# Patient Record
Sex: FEMALE | Race: WHITE | NOT HISPANIC OR LATINO | Employment: OTHER | URBAN - METROPOLITAN AREA
[De-identification: names, ages, dates, MRNs, and addresses within clinical notes are randomized per-mention and may not be internally consistent; named-entity substitution may affect disease eponyms.]

---

## 2017-01-10 ENCOUNTER — TELEPHONE (OUTPATIENT)
Dept: FAMILY MEDICINE | Facility: CLINIC | Age: 72
End: 2017-01-10

## 2017-01-10 RX ORDER — PIOGLITAZONEHYDROCHLORIDE 30 MG/1
30 TABLET ORAL DAILY
Qty: 90 TABLET | Refills: 1 | Status: SHIPPED | OUTPATIENT
Start: 2017-01-10 | End: 2017-06-29 | Stop reason: SDUPTHER

## 2017-01-10 NOTE — TELEPHONE ENCOUNTER
----- Message from Cesar Chan sent at 1/9/2017  8:53 AM CST -----  Contact: Self  Needs to discuss a change of medicine through insurance company. Insurance wants to replace Rx Januvia 100mg tablets to Rx Pioglipazone. First must be verified through [employer]. Please call [phone]. Thank You!

## 2017-01-16 ENCOUNTER — TELEPHONE (OUTPATIENT)
Dept: FAMILY MEDICINE | Facility: CLINIC | Age: 72
End: 2017-01-16

## 2017-01-16 NOTE — TELEPHONE ENCOUNTER
----- Message from Miranda Hall sent at 1/16/2017  3:25 PM CST -----  Contact: patient  Patient called stated that she has flu symptoms requesting a Rx. Please call back to advise at [phone]. Thanks,

## 2017-01-17 ENCOUNTER — OFFICE VISIT (OUTPATIENT)
Dept: FAMILY MEDICINE | Facility: CLINIC | Age: 72
End: 2017-01-17
Payer: MEDICARE

## 2017-01-17 VITALS
WEIGHT: 162.25 LBS | DIASTOLIC BLOOD PRESSURE: 60 MMHG | OXYGEN SATURATION: 98 % | BODY MASS INDEX: 29.86 KG/M2 | SYSTOLIC BLOOD PRESSURE: 114 MMHG | HEART RATE: 87 BPM | TEMPERATURE: 98 F | HEIGHT: 62 IN

## 2017-01-17 DIAGNOSIS — K52.9 GASTROENTERITIS: ICD-10-CM

## 2017-01-17 DIAGNOSIS — K52.9 GASTROENTERITIS: Primary | ICD-10-CM

## 2017-01-17 DIAGNOSIS — J06.9 UPPER RESPIRATORY TRACT INFECTION, UNSPECIFIED TYPE: ICD-10-CM

## 2017-01-17 PROCEDURE — 99999 PR PBB SHADOW E&M-EST. PATIENT-LVL III: CPT | Mod: PBBFAC,,, | Performed by: INTERNAL MEDICINE

## 2017-01-17 PROCEDURE — 3074F SYST BP LT 130 MM HG: CPT | Mod: S$GLB,,, | Performed by: INTERNAL MEDICINE

## 2017-01-17 PROCEDURE — 1159F MED LIST DOCD IN RCRD: CPT | Mod: S$GLB,,, | Performed by: INTERNAL MEDICINE

## 2017-01-17 PROCEDURE — 1157F ADVNC CARE PLAN IN RCRD: CPT | Mod: S$GLB,,, | Performed by: INTERNAL MEDICINE

## 2017-01-17 PROCEDURE — 99214 OFFICE O/P EST MOD 30 MIN: CPT | Mod: S$GLB,,, | Performed by: INTERNAL MEDICINE

## 2017-01-17 PROCEDURE — 3078F DIAST BP <80 MM HG: CPT | Mod: S$GLB,,, | Performed by: INTERNAL MEDICINE

## 2017-01-17 PROCEDURE — 1126F AMNT PAIN NOTED NONE PRSNT: CPT | Mod: S$GLB,,, | Performed by: INTERNAL MEDICINE

## 2017-01-17 PROCEDURE — 1160F RVW MEDS BY RX/DR IN RCRD: CPT | Mod: S$GLB,,, | Performed by: INTERNAL MEDICINE

## 2017-01-17 RX ORDER — ONDANSETRON 4 MG/1
4 TABLET, ORALLY DISINTEGRATING ORAL EVERY 4 HOURS PRN
Qty: 15 TABLET | Refills: 0 | Status: SHIPPED | OUTPATIENT
Start: 2017-01-17 | End: 2017-06-29

## 2017-01-17 RX ORDER — CEFUROXIME AXETIL 500 MG/1
500 TABLET ORAL 2 TIMES DAILY
Qty: 20 TABLET | Refills: 0 | Status: SHIPPED | OUTPATIENT
Start: 2017-01-17 | End: 2017-01-27

## 2017-01-17 RX ORDER — PANTOPRAZOLE SODIUM 40 MG/1
40 TABLET, DELAYED RELEASE ORAL DAILY
Qty: 30 TABLET | Refills: 0 | Status: SHIPPED | OUTPATIENT
Start: 2017-01-17 | End: 2017-06-29

## 2017-01-17 NOTE — MR AVS SNAPSHOT
[facility]  [address]  [facility] [address]  Phone: [phone]  Fax: [phone]                  Shirlene Ortiz   2017 2:10 PM   Office Visit    Description:  Female : 1945   Provider:  Hiram Mendiola MD   Department:  [facility]           Reason for Visit     Cough     Nasal Congestion           Diagnoses this Visit        Comments    Gastroenteritis    -  Primary     Upper respiratory tract infection, unspecified type                To Do List           Future Appointments        Provider Department Dept Phone    3/15/2017 9:40 AM LAB, [place] [facility] [phone]    3/22/2017 7:50 AM Hiram Mendiola MD [facility] [phone]      Goals (5 Years of Data)     None       These Medications        Disp Refills Start End    cefUROXime (CEFTIN) 500 MG tablet 20 tablet 0 2017    Take 1 tablet (500 mg total) by mouth 2 (two) times daily. - Oral    Pharmacy: [id number] - [facility] [address] Ph #: [phone]       ondansetron (ZOFRAN-ODT) 4 MG TbDL 15 tablet 0 2017     Take 1 tablet (4 mg total) by mouth every 4 (four) hours as needed (nausea, vomiting). - Oral    Pharmacy: [id number] - [place], [place] - [address] Ph #: [phone]       pantoprazole (PROTONIX) 40 MG tablet 30 tablet 0 2017     Take 1 tablet (40 mg total) by mouth once daily. - Oral    Pharmacy: [id number] - [place], [place] - [address] Ph #: [phone]       ranitidine (ZANTAC) 300 MG tablet 14 tablet 0 2017    Take 1 tablet (300 mg total) by mouth every evening. - Oral    Pharmacy: [id number] - [place], [place] - [address] Ph #: [phone]         [facility]     [facility] On Call Nurse Care Line -  Assistance  Registered nurses in the [facility] provide clinical advisement, health education,  appointment booking, and other advisory services.  Call for this free service at [phone].             Medications           Message regarding Medications     Verify the changes and/or additions to your medication regime listed below are the same as discussed with your clinician today.  If any of these changes or additions are incorrect, please notify your healthcare provider.        START taking these NEW medications        Refills    cefUROXime (CEFTIN) 500 MG tablet 0    Sig: Take 1 tablet (500 mg total) by mouth 2 (two) times daily.    Class: Normal    Route: Oral    ondansetron (ZOFRAN-ODT) 4 MG TbDL 0    Sig: Take 1 tablet (4 mg total) by mouth every 4 (four) hours as needed (nausea, vomiting).    Class: Normal    Route: Oral    pantoprazole (PROTONIX) 40 MG tablet 0    Sig: Take 1 tablet (40 mg total) by mouth once daily.    Class: Normal    Route: Oral    ranitidine (ZANTAC) 300 MG tablet 0    Sig: Take 1 tablet (300 mg total) by mouth every evening.    Class: Normal    Route: Oral           Verify that the below list of medications is an accurate representation of the medications you are currently taking.  If none reported, the list may be blank. If incorrect, please contact your healthcare provider. Carry this list with you in case of emergency.           Current Medications     amlodipine (NORVASC) 10 MG tablet Take 1 tablet (10 mg total) by mouth once daily. If pressure drops too much, then take 1/2 tablet (5 mg) daily    aspirin (ECOTRIN) 325 MG EC tablet Take 325 mg by mouth once daily.      atorvastatin (LIPITOR) 40 MG tablet Take 1 tablet (40 mg total) by mouth once daily.    blood sugar diagnostic Strp 1 strip by Misc.(Non-Drug; Combo Route) route once daily. One touch ultra Blue Test Strips    clopidogrel (PLAVIX) 75 mg tablet Take 1 tablet (75 mg total) by mouth once.    fenofibrate 160 MG Tab Take 1 tablet (160 mg total) by mouth once daily.    glipiZIDE (GLUCOTROL) 10 MG tablet Take 1  "tablet (10 mg total) by mouth 2 (two) times daily.    hydrochlorothiazide (MICROZIDE) 12.5 mg capsule Take 1 capsule (12.5 mg total) by mouth once daily.    lisinopril (PRINIVIL,ZESTRIL) 20 MG tablet Take 1 tablet (20 mg total) by mouth once daily.    metformin (GLUCOPHAGE) 1000 MG tablet Take 1 tablet (1,000 mg total) by mouth 2 (two) times daily with meals.    pioglitazone (ACTOS) 30 MG tablet Take 1 tablet (30 mg total) by mouth once daily.    tizanidine (ZANAFLEX) 2 MG tablet 1 po q4h prn back pain    cefUROXime (CEFTIN) 500 MG tablet Take 1 tablet (500 mg total) by mouth 2 (two) times daily.    ondansetron (ZOFRAN-ODT) 4 MG TbDL Take 1 tablet (4 mg total) by mouth every 4 (four) hours as needed (nausea, vomiting).    pantoprazole (PROTONIX) 40 MG tablet Take 1 tablet (40 mg total) by mouth once daily.    ranitidine (ZANTAC) 300 MG tablet Take 1 tablet (300 mg total) by mouth every evening.           Clinical Reference Information           Vital Signs - Last Recorded  Most recent update: 1/17/2017  2:21 PM by Ashly Vogel LPN    BP Pulse Temp Ht Wt SpO2    114/60 87 97.6 °F (36.4 °C) 5' 2" (1.575 m) 73.6 kg (162 lb 4.1 oz) 98%    BMI                29.68 kg/m2          Blood Pressure          Most Recent Value    BP  114/60      Allergies as of 1/17/2017     No Known Allergies      Immunizations Administered on Date of Encounter - 1/17/2017     None      Smoking Cessation     If you would like to quit smoking:   You may be eligible for free services if you are a [place] resident and started smoking cigarettes before September 1, 1988.  Call the Smoking Cessation Trust (SCT) toll free at (274) 643-4011 or (793) 032-4312.   Call 6-800-QUIT-NOW if you do not meet the above criteria.            "

## 2017-01-24 DIAGNOSIS — Z79.4 TYPE 2 DIABETES MELLITUS WITHOUT COMPLICATION, WITH LONG-TERM CURRENT USE OF INSULIN: ICD-10-CM

## 2017-01-24 DIAGNOSIS — E11.9 TYPE 2 DIABETES MELLITUS WITHOUT COMPLICATION, WITH LONG-TERM CURRENT USE OF INSULIN: ICD-10-CM

## 2017-01-24 NOTE — TELEPHONE ENCOUNTER
----- Message from Trae Echevarria sent at 1/23/2017  3:21 PM CST -----  Contact: same  Patient called in and stated that [employer] called her and stated they have not been able to get in touch with Dr. Mendiola about filling her Lighttouch test strips.      [facility] - [place], [place] - [address]  [address] [facility] [address]  Phone: [phone] Fax: [phone]    Patient call back number is [phone]

## 2017-01-24 NOTE — TELEPHONE ENCOUNTER
I called the patient and I am waiting for her to give me the number to [facility], for test strips. Awaiting call

## 2017-02-14 ENCOUNTER — CLINICAL SUPPORT (OUTPATIENT)
Dept: CARDIOLOGY | Facility: CLINIC | Age: 72
End: 2017-02-14
Payer: MEDICARE

## 2017-02-14 DIAGNOSIS — I49.5 SINUS NODE DYSFUNCTION: ICD-10-CM

## 2017-02-14 DIAGNOSIS — Z95.0 PACEMAKER: Primary | ICD-10-CM

## 2017-02-14 DIAGNOSIS — Z95.0 PACEMAKER: ICD-10-CM

## 2017-02-14 PROCEDURE — 93280 PM DEVICE PROGR EVAL DUAL: CPT | Mod: S$GLB,,, | Performed by: INTERNAL MEDICINE

## 2017-03-08 ENCOUNTER — PATIENT OUTREACH (OUTPATIENT)
Dept: ADMINISTRATIVE | Facility: HOSPITAL | Age: 72
End: 2017-03-08

## 2017-03-16 ENCOUNTER — PATIENT OUTREACH (OUTPATIENT)
Dept: ADMINISTRATIVE | Facility: HOSPITAL | Age: 72
End: 2017-03-16

## 2017-03-16 NOTE — LETTER
March 16, 2017    Shirlene Ortiz  [address] [facility] [address]             [facility]  [address]  [facility] [address]  Phone: [phone] Dear Mrs. Ortiz:    We have tried to reach you by mikehart unsuccessfully.    [facility] is committed to your overall health.  To help you get the most out of each of your visits, we will review your information to make sure you are up to date on all of your recommended tests and/or procedures.       Dr. Mendiola         has found that you may be due for:     Tetanus immunization   Pneumonia immunization   Annual Dilated Eye Exam   Osteoporosis screening (DEXA SCAN)     Please bring your home blood pressure machine with you so that we may test for accuracy.     If you have had any of the above done at another facility, please bring the records or information with you so that your record at [facility] will be complete.      If you are currently taking medication, please bring it with you to your appointment for review.     Sincerely,  Radha Rodriguez  Clinical Care Coordinator  [facility] [address] [place] [address]  Phone: [phone]   Fax: [phone]

## 2017-03-22 ENCOUNTER — HOSPITAL ENCOUNTER (OUTPATIENT)
Dept: RADIOLOGY | Facility: HOSPITAL | Age: 72
Discharge: HOME | End: 2017-03-22
Attending: INTERNAL MEDICINE
Payer: MEDICARE

## 2017-03-22 ENCOUNTER — OFFICE VISIT (OUTPATIENT)
Dept: FAMILY MEDICINE | Facility: CLINIC | Age: 72
End: 2017-03-22
Payer: MEDICARE

## 2017-03-22 VITALS
OXYGEN SATURATION: 95 % | SYSTOLIC BLOOD PRESSURE: 124 MMHG | BODY MASS INDEX: 30.31 KG/M2 | HEART RATE: 86 BPM | DIASTOLIC BLOOD PRESSURE: 64 MMHG | HEIGHT: 62 IN | WEIGHT: 164.69 LBS | RESPIRATION RATE: 18 BRPM

## 2017-03-22 DIAGNOSIS — E78.5 DYSLIPIDEMIA: ICD-10-CM

## 2017-03-22 DIAGNOSIS — Z00.00 ROUTINE PHYSICAL EXAMINATION: Primary | ICD-10-CM

## 2017-03-22 DIAGNOSIS — I73.9 PVD (PERIPHERAL VASCULAR DISEASE): ICD-10-CM

## 2017-03-22 DIAGNOSIS — Z78.0 POST-MENOPAUSAL: ICD-10-CM

## 2017-03-22 DIAGNOSIS — I10 ESSENTIAL HYPERTENSION: ICD-10-CM

## 2017-03-22 PROCEDURE — 99999 PR PBB SHADOW E&M-EST. PATIENT-LVL III: CPT | Mod: PBBFAC,,, | Performed by: INTERNAL MEDICINE

## 2017-03-22 PROCEDURE — 3078F DIAST BP <80 MM HG: CPT | Mod: S$GLB,,, | Performed by: INTERNAL MEDICINE

## 2017-03-22 PROCEDURE — 99499 UNLISTED E&M SERVICE: CPT | Mod: S$GLB,,, | Performed by: INTERNAL MEDICINE

## 2017-03-22 PROCEDURE — 3074F SYST BP LT 130 MM HG: CPT | Mod: S$GLB,,, | Performed by: INTERNAL MEDICINE

## 2017-03-22 PROCEDURE — 99397 PER PM REEVAL EST PAT 65+ YR: CPT | Mod: S$GLB,,, | Performed by: INTERNAL MEDICINE

## 2017-03-22 PROCEDURE — 77080 DXA BONE DENSITY AXIAL: CPT | Mod: 26,,, | Performed by: RADIOLOGY

## 2017-03-22 PROCEDURE — 77080 DXA BONE DENSITY AXIAL: CPT | Mod: TC,PO

## 2017-03-22 NOTE — PROGRESS NOTES
Subjective:       Patient ID: Shirlene Ortiz is a 71 y.o. female.    Chief Complaint: Annual Exam    HPI Comments: Here for routine health maintenance.  No new complaints.  PVD - 3 new stents in left leg 12/2016; will likely get stent in right leg  Poorly compliant with office f/u in past    DM - uncontrolled   HTN - controlled; takes 1/2 10 mg amlodipine daily; if BP low, will take 1/4 of a 10 mg per Dr Sandoval's instructions.   HLD - borderline controlled ldl goal < 70; good triglycerides; low HDL  Smoke abuse - advised to quit; not ready  Complains of low back pain - advised to avoid nsaids, but takes ibuprofen  CKD III - stable    Review of Systems   Constitutional: Negative for appetite change and fever.   HENT: Negative for nosebleeds and trouble swallowing.    Eyes: Negative for discharge and visual disturbance.   Respiratory: Negative for choking and shortness of breath.    Cardiovascular: Negative for chest pain and palpitations.   Gastrointestinal: Negative for abdominal pain, nausea and vomiting.   Musculoskeletal: Negative for arthralgias and joint swelling.   Skin: Negative for rash and wound.   Neurological: Negative for dizziness and syncope.   Psychiatric/Behavioral: Negative for confusion and dysphoric mood.       Objective:      Vitals:    03/22/17 0801   BP: 124/64   Pulse: 86   Resp: 18     Physical Exam   Constitutional: She appears well-nourished.   Eyes: Conjunctivae and EOM are normal.   Neck: Trachea normal and normal range of motion. No thyromegaly present.   Cardiovascular: Normal heart sounds.    Edema negative   Pulmonary/Chest: Effort normal and breath sounds normal.   Abdominal: Soft. There is no hepatomegaly.   Neurological: No cranial nerve deficit.   DTR decreased bilateral   Skin: Skin is warm, dry and intact.   Psychiatric: She has a normal mood and affect.   Alert and Oriented    Vitals reviewed.        Assessment:       1. Routine physical examination    2. Uncontrolled type  "2 diabetes mellitus without complication, without long-term current use of insulin    3. PVD (peripheral vascular disease)    4. Dyslipidemia    5. Essential hypertension    6. Post-menopausal        Plan:       Routine physical examination    Uncontrolled type 2 diabetes mellitus without complication, without long-term current use of insulin  -     Hemoglobin A1c; Future; Expected date: 3/22/17  -     Hemoglobin A1c; Future; Expected date: 6/20/17    PVD (peripheral vascular disease)  -     Comprehensive metabolic panel; Future; Expected date: 3/22/17  -     Comprehensive metabolic panel; Future; Expected date: 6/20/17    Dyslipidemia  -     Lipid panel; Future; Expected date: 3/22/17  -     Lipid panel; Future; Expected date: 6/20/17    Essential hypertension  -     Comprehensive metabolic panel; Future; Expected date: 3/22/17  -     Comprehensive metabolic panel; Future; Expected date: 6/20/17    Post-menopausal  -     DXA Bone Density Spine And Hip_Axial Skeleton; Future; Expected date: 3/22/17    wellness reviewed        Counseled on regular exercise, maintenance of a healthy weight, balanced diet rich in fruits/vegetables and lean protein, and avoidance of unhealthy habits like smoking and excessive alcohol intake.   Also, counseled on importance of being compliant with medication, health appointments, diet and exercise.     Return in about 3 months (around 6/22/2017). c    "This note will not be shared with the patient."  "

## 2017-03-22 NOTE — MR AVS SNAPSHOT
[facility]  [address]  [place] [address]  Phone: [phone]  Fax: [phone]                  Shirlene Ortiz   3/22/2017 7:50 AM   Office Visit    Description:  Female : 1945   Provider:  Hiram Mendiola MD   Department:  [facility]           Reason for Visit     Annual Exam           Diagnoses this Visit        Comments    Routine physical examination    -  Primary     Uncontrolled type 2 diabetes mellitus without complication, without long-term current use of insulin         PVD (peripheral vascular disease)         Dyslipidemia         Essential hypertension         Post-menopausal                To Do List           Future Appointments        Provider Department Dept Phone    3/22/2017 8:45 AM LAB, [place] [facility] [phone]    2017 8:00 AM LAB, [place] [facility] [phone]    2017 7:30 AM Hiram Mendiola MD [facility] [phone]      Goals (5 Years of Data)     None      Follow-Up and Disposition     Return in about 3 months (around 2017).    Follow-up and Disposition History      [facility] Call     [facility] On Call Nurse Care Line -  Assistance  Registered nurses in the [facility] provide clinical advisement, health education, appointment booking, and other advisory services.  Call for this free service at [phone].             Medications           Message regarding Medications     Verify the changes and/or additions to your medication regime listed below are the same as discussed with your clinician today.  If any of these changes or additions are incorrect, please notify your healthcare provider.        STOP taking these medications     tizanidine (ZANAFLEX) 2 MG tablet 1 po q4h prn back pain           Verify that the below list of medications is an accurate representation of the medications you are currently taking.  If none reported, the  "list may be blank. If incorrect, please contact your healthcare provider. Carry this list with you in case of emergency.           Current Medications     amlodipine (NORVASC) 10 MG tablet Take 1 tablet (10 mg total) by mouth once daily. If pressure drops too much, then take 1/2 tablet (5 mg) daily    aspirin (ECOTRIN) 325 MG EC tablet Take 325 mg by mouth once daily.      atorvastatin (LIPITOR) 40 MG tablet Take 1 tablet (40 mg total) by mouth once daily.    blood sugar diagnostic Strp 1 strip by Misc.(Non-Drug; Combo Route) route once daily. One touch ultra Blue Test Strips    clopidogrel (PLAVIX) 75 mg tablet Take 1 tablet (75 mg total) by mouth once.    fenofibrate 160 MG Tab Take 1 tablet (160 mg total) by mouth once daily.    glipiZIDE (GLUCOTROL) 10 MG tablet Take 1 tablet (10 mg total) by mouth 2 (two) times daily.    hydrochlorothiazide (MICROZIDE) 12.5 mg capsule Take 1 capsule (12.5 mg total) by mouth once daily.    lisinopril (PRINIVIL,ZESTRIL) 20 MG tablet Take 1 tablet (20 mg total) by mouth once daily.    metformin (GLUCOPHAGE) 1000 MG tablet Take 1 tablet (1,000 mg total) by mouth 2 (two) times daily with meals.    ondansetron (ZOFRAN-ODT) 4 MG TbDL Take 1 tablet (4 mg total) by mouth every 4 (four) hours as needed (nausea, vomiting).    pantoprazole (PROTONIX) 40 MG tablet Take 1 tablet (40 mg total) by mouth once daily.    pioglitazone (ACTOS) 30 MG tablet Take 1 tablet (30 mg total) by mouth once daily.    ranitidine (ZANTAC) 300 MG tablet TAKE ONE TABLET BY MOUTH DAILY IN THE EVENING           Clinical Reference Information           Your Vitals Were     BP Pulse Resp Height Weight SpO2    124/64 (BP Location: Left arm, Patient Position: Sitting, BP Method: Manual) 86 18 5' 2" (1.575 m) 74.7 kg (164 lb 10.9 oz) 95%    BMI                30.12 kg/m2          Blood Pressure          Most Recent Value    BP  124/64      Allergies as of 3/22/2017     No Known Allergies      Immunizations Administered " on Date of Encounter - 3/22/2017     None      Orders Placed During Today's Visit     Future Labs/Procedures Expected by Expires    Comprehensive metabolic panel  3/22/2017 5/21/2018    DXA Bone Density Spine And Hip_Axial Skeleton  3/22/2017 3/22/2018    Hemoglobin A1c  3/22/2017 5/21/2018    Lipid panel  3/22/2017 5/21/2018    Comprehensive metabolic panel  6/20/2017 3/22/2018    Hemoglobin A1c  6/20/2017 3/22/2018    Lipid panel  6/20/2017 3/22/2018      Smoking Cessation     If you would like to quit smoking:   You may be eligible for free services if you are a [place] resident and started smoking cigarettes before September 1, 1988.  Call the Smoking Cessation Trust (SCT) toll free at (244) 124-3064 or (368) 773-7508.   Call 1-503-QUIT-NOW if you do not meet the above criteria.            Language Assistance Services     ATTENTION: Language assistance services are available, free of charge. Please call [phone].      ATENCIÓN: Si habla kendraañol, tiene a keith disposición servicios gratuitos de asistencia lingüística. Llame al [phone].     CHÚ Ý: N?u b?n nói Ti?ng Vi?t, có các d?ch v? h? tr? ngôn ng? mi?n phí dành cho b?n. G?i s? [phone].         [facility] complies with applicable Federal civil rights laws and does not discriminate on the basis of race, color, national origin, age, disability, or sex.

## 2017-03-31 ENCOUNTER — TELEPHONE (OUTPATIENT)
Dept: FAMILY MEDICINE | Facility: CLINIC | Age: 72
End: 2017-03-31

## 2017-06-12 DIAGNOSIS — I49.5 SINUS NODE DYSFUNCTION: ICD-10-CM

## 2017-06-12 DIAGNOSIS — Z95.0 PACEMAKER: Primary | ICD-10-CM

## 2017-06-14 ENCOUNTER — CLINICAL SUPPORT (OUTPATIENT)
Dept: CARDIOLOGY | Facility: CLINIC | Age: 72
End: 2017-06-14
Payer: MEDICARE

## 2017-06-14 DIAGNOSIS — Z95.0 PACEMAKER: ICD-10-CM

## 2017-06-14 DIAGNOSIS — I49.5 SINUS NODE DYSFUNCTION: ICD-10-CM

## 2017-06-14 PROCEDURE — 93280 PM DEVICE PROGR EVAL DUAL: CPT | Mod: S$GLB,,, | Performed by: INTERNAL MEDICINE

## 2017-06-15 ENCOUNTER — PATIENT OUTREACH (OUTPATIENT)
Dept: ADMINISTRATIVE | Facility: HOSPITAL | Age: 72
End: 2017-06-15

## 2017-06-23 ENCOUNTER — PATIENT OUTREACH (OUTPATIENT)
Dept: ADMINISTRATIVE | Facility: HOSPITAL | Age: 72
End: 2017-06-23

## 2017-06-23 NOTE — LETTER
June 23, 2017    Shirlene MARRERO   [address] [address] [facility] [address]             [facility]  [address]  [facility] [address]  Phone: [phone] Dear Ms. Ortiz:    We have tried to reach you by mychart unsuccessfully.    [facility] is committed to your overall health.  To help you get the most out of each of your visits, we will review your information to make sure you are up to date on all of your recommended tests and/or procedures.       Dr. Mendiola        has found that you may be due for:     Annual Dilated Eye Exam     If you have had any of the above done at another facility, please bring the records or information with you so that your record at [facility] will be complete.      If you are currently taking medication , please bring it with you to your appointment for review.     We appreciate the opportunity to provide you with excellent medical care.       If you have any questions or concerns, please don't hesitate to call.    Sincerely,  Radha Rodriguez  Clinical Care Coordinator  [facility] [address] [place] [address]  Phone: [phone]   Fax: [phone]

## 2017-06-29 ENCOUNTER — OFFICE VISIT (OUTPATIENT)
Dept: FAMILY MEDICINE | Facility: CLINIC | Age: 72
End: 2017-06-29
Payer: MEDICARE

## 2017-06-29 ENCOUNTER — LAB VISIT (OUTPATIENT)
Dept: LAB | Facility: HOSPITAL | Age: 72
End: 2017-06-29
Attending: INTERNAL MEDICINE
Payer: MEDICARE

## 2017-06-29 VITALS
HEART RATE: 76 BPM | OXYGEN SATURATION: 99 % | SYSTOLIC BLOOD PRESSURE: 118 MMHG | BODY MASS INDEX: 30.44 KG/M2 | DIASTOLIC BLOOD PRESSURE: 64 MMHG | WEIGHT: 165.38 LBS | RESPIRATION RATE: 18 BRPM | HEIGHT: 62 IN

## 2017-06-29 DIAGNOSIS — I10 ESSENTIAL HYPERTENSION: ICD-10-CM

## 2017-06-29 DIAGNOSIS — M54.50 ACUTE MIDLINE LOW BACK PAIN WITHOUT SCIATICA: ICD-10-CM

## 2017-06-29 DIAGNOSIS — E78.5 DYSLIPIDEMIA: ICD-10-CM

## 2017-06-29 LAB
ALBUMIN SERPL BCP-MCNC: 3.8 G/DL
ALP SERPL-CCNC: 68 U/L
ALT SERPL W/O P-5'-P-CCNC: 11 U/L
ANION GAP SERPL CALC-SCNC: 11 MMOL/L
AST SERPL-CCNC: 12 U/L
BILIRUB SERPL-MCNC: 0.3 MG/DL
BUN SERPL-MCNC: 30 MG/DL
CALCIUM SERPL-MCNC: 10.4 MG/DL
CHLORIDE SERPL-SCNC: 106 MMOL/L
CHOLEST/HDLC SERPL: 4.7 {RATIO}
CO2 SERPL-SCNC: 25 MMOL/L
CREAT SERPL-MCNC: 1.5 MG/DL
EST. GFR  (AFRICAN AMERICAN): 39.8 ML/MIN/1.73 M^2
EST. GFR  (NON AFRICAN AMERICAN): 34.6 ML/MIN/1.73 M^2
ESTIMATED AVG GLUCOSE: 140 MG/DL
GLUCOSE SERPL-MCNC: 170 MG/DL
HBA1C MFR BLD HPLC: 6.5 %
HDL/CHOLESTEROL RATIO: 21.3 %
HDLC SERPL-MCNC: 136 MG/DL
HDLC SERPL-MCNC: 29 MG/DL
LDLC SERPL CALC-MCNC: 63.4 MG/DL
NONHDLC SERPL-MCNC: 107 MG/DL
POTASSIUM SERPL-SCNC: 4.8 MMOL/L
PROT SERPL-MCNC: 7.5 G/DL
SODIUM SERPL-SCNC: 142 MMOL/L
TRIGL SERPL-MCNC: 218 MG/DL

## 2017-06-29 PROCEDURE — 1159F MED LIST DOCD IN RCRD: CPT | Mod: S$GLB,,, | Performed by: INTERNAL MEDICINE

## 2017-06-29 PROCEDURE — 1126F AMNT PAIN NOTED NONE PRSNT: CPT | Mod: S$GLB,,, | Performed by: INTERNAL MEDICINE

## 2017-06-29 PROCEDURE — 36415 COLL VENOUS BLD VENIPUNCTURE: CPT | Mod: PO

## 2017-06-29 PROCEDURE — 80061 LIPID PANEL: CPT

## 2017-06-29 PROCEDURE — 99214 OFFICE O/P EST MOD 30 MIN: CPT | Mod: S$GLB,,, | Performed by: INTERNAL MEDICINE

## 2017-06-29 PROCEDURE — 3044F HG A1C LEVEL LT 7.0%: CPT | Mod: S$GLB,,, | Performed by: INTERNAL MEDICINE

## 2017-06-29 PROCEDURE — 83036 HEMOGLOBIN GLYCOSYLATED A1C: CPT

## 2017-06-29 PROCEDURE — 80053 COMPREHEN METABOLIC PANEL: CPT

## 2017-06-29 PROCEDURE — 4010F ACE/ARB THERAPY RXD/TAKEN: CPT | Mod: S$GLB,,, | Performed by: INTERNAL MEDICINE

## 2017-06-29 PROCEDURE — 99499 UNLISTED E&M SERVICE: CPT | Mod: S$GLB,,, | Performed by: INTERNAL MEDICINE

## 2017-06-29 PROCEDURE — 99999 PR PBB SHADOW E&M-EST. PATIENT-LVL III: CPT | Mod: PBBFAC,,, | Performed by: INTERNAL MEDICINE

## 2017-06-29 RX ORDER — TIZANIDINE 2 MG/1
1 TABLET ORAL 2 TIMES DAILY PRN
COMMUNITY
Start: 2017-06-12 | End: 2017-06-29

## 2017-06-29 RX ORDER — PIOGLITAZONEHYDROCHLORIDE 30 MG/1
30 TABLET ORAL DAILY
Qty: 90 TABLET | Refills: 1 | Status: SHIPPED | OUTPATIENT
Start: 2017-06-29 | End: 2017-08-15

## 2017-06-29 RX ORDER — METHOCARBAMOL 500 MG/1
500 TABLET, FILM COATED ORAL 4 TIMES DAILY
Qty: 40 TABLET | Refills: 1 | Status: SHIPPED | OUTPATIENT
Start: 2017-06-29 | End: 2017-07-09

## 2017-06-29 NOTE — PROGRESS NOTES
Subjective:       Patient ID: Shirlene Ortiz is a 72 y.o. female.    Chief Complaint: Back Pain; Diabetes (f/u); and Medication Refill    PVD - 3 new stents in left leg 12/2016; will likely get stent in right leg  Poorly compliant with office f/u in past    DM - controlled   HTN - controlled; takes 1/2 10 mg amlodipine daily; if BP low, will take 1/4 of a 10 mg per Dr Sandoval's instructions.   HLD - borderline controlled ldl goal < 70; good triglycerides; low HDL  Smoke abuse - advised to quit; not ready  Complains of low back pain - advised to avoid nsaids; tizanidine nw;   CKD III - stable      Review of Systems   Constitutional: Negative for appetite change and fever.   HENT: Negative for nosebleeds and trouble swallowing.    Eyes: Negative for discharge and visual disturbance.   Respiratory: Negative for choking and shortness of breath.    Cardiovascular: Negative for chest pain and palpitations.   Gastrointestinal: Negative for abdominal pain, nausea and vomiting.   Musculoskeletal: Positive for back pain. Negative for arthralgias and joint swelling.   Skin: Negative for rash and wound.   Neurological: Negative for dizziness and syncope.   Psychiatric/Behavioral: Negative for confusion and dysphoric mood.       Objective:      Vitals:    06/29/17 0729   BP: 118/64   Pulse: 76   Resp: 18     Physical Exam   Constitutional: She appears well-nourished.   Eyes: Conjunctivae and EOM are normal.   Neck: Normal range of motion.   Cardiovascular: Normal rate and regular rhythm.    Pulmonary/Chest: Effort normal and breath sounds normal.   Musculoskeletal:   Normal ROM bilateral   Back non ttp   Neurological: No cranial nerve deficit (grossly intact).   Skin: Skin is warm and dry.   Psychiatric: She has a normal mood and affect.   Alert and orientated   Vitals reviewed.        Assessment:       1. Uncontrolled type 2 diabetes mellitus without complication, without long-term current use of insulin    2. Essential  hypertension    3. Dyslipidemia    4. Acute midline low back pain without sciatica        Plan:       Uncontrolled type 2 diabetes mellitus without complication, without long-term current use of insulin  -     pioglitazone (ACTOS) 30 MG tablet; Take 1 tablet (30 mg total) by mouth once daily.  Dispense: 90 tablet; Refill: 1  -     Hemoglobin A1c; Future; Expected date: 06/29/2017  -     Hemoglobin A1c; Future; Expected date: 09/27/2017  -     Microalbumin/creatinine urine ratio; Future; Expected date: 09/27/2017  -     Ambulatory referral to Optometry    Essential hypertension  -     Comprehensive metabolic panel; Future; Expected date: 06/29/2017  -     Comprehensive metabolic panel; Future; Expected date: 09/27/2017    Dyslipidemia  -     Lipid panel; Future; Expected date: 06/29/2017  -     Lipid panel; Future; Expected date: 09/27/2017    Acute midline low back pain without sciatica  -     Ambulatory consult to Physical Therapy  -     methocarbamol (ROBAXIN) 500 MG Tab; Take 1 tablet (500 mg total) by mouth 4 (four) times daily.  Dispense: 40 tablet; Refill: 1    Continue current plan of care    Medication List with Changes/Refills   New Medications    METHOCARBAMOL (ROBAXIN) 500 MG TAB    Take 1 tablet (500 mg total) by mouth 4 (four) times daily.   Current Medications    AMLODIPINE (NORVASC) 10 MG TABLET    Take 1 tablet (10 mg total) by mouth once daily. If pressure drops too much, then take 1/2 tablet (5 mg) daily    ASPIRIN (ECOTRIN) 325 MG EC TABLET    Take 325 mg by mouth once daily.      ATORVASTATIN (LIPITOR) 40 MG TABLET    Take 1 tablet (40 mg total) by mouth once daily.    BLOOD SUGAR DIAGNOSTIC STRP    3 strips by Misc.(Non-Drug; Combo Route) route once daily. One touch ultra Blue Test Strips    CLOPIDOGREL (PLAVIX) 75 MG TABLET    Take 1 tablet (75 mg total) by mouth once.    FENOFIBRATE 160 MG TAB    Take 1 tablet (160 mg total) by mouth once daily.    GLIPIZIDE (GLUCOTROL) 10 MG TABLET    Take 1  "tablet (10 mg total) by mouth 2 (two) times daily.    HYDROCHLOROTHIAZIDE (MICROZIDE) 12.5 MG CAPSULE    Take 1 capsule (12.5 mg total) by mouth once daily.    LISINOPRIL (PRINIVIL,ZESTRIL) 20 MG TABLET    Take 1 tablet (20 mg total) by mouth once daily.    METFORMIN (GLUCOPHAGE) 1000 MG TABLET    Take 1 tablet (1,000 mg total) by mouth 2 (two) times daily with meals.   Changed and/or Refilled Medications    Modified Medication Previous Medication    PIOGLITAZONE (ACTOS) 30 MG TABLET pioglitazone (ACTOS) 30 MG tablet       Take 1 tablet (30 mg total) by mouth once daily.    Take 1 tablet (30 mg total) by mouth once daily.   Discontinued Medications    ONDANSETRON (ZOFRAN-ODT) 4 MG TBDL    Take 1 tablet (4 mg total) by mouth every 4 (four) hours as needed (nausea, vomiting).    PANTOPRAZOLE (PROTONIX) 40 MG TABLET    Take 1 tablet (40 mg total) by mouth once daily.    RANITIDINE (ZANTAC) 300 MG TABLET    TAKE ONE TABLET BY MOUTH DAILY IN THE EVENING    TIZANIDINE (ZANAFLEX) 2 MG TABLET    Take 1 tablet by mouth 2 (two) times daily as needed.             Counseled on regular exercise, maintenance of a healthy weight, balanced diet rich in fruits/vegetables and lean protein, and avoidance of unhealthy habits like smoking and excessive alcohol intake.   Also, counseled on importance of being compliant with medication, health appointments, diet and exercise.     Return in about 3 months (around 9/29/2017). c    "This note will not be shared with the patient."  "

## 2017-07-05 ENCOUNTER — TELEPHONE (OUTPATIENT)
Dept: FAMILY MEDICINE | Facility: CLINIC | Age: 72
End: 2017-07-05

## 2017-07-05 NOTE — TELEPHONE ENCOUNTER
Phoned pt in regards to messages below. Lab results per Dr Mendiola' instructions. Pt is inquiring if she should continue her 325 mg aspirin daily or change to a low dose aspirin. Pt states we can leave message on her vm to avoid playing phone tag. Please review and advise on the aspirin. Thank you, CLC

## 2017-07-05 NOTE — TELEPHONE ENCOUNTER
----- Message from Miranda Hall sent at 7/5/2017  9:01 AM CDT -----  Contact: Patient  Patient returning call regarding test results. Please call back at [phone] to advise. Thanks,

## 2017-07-05 NOTE — TELEPHONE ENCOUNTER
----- Message from Latasha Snyder sent at 7/3/2017  4:06 PM CDT -----  Contact: self  Patient want to speak with a nurse regarding test results please call back at [phone] (home)

## 2017-08-15 ENCOUNTER — TELEPHONE (OUTPATIENT)
Dept: CARDIOLOGY | Facility: CLINIC | Age: 72
End: 2017-08-15

## 2017-08-15 PROBLEM — Z72.0 TOBACCO ABUSE: Status: ACTIVE | Noted: 2017-08-15

## 2017-08-15 PROBLEM — R79.89 ELEVATED TROPONIN: Status: ACTIVE | Noted: 2017-08-15

## 2017-08-15 NOTE — TELEPHONE ENCOUNTER
----- Message from Maya Esquivel sent at 8/15/2017  8:53 AM CDT -----  Contact: self  Call placed to pod.  Tried to get Latia Blair through IM. Was told by manager to send high priority message.  Patient is not sure if she had a heart attack or not. Patient had tightness in chest all day yesterday and last night, took nitroglycerin pill and BP meds last night BP was 189/99, went to normal.  Woke up this morning BP is 100/51. [phone] (home)

## 2017-08-16 ENCOUNTER — TELEPHONE (OUTPATIENT)
Dept: CARDIOLOGY | Facility: CLINIC | Age: 72
End: 2017-08-16

## 2017-08-16 PROBLEM — R07.9 CHEST PAIN: Status: ACTIVE | Noted: 2017-08-16

## 2017-08-16 PROBLEM — R07.9 CHEST PAIN: Status: ACTIVE | Noted: 2017-08-15

## 2017-08-16 NOTE — TELEPHONE ENCOUNTER
----- Message from Miranda Hall sent at 8/16/2017  2:57 PM CDT -----  Contact: Mitali Jasmine patient's daughter called to advise that patient is in the [facility]. Please call back at [phone]. Thanks,

## 2017-08-17 DIAGNOSIS — I25.10 CORONARY ATHEROSCLEROSIS OF UNSPECIFIED TYPE OF VESSEL, NATIVE OR GRAFT: Primary | ICD-10-CM

## 2017-08-17 PROBLEM — I21.4 NON-ST ELEVATED MYOCARDIAL INFARCTION: Status: ACTIVE | Noted: 2017-08-17

## 2017-08-22 PROBLEM — I25.10 CORONARY ATHEROSCLEROSIS OF UNSPECIFIED TYPE OF VESSEL, NATIVE OR GRAFT: Status: ACTIVE | Noted: 2017-08-15

## 2017-08-22 PROBLEM — I25.10 CORONARY ATHEROSCLEROSIS OF UNSPECIFIED TYPE OF VESSEL, NATIVE OR GRAFT: Status: ACTIVE | Noted: 2017-08-22

## 2017-08-25 PROBLEM — J20.9 COPD WITH ACUTE BRONCHITIS: Status: ACTIVE | Noted: 2017-08-25

## 2017-08-25 PROBLEM — J44.0 COPD WITH ACUTE BRONCHITIS: Status: ACTIVE | Noted: 2017-08-25

## 2017-08-27 PROBLEM — D64.9 ANEMIA: Status: ACTIVE | Noted: 2017-08-27

## 2017-08-28 DIAGNOSIS — I25.10 CORONARY ARTERY DISEASE, ANGINA PRESENCE UNSPECIFIED, UNSPECIFIED VESSEL OR LESION TYPE, UNSPECIFIED WHETHER NATIVE OR TRANSPLANTED HEART: Primary | ICD-10-CM

## 2017-08-31 DIAGNOSIS — E11.8 TYPE 2 DIABETES MELLITUS WITH COMPLICATION, WITHOUT LONG-TERM CURRENT USE OF INSULIN: Primary | ICD-10-CM

## 2017-08-31 NOTE — TELEPHONE ENCOUNTER
----- Message from Kassandra Jimenez sent at 8/31/2017  9:43 AM CDT -----  Contact: self  Patient [phone] is requesting Lantus and test strips for [employer] Diabetic testing Machine called to [facility]/patient test 6 times daily/patient is almost out and asking to send request as soon as possible and please advise patient when sent

## 2017-09-01 RX ORDER — LANCETS
1 EACH MISCELLANEOUS
Qty: 540 EACH | Refills: 3 | Status: SHIPPED | OUTPATIENT
Start: 2017-09-01 | End: 2017-09-28 | Stop reason: SDUPTHER

## 2017-09-14 ENCOUNTER — OFFICE VISIT (OUTPATIENT)
Dept: VASCULAR SURGERY | Facility: CLINIC | Age: 72
End: 2017-09-14
Payer: MEDICARE

## 2017-09-14 VITALS
HEART RATE: 88 BPM | BODY MASS INDEX: 27.85 KG/M2 | HEIGHT: 63 IN | WEIGHT: 157.19 LBS | DIASTOLIC BLOOD PRESSURE: 65 MMHG | SYSTOLIC BLOOD PRESSURE: 116 MMHG

## 2017-09-14 DIAGNOSIS — Z95.1 S/P CABG (CORONARY ARTERY BYPASS GRAFT): ICD-10-CM

## 2017-09-14 PROCEDURE — 99999 PR PBB SHADOW E&M-EST. PATIENT-LVL III: CPT | Mod: PBBFAC,,, | Performed by: THORACIC SURGERY (CARDIOTHORACIC VASCULAR SURGERY)

## 2017-09-14 PROCEDURE — 99024 POSTOP FOLLOW-UP VISIT: CPT | Mod: S$GLB,,, | Performed by: THORACIC SURGERY (CARDIOTHORACIC VASCULAR SURGERY)

## 2017-09-15 NOTE — PROGRESS NOTES
OFFICE NOTE    This is a 72-year-old lady who had coronary artery bypass grafting on   08/22/2017.  She returns to the office today in followup.  She has done well.    She has no major complaints.  She has been able to wean her home oxygen to where   she only needs it occasionally at night.  Medicines are noted.    PHYSICAL EXAMINATION:  VITAL SIGNS:  Stable.  SKIN:  On exam, surgical wounds are clean and dry.  There is no evidence of   infection.  Her chest tube stitches were removed.    PLAN:  At this point, she is doing well, status post coronary artery bypass   grafting and she is making good progress.  I will defer medical management to   Cardiology Service.  She can see me on an as-needed basis, but I would   anticipate a good long-term outlook.      ALICIA  dd: 09/14/2017 08:46:34 (CDT)  td: 09/15/2017 00:32:09 (CDT)  Doc ID   #[id number]  Job ID #[id number]    CC:

## 2017-09-19 ENCOUNTER — PATIENT OUTREACH (OUTPATIENT)
Dept: ADMINISTRATIVE | Facility: HOSPITAL | Age: 72
End: 2017-09-19

## 2017-09-19 ENCOUNTER — TELEPHONE (OUTPATIENT)
Dept: FAMILY MEDICINE | Facility: CLINIC | Age: 72
End: 2017-09-19

## 2017-09-19 ENCOUNTER — NURSE TRIAGE (OUTPATIENT)
Dept: ADMINISTRATIVE | Facility: CLINIC | Age: 72
End: 2017-09-19

## 2017-09-19 NOTE — TELEPHONE ENCOUNTER
----- Message from Alley Simms sent at 9/19/2017  4:10 PM CDT -----  Contact: Patient  Patient is returning the call to report her blood sugar level and her bp.  Her blood sugar level is 245 and he bp is 93/46 and 89 is the pulse.  Can you please call the patient back at [phone].  Thank you

## 2017-09-19 NOTE — TELEPHONE ENCOUNTER
I have asked patient to lie down legs up and drink more water.  Also I have asked her to check her BS and to call back with her results.  Patient states she wakes up with a very dry mouth, and sometimes during the day her lips are very dry.  Patient states sometimes sh feels an irregular Heart beat but states it doesn't cause chest pains and goes away.  Advised patient that this advice is based on her stating she is having no heart rate problems now.  Patient states she has night sweats and wakes up soaked. She is concerned if she may not be becoming dehydrated.

## 2017-09-26 ENCOUNTER — LAB VISIT (OUTPATIENT)
Dept: LAB | Facility: HOSPITAL | Age: 72
End: 2017-09-26
Attending: INTERNAL MEDICINE
Payer: MEDICARE

## 2017-09-26 DIAGNOSIS — I10 ESSENTIAL HYPERTENSION: ICD-10-CM

## 2017-09-26 DIAGNOSIS — E11.8 TYPE 2 DIABETES MELLITUS WITH COMPLICATION, WITHOUT LONG-TERM CURRENT USE OF INSULIN: ICD-10-CM

## 2017-09-26 DIAGNOSIS — E78.5 DYSLIPIDEMIA: ICD-10-CM

## 2017-09-26 LAB
ALBUMIN SERPL BCP-MCNC: 3.7 G/DL
ALP SERPL-CCNC: 68 U/L
ALT SERPL W/O P-5'-P-CCNC: 12 U/L
ANION GAP SERPL CALC-SCNC: 8 MMOL/L
AST SERPL-CCNC: 18 U/L
BILIRUB SERPL-MCNC: 0.6 MG/DL
BUN SERPL-MCNC: 25 MG/DL
CALCIUM SERPL-MCNC: 9.5 MG/DL
CHLORIDE SERPL-SCNC: 107 MMOL/L
CHOLEST SERPL-MCNC: 144 MG/DL
CHOLEST/HDLC SERPL: 5 {RATIO}
CO2 SERPL-SCNC: 26 MMOL/L
CREAT SERPL-MCNC: 1.4 MG/DL
EST. GFR  (AFRICAN AMERICAN): 43.3 ML/MIN/1.73 M^2
EST. GFR  (NON AFRICAN AMERICAN): 37.6 ML/MIN/1.73 M^2
ESTIMATED AVG GLUCOSE: 117 MG/DL
GLUCOSE SERPL-MCNC: 106 MG/DL
HBA1C MFR BLD HPLC: 5.7 %
HDLC SERPL-MCNC: 29 MG/DL
HDLC SERPL: 20.1 %
LDLC SERPL CALC-MCNC: 75.6 MG/DL
NONHDLC SERPL-MCNC: 115 MG/DL
POTASSIUM SERPL-SCNC: 4.6 MMOL/L
PROT SERPL-MCNC: 7.3 G/DL
SODIUM SERPL-SCNC: 141 MMOL/L
TRIGL SERPL-MCNC: 197 MG/DL

## 2017-09-26 PROCEDURE — 80053 COMPREHEN METABOLIC PANEL: CPT

## 2017-09-26 PROCEDURE — 80061 LIPID PANEL: CPT

## 2017-09-26 PROCEDURE — 36415 COLL VENOUS BLD VENIPUNCTURE: CPT | Mod: PO

## 2017-09-26 PROCEDURE — 83036 HEMOGLOBIN GLYCOSYLATED A1C: CPT

## 2017-09-26 RX ORDER — LISINOPRIL 20 MG/1
TABLET ORAL
Qty: 90 TABLET | Refills: 3 | Status: CANCELLED | OUTPATIENT
Start: 2017-09-26

## 2017-09-26 RX ORDER — HYDROCHLOROTHIAZIDE 12.5 MG/1
CAPSULE ORAL
Qty: 90 CAPSULE | Refills: 3 | Status: CANCELLED | OUTPATIENT
Start: 2017-09-26

## 2017-09-26 RX ORDER — GLIPIZIDE 10 MG/1
TABLET ORAL
Qty: 180 TABLET | Refills: 3 | Status: CANCELLED | OUTPATIENT
Start: 2017-09-26

## 2017-09-26 RX ORDER — ATORVASTATIN CALCIUM 40 MG/1
TABLET, FILM COATED ORAL
Qty: 90 TABLET | Refills: 3 | Status: CANCELLED | OUTPATIENT
Start: 2017-09-26

## 2017-09-26 RX ORDER — METFORMIN HYDROCHLORIDE 1000 MG/1
TABLET ORAL
Qty: 180 TABLET | Refills: 3 | Status: CANCELLED | OUTPATIENT
Start: 2017-09-26

## 2017-09-26 NOTE — TELEPHONE ENCOUNTER
----- Message from Marilee Solomon sent at 9/26/2017 11:30 AM CDT -----  Pharmacy has been sending refill requests in but has not gotten a response. Patient would like office to call pharmacy to get her medications refilled. She did not know which of her medications needed to be refilled. Please remit to:      [facility] - [place], [place] - [address]  [address] [address]  Phone: [phone] Fax: [phone]    Please call patient when completed at [phone].

## 2017-09-27 ENCOUNTER — PATIENT OUTREACH (OUTPATIENT)
Dept: ADMINISTRATIVE | Facility: HOSPITAL | Age: 72
End: 2017-09-27

## 2017-09-27 RX ORDER — METHOCARBAMOL 500 MG/1
TABLET, FILM COATED ORAL
Qty: 80 TABLET | Refills: 1 | Status: SHIPPED | OUTPATIENT
Start: 2017-09-27 | End: 2017-10-03

## 2017-09-27 RX ORDER — TIZANIDINE 2 MG/1
TABLET ORAL
Qty: 60 TABLET | Refills: 1 | OUTPATIENT
Start: 2017-09-27

## 2017-09-27 NOTE — TELEPHONE ENCOUNTER
Please advise:  Pt. Reports /62 on 9-  94/49 on 9-27-17,  130/70 today    LHC done on 8-16-17  want to know do meds need adjustment.

## 2017-09-27 NOTE — PROGRESS NOTES
Portal outreach un-read by patient.  Outreach mailed today    [facility] is committed to your overall health.  To help you get the most out of each of your visits, we will review your information to make sure you are up to date on all of your recommended tests and/or procedures.       Dr. Mendiola       has found that you may be due for:     Influenza vaccine   Diabetic Foot Exam   Mammogram       REMINDER: lab appointment 9/26/17     If you have had any of the above done at another facility, please bring the records or information with you so that your record at [facility] will be complete.      If you are currently taking medication , please bring it with you to your appointment for review.     We appreciate the opportunity to provide you with excellent medical care.     Please call the number listed below if you have any questions.

## 2017-09-27 NOTE — LETTER
September 27, 2017    Shirlene MARRERO   [address] [address] [address]             [facility]  [address]  [place] [facility] [place] [address]  Phone: [phone] Dear Ms. Ortiz:    We have tried to reach you by mikehart unsuccessfully.      [facility] is committed to your overall health.  To help you get the most out of each of your visits, we will review your information to make sure you are up to date on all of your recommended tests and/or procedures.       Dr. Mendiola       has found that you may be due for:     Influenza vaccine   Diabetic Foot Exam   Mammogram     If you have had any of the above done at another facility, please bring the records or information with you so that your record at [facility] will be complete.      If you are currently taking medication , please bring it with you to your appointment for review.     We appreciate the opportunity to provide you with excellent medical care.     Please call the number listed below if you have any questions.     Sincerely,    Radha Rodriguez  Clinical Care Coordinator  [facility] [address] [place] [address]  Phone: [phone]   Fax: [phone]

## 2017-09-28 RX ORDER — FENOFIBRATE 160 MG/1
TABLET ORAL
Qty: 90 TABLET | Refills: 3 | Status: SHIPPED | OUTPATIENT
Start: 2017-09-28 | End: 2018-09-07 | Stop reason: SDUPTHER

## 2017-09-29 ENCOUNTER — TELEPHONE (OUTPATIENT)
Dept: CARDIOLOGY | Facility: CLINIC | Age: 72
End: 2017-09-29

## 2017-09-29 NOTE — TELEPHONE ENCOUNTER
----- Message from Gia Avalos sent at 9/29/2017  3:15 PM CDT -----  Contact: patient   Patient returning a missed call. Please advise. Call to pod. No answer.  Call back   Thanks!

## 2017-09-29 NOTE — TELEPHONE ENCOUNTER
Spoke to patient, informed her of prescription was sent to Dr. Loo by Latia GOFF. Verbalized understanding.

## 2017-09-29 NOTE — TELEPHONE ENCOUNTER
----- Message from RT Jeramy sent at 9/29/2017 11:49 AM CDT -----  Contact: pt    pt , requesting to check the status of her medication refills via [facility] pharmacy, very soon, please, thanks.

## 2017-10-03 ENCOUNTER — TELEPHONE (OUTPATIENT)
Dept: CARDIOLOGY | Facility: CLINIC | Age: 72
End: 2017-10-03

## 2017-10-03 ENCOUNTER — OFFICE VISIT (OUTPATIENT)
Dept: FAMILY MEDICINE | Facility: CLINIC | Age: 72
End: 2017-10-03
Payer: MEDICARE

## 2017-10-03 VITALS
SYSTOLIC BLOOD PRESSURE: 118 MMHG | HEIGHT: 63 IN | DIASTOLIC BLOOD PRESSURE: 72 MMHG | OXYGEN SATURATION: 99 % | RESPIRATION RATE: 20 BRPM | BODY MASS INDEX: 27.57 KG/M2 | WEIGHT: 155.63 LBS | HEART RATE: 77 BPM

## 2017-10-03 DIAGNOSIS — D64.9 NORMOCYTIC ANEMIA: ICD-10-CM

## 2017-10-03 DIAGNOSIS — E11.9 CONTROLLED TYPE 2 DIABETES MELLITUS WITHOUT COMPLICATION, WITHOUT LONG-TERM CURRENT USE OF INSULIN: Primary | ICD-10-CM

## 2017-10-03 DIAGNOSIS — I25.10 CORONARY ARTERY DISEASE, ANGINA PRESENCE UNSPECIFIED, UNSPECIFIED VESSEL OR LESION TYPE, UNSPECIFIED WHETHER NATIVE OR TRANSPLANTED HEART: ICD-10-CM

## 2017-10-03 DIAGNOSIS — I10 ESSENTIAL HYPERTENSION: ICD-10-CM

## 2017-10-03 DIAGNOSIS — E78.5 DYSLIPIDEMIA: ICD-10-CM

## 2017-10-03 DIAGNOSIS — Z12.31 ENCOUNTER FOR SCREENING MAMMOGRAM FOR BREAST CANCER: ICD-10-CM

## 2017-10-03 PROCEDURE — 90662 IIV NO PRSV INCREASED AG IM: CPT | Mod: S$GLB,,, | Performed by: INTERNAL MEDICINE

## 2017-10-03 PROCEDURE — G0008 ADMIN INFLUENZA VIRUS VAC: HCPCS | Mod: S$GLB,,, | Performed by: INTERNAL MEDICINE

## 2017-10-03 PROCEDURE — 99999 PR PBB SHADOW E&M-EST. PATIENT-LVL III: CPT | Mod: PBBFAC,,, | Performed by: INTERNAL MEDICINE

## 2017-10-03 PROCEDURE — 99499 UNLISTED E&M SERVICE: CPT | Mod: S$GLB,,, | Performed by: INTERNAL MEDICINE

## 2017-10-03 PROCEDURE — 99214 OFFICE O/P EST MOD 30 MIN: CPT | Mod: 25,S$GLB,, | Performed by: INTERNAL MEDICINE

## 2017-10-03 RX ORDER — LANCETS
1 EACH MISCELLANEOUS
Qty: 540 EACH | Refills: 11 | Status: SHIPPED | OUTPATIENT
Start: 2017-10-03 | End: 2019-01-14 | Stop reason: ALTCHOICE

## 2017-10-03 RX ORDER — ATORVASTATIN CALCIUM 40 MG/1
40 TABLET, FILM COATED ORAL DAILY
Qty: 90 TABLET | Refills: 3 | Status: SHIPPED | OUTPATIENT
Start: 2017-10-03 | End: 2018-09-07 | Stop reason: SDUPTHER

## 2017-10-03 RX ORDER — AMLODIPINE BESYLATE 5 MG/1
5 TABLET ORAL NIGHTLY
Qty: 30 TABLET | Refills: 6 | Status: SHIPPED | OUTPATIENT
Start: 2017-10-03 | End: 2017-10-18

## 2017-10-03 RX ORDER — METFORMIN HYDROCHLORIDE 1000 MG/1
1000 TABLET ORAL 2 TIMES DAILY WITH MEALS
Qty: 180 TABLET | Refills: 3 | Status: SHIPPED | OUTPATIENT
Start: 2017-10-03 | End: 2018-09-07 | Stop reason: SDUPTHER

## 2017-10-03 RX ORDER — HYDROCHLOROTHIAZIDE 12.5 MG/1
12.5 CAPSULE ORAL DAILY
Qty: 90 CAPSULE | Refills: 0 | Status: SHIPPED | OUTPATIENT
Start: 2017-10-03 | End: 2017-10-18

## 2017-10-03 RX ORDER — GLIPIZIDE 10 MG/1
10 TABLET ORAL 2 TIMES DAILY
Qty: 180 TABLET | Refills: 3 | Status: SHIPPED | OUTPATIENT
Start: 2017-10-03 | End: 2018-09-07 | Stop reason: SDUPTHER

## 2017-10-03 RX ORDER — LISINOPRIL 20 MG/1
20 TABLET ORAL DAILY
Qty: 90 TABLET | Refills: 3 | Status: SHIPPED | OUTPATIENT
Start: 2017-10-03 | End: 2017-10-18 | Stop reason: SDUPTHER

## 2017-10-03 RX ORDER — CARVEDILOL 6.25 MG/1
6.25 TABLET ORAL 2 TIMES DAILY
Qty: 90 TABLET | Refills: 6 | Status: SHIPPED | OUTPATIENT
Start: 2017-10-03 | End: 2017-10-18 | Stop reason: SDUPTHER

## 2017-10-03 NOTE — PROGRESS NOTES
Subjective:       Patient ID: Shirlene Ortiz is a 72 y.o. female.    Chief Complaint: No chief complaint on file.    CAD s/p CABG 8/17 after an NSTEMI  PVD - 3 new stents in left leg 12/2016;   13 stents in total  Poorly compliant with office f/u in past    DM - controlled   HTN - controlled; takes 1/2 10 mg amlodipine daily; if BP low, will take 1/4 of a 10 mg per Dr Sandoval's instructions.   HLD - uncontrolled ldl goal < 70; good triglycerides; low HDL  Smoke abuse - has quit since CABG - relapsed 1 day last week.  CKD III - slightly worse  COPD - stable      Review of Systems   Constitutional: Negative for appetite change and fever.   HENT: Negative for nosebleeds and trouble swallowing.    Eyes: Negative for discharge and visual disturbance.   Respiratory: Negative for choking and shortness of breath.    Cardiovascular: Negative for chest pain and palpitations.   Gastrointestinal: Negative for abdominal pain, nausea and vomiting.   Musculoskeletal: Negative for arthralgias and joint swelling.   Skin: Negative for rash and wound.   Neurological: Negative for dizziness and syncope.   Psychiatric/Behavioral: Negative for confusion and dysphoric mood.       Objective:      Vitals:    10/03/17 0827   BP: 118/72   Pulse: 77   Resp: 20     Physical Exam   Constitutional: She appears well-nourished.   Eyes: Conjunctivae and EOM are normal.   Neck: Normal range of motion.   Cardiovascular: Normal rate and regular rhythm.    Pulses:       Dorsalis pedis pulses are 2+ on the right side, and 2+ on the left side.   Pulmonary/Chest: Effort normal and breath sounds normal.   Musculoskeletal:   Normal ROM bilateral    Feet:   Right Foot:   Protective Sensation: 4 sites tested. 4 sites sensed.   Left Foot:   Protective Sensation: 4 sites tested. 4 sites sensed.   Neurological: No cranial nerve deficit (grossly intact).   Skin: Skin is warm and dry.   Psychiatric: She has a normal mood and affect.   Alert and orientated  "  Vitals reviewed.        Assessment:       1. Controlled type 2 diabetes mellitus without complication, without long-term current use of insulin    2. Dyslipidemia    3. Essential hypertension    4. Coronary artery disease, angina presence unspecified, unspecified vessel or lesion type, unspecified whether native or transplanted heart    5. Normocytic anemia        Plan:       Controlled type 2 diabetes mellitus without complication, without long-term current use of insulin  -     Microalbumin/creatinine urine ratio; Future; Expected date: 04/01/2018  -     Hemoglobin A1c; Future; Expected date: 04/01/2018    Dyslipidemia  -     Lipid panel; Future; Expected date: 04/01/2018    Essential hypertension  -     Comprehensive metabolic panel; Future; Expected date: 04/01/2018    Coronary artery disease, angina presence unspecified, unspecified vessel or lesion type, unspecified whether native or transplanted heart  -     Lipid panel; Future; Expected date: 04/01/2018    Normocytic anemia  -     CBC auto differential; Future; Expected date: 04/01/2018            Counseled on regular exercise, maintenance of a healthy weight, balanced diet rich in fruits/vegetables and lean protein, and avoidance of unhealthy habits like smoking and excessive alcohol intake.   Also, counseled on importance of being compliant with medication, health appointments, diet and exercise.     Return in about 6 months (around 4/3/2018).    "This note will not be shared with the patient."  "

## 2017-10-03 NOTE — TELEPHONE ENCOUNTER
----- Message from Yajaira Diaz sent at 10/3/2017 12:59 PM CDT -----  Contact: pt [phone]  Patient called and states she has been calling for a few weeks for a refill to be  sent to [facility] as soon as possible  she states it is  For all of her medications.

## 2017-10-03 NOTE — TELEPHONE ENCOUNTER
Spoke to patient, informed her of prescriptions sent to [facility] on 10-, patient verbalized understanding.

## 2017-10-12 ENCOUNTER — TELEPHONE (OUTPATIENT)
Dept: FAMILY MEDICINE | Facility: CLINIC | Age: 72
End: 2017-10-12

## 2017-10-12 NOTE — TELEPHONE ENCOUNTER
----- Message from Ena Lamar sent at 10/12/2017 10:08 AM CDT -----  Contact: [facility] called regarding diabetic supplies needed for the pt, meter and test strips. Received only lancets. Please contact [phone] reference # [id number]    [facility] - [place], [place] - [address]  [address] [address]  Phone: [phone] Fax: [phone]

## 2017-10-13 ENCOUNTER — TELEPHONE (OUTPATIENT)
Dept: CARDIOLOGY | Facility: CLINIC | Age: 72
End: 2017-10-13

## 2017-10-13 NOTE — TELEPHONE ENCOUNTER
Spoke to patient, patient stated she has been feeling dizzy on and off when she moves and light headed, she stated she didn't have BP readings, she stated she stop taking her norvas med about 3-4 days ago. I advised her to take her Blood Pressure and record the readings before she takes her meds and if its lower than 110/60 skip that dose, and advise her to drink more water she may be dehydrated.      Patient also has a appointment to see Dr. Loo on Wednesday, informed her if she feels dizzy and weak with light headiness to go to the ER over the weekend, patient verbalized understanding.

## 2017-10-18 ENCOUNTER — OFFICE VISIT (OUTPATIENT)
Dept: CARDIOLOGY | Facility: CLINIC | Age: 72
End: 2017-10-18
Payer: MEDICARE

## 2017-10-18 VITALS
HEIGHT: 63 IN | BODY MASS INDEX: 27.7 KG/M2 | HEART RATE: 80 BPM | WEIGHT: 156.31 LBS | DIASTOLIC BLOOD PRESSURE: 53 MMHG | SYSTOLIC BLOOD PRESSURE: 79 MMHG

## 2017-10-18 DIAGNOSIS — I10 ESSENTIAL HYPERTENSION: ICD-10-CM

## 2017-10-18 DIAGNOSIS — I25.83 CORONARY ARTERY DISEASE DUE TO LIPID RICH PLAQUE: ICD-10-CM

## 2017-10-18 DIAGNOSIS — I73.9 PVD (PERIPHERAL VASCULAR DISEASE): ICD-10-CM

## 2017-10-18 DIAGNOSIS — I25.10 CORONARY ARTERY DISEASE DUE TO LIPID RICH PLAQUE: ICD-10-CM

## 2017-10-18 DIAGNOSIS — R77.0 ABNORMALITY OF ALBUMIN: ICD-10-CM

## 2017-10-18 DIAGNOSIS — Z95.0 PACEMAKER: ICD-10-CM

## 2017-10-18 DIAGNOSIS — E78.2 MIXED HYPERLIPIDEMIA: ICD-10-CM

## 2017-10-18 DIAGNOSIS — Z95.1 S/P CABG (CORONARY ARTERY BYPASS GRAFT): Primary | ICD-10-CM

## 2017-10-18 DIAGNOSIS — E11.00 UNCONTROLLED TYPE 2 DIABETES MELLITUS WITH HYPEROSMOLARITY WITHOUT COMA, WITHOUT LONG-TERM CURRENT USE OF INSULIN: ICD-10-CM

## 2017-10-18 DIAGNOSIS — I21.4 NSTEMI (NON-ST ELEVATED MYOCARDIAL INFARCTION): ICD-10-CM

## 2017-10-18 PROCEDURE — 99214 OFFICE O/P EST MOD 30 MIN: CPT | Mod: S$GLB,,, | Performed by: INTERNAL MEDICINE

## 2017-10-18 PROCEDURE — 99999 PR PBB SHADOW E&M-EST. PATIENT-LVL III: CPT | Mod: PBBFAC,,, | Performed by: INTERNAL MEDICINE

## 2017-10-18 PROCEDURE — 99499 UNLISTED E&M SERVICE: CPT | Mod: S$GLB,,, | Performed by: INTERNAL MEDICINE

## 2017-10-18 RX ORDER — BLOOD GLUCOSE CONTROL HIGH,LOW
EACH MISCELLANEOUS
COMMUNITY
Start: 2017-10-13

## 2017-10-18 RX ORDER — CARVEDILOL 3.12 MG/1
3.12 TABLET ORAL 2 TIMES DAILY
Qty: 180 TABLET | Refills: 6 | Status: SHIPPED | OUTPATIENT
Start: 2017-10-18 | End: 2018-11-14 | Stop reason: SDUPTHER

## 2017-10-18 RX ORDER — ISOPROPYL ALCOHOL 0.75 G/1
SWAB TOPICAL
COMMUNITY
Start: 2017-10-17

## 2017-10-18 RX ORDER — AMLODIPINE BESYLATE 2.5 MG/1
2.5 TABLET ORAL NIGHTLY
Qty: 90 TABLET | Refills: 5 | Status: SHIPPED | OUTPATIENT
Start: 2017-10-18 | End: 2018-10-29 | Stop reason: SDUPTHER

## 2017-10-18 RX ORDER — LISINOPRIL 10 MG/1
10 TABLET ORAL DAILY
Qty: 90 TABLET | Refills: 5 | Status: SHIPPED | OUTPATIENT
Start: 2017-10-18 | End: 2018-11-14 | Stop reason: SDUPTHER

## 2017-10-18 NOTE — PROGRESS NOTES
Subjective:    Patient ID:  Shirlene Ortiz is a 72 y.o. female who presents for follow-up of Coronary Artery Disease (corn.bypass-echo-ekg-b/p low for the pass two to three wks-dizzines when moving around)      HPI  Here for follow up of CABG (8/17)/PVD. Feeling very weak especially during hypotension. No angina.       Review of Systems   Constitution: Negative for malaise/fatigue.   Eyes: Negative for blurred vision.   Cardiovascular: Negative for chest pain, claudication, cyanosis, dyspnea on exertion, irregular heartbeat, leg swelling, near-syncope, orthopnea, palpitations, paroxysmal nocturnal dyspnea and syncope.   Respiratory: Negative for cough and shortness of breath.    Hematologic/Lymphatic: Does not bruise/bleed easily.   Musculoskeletal: Negative for back pain, falls, joint pain, muscle cramps, muscle weakness and myalgias.   Gastrointestinal: Negative for abdominal pain, change in bowel habit, nausea and vomiting.   Genitourinary: Negative for urgency.   Neurological: Negative for dizziness, focal weakness and light-headedness.        Objective:    Physical Exam   Constitutional: She is oriented to person, place, and time. She appears well-developed and well-nourished.   HENT:   Head: Normocephalic.   Eyes: Conjunctivae are normal.   Neck: Normal range of motion. Neck supple. No JVD present.   Cardiovascular: Normal rate, regular rhythm, normal heart sounds and intact distal pulses.    Pulses:       Carotid pulses are 2+ on the right side, and 2+ on the left side.       Radial pulses are 2+ on the right side, and 2+ on the left side.        Dorsalis pedis pulses are 2+ on the right side, and 2+ on the left side.        Posterior tibial pulses are 2+ on the right side, and 2+ on the left side.   Well healed midline sternal incision.     Pulmonary/Chest: Effort normal and breath sounds normal.   Abdominal: Soft. Bowel sounds are normal.   Musculoskeletal: She exhibits no edema or tenderness.    Neurological: She is alert and oriented to person, place, and time. Gait normal.   Skin: Skin is warm, dry and intact. No cyanosis. Nails show no clubbing.   Psychiatric: She has a normal mood and affect. Her speech is normal and behavior is normal. Thought content normal.   Nursing note and vitals reviewed.            ..    Chemistry        Component Value Date/Time     09/26/2017 0918    K 4.6 09/26/2017 0918     09/26/2017 0918    CO2 26 09/26/2017 0918    BUN 25 (H) 09/26/2017 0918    CREATININE 1.4 09/26/2017 0918     09/26/2017 0918        Component Value Date/Time    CALCIUM 9.5 09/26/2017 0918    ALKPHOS 68 09/26/2017 0918    AST 18 09/26/2017 0918    ALT 12 09/26/2017 0918    BILITOT 0.6 09/26/2017 0918    ESTGFRAFRICA 43.3 (A) 09/26/2017 0918    EGFRNONAA 37.6 (A) 09/26/2017 0918            ..  Lab Results   Component Value Date    CHOL 144 09/26/2017    CHOL 163 08/22/2017    CHOL 136 06/29/2017     Lab Results   Component Value Date    HDL 29 (L) 09/26/2017    HDL 20 (L) 08/22/2017    HDL 29 (L) 06/29/2017     Lab Results   Component Value Date    LDLCALC 75.6 09/26/2017    LDLCALC Invalid, Trig>400.0 08/22/2017    LDLCALC 63.4 06/29/2017     Lab Results   Component Value Date    TRIG 197 (H) 09/26/2017    TRIG 491 (H) 08/22/2017    TRIG 218 (H) 06/29/2017     Lab Results   Component Value Date    CHOLHDL 20.1 09/26/2017    CHOLHDL 12.3 (L) 08/22/2017    CHOLHDL 21.3 06/29/2017     ..  Lab Results   Component Value Date    WBC 4.89 09/12/2017    HGB 9.3 (L) 09/12/2017    HCT 30.5 (L) 09/12/2017    MCV 91 09/12/2017     09/12/2017       Test(s) Reviewed  I have reviewed the following in detail:  [] Stress test   [x] Angiography   [x] Echocardiogram   [x] Labs   [x] Other:       Assessment:         ICD-10-CM ICD-9-CM   1. S/P CABG (coronary artery bypass graft) Z95.1 V45.81   2. PVD (peripheral vascular disease) I73.9 443.9   3. Coronary artery disease due to lipid rich plaque  I25.10 414.00    I25.83 414.3   4. Pacemaker Z95.0 V45.01   5. Mixed hyperlipidemia E78.2 272.2   6. Essential hypertension I10 401.9   7. NSTEMI (non-ST elevated myocardial infarction) I21.4 410.70   8. Uncontrolled type 2 diabetes mellitus with hyperosmolarity without coma, without long-term current use of insulin E11.00 250.22     Problem List Items Addressed This Visit     Coronary artery disease due to lipid rich plaque    Essential hypertension    Mixed hyperlipidemia    NSTEMI (non-ST elevated myocardial infarction)    Pacemaker    PVD (peripheral vascular disease)    Overview     Significantly reduced MAURICIO on the left leg.  70-89% stenosis seen in the left popliteal artery.   SIGNED BY: Caesar Loo MD On: 08/12/2016 17:05         S/P CABG (coronary artery bypass graft) - Primary    Overview     8/17  CABG x5 LIMA-LAD, VG-PDA, VG-D, YVG-OM-PLB   mammary artery to left anterior descending coronary artery and separate segments   of saphenous vein from the aorta to the posterior descending coronary artery   and from the aorta to the diagonal coronary artery and from the aorta to the   obtuse marginal coronary artery and to the posterolateral branch of the   circumflex in a sequential fashion using a combination of antegrade and   retrograde cardioplegia with mild systemic hypothermia and endoscopic vein   harvest.         Uncontrolled type 2 diabetes mellitus with hyperosmolarity without coma, without long-term current use of insulin    Overview     Documented by Maury on 10/26/15.           Other Visit Diagnoses    None.          Plan:           Return to clinic 6 months   Low level/low impact aerobic exercise 5x's/wk. Heart healthy diet and risk factor modification.    See labs and med orders.  Cardiac rehab

## 2017-10-24 ENCOUNTER — TELEPHONE (OUTPATIENT)
Dept: CARDIOLOGY | Facility: CLINIC | Age: 72
End: 2017-10-24

## 2017-10-24 NOTE — TELEPHONE ENCOUNTER
Spoke to Cornelia Mendiola, clarified patient meds, Lisinopril 20 mg to 10 mg, Amlodipine 5 mg to 2.5 mg, Carvedilol 6.25 mg to 3.125 mg.

## 2017-11-06 ENCOUNTER — TELEPHONE (OUTPATIENT)
Dept: CARDIOLOGY | Facility: CLINIC | Age: 72
End: 2017-11-06

## 2017-11-06 NOTE — TELEPHONE ENCOUNTER
----- Message from Zuleyma Cheatham sent at 11/6/2017 10:27 AM CST -----  Contact: self  Patient needs to speak to a nurse.. Please call back [phone] (home)

## 2017-11-06 NOTE — TELEPHONE ENCOUNTER
Spoke to patient, stated she has not been taking her brio medication and has some breathing problems. Advise her to consult with her PCP in upcoming appointment, patient verbalized understanding.

## 2017-11-07 PROBLEM — R07.9 CHEST PAIN: Status: ACTIVE | Noted: 2017-11-06

## 2017-11-07 PROBLEM — J44.9 COPD (CHRONIC OBSTRUCTIVE PULMONARY DISEASE): Status: ACTIVE | Noted: 2017-08-25

## 2017-11-10 ENCOUNTER — TELEPHONE (OUTPATIENT)
Dept: CARDIOLOGY | Facility: CLINIC | Age: 72
End: 2017-11-10

## 2017-11-10 ENCOUNTER — TELEPHONE (OUTPATIENT)
Dept: FAMILY MEDICINE | Facility: CLINIC | Age: 72
End: 2017-11-10

## 2017-11-10 PROBLEM — R07.9 CHEST PAIN: Status: RESOLVED | Noted: 2017-11-06 | Resolved: 2017-11-10

## 2017-11-10 NOTE — TELEPHONE ENCOUNTER
----- Message from Cristhian Villanueva sent at 11/10/2017  9:22 AM CST -----  Contact: [facility]   Patient need to speak with a nurse regarding scheduling a hospital follow up appointment in 1 month. Please call back at [phone] (home) or [phone]

## 2017-11-10 NOTE — TELEPHONE ENCOUNTER
----- Message from Cristhian Villanueva sent at 11/10/2017  9:19 AM CST -----  Contact: [facility]   Patient need to speak with a nurse regarding scheduling a hospital follow up appointment in 3 weeks. Please call back at [phone] (home) or [phone]

## 2017-11-16 ENCOUNTER — TELEPHONE (OUTPATIENT)
Dept: CARDIOLOGY | Facility: CLINIC | Age: 72
End: 2017-11-16

## 2017-11-16 ENCOUNTER — TELEPHONE (OUTPATIENT)
Dept: FAMILY MEDICINE | Facility: CLINIC | Age: 72
End: 2017-11-16

## 2017-11-16 NOTE — TELEPHONE ENCOUNTER
----- Message from Cristhian Villanueva sent at 11/16/2017 11:37 AM CST -----  Contact: [facility]   Pat With [facility] want to speak with a nurse regarding a rx for nicotine packages. Please call back at [phone]

## 2017-11-16 NOTE — TELEPHONE ENCOUNTER
----- Message from Trae MARRERO Wale sent at 11/16/2017  1:16 PM CST -----  Contact: [facility]/[place]  Patton called in regarding the attached patient who is trying to quite smoking.  Patient has a Voucher Card from [employer] [place] that will pay for patients Rx for patches.  Rk wanted to see if Dr. Mendiola (patients PCP) would send a Rx over for this.  Generic nicotine patch at 21 mgs is fine.      [id number] - [address], [address], [address] [address]  Phone: [phone] Fax: [phone]

## 2017-11-16 NOTE — TELEPHONE ENCOUNTER
----- Message from Miranda Hall sent at 11/16/2017 10:23 AM CST -----  Contact: patient  Patient called wanted to know when can she start driving her car? Please call back to advise at [phone]. Thanks,

## 2017-11-17 RX ORDER — IBUPROFEN 200 MG
1 TABLET ORAL DAILY
Qty: 28 PATCH | Refills: 1 | Status: SHIPPED | OUTPATIENT
Start: 2017-11-17 | End: 2018-01-10

## 2017-11-17 NOTE — TELEPHONE ENCOUNTER
----- Message from Mariam Urrutia sent at 11/17/2017 10:49 AM CST -----  Contact: pt  Pt states need the office to send over nicotine (NICODERM CQ) 21 mg/24 hr, Place 1 patch onto the skin once daily. - Transdermal to pharmacy..[phone] (please notify when sent)          .  [id number] - [place], [place] - [address], [place] [address]  [address], [address] [address]  Phone: [phone] Fax: [phone]

## 2017-12-05 ENCOUNTER — TELEPHONE (OUTPATIENT)
Dept: CARDIOLOGY | Facility: CLINIC | Age: 72
End: 2017-12-05

## 2017-12-05 NOTE — TELEPHONE ENCOUNTER
----- Message from Trang Dover sent at 12/5/2017  9:27 AM CST -----  Contact: Self  Pt calling to speak to a nurse regarding more questions for vascular procedure. [phone].

## 2017-12-05 NOTE — TELEPHONE ENCOUNTER
Spoke to patient, informed her no preparations  is neccessary for her CFD. Patient verbalized understanding.

## 2017-12-19 ENCOUNTER — CLINICAL SUPPORT (OUTPATIENT)
Dept: CARDIOLOGY | Facility: CLINIC | Age: 72
End: 2017-12-19
Attending: INTERNAL MEDICINE
Payer: MEDICARE

## 2017-12-19 ENCOUNTER — LAB VISIT (OUTPATIENT)
Dept: LAB | Facility: HOSPITAL | Age: 72
End: 2017-12-19
Attending: INTERNAL MEDICINE
Payer: MEDICARE

## 2017-12-19 DIAGNOSIS — R77.0 ABNORMALITY OF ALBUMIN: ICD-10-CM

## 2017-12-19 DIAGNOSIS — I35.0 NONRHEUMATIC AORTIC VALVE STENOSIS: ICD-10-CM

## 2017-12-19 DIAGNOSIS — Z95.1 S/P CABG (CORONARY ARTERY BYPASS GRAFT): ICD-10-CM

## 2017-12-19 LAB
BASOPHILS # BLD AUTO: 0.02 K/UL
BASOPHILS NFR BLD: 0.4 %
DIFFERENTIAL METHOD: ABNORMAL
EOSINOPHIL # BLD AUTO: 0.2 K/UL
EOSINOPHIL NFR BLD: 3.9 %
ERYTHROCYTE [DISTWIDTH] IN BLOOD BY AUTOMATED COUNT: 14.5 %
HCT VFR BLD AUTO: 33.5 %
HGB BLD-MCNC: 10.4 G/DL
IMM GRANULOCYTES # BLD AUTO: 0.02 K/UL
IMM GRANULOCYTES NFR BLD AUTO: 0.4 %
LYMPHOCYTES # BLD AUTO: 1.2 K/UL
LYMPHOCYTES NFR BLD: 22.9 %
MCH RBC QN AUTO: 26.7 PG
MCHC RBC AUTO-ENTMCNC: 31 G/DL
MCV RBC AUTO: 86 FL
MONOCYTES # BLD AUTO: 0.5 K/UL
MONOCYTES NFR BLD: 8.6 %
NEUTROPHILS # BLD AUTO: 3.4 K/UL
NEUTROPHILS NFR BLD: 63.8 %
NRBC BLD-RTO: 0 /100 WBC
PLATELET # BLD AUTO: 269 K/UL
PMV BLD AUTO: 10.1 FL
RBC # BLD AUTO: 3.89 M/UL
WBC # BLD AUTO: 5.37 K/UL

## 2017-12-19 PROCEDURE — 85025 COMPLETE CBC W/AUTO DIFF WBC: CPT

## 2017-12-19 PROCEDURE — 36415 COLL VENOUS BLD VENIPUNCTURE: CPT | Mod: PO

## 2017-12-19 PROCEDURE — 93306 TTE W/DOPPLER COMPLETE: CPT | Mod: S$GLB,,, | Performed by: INTERNAL MEDICINE

## 2017-12-21 LAB
DIASTOLIC DYSFUNCTION: YES
ESTIMATED PA SYSTOLIC PRESSURE: 30.04
RETIRED EF AND QEF - SEE NOTES: 55 (ref 55–65)
TRICUSPID VALVE REGURGITATION: ABNORMAL

## 2018-01-10 ENCOUNTER — OFFICE VISIT (OUTPATIENT)
Dept: CARDIOLOGY | Facility: CLINIC | Age: 73
End: 2018-01-10
Payer: MEDICARE

## 2018-01-10 ENCOUNTER — CLINICAL SUPPORT (OUTPATIENT)
Dept: CARDIOLOGY | Facility: CLINIC | Age: 73
End: 2018-01-10
Attending: INTERNAL MEDICINE
Payer: MEDICARE

## 2018-01-10 VITALS
HEIGHT: 63 IN | HEART RATE: 88 BPM | DIASTOLIC BLOOD PRESSURE: 71 MMHG | WEIGHT: 158.75 LBS | SYSTOLIC BLOOD PRESSURE: 123 MMHG | BODY MASS INDEX: 28.13 KG/M2

## 2018-01-10 DIAGNOSIS — Z95.5 S/P CORONARY ARTERY STENT PLACEMENT: ICD-10-CM

## 2018-01-10 DIAGNOSIS — Z95.0 PACEMAKER: ICD-10-CM

## 2018-01-10 DIAGNOSIS — I49.5 SINUS NODE DYSFUNCTION: ICD-10-CM

## 2018-01-10 DIAGNOSIS — Z95.1 S/P CABG (CORONARY ARTERY BYPASS GRAFT): Primary | ICD-10-CM

## 2018-01-10 DIAGNOSIS — I25.10 CORONARY ARTERY DISEASE INVOLVING NATIVE CORONARY ARTERY OF NATIVE HEART WITHOUT ANGINA PECTORIS: ICD-10-CM

## 2018-01-10 DIAGNOSIS — I73.9 PVD (PERIPHERAL VASCULAR DISEASE): ICD-10-CM

## 2018-01-10 DIAGNOSIS — Z95.0 PACEMAKER: Primary | ICD-10-CM

## 2018-01-10 DIAGNOSIS — E78.2 MIXED HYPERLIPIDEMIA: ICD-10-CM

## 2018-01-10 DIAGNOSIS — I10 ESSENTIAL HYPERTENSION: ICD-10-CM

## 2018-01-10 PROCEDURE — 99214 OFFICE O/P EST MOD 30 MIN: CPT | Mod: S$GLB,,, | Performed by: INTERNAL MEDICINE

## 2018-01-10 PROCEDURE — 93280 PM DEVICE PROGR EVAL DUAL: CPT | Mod: S$GLB,,, | Performed by: INTERNAL MEDICINE

## 2018-01-10 PROCEDURE — 99499 UNLISTED E&M SERVICE: CPT | Mod: S$GLB,,, | Performed by: INTERNAL MEDICINE

## 2018-01-10 PROCEDURE — 99999 PR PBB SHADOW E&M-EST. PATIENT-LVL III: CPT | Mod: PBBFAC,,, | Performed by: INTERNAL MEDICINE

## 2018-01-10 RX ORDER — NEOMYCIN SULFATE, POLYMYXIN B SULFATE AND HYDROCORTISONE 10; 3.5; 1 MG/ML; MG/ML; [USP'U]/ML
3 SUSPENSION/ DROPS AURICULAR (OTIC) 4 TIMES DAILY
Qty: 10 ML | Refills: 2 | Status: SHIPPED | OUTPATIENT
Start: 2018-01-10 | End: 2019-04-30

## 2018-01-10 NOTE — PROGRESS NOTES
Subjective:    Patient ID:  Shirlene Ortiz is a 72 y.o. female who presents for follow-up of Hyperlipidemia (4 month f/u w/ echo and labs ) and Peripheral Vascular Disease      HPI  Here for follow up of CABG-PCI (BELLE 11/17)/PPM/PAD. No further angina since PCI. Remains active.     Review of Systems   Constitution: Negative for malaise/fatigue.   Eyes: Negative for blurred vision.   Cardiovascular: Negative for chest pain, claudication, cyanosis, dyspnea on exertion, irregular heartbeat, leg swelling, near-syncope, orthopnea, palpitations, paroxysmal nocturnal dyspnea and syncope.   Respiratory: Negative for cough and shortness of breath.    Hematologic/Lymphatic: Does not bruise/bleed easily.   Musculoskeletal: Negative for back pain, falls, joint pain, muscle cramps, muscle weakness and myalgias.   Gastrointestinal: Negative for abdominal pain, change in bowel habit, nausea and vomiting.   Genitourinary: Negative for urgency.   Neurological: Negative for dizziness, focal weakness and light-headedness.        Objective:    Physical Exam   Constitutional: She is oriented to person, place, and time. She appears well-developed and well-nourished.   HENT:   Head: Normocephalic.   Eyes: Conjunctivae are normal.   Neck: Normal range of motion. Neck supple. No JVD present.   Cardiovascular: Normal rate, regular rhythm, normal heart sounds and intact distal pulses.    Pulses:       Carotid pulses are 2+ on the right side, and 2+ on the left side.       Radial pulses are 2+ on the right side, and 2+ on the left side.        Dorsalis pedis pulses are 2+ on the right side, and 2+ on the left side.        Posterior tibial pulses are 2+ on the right side, and 2+ on the left side.   Well healed midline sternal incision.  Pacer site clean and dry, well healed.       Pulmonary/Chest: Effort normal and breath sounds normal.   Abdominal: Soft. Bowel sounds are normal.   Musculoskeletal: She exhibits no edema or tenderness.    Neurological: She is alert and oriented to person, place, and time. Gait normal.   Skin: Skin is warm, dry and intact. No cyanosis. Nails show no clubbing.   Psychiatric: She has a normal mood and affect. Her speech is normal and behavior is normal. Thought content normal.   Nursing note and vitals reviewed.            ..    Chemistry        Component Value Date/Time     11/10/2017 0540    K 4.7 11/10/2017 0540     11/10/2017 0540    CO2 26 11/10/2017 0540    BUN 18 11/10/2017 0540    CREATININE 1.10 11/10/2017 0540     (H) 11/10/2017 0540        Component Value Date/Time    CALCIUM 9.8 11/10/2017 0540    ALKPHOS 62 11/06/2017 2344    AST 17 11/06/2017 2344    ALT 23 11/06/2017 2344    BILITOT 0.3 11/06/2017 2344    ESTGFRAFRICA 58 (A) 11/10/2017 0540    EGFRNONAA 50 (A) 11/10/2017 0540            ..  Lab Results   Component Value Date    CHOL 144 09/26/2017    CHOL 163 08/22/2017    CHOL 136 06/29/2017     Lab Results   Component Value Date    HDL 29 (L) 09/26/2017    HDL 20 (L) 08/22/2017    HDL 29 (L) 06/29/2017     Lab Results   Component Value Date    LDLCALC 75.6 09/26/2017    LDLCALC Invalid, Trig>400.0 08/22/2017    LDLCALC 63.4 06/29/2017     Lab Results   Component Value Date    TRIG 197 (H) 09/26/2017    TRIG 491 (H) 08/22/2017    TRIG 218 (H) 06/29/2017     Lab Results   Component Value Date    CHOLHDL 20.1 09/26/2017    CHOLHDL 12.3 (L) 08/22/2017    CHOLHDL 21.3 06/29/2017     ..  Lab Results   Component Value Date    WBC 5.37 12/19/2017    HGB 10.4 (L) 12/19/2017    HCT 33.5 (L) 12/19/2017    MCV 86 12/19/2017     12/19/2017       Test(s) Reviewed  I have reviewed the following in detail:  [] Stress test   [] Angiography   [x] Echocardiogram   [x] Labs   [] Other:       Assessment:         ICD-10-CM ICD-9-CM   1. S/P CABG (coronary artery bypass graft) Z95.1 V45.81   2. S/P coronary artery stent placement Z95.5 V45.82   3. Essential hypertension I10 401.9   4. Mixed  hyperlipidemia E78.2 272.2   5. PVD (peripheral vascular disease) I73.9 443.9   6. Coronary artery disease involving native coronary artery of native heart without angina pectoris I25.10 414.01   7. Pacemaker Z95.0 V45.01     Problem List Items Addressed This Visit     CAD (coronary artery disease)    Overview     11/17  Circumflex: Native circumflex has a 70% stenosis proximal to the stent in  the mid portion and has a short discrete 95% in-stent restenosis just prior to  the obtuse marginal and another 80% right at the obtuse marginal and a 70% in  the distal portion of the stent. There was a stent in the obtuse marginal into  the stent of the circumflex with a 70% in-stent restenosis.  --  Graft to the mid LAD: The graft was a LIMA from the aorta.  --  Graft to the 1st obtuse marginal: Vein graft to the circumflex is occluded.           Essential hypertension    Mixed hyperlipidemia    Pacemaker    Overview     left chest PACEMAKER  MEDTRONIC SEDR01 Sensia   S/N:[id number]  Deion Link  8/3/2010 - current     right atrium LEAD  MEDTRONIC 5076 CapSure Fix Novus  S/N:[id number]  Deion Link  8/3/2010 - current     right ventricle LEAD  MEDTRONIC 5076 CapSure Fix Novus  S/N:[id number]  Deion Link  8/3/2010 - current          PVD (peripheral vascular disease)    Overview     Significantly reduced MARUICIO on the left leg.  70-89% stenosis seen in the left popliteal artery.   SIGNED BY: Caesar Loo MD On: 08/12/2016 17:05         S/P CABG (coronary artery bypass graft) - Primary    Overview     8/17  CABG x5 LIMA-LAD, VG-PDA, VG-D, YVG-OM-PLB            S/P coronary artery stent placement    Overview     11/17  proximal circumflex using a 2.5 x 38, post-dilated to 3.0 with 0% residual  stenosis.                Plan:           Return to clinic 9 months   Low level/low impact aerobic exercise 5x's/wk. Heart healthy diet and risk factor modification.    See labs and med orders.

## 2018-01-25 ENCOUNTER — PES CALL (OUTPATIENT)
Dept: ADMINISTRATIVE | Facility: CLINIC | Age: 73
End: 2018-01-25

## 2018-02-19 ENCOUNTER — PATIENT OUTREACH (OUTPATIENT)
Dept: ADMINISTRATIVE | Facility: HOSPITAL | Age: 73
End: 2018-02-19

## 2018-03-06 ENCOUNTER — PATIENT OUTREACH (OUTPATIENT)
Dept: ADMINISTRATIVE | Facility: HOSPITAL | Age: 73
End: 2018-03-06

## 2018-03-06 NOTE — PROGRESS NOTES
Health Maintenance Due   Topic Date Due    Mammogram  09/19/2017    Eye Exam  12/21/2017     Portal outreach un-read by patient.  Outreach mailed today

## 2018-03-06 NOTE — LETTER
March 6, 2018    Shirlene Ortiz  [address] [address]             [facility]  [address]  [place] [address]  Phone: [phone] Dear MsKalpana Ortiz:    We have tried to reach you by [facility] email unsuccessfully.      [facility] is committed to your overall health and would like to ensure that you are up to date on your recommended health testing.   Dr. Mendiola has found that you may be due for the following:     Mammogram   Diabetic Retinopathy EYE EXAM screening (we now are able to perform this test the same day as your office visit with a health care team member without paying a second copay)     If you have had any of the above done at another facility and have a copy of these records, please fax them to the fax number below.  If not, please call [phone] so that we can get the necessary information to obtain copies from that facility.     Otherwise, please schedule these appointments at your earliest convenience by calling [phone] or going to [email or web address].      Sincerely,  Radha Rodriguez  Clinical Care Coordinator  [facility] [address] [facility] [address]  [place] [address]  Phone: [phone]   Fax: [phone]

## 2018-03-16 DIAGNOSIS — Z13.5 DIABETIC RETINOPATHY SCREENING: ICD-10-CM

## 2018-03-23 ENCOUNTER — OFFICE VISIT (OUTPATIENT)
Dept: FAMILY MEDICINE | Facility: CLINIC | Age: 73
End: 2018-03-23
Payer: MEDICARE

## 2018-03-23 VITALS
DIASTOLIC BLOOD PRESSURE: 68 MMHG | WEIGHT: 157.44 LBS | HEART RATE: 80 BPM | OXYGEN SATURATION: 99 % | BODY MASS INDEX: 27.89 KG/M2 | HEIGHT: 63 IN | SYSTOLIC BLOOD PRESSURE: 126 MMHG

## 2018-03-23 DIAGNOSIS — I25.10 CORONARY ARTERY DISEASE INVOLVING NATIVE CORONARY ARTERY OF NATIVE HEART WITHOUT ANGINA PECTORIS: ICD-10-CM

## 2018-03-23 DIAGNOSIS — Z95.0 PACEMAKER: ICD-10-CM

## 2018-03-23 DIAGNOSIS — I49.5 SINUS NODE DYSFUNCTION: ICD-10-CM

## 2018-03-23 DIAGNOSIS — Z00.00 ENCOUNTER FOR PREVENTIVE HEALTH EXAMINATION: Primary | ICD-10-CM

## 2018-03-23 DIAGNOSIS — E11.00 UNCONTROLLED TYPE 2 DIABETES MELLITUS WITH HYPEROSMOLARITY WITHOUT COMA, WITHOUT LONG-TERM CURRENT USE OF INSULIN: ICD-10-CM

## 2018-03-23 DIAGNOSIS — I77.1 TORTUOUS AORTA: ICD-10-CM

## 2018-03-23 DIAGNOSIS — Z95.5 S/P CORONARY ARTERY STENT PLACEMENT: ICD-10-CM

## 2018-03-23 DIAGNOSIS — N18.30 TYPE 2 DIABETES MELLITUS WITH STAGE 3 CHRONIC KIDNEY DISEASE, WITHOUT LONG-TERM CURRENT USE OF INSULIN: ICD-10-CM

## 2018-03-23 DIAGNOSIS — I73.9 PVD (PERIPHERAL VASCULAR DISEASE): ICD-10-CM

## 2018-03-23 DIAGNOSIS — E78.2 MIXED HYPERLIPIDEMIA: ICD-10-CM

## 2018-03-23 DIAGNOSIS — I70.0 ATHEROSCLEROSIS OF AORTA: ICD-10-CM

## 2018-03-23 DIAGNOSIS — I21.4 NSTEMI (NON-ST ELEVATED MYOCARDIAL INFARCTION): ICD-10-CM

## 2018-03-23 DIAGNOSIS — E11.22 TYPE 2 DIABETES MELLITUS WITH STAGE 3 CHRONIC KIDNEY DISEASE, WITHOUT LONG-TERM CURRENT USE OF INSULIN: ICD-10-CM

## 2018-03-23 DIAGNOSIS — R47.9 SPEECH DEFECT: ICD-10-CM

## 2018-03-23 DIAGNOSIS — R26.9 FUNCTIONAL GAIT DISORDER: ICD-10-CM

## 2018-03-23 DIAGNOSIS — J44.9 CHRONIC OBSTRUCTIVE PULMONARY DISEASE, UNSPECIFIED COPD TYPE: ICD-10-CM

## 2018-03-23 DIAGNOSIS — Z72.0 TOBACCO ABUSE: ICD-10-CM

## 2018-03-23 DIAGNOSIS — I10 ESSENTIAL HYPERTENSION: ICD-10-CM

## 2018-03-23 DIAGNOSIS — N18.30 CHRONIC KIDNEY DISEASE, STAGE III (MODERATE): ICD-10-CM

## 2018-03-23 DIAGNOSIS — Z95.1 S/P CABG (CORONARY ARTERY BYPASS GRAFT): ICD-10-CM

## 2018-03-23 DIAGNOSIS — D64.9 ANEMIA, UNSPECIFIED TYPE: ICD-10-CM

## 2018-03-23 PROCEDURE — G0439 PPPS, SUBSEQ VISIT: HCPCS | Mod: S$GLB,,, | Performed by: NURSE PRACTITIONER

## 2018-03-23 PROCEDURE — 99499 UNLISTED E&M SERVICE: CPT | Mod: S$GLB,,, | Performed by: NURSE PRACTITIONER

## 2018-03-23 PROCEDURE — 99999 PR PBB SHADOW E&M-EST. PATIENT-LVL V: CPT | Mod: PBBFAC,,, | Performed by: NURSE PRACTITIONER

## 2018-03-23 NOTE — PROGRESS NOTES
"Shirlene Ortiz presented for a  Medicare AWV and comprehensive Health Risk Assessment today. The following components were reviewed and updated:    · Medical history  · Family History  · Social history  · Allergies and Current Medications  · Health Risk Assessment  · Health Maintenance  · Care Team     Review of Systems   Constitutional: Negative for chills, fever and weight loss.   Respiratory: Negative for cough, shortness of breath and wheezing.    Cardiovascular: Negative for chest pain and leg swelling.   Gastrointestinal: Negative for abdominal pain, blood in stool, constipation, diarrhea, nausea and vomiting.   Skin: Negative for rash.   Neurological: Negative for dizziness, weakness and headaches.     ** See Completed Assessments for Annual Wellness Visit within the encounter summary.**     The following assessments were completed:  · Living Situation  · CAGE  · Depression Screening  · Timed Get Up and Go  · Whisper Test  · Cognitive Function Screening      · Nutrition Screening  · ADL Screening  · PAQ Screening    Vitals:    03/23/18 0828   BP: 126/68   BP Location: Left arm   Patient Position: Sitting   BP Method: Medium (Manual)   Pulse: 80   SpO2: 99%   Weight: 71.4 kg (157 lb 6.5 oz)   Height: 5' 3" (1.6 m)     Body mass index is 27.88 kg/m².  Physical Exam   Constitutional: She is oriented to person, place, and time. She appears well-nourished.   Cardiovascular: Normal rate, regular rhythm, normal heart sounds and intact distal pulses.    Pulmonary/Chest: Effort normal and breath sounds normal. She has no wheezes. She has no rales.   Neurological: She is alert and oriented to person, place, and time.   Skin: Skin is warm and dry. No rash noted.   Vitals reviewed.        Diagnoses and health risks identified today and associated recommendations/orders:    1. Encounter for preventive health examination  Reviewed and discussed health maintenance.    Mammogram scheduled  - Ambulatory referral to " Optometry    2. Chronic kidney disease, stage III (moderate)  Stable- continue current treatment and follow up routinely with PCP   Labs reviewed. Avoid NSAIDs and increase fluids    3. Type 2 diabetes mellitus with stage 3 chronic kidney disease, without long-term current use of insulin  Stable- continue current treatment and follow up routinely with PCP   - Ambulatory referral to Optometry    4. Uncontrolled type 2 diabetes mellitus with hyperosmolarity without coma, without long-term current use of insulin  Stable- continue current treatment and follow up routinely with PCP  - Ambulatory referral to Optometry    5. Chronic obstructive pulmonary disease, unspecified COPD type  Stable- continue current treatment and follow up routinely with PCP    6. Atherosclerosis of aorta  Stable- continue current treatment and follow up routinely with PCP and cardiology (Dr. Loo)    7. Tortuous aorta  Stable- continue current treatment and follow up routinely with PCP and cardiology (Dr. Loo)    8. PVD (peripheral vascular disease)  Stable- continue current treatment and follow up routinely with PCP and cardiology (Dr. Loo)    9. Coronary artery disease involving native coronary artery of native heart without angina pectoris  Stable- continue current treatment and follow up routinely with PCP and cardiology (Dr. Loo)    10. Pacemaker  Stable- continue current treatment and follow up routinely with PCP and cardiology (Dr. Loo)    11. Sinus node dysfunction  Stable- continue current treatment and follow up routinely with PCP and cardiology (Dr. Loo)    12. S/P coronary artery stent placement  Stable- continue current treatment and follow up routinely with PCP and cardiology (Dr. Loo)    13. S/P CABG (coronary artery bypass graft)  Stable- continue current treatment and follow up routinely with PCP and cardiology (Dr. Loo)    14. NSTEMI (non-ST elevated myocardial infarction)  Stable- continue current treatment and follow up  routinely with PCP and cardiology (Dr. Loo)    15. Mixed hyperlipidemia  Stable- continue current treatment and follow up routinely with PCP and cardiology (Dr. Loo)    16. Essential hypertension  Stable- continue current treatment and follow up routinely with PCP and cardiology (Dr. Loo)    17. Anemia, unspecified type  Stable- continue current treatment and follow up routinely with PCP   Labs reviewed    18. Functional gait disorder  Safety issues discussed and reviewed    19. Tobacco abuse  Current smoker. Not willing to quite    20. Speech defect  No change. Stable. Follow up with PCP routinely and prn    I offered to discuss end of life issues, including information on how to make advance directives that the patient could use to name someone who would make medical decisions on their behalf if they became too ill to make themselves.    ___Patient declined  _X_Patient is interested, I provided paper work and offered to discuss.    Provided Shirlene with a 5-10 year written screening schedule and personal prevention plan. Recommendations were developed using the USPSTF age appropriate recommendations. Education, counseling, and referrals were provided as needed. After Visit Summary printed and given to patient which includes a list of additional screenings\tests needed.    Naomie Davis NP

## 2018-03-23 NOTE — PATIENT INSTRUCTIONS
Counseling and Referral of Other Preventative  (Italic type indicates deductible and co-insurance are waived)    Patient Name: Shirlene Ortiz  Today's Date: 3/23/2018    Health Maintenance       Date Due Completion Date    Mammogram 09/19/2017 9/19/2016    Eye Exam 12/21/2017 12/21/2016    Hemoglobin A1c 05/07/2018 11/7/2017    Lipid Panel 09/26/2018 9/26/2017    Foot Exam 10/03/2018 10/3/2017 (Done)    Override on 10/3/2017: Done    Override on 9/14/2016: Done    Override on 10/26/2015: Done    High Dose Statin 03/23/2019 3/23/2018    DEXA SCAN 03/22/2020 3/22/2017    Override on 1/1/2014: Done    TETANUS VACCINE 01/01/2022 1/1/2012 (Done)    Override on 1/1/2012: Done    Colonoscopy 04/27/2025 4/27/2015        No orders of the defined types were placed in this encounter.    The following information is provided to all patients.  This information is to help you find resources for any of the problems found today that may be affecting your health:                Living healthy guide: www.[facility].[place].gov      Understanding Diabetes: www.diabetes.org      Eating healthy: www.cdc.gov/healthyweight      CDC home safety checklist: www.cdc.gov/steadi/patient.html      Agency on Aging: www.goea.[place].[facility]      Alcoholics anonymous (AA): www.aa.org      Physical Activity: www.meng.nih.gov/ly9liim      Tobacco use: www.quitwithus[place].org

## 2018-09-10 RX ORDER — GLIPIZIDE 10 MG/1
TABLET ORAL
Qty: 180 TABLET | Refills: 3 | Status: SHIPPED | OUTPATIENT
Start: 2018-09-10 | End: 2019-07-17 | Stop reason: SDUPTHER

## 2018-09-10 RX ORDER — METFORMIN HYDROCHLORIDE 1000 MG/1
TABLET ORAL
Qty: 180 TABLET | Refills: 3 | Status: SHIPPED | OUTPATIENT
Start: 2018-09-10 | End: 2019-07-17 | Stop reason: SDUPTHER

## 2018-09-10 RX ORDER — ATORVASTATIN CALCIUM 40 MG/1
TABLET, FILM COATED ORAL
Qty: 90 TABLET | Refills: 3 | Status: SHIPPED | OUTPATIENT
Start: 2018-09-10 | End: 2019-07-17 | Stop reason: SDUPTHER

## 2018-09-12 RX ORDER — FENOFIBRATE 160 MG/1
TABLET ORAL
Qty: 90 TABLET | Refills: 4 | Status: SHIPPED | OUTPATIENT
Start: 2018-09-12 | End: 2019-10-01 | Stop reason: SDUPTHER

## 2018-10-16 DIAGNOSIS — E11.8 TYPE 2 DIABETES MELLITUS WITH COMPLICATION, WITHOUT LONG-TERM CURRENT USE OF INSULIN: ICD-10-CM

## 2018-10-18 RX ORDER — BLOOD SUGAR DIAGNOSTIC
STRIP MISCELLANEOUS
Qty: 350 STRIP | Refills: 3 | Status: SHIPPED | OUTPATIENT
Start: 2018-10-18 | End: 2019-12-06 | Stop reason: SDUPTHER

## 2018-10-18 NOTE — TELEPHONE ENCOUNTER
----- Message from Sarah Andino sent at 10/18/2018 11:33 AM CDT -----  Contact: [facility]   Pharmacy called regarding ACCU-CHEK [place] PLUS TEST STRP Strp for the above pt.     [email or web address] [address] [facility]    Pharmacy can be reached at [phone].

## 2018-10-22 ENCOUNTER — LAB VISIT (OUTPATIENT)
Dept: LAB | Facility: HOSPITAL | Age: 73
End: 2018-10-22
Attending: INTERNAL MEDICINE
Payer: MEDICARE

## 2018-10-22 DIAGNOSIS — I10 ESSENTIAL HYPERTENSION: ICD-10-CM

## 2018-10-22 DIAGNOSIS — Z95.5 S/P CORONARY ARTERY STENT PLACEMENT: ICD-10-CM

## 2018-10-22 DIAGNOSIS — Z95.1 S/P CABG (CORONARY ARTERY BYPASS GRAFT): ICD-10-CM

## 2018-10-22 DIAGNOSIS — E78.2 MIXED HYPERLIPIDEMIA: ICD-10-CM

## 2018-10-22 LAB
ALBUMIN SERPL BCP-MCNC: 3.7 G/DL
ALP SERPL-CCNC: 59 U/L
ALT SERPL W/O P-5'-P-CCNC: 11 U/L
ANION GAP SERPL CALC-SCNC: 7 MMOL/L
AST SERPL-CCNC: 10 U/L
BASOPHILS # BLD AUTO: 0.04 K/UL
BASOPHILS NFR BLD: 0.6 %
BILIRUB SERPL-MCNC: 0.3 MG/DL
BUN SERPL-MCNC: 38 MG/DL
CALCIUM SERPL-MCNC: 9.9 MG/DL
CHLORIDE SERPL-SCNC: 108 MMOL/L
CHOLEST SERPL-MCNC: 130 MG/DL
CHOLEST/HDLC SERPL: 4.3 {RATIO}
CO2 SERPL-SCNC: 24 MMOL/L
CREAT SERPL-MCNC: 1.3 MG/DL
DIFFERENTIAL METHOD: ABNORMAL
EOSINOPHIL # BLD AUTO: 0.2 K/UL
EOSINOPHIL NFR BLD: 2.9 %
ERYTHROCYTE [DISTWIDTH] IN BLOOD BY AUTOMATED COUNT: 14.5 %
EST. GFR  (AFRICAN AMERICAN): 47 ML/MIN/1.73 M^2
EST. GFR  (NON AFRICAN AMERICAN): 40.8 ML/MIN/1.73 M^2
GLUCOSE SERPL-MCNC: 142 MG/DL
HCT VFR BLD AUTO: 35.2 %
HDLC SERPL-MCNC: 30 MG/DL
HDLC SERPL: 23.1 %
HGB BLD-MCNC: 10.9 G/DL
IMM GRANULOCYTES # BLD AUTO: 0.02 K/UL
IMM GRANULOCYTES NFR BLD AUTO: 0.3 %
LDLC SERPL CALC-MCNC: 71 MG/DL
LYMPHOCYTES # BLD AUTO: 1.3 K/UL
LYMPHOCYTES NFR BLD: 21.2 %
MCH RBC QN AUTO: 28.5 PG
MCHC RBC AUTO-ENTMCNC: 31 G/DL
MCV RBC AUTO: 92 FL
MONOCYTES # BLD AUTO: 0.6 K/UL
MONOCYTES NFR BLD: 9.5 %
NEUTROPHILS # BLD AUTO: 4 K/UL
NEUTROPHILS NFR BLD: 65.5 %
NONHDLC SERPL-MCNC: 100 MG/DL
NRBC BLD-RTO: 0 /100 WBC
PLATELET # BLD AUTO: 230 K/UL
PMV BLD AUTO: 10.6 FL
POTASSIUM SERPL-SCNC: 4.6 MMOL/L
PROT SERPL-MCNC: 7.1 G/DL
RBC # BLD AUTO: 3.82 M/UL
SODIUM SERPL-SCNC: 139 MMOL/L
T4 FREE SERPL-MCNC: 1.01 NG/DL
TRIGL SERPL-MCNC: 145 MG/DL
TSH SERPL DL<=0.005 MIU/L-ACNC: 1.79 UIU/ML
WBC # BLD AUTO: 6.18 K/UL

## 2018-10-22 PROCEDURE — 80061 LIPID PANEL: CPT

## 2018-10-22 PROCEDURE — 80053 COMPREHEN METABOLIC PANEL: CPT

## 2018-10-22 PROCEDURE — 84439 ASSAY OF FREE THYROXINE: CPT

## 2018-10-22 PROCEDURE — 84443 ASSAY THYROID STIM HORMONE: CPT

## 2018-10-22 PROCEDURE — 36415 COLL VENOUS BLD VENIPUNCTURE: CPT | Mod: PO

## 2018-10-22 PROCEDURE — 85025 COMPLETE CBC W/AUTO DIFF WBC: CPT

## 2018-10-29 ENCOUNTER — OFFICE VISIT (OUTPATIENT)
Dept: CARDIOLOGY | Facility: CLINIC | Age: 73
End: 2018-10-29
Payer: MEDICARE

## 2018-10-29 VITALS
BODY MASS INDEX: 28.21 KG/M2 | SYSTOLIC BLOOD PRESSURE: 145 MMHG | DIASTOLIC BLOOD PRESSURE: 80 MMHG | WEIGHT: 159.19 LBS | HEIGHT: 63 IN | HEART RATE: 97 BPM

## 2018-10-29 DIAGNOSIS — I10 ESSENTIAL HYPERTENSION: ICD-10-CM

## 2018-10-29 DIAGNOSIS — I25.10 CORONARY ARTERY DISEASE INVOLVING NATIVE CORONARY ARTERY OF NATIVE HEART WITHOUT ANGINA PECTORIS: ICD-10-CM

## 2018-10-29 DIAGNOSIS — Z95.5 S/P CORONARY ARTERY STENT PLACEMENT: Primary | ICD-10-CM

## 2018-10-29 DIAGNOSIS — Z95.0 PACEMAKER: ICD-10-CM

## 2018-10-29 DIAGNOSIS — E78.2 MIXED HYPERLIPIDEMIA: ICD-10-CM

## 2018-10-29 DIAGNOSIS — Z95.1 S/P CABG (CORONARY ARTERY BYPASS GRAFT): ICD-10-CM

## 2018-10-29 DIAGNOSIS — I73.9 PVD (PERIPHERAL VASCULAR DISEASE): ICD-10-CM

## 2018-10-29 PROCEDURE — 1101F PT FALLS ASSESS-DOCD LE1/YR: CPT | Mod: CPTII,,, | Performed by: INTERNAL MEDICINE

## 2018-10-29 PROCEDURE — 3077F SYST BP >= 140 MM HG: CPT | Mod: CPTII,,, | Performed by: INTERNAL MEDICINE

## 2018-10-29 PROCEDURE — 99999 PR PBB SHADOW E&M-EST. PATIENT-LVL IV: CPT | Mod: PBBFAC,,, | Performed by: INTERNAL MEDICINE

## 2018-10-29 PROCEDURE — 99214 OFFICE O/P EST MOD 30 MIN: CPT | Mod: PBBFAC,PO | Performed by: INTERNAL MEDICINE

## 2018-10-29 PROCEDURE — 99214 OFFICE O/P EST MOD 30 MIN: CPT | Mod: S$PBB,,, | Performed by: INTERNAL MEDICINE

## 2018-10-29 PROCEDURE — 3079F DIAST BP 80-89 MM HG: CPT | Mod: CPTII,,, | Performed by: INTERNAL MEDICINE

## 2018-10-29 RX ORDER — AMLODIPINE BESYLATE 5 MG/1
5 TABLET ORAL NIGHTLY
Qty: 90 TABLET | Refills: 5 | Status: SHIPPED | OUTPATIENT
Start: 2018-10-29 | End: 2020-01-23

## 2018-10-29 NOTE — PROGRESS NOTES
Subjective:    Patient ID:  Shirlene Ortiz is a 73 y.o. female who presents for follow-up of No chief complaint on file.      HPI    Here for follow up of CABG-PCI (BELLE 11/17)/PPM/PAD. No angina. Having Bp fluctuations. C/o bilateral calf claudications FC 2.        Review of Systems   Constitution: Negative for malaise/fatigue.   Eyes: Negative for blurred vision.   Cardiovascular: Negative for chest pain, claudication, cyanosis, dyspnea on exertion, irregular heartbeat, leg swelling, near-syncope, orthopnea, palpitations, paroxysmal nocturnal dyspnea and syncope.   Respiratory: Negative for cough and shortness of breath.    Hematologic/Lymphatic: Does not bruise/bleed easily.   Musculoskeletal: Negative for back pain, falls, joint pain, muscle cramps, muscle weakness and myalgias.   Gastrointestinal: Negative for abdominal pain, change in bowel habit, nausea and vomiting.   Genitourinary: Negative for urgency.   Neurological: Negative for dizziness, focal weakness and light-headedness.        Objective:    Physical Exam   Constitutional: She is oriented to person, place, and time. She appears well-developed and well-nourished.   HENT:   Head: Normocephalic.   Eyes: Conjunctivae are normal.   Neck: Normal range of motion. Neck supple. No JVD present.   Cardiovascular: Normal rate, regular rhythm, normal heart sounds and intact distal pulses.   Pulses:       Carotid pulses are 2+ on the right side, and 2+ on the left side.       Radial pulses are 2+ on the right side, and 2+ on the left side.        Dorsalis pedis pulses are 2+ on the right side, and 2+ on the left side.        Posterior tibial pulses are 2+ on the right side, and 2+ on the left side.   Well healed midline sternal incision.  Pacer site clean and dry, well healed.       Pulmonary/Chest: Effort normal and breath sounds normal.   Abdominal: Soft. Bowel sounds are normal.   Musculoskeletal: She exhibits no edema or tenderness.   Neurological: She is  alert and oriented to person, place, and time. Gait normal.   Skin: Skin is warm, dry and intact. No cyanosis. Nails show no clubbing.   Psychiatric: She has a normal mood and affect. Her speech is normal and behavior is normal. Thought content normal.   Nursing note and vitals reviewed.              ..    Chemistry        Component Value Date/Time     10/22/2018 0710    K 4.6 10/22/2018 0710     10/22/2018 0710    CO2 24 10/22/2018 0710    BUN 38 (H) 10/22/2018 0710    CREATININE 1.3 10/22/2018 0710     (H) 10/22/2018 0710        Component Value Date/Time    CALCIUM 9.9 10/22/2018 0710    ALKPHOS 59 10/22/2018 0710    AST 10 10/22/2018 0710    ALT 11 10/22/2018 0710    BILITOT 0.3 10/22/2018 0710    ESTGFRAFRICA 47.0 (A) 10/22/2018 0710    EGFRNONAA 40.8 (A) 10/22/2018 0710            ..  Lab Results   Component Value Date    CHOL 130 10/22/2018    CHOL 144 09/26/2017    CHOL 163 08/22/2017     Lab Results   Component Value Date    HDL 30 (L) 10/22/2018    HDL 29 (L) 09/26/2017    HDL 20 (L) 08/22/2017     Lab Results   Component Value Date    LDLCALC 71.0 10/22/2018    LDLCALC 75.6 09/26/2017    LDLCALC Invalid, Trig>400.0 08/22/2017     Lab Results   Component Value Date    TRIG 145 10/22/2018    TRIG 197 (H) 09/26/2017    TRIG 491 (H) 08/22/2017     Lab Results   Component Value Date    CHOLHDL 23.1 10/22/2018    CHOLHDL 20.1 09/26/2017    CHOLHDL 12.3 (L) 08/22/2017     ..  Lab Results   Component Value Date    WBC 6.18 10/22/2018    HGB 10.9 (L) 10/22/2018    HCT 35.2 (L) 10/22/2018    MCV 92 10/22/2018     10/22/2018       Test(s) Reviewed  I have reviewed the following in detail:  [] Stress test   [] Angiography   [x] Echocardiogram   [x] Labs   [x] Other:       Assessment:         ICD-10-CM ICD-9-CM   1. S/P coronary artery stent placement Z95.5 V45.82   2. S/P CABG (coronary artery bypass graft) Z95.1 V45.81   3. Pacemaker Z95.0 V45.01   4. PVD (peripheral vascular disease) I73.9  443.9   5. Mixed hyperlipidemia E78.2 272.2   6. Essential hypertension I10 401.9   7. Coronary artery disease involving native coronary artery of native heart without angina pectoris I25.10 414.01     Problem List Items Addressed This Visit     CAD (coronary artery disease)    Overview     11/17  Circumflex: Native circumflex has a 70% stenosis proximal to the stent in  the mid portion and has a short discrete 95% in-stent restenosis just prior to  the obtuse marginal and another 80% right at the obtuse marginal and a 70% in  the distal portion of the stent. There was a stent in the obtuse marginal into  the stent of the circumflex with a 70% in-stent restenosis.  --  Graft to the mid LAD: The graft was a LIMA from the aorta.  --  Graft to the 1st obtuse marginal: Vein graft to the circumflex is occluded.           Essential hypertension    Mixed hyperlipidemia    Pacemaker    Overview     left chest PACEMAKER  MEDTRONIC SEDR01 Sensia   S/N:[id number]  Deion Link  8/3/2010 - current     right atrium LEAD  [employer]TR[employer] 5076 CapSure Fix Novus  S/N:[id number]  Deion Link  8/3/2010 - current     right ventricle LEAD  MEDTRONIC 5076 CapSure Fix Novus  S/N:[id number]  Deion Link  8/3/2010 - current          PVD (peripheral vascular disease)    Overview     Significantly reduced MAURICIO on the left leg.  70-89% stenosis seen in the left popliteal artery.   SIGNED BY: Caesar Loo MD On: 08/12/2016 17:05         S/P CABG (coronary artery bypass graft)    Overview     8/17  CABG x5 LIMA-LAD, VG-PDA, VG-D, YVG-OM-PLB            S/P coronary artery stent placement - Primary    Overview     11/17  proximal circumflex using a 2.5 x 38, post-dilated to 3.0 with 0% residual  stenosis.                Plan:           Return to clinic 3 months   Low level/low impact aerobic exercise 5x's/wk. Heart healthy diet and risk factor modification.    See labs and med orders.  Increase norvasc.    carotid US, MIC- US

## 2018-11-08 RX ORDER — CLOPIDOGREL BISULFATE 75 MG/1
75 TABLET ORAL DAILY
Qty: 90 TABLET | Refills: 1 | Status: SHIPPED | OUTPATIENT
Start: 2018-11-08 | End: 2019-04-08 | Stop reason: SDUPTHER

## 2018-11-14 RX ORDER — LISINOPRIL 10 MG/1
10 TABLET ORAL DAILY
Qty: 90 TABLET | Refills: 4 | Status: SHIPPED | OUTPATIENT
Start: 2018-11-14 | End: 2019-11-11 | Stop reason: SDUPTHER

## 2018-11-14 RX ORDER — CARVEDILOL 3.12 MG/1
3.12 TABLET ORAL 2 TIMES DAILY
Qty: 180 TABLET | Refills: 6 | Status: SHIPPED | OUTPATIENT
Start: 2018-11-14 | End: 2020-01-22

## 2018-11-14 NOTE — TELEPHONE ENCOUNTER
----- Message from Hanna Medina sent at 11/14/2018 10:33 AM CST -----  Contact: Margarita with [facility]  Margarita with [facility]  pharmacy [phone] called regarding carvedilol (COREG) 3.125 MG tablet for above patient. They are requesting one of the following: refill request. Thanks!

## 2018-11-27 ENCOUNTER — CLINICAL SUPPORT (OUTPATIENT)
Dept: CARDIOLOGY | Facility: CLINIC | Age: 73
End: 2018-11-27
Attending: INTERNAL MEDICINE
Payer: MEDICARE

## 2018-11-27 DIAGNOSIS — E78.2 MIXED HYPERLIPIDEMIA: ICD-10-CM

## 2018-11-27 DIAGNOSIS — I73.9 PVD (PERIPHERAL VASCULAR DISEASE): ICD-10-CM

## 2018-11-27 DIAGNOSIS — I25.10 CORONARY ARTERY DISEASE INVOLVING NATIVE CORONARY ARTERY OF NATIVE HEART WITHOUT ANGINA PECTORIS: ICD-10-CM

## 2018-11-27 DIAGNOSIS — Z95.1 S/P CABG (CORONARY ARTERY BYPASS GRAFT): ICD-10-CM

## 2018-11-27 DIAGNOSIS — Z95.5 S/P CORONARY ARTERY STENT PLACEMENT: ICD-10-CM

## 2018-11-27 PROCEDURE — 93925 LOWER EXTREMITY STUDY: CPT | Mod: HCWC,S$GLB,, | Performed by: INTERNAL MEDICINE

## 2018-11-27 PROCEDURE — 93880 EXTRACRANIAL BILAT STUDY: CPT | Mod: HCWC,S$GLB,, | Performed by: INTERNAL MEDICINE

## 2018-11-30 LAB
LEFT ANT TIBIAL SYS PSV: 90 CM/S
LEFT ARM DIASTOLIC BLOOD PRESSURE: 60 MMHG
LEFT ARM SYSTOLIC BLOOD PRESSURE: 100 MMHG
LEFT CBA DIAS: 14 CM/S
LEFT CBA SYS: 71 CM/S
LEFT CCA DIST DIAS: 15 CM/S
LEFT CCA DIST SYS: 66 CM/S
LEFT CCA MID DIAS: 16 CM/S
LEFT CCA MID SYS: 77 CM/S
LEFT CCA PROX DIAS: 14 CM/S
LEFT CCA PROX SYS: 91 CM/S
LEFT CFA PSV: 114 CM/S
LEFT ECA DIAS: 7 CM/S
LEFT ECA SYS: 89 CM/S
LEFT ICA DIST DIAS: 18 CM/S
LEFT ICA DIST SYS: 64 CM/S
LEFT ICA MID DIAS: 29 CM/S
LEFT ICA MID SYS: 162 CM/S
LEFT ICA PROX DIAS: 41 CM/S
LEFT ICA PROX SYS: 143 CM/S
LEFT PERONEAL SYS PSV: 70 CM/S
LEFT POPLITEAL PSV: 90 CM/S
LEFT POST TIBIAL SYS PSV: 19 CM/S
LEFT PROFUNDA SYS PSV: 116 CM/S
LEFT SUPER FEMORAL OSTIAL SYS PSV: 86 CM/S
LEFT SUPER FEMORAL PROX SYS PSV: 108 CM/S
LEFT TIB/PER TRUNK SYS PSV: 67 CM/S
LEFT VERTEBRAL DIAS: 17 CM/S
LEFT VERTEBRAL SYS: 55 CM/S
OHS CV CAROTID ULTRASOUND LEFT ICA/CCA RATIO: 1.78
OHS CV CAROTID ULTRASOUND RIGHT ICA/CCA RATIO: 2.25
OHS CV LEFT LOWER EXTREMITY ABI (NO CALC): 1
OHS CV LOWER EXTREMITY STENT MEASUREMENTS - LEFT - DSFA S1 - MID: 139
OHS CV LOWER EXTREMITY STENT MEASUREMENTS - LEFT - MSFA S1 - PROX: 128
OHS CV LOWER EXTREMITY STENT MEASUREMENTS - LEFT - POP S1 - DIST: 89
OHS CV LOWER EXTREMITY STENT MEASUREMENTS - LEFT - PSFA S1 - OST: 101
OHS CV RIGHT ABI LOWER EXTREMITY (NO CALC): 0.92
RIGHT ANT TIBIAL SYS PSV: 55 CM/S
RIGHT ARM DIASTOLIC BLOOD PRESSURE: 60 MMHG
RIGHT ARM SYSTOLIC BLOOD PRESSURE: 120 MMHG
RIGHT CBA DIAS: 12 CM/S
RIGHT CBA SYS: 64 CM/S
RIGHT CCA DIST DIAS: 12 CM/S
RIGHT CCA DIST SYS: 52 CM/S
RIGHT CCA MID DIAS: 14 CM/S
RIGHT CCA MID SYS: 69 CM/S
RIGHT CCA PROX DIAS: 10 CM/S
RIGHT CCA PROX SYS: 67 CM/S
RIGHT CFA PSV: 82 CM/S
RIGHT ECA DIAS: 11 CM/S
RIGHT ECA SYS: 84 CM/S
RIGHT ICA DIST DIAS: 14 CM/S
RIGHT ICA DIST SYS: 40 CM/S
RIGHT ICA MID DIAS: 17 CM/S
RIGHT ICA MID SYS: 129 CM/S
RIGHT ICA PROX DIAS: 35 CM/S
RIGHT ICA PROX SYS: 155 CM/S
RIGHT PERONEAL SYS PSV: 70 CM/S
RIGHT POPLITEAL PSV: 46 CM/S
RIGHT POST TIBIAL SYS PSV: 45 CM/S
RIGHT PROFUNDA SYS PSV: 76 CM/S
RIGHT SUPER FEMORAL DIST SYS PSV: 74 CM/S
RIGHT SUPER FEMORAL MID SYS PSV: 136 CM/S
RIGHT SUPER FEMORAL OSTIAL SYS PSV: 82 CM/S
RIGHT SUPER FEMORAL PROX SYS PSV: 94 CM/S
RIGHT TIB/PER TRUNK SYS PSV: 305 CM/S
RIGHT VERTEBRAL DIAS: 12 CM/S
RIGHT VERTEBRAL SYS: 69 CM/S

## 2019-01-08 ENCOUNTER — TELEPHONE (OUTPATIENT)
Dept: CARDIOLOGY | Facility: CLINIC | Age: 74
End: 2019-01-08

## 2019-01-14 RX ORDER — INSULIN PUMP SYRINGE, 3 ML
EACH MISCELLANEOUS
Qty: 1 EACH | Refills: 0 | Status: SHIPPED | OUTPATIENT
Start: 2019-01-14 | End: 2020-01-14

## 2019-01-14 RX ORDER — LANCETS
EACH MISCELLANEOUS
Qty: 200 EACH | Refills: 11 | Status: SHIPPED | OUTPATIENT
Start: 2019-01-14

## 2019-01-14 NOTE — TELEPHONE ENCOUNTER
----- Message from Lucy Kramer sent at 1/14/2019 11:56 AM CST -----  Type:  RX Refill Request    Who Called: self   Refill or New Rx:  New glucose meter-Accu check meter  RX Name and Strength:   How is the patient currently taking it? (ex. 1XDay):  Is this a 30 day or 90 day RX:  Preferred Pharmacy with phone number:  [facility] mail/[facility] -fax [phone]  Local or Mail Order: mail order Ordering Provider:  Dr Maury Jose Call Back Number:  Additional Information:  Patient states she need a new rx for glucose meter.

## 2019-01-23 ENCOUNTER — TELEPHONE (OUTPATIENT)
Dept: FAMILY MEDICINE | Facility: CLINIC | Age: 74
End: 2019-01-23

## 2019-01-23 NOTE — TELEPHONE ENCOUNTER
----- Message from Debby Mosqueda sent at 1/23/2019 11:24 AM CST -----  Contact: patient  Type: Needs Medical Advice    Who Called:  Patient  Symptoms (please be specific):  na  How long has patient had these symptoms:  herman  Pharmacy name and phone #:  [facility]  Best Call Back Number: [phone] (home)    Additional Information: Patient is calling to check the status of the order for a new accu -check blood glucose monitor, please call to advise. Thank you!

## 2019-01-24 ENCOUNTER — OFFICE VISIT (OUTPATIENT)
Dept: PRIMARY CARE CLINIC | Facility: CLINIC | Age: 74
End: 2019-01-24
Payer: MEDICARE

## 2019-01-24 VITALS
BODY MASS INDEX: 28.08 KG/M2 | OXYGEN SATURATION: 98 % | RESPIRATION RATE: 17 BRPM | HEIGHT: 63 IN | TEMPERATURE: 98 F | DIASTOLIC BLOOD PRESSURE: 80 MMHG | HEART RATE: 80 BPM | WEIGHT: 158.5 LBS | SYSTOLIC BLOOD PRESSURE: 140 MMHG

## 2019-01-24 DIAGNOSIS — R05.9 COUGH: Primary | ICD-10-CM

## 2019-01-24 DIAGNOSIS — R06.2 WHEEZING: ICD-10-CM

## 2019-01-24 PROCEDURE — 3079F DIAST BP 80-89 MM HG: CPT | Mod: HCNC,CPTII,S$GLB, | Performed by: NURSE PRACTITIONER

## 2019-01-24 PROCEDURE — 3077F PR MOST RECENT SYSTOLIC BLOOD PRESSURE >= 140 MM HG: ICD-10-PCS | Mod: HCNC,CPTII,S$GLB, | Performed by: NURSE PRACTITIONER

## 2019-01-24 PROCEDURE — 1101F PR PT FALLS ASSESS DOC 0-1 FALLS W/OUT INJ PAST YR: ICD-10-PCS | Mod: HCNC,CPTII,S$GLB, | Performed by: NURSE PRACTITIONER

## 2019-01-24 PROCEDURE — 99999 PR PBB SHADOW E&M-EST. PATIENT-LVL V: ICD-10-PCS | Mod: PBBFAC,,, | Performed by: NURSE PRACTITIONER

## 2019-01-24 PROCEDURE — 94640 AIRWAY INHALATION TREATMENT: CPT | Mod: 59,HCNC,S$GLB, | Performed by: NURSE PRACTITIONER

## 2019-01-24 PROCEDURE — 3077F SYST BP >= 140 MM HG: CPT | Mod: HCNC,CPTII,S$GLB, | Performed by: NURSE PRACTITIONER

## 2019-01-24 PROCEDURE — 1101F PT FALLS ASSESS-DOCD LE1/YR: CPT | Mod: HCNC,CPTII,S$GLB, | Performed by: NURSE PRACTITIONER

## 2019-01-24 PROCEDURE — 99999 PR PBB SHADOW E&M-EST. PATIENT-LVL V: CPT | Mod: PBBFAC,,, | Performed by: NURSE PRACTITIONER

## 2019-01-24 PROCEDURE — 99214 OFFICE O/P EST MOD 30 MIN: CPT | Mod: 25,HCNC,S$GLB, | Performed by: NURSE PRACTITIONER

## 2019-01-24 PROCEDURE — 94640 PR INHAL RX, AIRWAY OBST/DX SPUTUM INDUCT: ICD-10-PCS | Mod: 59,HCNC,S$GLB, | Performed by: NURSE PRACTITIONER

## 2019-01-24 PROCEDURE — 3079F PR MOST RECENT DIASTOLIC BLOOD PRESSURE 80-89 MM HG: ICD-10-PCS | Mod: HCNC,CPTII,S$GLB, | Performed by: NURSE PRACTITIONER

## 2019-01-24 PROCEDURE — 99214 PR OFFICE/OUTPT VISIT, EST, LEVL IV, 30-39 MIN: ICD-10-PCS | Mod: 25,HCNC,S$GLB, | Performed by: NURSE PRACTITIONER

## 2019-01-24 RX ORDER — ALBUTEROL SULFATE 90 UG/1
2 AEROSOL, METERED RESPIRATORY (INHALATION) EVERY 6 HOURS PRN
Qty: 18 G | Refills: 0 | Status: SHIPPED | OUTPATIENT
Start: 2019-01-24 | End: 2019-12-27

## 2019-01-24 RX ORDER — ALBUTEROL SULFATE 0.83 MG/ML
2.5 SOLUTION RESPIRATORY (INHALATION)
Status: COMPLETED | OUTPATIENT
Start: 2019-01-24 | End: 2019-01-24

## 2019-01-24 RX ORDER — ALBUTEROL SULFATE 90 UG/1
2 AEROSOL, METERED RESPIRATORY (INHALATION) EVERY 6 HOURS PRN
Qty: 18 G | Refills: 0 | Status: SHIPPED | OUTPATIENT
Start: 2019-01-24 | End: 2019-01-24 | Stop reason: SDUPTHER

## 2019-01-24 RX ORDER — ALBUTEROL SULFATE 90 UG/1
2 AEROSOL, METERED RESPIRATORY (INHALATION) EVERY 6 HOURS PRN
Qty: 18 G | Refills: 0 | Status: SHIPPED | OUTPATIENT
Start: 2019-01-24 | End: 2019-01-24

## 2019-01-24 RX ADMIN — ALBUTEROL SULFATE 2.5 MG: 0.83 SOLUTION RESPIRATORY (INHALATION) at 02:01

## 2019-01-24 NOTE — PROGRESS NOTES
"Shirlene Ortiz is a 73 y.o. female patient of Hiram Mendiola MD who presents to the clinic today for   Chief Complaint   Patient presents with    Cough   .    HPI    Pt, who is not known to me, reports a new problem to me: [place] pretty [place].  2 mornings ago she had a temp of 99.2 and started with a dry cough--"700 or 800 times", she guesses.  Breathing through her mouth or taking caused the coughing.  Cough originates in the throat, like there's an irritated area on the right throat.  She's very hoarse.  Rare runny nose for a day.  Saliva in her mouth is causing her to be nauseated  Last night she did get some sleep, the cough calmed some.   Rare mucus production is clear    These symptoms began 4 days ago and status is improved..     Symptoms are + acute.    Pt denies the following symptoms:  Vomiting, ST    Aggravating factors include nothing .    Relieving factors include nothing .    OTC Medications tried are Mucinex Multipsymptom didn't help.    Prescription medications taken for symptoms are Amox.    Pertinent medical history:  Had a lot of breathing problems after CABG in 11/2017 but that improved.  Did try to quit again in 2017/early 2018.    Smoking status:  Current everyday smoker    ROS    Constitutional:   +  fever, no fatigue, no change in appetite.    Head:   + headache from coughing yesterday  Ears:   No pain.  Eyes:  No sxs  Nose:   No sinus pain, + congestion, + runny nose, no post nasal drip.  Throat:  No ST pain.    Heart:  No palpitations, chest pain.    Lungs:  No difficulty breathing, + coughing, rare clear sputum production, not wheezing.              Symptoms are community acquired.  No known sick contacts.  Was at the [employer].    GI/:  Nausea but no vomiting.  Has spastic colon.  Bowel pattern is to have BM about every 5 days--not hard to pass, very long stools, sometimes they are very soft.    MS:  No new bone, joint or muscle problems.  Abd sore from " coughing    Skin:  No rashes, itching.      PAST MEDICAL HISTORY:  Past Medical History:   Diagnosis Date    Acute coronary artery obstruction without MI     DM (diabetes mellitus)     HTN (hypertension)     Hyperlipidemia     PVD (peripheral vascular disease)     S/P CABG (coronary artery bypass graft) 9/14/2017 8/17 CABG x5 LIMA-LAD, VG-PDA, VG-D, YVG-OM-PLB  mammary artery to left anterior descending coronary artery and separate segments  of saphenous vein from the aorta to the posterior descending coronary artery  and from the aorta to the diagonal coronary artery and from the aorta to the  obtuse marginal coronary artery and to the posterolateral branch of the  circumflex in a sequential fashion using a combination of antegrade and  retrograde cardioplegia with mild systemic hypothermia and endoscopic vein  harvest.    Sinus node dysfunction     Stroke 7/4/14       PAST SURGICAL HISTORY:  Past Surgical History:   Procedure Laterality Date    AORTOCORONARY BYPASS-CABG x5 with endoscopic vein harvest N/A 8/22/2017    Performed by [facility] at [facility] OR    AORTOGRAM WITH RUNOFF N/A 12/5/2016    Performed by Caesar Loo MD at [facility] CATH    APPENDECTOMY      CARDIAC PACEMAKER PLACEMENT      CHOLECYSTECTOMY      COLONOSCOPY N/A 4/27/2015    [facility]O    coranary stent      CORONARY ANGIOPLASTY      HEART CATH-LEFT Right 8/16/2017    Performed [facility]oo [facility] at [facility] CATH    ILIAC ARTERY STENT      Left heart cath Right 11/7/2017    Performed by Zhao aL MD at [facility] CATH    POLYPECTOMY      Stent BELLE coronary-RM # 211 B N/A 11/9/2017    Performed by [facility] Loo [facility] at [facility] CATH    TOTAL ABDOMINAL HYSTERECTOMY         SOCIAL HISTORY:  Social History     Socioeconomic History    Marital status: Single     Spouse name: Not on file    Number of children: Not on file    Years of education: Not on file    Highest education level:  Not on file   Social Needs    Financial resource strain: Not on file    Food insecurity - worry: Not on file    Food insecurity - inability: Not on file    Transportation needs - medical: Not on file    Transportation needs - non-medical: Not on file   Occupational History    Not on file   Tobacco Use    Smoking status: Current Every Day Smoker     Packs/day: 1.50     Years: 60.00     Pack years: 90.00     Types: Cigarettes     Last attempt to quit: 8/15/2017     Years since quittin.4    Smokeless tobacco: Never Used    Tobacco comment: Tobacco treatment specialist consulted.   Substance and Sexual Activity    Alcohol use: No    Drug use: No    Sexual activity: Not on file   Other Topics Concern    Not on file   Social History Narrative    Not on file       FAMILY HISTORY:  Family History   Problem Relation Age of Onset    Kidney disease Mother     Hypertension Mother     Diabetes Mother     Melanoma Sister     Lung disease Sister     Hypertension Sister        ALLERGIES AND MEDICATIONS: updated and reviewed.  Review of patient's allergies indicates:   Allergen Reactions    Nicotine      puritis     Current Outpatient Medications   Medication Sig Dispense Refill    ACCU-CHEK ISRAEL CONTROL SOLN Soln       ACCU-CHEK [place] PLUS TEST STRP Strp TEST  2  TO  4  TIMES  EVERY   strip 3    amLODIPine (NORVASC) 5 MG tablet Take 1 tablet (5 mg total) by mouth every evening. 90 tablet 5    aspirin (ECOTRIN) 81 MG EC tablet Take 1 tablet (81 mg total) by mouth once daily. (Patient taking differently: Take 325 mg by mouth once daily. )  0    atorvastatin (LIPITOR) 40 MG tablet TAKE 1 TABLET EVERY DAY 90 tablet 3    BD ALCOHOL SWABS PadM       blood sugar diagnostic Strp To check BG 6 times daily, to use with insurance preferred meter 200 each 11    blood-glucose meter kit To check BG 6  times daily, to use with insurance preferred meter 1 each 0    carvedilol (COREG) 3.125 MG tablet Take 1  tablet (3.125 mg total) by mouth 2 (two) times daily. 180 tablet 6    clopidogrel (PLAVIX) 75 mg tablet Take 1 tablet (75 mg total) by mouth once daily. 90 tablet 1    fenofibrate 160 MG Tab TAKE 1 TABLET EVERY DAY 90 tablet 4    glipiZIDE (GLUCOTROL) 10 MG tablet TAKE 1 TABLET TWICE DAILY 180 tablet 3    lancets Misc To check BG 6 times daily, to use with insurance preferred meter 200 each 11    lisinopril 10 MG tablet TAKE 1 TABLET (10 MG TOTAL) BY MOUTH ONCE DAILY. 90 tablet 4    metFORMIN (GLUCOPHAGE) 1000 MG tablet TAKE 1 TABLET TWICE DAILY WITH MEALS 180 tablet 3    neomycin-polymyxin-hydrocortisone (CORTISPORIN) 3.5-10,000-1 mg/mL-unit/mL-% otic suspension Place 3 drops into both ears 4 (four) times daily. 10 mL 2     No current facility-administered medications for this visit.              PHYSICAL EXAM    Alert, coop 73 y.o. female patient in no acute distress.    Vitals:    01/24/19 1335   BP: (!) 140/80   Pulse: 80   Resp: 17   Temp: 97.8 °F (36.6 °C)     VS reviewed.  VS stable.  CC, nursing note, medications & PMH all reviewed today.    Head:  Normocephalic, atraumatic.    EENT:  EACs patent (narrow canals with small amount of cerumen).  TMs no erythema, normal LR, no effusions, no TM perforation.                              Eye lids normal, no discharge present.       Sinus tenderness to palp--none.               Nares--no edema, no d/c present.    Pharynx not injected.  Edentulous               Tonsils not erythematous , not enlarged, no exudate present.    No anterior, no posterior cervical lymph nodes palpable.    No submental, submandibular or supraclavicular lymph nodes palp.             Resp:  Respirations even, unlabored   Lungs CTA bilat.  No wheezing.  No crackles.  Moves air to bases bilat.    Heart:  RRR, no MRG.                MS:  Ambulates normally.             NEURO:  Alert and oriented x 4.  Responds appropriately during interaction.    Skin:  Warm, dry, color good.    Cough  -      albuterol nebulizer solution 2.5 mg    Wheezing  -     albuterol nebulizer solution 2.5 mg      Pt today presents with coughing continuing after an illness earlier in the week, where she had a fever, runny nose and a lot of coughing, sometimes in back to back spasms..  The fever is gone and the coughing improved as of last night.  But she now has a hoarse voice.  Cough rarely productive and the sputum is clear.  On exam she coughs occ, has coarse breath sounds t/o but good air movement.    This is a new problem to me.  No work up is planned.        Pt advised to perform comfort measures recommended on patient instruction sheet .    If not better in 7 days, the patient is advised to call us.  If worse or concerns, the patient is advised to call us.  Explained exam findings, diagnosis and treatment plan to patient.  Questions answered and patient states understanding.

## 2019-01-24 NOTE — PATIENT INSTRUCTIONS
Your symptoms today are consistent with viral respiratory illness that has not completely resolved.  The infection has left some inflammation in the tubes of the lungs worsening the cough.    Comfort measures:  Increase fluids  Get plenty of rest  Vaporizer or frequent showers may be helpful.  Take tylenol of ibuprofen as needed for pain or fever    The following is also prescribed for the cough albuterol inhaler.    If you are using an spacer (small piece of cardboard tube) with the inhaler:  Shake the inhaler.  Connect it to the spacer.  Put the spacer to your lips.  Spray the medication into the spacer.  Slowly inhale the medication from the spacer.  Hold to the count of 10, if you can.  Breathe out.  Rinse your mouth out with water and spit it in the sink.  Wait 1-2 minutes.  Repeat.    Salt water gargles.  Saline nasal spray  Wash cloth with warm water placed over the sinus area can lessen discomfort and pressure.  Sleep with head of bed elevated to decrease drainage, cough and congestion.    I recommend frequent handwashing to limit spread of infection to others     If you are not better in 7 days, if worse or you have concerns or questions, please do not hesitate to call.  You can reach us at [phone] Monday through Thursday (except holidays) 10 a.m. to 5 p.m.  Or call Hiram Mendiola MD's nurse.   NOTE:  I do not work on Fridays.  If you have concerns on Fridays, call your primary care office.    Evenings and weekends [facility] is also available if symptoms worsen or fail to improve.  When you call the number, a registered nurse answers and takes your information.  The nurse can look at your medical record and make recommendations or call the on call physician, if warranted.      Urgent Cares associated with [facility] are open M-F evenings and on the weekends, as well.    [facility] Address: [address], [place] [address] Phone: [phone]    [facility] Address:   Address: [address] [place] [address], [place] [address] Phone: [phone]    Thank you for using the [facility]!    MELVIN Lujan, CNP, FNP-BC  [facility]

## 2019-02-26 DIAGNOSIS — Z95.0 PACEMAKER: Primary | ICD-10-CM

## 2019-02-26 DIAGNOSIS — I49.5 SINUS NODE DYSFUNCTION: ICD-10-CM

## 2019-03-01 ENCOUNTER — TELEPHONE (OUTPATIENT)
Dept: FAMILY MEDICINE | Facility: CLINIC | Age: 74
End: 2019-03-01

## 2019-03-28 ENCOUNTER — CLINICAL SUPPORT (OUTPATIENT)
Dept: CARDIOLOGY | Facility: CLINIC | Age: 74
End: 2019-03-28
Attending: INTERNAL MEDICINE
Payer: MEDICARE

## 2019-03-28 ENCOUNTER — OFFICE VISIT (OUTPATIENT)
Dept: CARDIOLOGY | Facility: CLINIC | Age: 74
End: 2019-03-28
Payer: MEDICARE

## 2019-03-28 VITALS
DIASTOLIC BLOOD PRESSURE: 76 MMHG | SYSTOLIC BLOOD PRESSURE: 125 MMHG | HEIGHT: 63 IN | HEART RATE: 86 BPM | WEIGHT: 155.44 LBS | BODY MASS INDEX: 27.54 KG/M2

## 2019-03-28 DIAGNOSIS — I49.5 SINUS NODE DYSFUNCTION: ICD-10-CM

## 2019-03-28 DIAGNOSIS — Z72.0 TOBACCO ABUSE: ICD-10-CM

## 2019-03-28 DIAGNOSIS — I73.9 PVD (PERIPHERAL VASCULAR DISEASE): ICD-10-CM

## 2019-03-28 DIAGNOSIS — I25.10 CORONARY ARTERY DISEASE INVOLVING NATIVE CORONARY ARTERY OF NATIVE HEART WITHOUT ANGINA PECTORIS: ICD-10-CM

## 2019-03-28 DIAGNOSIS — E78.2 MIXED HYPERLIPIDEMIA: ICD-10-CM

## 2019-03-28 DIAGNOSIS — I10 ESSENTIAL HYPERTENSION: ICD-10-CM

## 2019-03-28 DIAGNOSIS — Z95.1 S/P CABG (CORONARY ARTERY BYPASS GRAFT): ICD-10-CM

## 2019-03-28 DIAGNOSIS — Z95.0 PACEMAKER: Primary | ICD-10-CM

## 2019-03-28 DIAGNOSIS — Z95.5 S/P CORONARY ARTERY STENT PLACEMENT: ICD-10-CM

## 2019-03-28 DIAGNOSIS — Z95.0 PACEMAKER: ICD-10-CM

## 2019-03-28 PROCEDURE — 3074F PR MOST RECENT SYSTOLIC BLOOD PRESSURE < 130 MM HG: ICD-10-PCS | Mod: HCNC,CPTII,S$GLB, | Performed by: INTERNAL MEDICINE

## 2019-03-28 PROCEDURE — 1101F PR PT FALLS ASSESS DOC 0-1 FALLS W/OUT INJ PAST YR: ICD-10-PCS | Mod: HCNC,CPTII,S$GLB, | Performed by: INTERNAL MEDICINE

## 2019-03-28 PROCEDURE — 1101F PT FALLS ASSESS-DOCD LE1/YR: CPT | Mod: HCNC,CPTII,S$GLB, | Performed by: INTERNAL MEDICINE

## 2019-03-28 PROCEDURE — 99999 PR PBB SHADOW E&M-EST. PATIENT-LVL III: CPT | Mod: PBBFAC,HCNC,, | Performed by: INTERNAL MEDICINE

## 2019-03-28 PROCEDURE — 99214 PR OFFICE/OUTPT VISIT, EST, LEVL IV, 30-39 MIN: ICD-10-PCS | Mod: HCNC,S$GLB,, | Performed by: INTERNAL MEDICINE

## 2019-03-28 PROCEDURE — 3078F PR MOST RECENT DIASTOLIC BLOOD PRESSURE < 80 MM HG: ICD-10-PCS | Mod: HCNC,CPTII,S$GLB, | Performed by: INTERNAL MEDICINE

## 2019-03-28 PROCEDURE — 3078F DIAST BP <80 MM HG: CPT | Mod: HCNC,CPTII,S$GLB, | Performed by: INTERNAL MEDICINE

## 2019-03-28 PROCEDURE — 3074F SYST BP LT 130 MM HG: CPT | Mod: HCNC,CPTII,S$GLB, | Performed by: INTERNAL MEDICINE

## 2019-03-28 PROCEDURE — 99999 PR PBB SHADOW E&M-EST. PATIENT-LVL III: ICD-10-PCS | Mod: PBBFAC,HCNC,, | Performed by: INTERNAL MEDICINE

## 2019-03-28 PROCEDURE — 99214 OFFICE O/P EST MOD 30 MIN: CPT | Mod: HCNC,S$GLB,, | Performed by: INTERNAL MEDICINE

## 2019-03-28 NOTE — PROGRESS NOTES
Subjective:    Patient ID:  Shirlene Ortiz is a 73 y.o. female who presents for follow-up of CAD F/U      HPI  Here for follow up of CABG-PCI (BELLE 11/17)/PPM/PAD. No angina.  C/o bilateral calf claudications FC 2a. Patient denies palpitations, syncope, presyncope, lightheadedness or dizziness.      Review of Systems   Constitution: Negative for malaise/fatigue.   Eyes: Negative for blurred vision.   Cardiovascular: Negative for chest pain, claudication, cyanosis, dyspnea on exertion, irregular heartbeat, leg swelling, near-syncope, orthopnea, palpitations, paroxysmal nocturnal dyspnea and syncope.   Respiratory: Negative for cough and shortness of breath.    Hematologic/Lymphatic: Does not bruise/bleed easily.   Musculoskeletal: Negative for back pain, falls, joint pain, muscle cramps, muscle weakness and myalgias.   Gastrointestinal: Negative for abdominal pain, change in bowel habit, nausea and vomiting.   Genitourinary: Negative for urgency.   Neurological: Negative for dizziness, focal weakness and light-headedness.        Objective:    Physical Exam   Constitutional: She is oriented to person, place, and time. She appears well-developed and well-nourished.   HENT:   Head: Normocephalic.   Eyes: Conjunctivae are normal.   Neck: Normal range of motion. Neck supple. No JVD present.   Cardiovascular: Normal rate, regular rhythm, normal heart sounds and intact distal pulses.   Pulses:       Carotid pulses are 2+ on the right side, and 2+ on the left side.       Radial pulses are 2+ on the right side, and 2+ on the left side.        Dorsalis pedis pulses are 2+ on the right side, and 2+ on the left side.        Posterior tibial pulses are 2+ on the right side, and 2+ on the left side.   Well healed midline sternal incision.  Pacer site clean and dry, well healed.       Pulmonary/Chest: Effort normal and breath sounds normal.   Abdominal: Soft. Bowel sounds are normal.   Musculoskeletal: She exhibits no edema or  tenderness.   Neurological: She is alert and oriented to person, place, and time. Gait normal.   Skin: Skin is warm, dry and intact. No cyanosis. Nails show no clubbing.   Psychiatric: She has a normal mood and affect. Her speech is normal and behavior is normal. Thought content normal.   Nursing note and vitals reviewed.              ..    Chemistry        Component Value Date/Time     10/22/2018 0710    K 4.6 10/22/2018 0710     10/22/2018 0710    CO2 24 10/22/2018 0710    BUN 38 (H) 10/22/2018 0710    CREATININE 1.3 10/22/2018 0710     (H) 10/22/2018 0710        Component Value Date/Time    CALCIUM 9.9 10/22/2018 0710    ALKPHOS 59 10/22/2018 0710    AST 10 10/22/2018 0710    ALT 11 10/22/2018 0710    BILITOT 0.3 10/22/2018 0710    ESTGFRAFRICA 47.0 (A) 10/22/2018 0710    EGFRNONAA 40.8 (A) 10/22/2018 0710            ..  Lab Results   Component Value Date    CHOL 130 10/22/2018    CHOL 144 09/26/2017    CHOL 163 08/22/2017     Lab Results   Component Value Date    HDL 30 (L) 10/22/2018    HDL 29 (L) 09/26/2017    HDL 20 (L) 08/22/2017     Lab Results   Component Value Date    LDLCALC 71.0 10/22/2018    LDLCALC 75.6 09/26/2017    LDLCALC Invalid, Trig>400.0 08/22/2017     Lab Results   Component Value Date    TRIG 145 10/22/2018    TRIG 197 (H) 09/26/2017    TRIG 491 (H) 08/22/2017     Lab Results   Component Value Date    CHOLHDL 23.1 10/22/2018    CHOLHDL 20.1 09/26/2017    CHOLHDL 12.3 (L) 08/22/2017     ..  Lab Results   Component Value Date    WBC 6.18 10/22/2018    HGB 10.9 (L) 10/22/2018    HCT 35.2 (L) 10/22/2018    MCV 92 10/22/2018     10/22/2018       Test(s) Reviewed  I have reviewed the following in detail:  [] Stress test   [] Angiography   [x] Echocardiogram   [x] Labs   [x] Other:       Assessment:         ICD-10-CM ICD-9-CM   1. Pacemaker Z95.0 V45.01   2. S/P coronary artery stent placement Z95.5 V45.82   3. S/P CABG (coronary artery bypass graft) Z95.1 V45.81   4. PVD  (peripheral vascular disease) I73.9 443.9   5. Coronary artery disease involving native coronary artery of native heart without angina pectoris I25.10 414.01   6. Essential hypertension I10 401.9   7. Mixed hyperlipidemia E78.2 272.2     Problem List Items Addressed This Visit     S/P coronary artery stent placement    Overview     11/17  proximal circumflex using a 2.5 x 38, post-dilated to 3.0 with 0% residual  stenosis.         S/P CABG (coronary artery bypass graft)    Overview     8/17  CABG x5 LIMA-LAD, VG-PDA, VG-D, YVG-OM-PLB            PVD (peripheral vascular disease)    Overview     Significantly reduced MAURICIO on the left leg.  70-89% stenosis seen in the left popliteal artery.   SIGNED BY: Caesar Loo MD On: 08/12/2016 17:05         Pacemaker - Primary    Overview     left chest PACEMAKER  MEDTRONIC SEDR01 Sensia   S/N:[id number]  [employer] Link  8/3/2010 - current     right atrium LEAD  MEDTRONIC 5076 CapSure Fix Novus  S/N:[id number]  Deion Link  8/3/2010 - current     right ventricle LEAD  MEDTRONIC 5076 CapSure Fix Novus  S/N:[id number]  Deion Link  8/3/2010 - current          Mixed hyperlipidemia    Essential hypertension    CAD (coronary artery disease)    Overview     11/17  Circumflex: Native circumflex has a 70% stenosis proximal to the stent in  the mid portion and has a short discrete 95% in-stent restenosis just prior to  the obtuse marginal and another 80% right at the obtuse marginal and a 70% in  the distal portion of the stent. There was a stent in the obtuse marginal into  the stent of the circumflex with a 70% in-stent restenosis.  --  Graft to the mid LAD: The graft was a LIMA from the aorta.  --  Graft to the 1st obtuse marginal: Vein graft to the circumflex is occluded.                  Plan:           Return to clinic 9 months   Low level/low impact aerobic exercise 5x's/wk. Heart healthy diet and risk factor modification.    See labs and med orders.      Portions of this note may have  been created with voice recognition software.  Grammatical, syntax and spelling errors may be inevitable.

## 2019-04-08 RX ORDER — CLOPIDOGREL BISULFATE 75 MG/1
TABLET ORAL
Qty: 90 TABLET | Refills: 1 | Status: SHIPPED | OUTPATIENT
Start: 2019-04-08 | End: 2019-08-26 | Stop reason: SDUPTHER

## 2019-04-17 ENCOUNTER — TELEPHONE (OUTPATIENT)
Dept: CARDIOLOGY | Facility: CLINIC | Age: 74
End: 2019-04-17

## 2019-04-17 ENCOUNTER — TELEPHONE (OUTPATIENT)
Dept: VASCULAR SURGERY | Facility: CLINIC | Age: 74
End: 2019-04-17

## 2019-04-17 NOTE — TELEPHONE ENCOUNTER
----- Message from Kelley Mauricio sent at 4/17/2019  9:31 AM CDT -----  Type: Needs Medical Advice    Who Called:  self  Symptoms (please be specific):  Having some pain / problems with the leg where vein was removed  How long has patient had these symptoms:  Had bypass surgery in august 2018    Pharmacy name and phone #:     Best Call Back Number: [phone]  Additional Information: asking to discuss

## 2019-04-17 NOTE — TELEPHONE ENCOUNTER
Spoke to pt. Stated she is trying to talk to Dr. Goodwin about leg he did a bypass on. informed pt. Of Dr. Goodwin number. Pt. Verbalized understanding.

## 2019-04-17 NOTE — TELEPHONE ENCOUNTER
----- Message from Ирина Vogel sent at 4/17/2019  8:49 AM CDT -----  Contact: patient  Type: Needs Medical Advice    Who Called:  patient  Best Call Back Number: [phone]  Additional Information: would like to speak to the nurse about some concerns that she has.

## 2019-04-30 ENCOUNTER — OFFICE VISIT (OUTPATIENT)
Dept: FAMILY MEDICINE | Facility: CLINIC | Age: 74
End: 2019-04-30
Payer: MEDICARE

## 2019-04-30 ENCOUNTER — LAB VISIT (OUTPATIENT)
Dept: LAB | Facility: HOSPITAL | Age: 74
End: 2019-04-30
Attending: NURSE PRACTITIONER
Payer: MEDICARE

## 2019-04-30 VITALS
BODY MASS INDEX: 27.62 KG/M2 | OXYGEN SATURATION: 98 % | HEIGHT: 63 IN | HEART RATE: 98 BPM | SYSTOLIC BLOOD PRESSURE: 138 MMHG | DIASTOLIC BLOOD PRESSURE: 84 MMHG | WEIGHT: 155.88 LBS

## 2019-04-30 DIAGNOSIS — I10 ESSENTIAL HYPERTENSION: ICD-10-CM

## 2019-04-30 DIAGNOSIS — Z72.0 TOBACCO ABUSE: ICD-10-CM

## 2019-04-30 DIAGNOSIS — Z86.010 HX OF COLONIC POLYPS: ICD-10-CM

## 2019-04-30 DIAGNOSIS — D22.9 ATYPICAL NEVI: ICD-10-CM

## 2019-04-30 DIAGNOSIS — Z87.891 PERSONAL HISTORY OF NICOTINE DEPENDENCE: ICD-10-CM

## 2019-04-30 DIAGNOSIS — Z95.1 S/P CABG (CORONARY ARTERY BYPASS GRAFT): ICD-10-CM

## 2019-04-30 DIAGNOSIS — H60.542 ECZEMA OF LEFT EXTERNAL EAR: ICD-10-CM

## 2019-04-30 DIAGNOSIS — I25.10 CORONARY ARTERY DISEASE INVOLVING NATIVE CORONARY ARTERY OF NATIVE HEART WITHOUT ANGINA PECTORIS: ICD-10-CM

## 2019-04-30 DIAGNOSIS — J44.9 CHRONIC OBSTRUCTIVE PULMONARY DISEASE, UNSPECIFIED COPD TYPE: ICD-10-CM

## 2019-04-30 DIAGNOSIS — Z00.00 ENCOUNTER FOR PREVENTIVE HEALTH EXAMINATION: Primary | ICD-10-CM

## 2019-04-30 DIAGNOSIS — D64.9 ANEMIA, UNSPECIFIED TYPE: ICD-10-CM

## 2019-04-30 DIAGNOSIS — I70.0 ATHEROSCLEROSIS OF AORTA: ICD-10-CM

## 2019-04-30 DIAGNOSIS — I77.1 TORTUOUS AORTA: ICD-10-CM

## 2019-04-30 DIAGNOSIS — E11.00 UNCONTROLLED TYPE 2 DIABETES MELLITUS WITH HYPEROSMOLARITY WITHOUT COMA, WITHOUT LONG-TERM CURRENT USE OF INSULIN: ICD-10-CM

## 2019-04-30 DIAGNOSIS — I49.5 SINUS NODE DYSFUNCTION: ICD-10-CM

## 2019-04-30 DIAGNOSIS — Z12.31 ENCOUNTER FOR SCREENING MAMMOGRAM FOR MALIGNANT NEOPLASM OF BREAST: ICD-10-CM

## 2019-04-30 DIAGNOSIS — R26.9 FUNCTIONAL GAIT DISORDER: ICD-10-CM

## 2019-04-30 DIAGNOSIS — E11.22 TYPE 2 DIABETES MELLITUS WITH STAGE 3 CHRONIC KIDNEY DISEASE, WITHOUT LONG-TERM CURRENT USE OF INSULIN: ICD-10-CM

## 2019-04-30 DIAGNOSIS — Z12.9 SCREENING FOR CANCER: ICD-10-CM

## 2019-04-30 DIAGNOSIS — E78.2 MIXED HYPERLIPIDEMIA: ICD-10-CM

## 2019-04-30 DIAGNOSIS — N18.30 TYPE 2 DIABETES MELLITUS WITH STAGE 3 CHRONIC KIDNEY DISEASE, WITHOUT LONG-TERM CURRENT USE OF INSULIN: ICD-10-CM

## 2019-04-30 DIAGNOSIS — I73.9 PVD (PERIPHERAL VASCULAR DISEASE): ICD-10-CM

## 2019-04-30 DIAGNOSIS — I65.23 BILATERAL CAROTID ARTERY STENOSIS: ICD-10-CM

## 2019-04-30 DIAGNOSIS — R47.9 SPEECH DEFECT: ICD-10-CM

## 2019-04-30 DIAGNOSIS — Z95.0 PACEMAKER: ICD-10-CM

## 2019-04-30 DIAGNOSIS — E11.649 UNCONTROLLED TYPE 2 DIABETES MELLITUS WITH HYPOGLYCEMIA WITHOUT COMA: ICD-10-CM

## 2019-04-30 DIAGNOSIS — Z95.5 S/P CORONARY ARTERY STENT PLACEMENT: ICD-10-CM

## 2019-04-30 LAB
ESTIMATED AVG GLUCOSE: 143 MG/DL (ref 68–131)
HBA1C MFR BLD HPLC: 6.6 % (ref 4–5.6)

## 2019-04-30 PROCEDURE — 99499 UNLISTED E&M SERVICE: CPT | Mod: S$GLB,,, | Performed by: NURSE PRACTITIONER

## 2019-04-30 PROCEDURE — G0439 PR MEDICARE ANNUAL WELLNESS SUBSEQUENT VISIT: ICD-10-PCS | Mod: HCNC,S$GLB,, | Performed by: NURSE PRACTITIONER

## 2019-04-30 PROCEDURE — 3075F PR MOST RECENT SYSTOLIC BLOOD PRESS GE 130-139MM HG: ICD-10-PCS | Mod: HCNC,CPTII,S$GLB, | Performed by: NURSE PRACTITIONER

## 2019-04-30 PROCEDURE — 99499 RISK ADDL DX/OHS AUDIT: ICD-10-PCS | Mod: S$GLB,,, | Performed by: NURSE PRACTITIONER

## 2019-04-30 PROCEDURE — 83036 HEMOGLOBIN GLYCOSYLATED A1C: CPT | Mod: HCNC

## 2019-04-30 PROCEDURE — 36415 COLL VENOUS BLD VENIPUNCTURE: CPT | Mod: HCNC,PO

## 2019-04-30 PROCEDURE — 3044F PR MOST RECENT HEMOGLOBIN A1C LEVEL <7.0%: ICD-10-PCS | Mod: HCNC,CPTII,S$GLB, | Performed by: NURSE PRACTITIONER

## 2019-04-30 PROCEDURE — 99999 PR PBB SHADOW E&M-EST. PATIENT-LVL V: ICD-10-PCS | Mod: PBBFAC,HCNC,, | Performed by: NURSE PRACTITIONER

## 2019-04-30 PROCEDURE — 3044F HG A1C LEVEL LT 7.0%: CPT | Mod: HCNC,CPTII,S$GLB, | Performed by: NURSE PRACTITIONER

## 2019-04-30 PROCEDURE — 3079F DIAST BP 80-89 MM HG: CPT | Mod: HCNC,CPTII,S$GLB, | Performed by: NURSE PRACTITIONER

## 2019-04-30 PROCEDURE — 3075F SYST BP GE 130 - 139MM HG: CPT | Mod: HCNC,CPTII,S$GLB, | Performed by: NURSE PRACTITIONER

## 2019-04-30 PROCEDURE — 3079F PR MOST RECENT DIASTOLIC BLOOD PRESSURE 80-89 MM HG: ICD-10-PCS | Mod: HCNC,CPTII,S$GLB, | Performed by: NURSE PRACTITIONER

## 2019-04-30 PROCEDURE — G0439 PPPS, SUBSEQ VISIT: HCPCS | Mod: HCNC,S$GLB,, | Performed by: NURSE PRACTITIONER

## 2019-04-30 PROCEDURE — 99999 PR PBB SHADOW E&M-EST. PATIENT-LVL V: CPT | Mod: PBBFAC,HCNC,, | Performed by: NURSE PRACTITIONER

## 2019-04-30 RX ORDER — TRIAMCINOLONE ACETONIDE 1 MG/G
OINTMENT TOPICAL 2 TIMES DAILY
Qty: 30 G | Refills: 1 | Status: SHIPPED | OUTPATIENT
Start: 2019-04-30 | End: 2019-08-05 | Stop reason: SDUPTHER

## 2019-04-30 NOTE — PATIENT INSTRUCTIONS
Counseling and Referral of Other Preventative  (Italic type indicates deductible and co-insurance are waived)    Patient Name: Shirlene Ortiz  Today's Date: 4/30/2019    Health Maintenance       Date Due Completion Date    LDCT Lung Screen 05/26/2000 ---    Mammogram 09/19/2017 9/19/2016    Eye Exam 12/21/2017 12/21/2016    Hemoglobin A1c 05/07/2018 11/7/2017    Foot Exam 10/03/2018 10/3/2017 (Done)    Override on 10/3/2017: Done    Override on 9/14/2016: Done    Override on 10/26/2015: Done    Lipid Panel 10/22/2019 10/22/2018    DEXA SCAN 03/22/2020 3/22/2017    Override on 1/1/2014: Done    High Dose Statin 04/30/2020 4/30/2019    Aspirin/Antiplatelet Therapy 04/30/2020 4/30/2019    TETANUS VACCINE 01/01/2022 1/1/2012 (Done)    Override on 1/1/2012: Done    Colonoscopy 04/27/2025 4/27/2015        Orders Placed This Encounter   Procedures    Hemoglobin A1c    Ambulatory referral to Dermatology     The following information is provided to all patients.  This information is to help you find resources for any of the problems found today that may be affecting your health:                Living healthy guide: www.[facility].[place].gov      Understanding Diabetes: www.diabetes.org      Eating healthy: www.cdc.gov/healthyweight      [facility] home safety checklist: www.cdc.gov/steadi/patient.html      Agency on Aging: www.goea.[place].[facility]      Alcoholics anonymous (AA): www.aa.org      Physical Activity: www.meng.nih.gov/yk7yavz      Tobacco use: www.quitwithusla.org

## 2019-05-01 ENCOUNTER — TELEPHONE (OUTPATIENT)
Dept: FAMILY MEDICINE | Facility: CLINIC | Age: 74
End: 2019-05-01

## 2019-05-01 NOTE — TELEPHONE ENCOUNTER
Spoke with pt.  HgA1c was 6.6.  A slight elevation from last HgA1c.  I want to encourage healthy food choices and daily exercise.  Continue current medication regimen.  Dr Mendiola will continue to follow blood sugars.

## 2019-05-01 NOTE — TELEPHONE ENCOUNTER
----- Message from Davon Cali sent at 5/1/2019  1:45 PM CDT -----  Type:  Test Results    Who Called:  pt  Best Call Back Number:  [phone]  Additional Information: please call pt to review results.

## 2019-05-06 DIAGNOSIS — N18.9 CHRONIC KIDNEY DISEASE, UNSPECIFIED CKD STAGE: ICD-10-CM

## 2019-05-06 PROBLEM — I65.23 BILATERAL CAROTID ARTERY STENOSIS: Status: ACTIVE | Noted: 2019-05-06

## 2019-05-06 PROBLEM — I21.4 NSTEMI (NON-ST ELEVATED MYOCARDIAL INFARCTION): Status: RESOLVED | Noted: 2017-08-17 | Resolved: 2019-05-06

## 2019-05-06 NOTE — PROGRESS NOTES
"Shirlene Ortiz presented for a  Medicare AWV and comprehensive Health Risk Assessment today. The following components were reviewed and updated:    · Medical history  · Family History  · Social history  · Allergies and Current Medications  · Health Risk Assessment  · Health Maintenance  · Care Team     ** See Completed Assessments for Annual Wellness Visit within the encounter summary.**       The following assessments were completed:  · Living Situation  · CAGE  · Depression Screening  · Timed Get Up and Go  · Whisper Test  · Cognitive Function Screening          · Nutrition Screening  · ADL Screening  · PAQ Screening    Vitals:    04/30/19 0850   BP: 138/84   BP Location: Left arm   Patient Position: Sitting   BP Method: Medium (Manual)   Pulse: 98   SpO2: 98%   Weight: 70.7 kg (155 lb 13.8 oz)   Height: 5' 3" (1.6 m)     Body mass index is 27.61 kg/m².  Physical Exam   Constitutional: She is oriented to person, place, and time. She appears well-nourished. No distress.   Eyes: Conjunctivae are normal.   Cardiovascular: Normal rate, regular rhythm and normal heart sounds.   No murmur heard.  Pulses:       Dorsalis pedis pulses are 2+ on the right side, and 2+ on the left side.        Posterior tibial pulses are 2+ on the right side, and 2+ on the left side.   Pulmonary/Chest: Effort normal and breath sounds normal. No stridor. She has no wheezes. She has no rales.   Musculoskeletal:        Right foot: There is normal range of motion.        Left foot: There is normal range of motion and no deformity.   Feet:   Right Foot:   Protective Sensation: 6 sites tested. 6 sites sensed.   Skin Integrity: Negative for ulcer, blister, skin breakdown or erythema.   Left Foot:   Protective Sensation: 6 sites tested. 6 sites sensed.   Skin Integrity: Negative for ulcer, blister, skin breakdown or erythema.   Neurological: She is alert and oriented to person, place, and time.   Skin: Skin is warm and dry. No rash noted. "   Psychiatric: She has a normal mood and affect.   Vitals reviewed.        Diagnoses and health risks identified today and associated recommendations/orders:    1. Encounter for preventive health examination  Reviewed health maintenance and provided recommendations    Highly encourage schedule eye exam, educated on importance     2. Type 2 diabetes mellitus with stage 3 chronic kidney disease, without long-term current use of insulin  Continue to monitor  Followed by Hiram Mendiola MD .   Last a1c 5.9   - Hemoglobin A1c; Future  - Ambulatory referral to Optometry    3. Uncontrolled type 2 diabetes mellitus with hypoglycemia without coma  Continue to monitor  Followed by Hiram Mendiola MD .      4. Eczema of left external ear  Continue to monitor  Followed by Hiram Mendiola MD .      5. Atypical nevi  Schedule skin check     - Ambulatory referral to Dermatology    6. Screening for cancer  Encourage smoking cessation  - CT Chest Lung Screening Low Dose; Future    7. Encounter for screening mammogram for malignant neoplasm of breast     - Mammo Digital Screening Bilat; Future    8. Personal history of nicotine dependence   Counseling provided  - CT Chest Lung Screening Low Dose; Future    9. Uncontrolled type 2 diabetes mellitus with hyperosmolarity without coma, without long-term current use of insulin  Continue to monitor  Followed by Hiram Mendiola MD .      10. Hx of colonic polyps  Continue to monitor  Followed by Hiram Mendiola MD .      11. Functional gait disorder  Continue to monitor  Followed by Hiram Mendiola MD .      12. Tobacco abuse  Continue to monitor  Followed by Hiram Mendiola MD .      13. Anemia, unspecified type  Continue to monitor  Followed by Hiram Mendiola MD .      14. Tortuous aorta  Continue to monitor  Followed by .Cinda .      15. Atherosclerosis of aorta  Continue to monitor  Followed by cinda.      16. S/P coronary artery stent placement  Continue to monitor  Followed by Cinda.      17. S/P CABG  (coronary artery bypass graft)  Continue to monitor  Followed by fernando.      19. Essential hypertension  Continue to monitor  Followed by Hiram Mendiola MD .      20. Mixed hyperlipidemia  Continue to monitor  Followed by Hiram Mendiola MD .      21. PVD (peripheral vascular disease)  Continue to monitor  Followed by Hiram Mendiola MD .      22. Coronary artery disease involving native coronary artery of native heart without angina pectoris  Continue to monitor  Followed by fernando  [facility].      23. Pacemaker  Continue to monitor  Followed by Fernando.      24. Sinus node dysfunction  Continue to monitor  Followed by Fernando.      25. Chronic obstructive pulmonary disease, unspecified COPD type  Continue to monitor  Followed by Hiram Mendiola MD .      26. Speech defect  Continue to monitor  Followed by Hiram Mendiola MD .        Provided Shirlene with a 5-10 year written screening schedule and personal prevention plan. Recommendations were developed using the USPSTF age appropriate recommendations. Education, counseling, and referrals were provided as needed. After Visit Summary printed and given to patient which includes a list of additional screenings\tests needed.    Follow up in about 1 year (around 4/30/2020).    Ivonne Campbell NP

## 2019-05-14 ENCOUNTER — HOSPITAL ENCOUNTER (OUTPATIENT)
Dept: RADIOLOGY | Facility: HOSPITAL | Age: 74
Discharge: HOME | End: 2019-05-14
Attending: NURSE PRACTITIONER
Payer: MEDICARE

## 2019-05-14 DIAGNOSIS — Z87.891 PERSONAL HISTORY OF NICOTINE DEPENDENCE: ICD-10-CM

## 2019-05-14 DIAGNOSIS — Z12.31 ENCOUNTER FOR SCREENING MAMMOGRAM FOR MALIGNANT NEOPLASM OF BREAST: ICD-10-CM

## 2019-05-14 DIAGNOSIS — Z00.00 ENCOUNTER FOR PREVENTIVE HEALTH EXAMINATION: ICD-10-CM

## 2019-05-14 DIAGNOSIS — Z12.9 SCREENING FOR CANCER: ICD-10-CM

## 2019-05-14 PROCEDURE — G0297 CT CHEST LUNG SCREENING LOW DOSE: ICD-10-PCS | Mod: 26,HCNC,, | Performed by: RADIOLOGY

## 2019-05-14 PROCEDURE — 77067 MAMMO DIGITAL SCREENING BILAT WITH TOMOSYNTHESIS_CAD: ICD-10-PCS | Mod: 26,HCNC,, | Performed by: RADIOLOGY

## 2019-05-14 PROCEDURE — 77067 SCR MAMMO BI INCL CAD: CPT | Mod: TC,HCNC,PO

## 2019-05-14 PROCEDURE — 77063 MAMMO DIGITAL SCREENING BILAT WITH TOMOSYNTHESIS_CAD: ICD-10-PCS | Mod: 26,HCNC,, | Performed by: RADIOLOGY

## 2019-05-14 PROCEDURE — G0297 LDCT FOR LUNG CA SCREEN: HCPCS | Mod: TC,HCNC,PO

## 2019-05-14 PROCEDURE — 77063 BREAST TOMOSYNTHESIS BI: CPT | Mod: 26,HCNC,, | Performed by: RADIOLOGY

## 2019-05-14 PROCEDURE — G0297 LDCT FOR LUNG CA SCREEN: HCPCS | Mod: 26,HCNC,, | Performed by: RADIOLOGY

## 2019-05-14 PROCEDURE — 77067 SCR MAMMO BI INCL CAD: CPT | Mod: 26,HCNC,, | Performed by: RADIOLOGY

## 2019-05-15 ENCOUNTER — TELEPHONE (OUTPATIENT)
Dept: FAMILY MEDICINE | Facility: CLINIC | Age: 74
End: 2019-05-15

## 2019-05-15 DIAGNOSIS — Z87.891 PERSONAL HISTORY OF NICOTINE DEPENDENCE: ICD-10-CM

## 2019-05-15 DIAGNOSIS — Z12.9 SCREENING FOR CANCER: ICD-10-CM

## 2019-05-15 NOTE — TELEPHONE ENCOUNTER
I returned Ms. Ortiz call and pt states someone called her yesterday around 4:30 concerning her test results.   I did not see a message from NP for result review.    Message forward to Funmi GERARD for review.

## 2019-05-15 NOTE — TELEPHONE ENCOUNTER
----- Message from Miranda Hall sent at 5/15/2019  9:37 AM CDT -----  Contact: patient  Type: Needs Medical Advice    Who Called:  patient  Symptoms (please be specific):    How long has patient had these symptoms:    Pharmacy name and phone #:    Best Call Back Number: [phone]  Additional Information: requesting a call back to discuss test results.

## 2019-05-15 NOTE — TELEPHONE ENCOUNTER
Spoke with Mrs. Ortiz, discussed the findings of CT (pulmonary nodules).  Pt agrees to f/u with LDCT in 6 months.  Order has been placed.  Also notified pt that mammogram was normal

## 2019-05-30 ENCOUNTER — OFFICE VISIT (OUTPATIENT)
Dept: OPTOMETRY | Facility: CLINIC | Age: 74
End: 2019-05-30
Payer: MEDICARE

## 2019-05-30 DIAGNOSIS — H25.13 NUCLEAR SCLEROSIS OF BOTH EYES: ICD-10-CM

## 2019-05-30 DIAGNOSIS — H52.203 HYPEROPIA OF BOTH EYES WITH ASTIGMATISM AND PRESBYOPIA: ICD-10-CM

## 2019-05-30 DIAGNOSIS — E11.9 TYPE 2 DIABETES MELLITUS WITHOUT RETINOPATHY: Primary | ICD-10-CM

## 2019-05-30 DIAGNOSIS — H52.4 HYPEROPIA OF BOTH EYES WITH ASTIGMATISM AND PRESBYOPIA: ICD-10-CM

## 2019-05-30 DIAGNOSIS — H52.03 HYPEROPIA OF BOTH EYES WITH ASTIGMATISM AND PRESBYOPIA: ICD-10-CM

## 2019-05-30 PROCEDURE — 92015 DETERMINE REFRACTIVE STATE: CPT | Mod: HCNC,S$GLB,, | Performed by: OPTOMETRIST

## 2019-05-30 PROCEDURE — 92015 PR REFRACTION: ICD-10-PCS | Mod: HCNC,S$GLB,, | Performed by: OPTOMETRIST

## 2019-05-30 PROCEDURE — 99999 PR PBB SHADOW E&M-EST. PATIENT-LVL II: ICD-10-PCS | Mod: PBBFAC,HCNC,, | Performed by: OPTOMETRIST

## 2019-05-30 PROCEDURE — 92004 COMPRE OPH EXAM NEW PT 1/>: CPT | Mod: HCNC,S$GLB,, | Performed by: OPTOMETRIST

## 2019-05-30 PROCEDURE — 99999 PR PBB SHADOW E&M-EST. PATIENT-LVL II: CPT | Mod: PBBFAC,HCNC,, | Performed by: OPTOMETRIST

## 2019-05-30 PROCEDURE — 92004 PR EYE EXAM, NEW PATIENT,COMPREHESV: ICD-10-PCS | Mod: HCNC,S$GLB,, | Performed by: OPTOMETRIST

## 2019-05-30 NOTE — LETTER
May 30, 2019      Ivonne Campbell, NP  [address] [facility] [address] [place] [address]           [facility]  [address] [facility] [address] [place] [address]  Phone: [phone]  Fax: [phone]          Patient: Shirlene Ortiz   MR Number: [id number]   YOB: 1945   Date of Visit: 5/30/2019       Dear Ivonne Campbell:    Thank you for referring Shirlene Ortiz to me for evaluation. Attached you will find relevant portions of my assessment and plan of care.    If you have questions, please do not hesitate to call me. I look forward to following Shirlene Ortiz along with you.    Sincerely,    Brian Parisi, OD    Enclosure  CC:  No Recipients    If you would like to receive this communication electronically, please contact [email or web address] or [phone] to request more information on [facility] Link access.    For providers and/or their staff who would like to refer a patient to [facility], please contact us through our one-stop-shop provider referral line, [facility], at [phone].    If you feel you have received this communication in error or would no longer like to receive these types of communications, please e-mail [email or web address]

## 2019-05-30 NOTE — PROGRESS NOTES
HPI     Routine diabetic eye exam--dle--2015    Pt complains of blurred distance and near glasses. Wearing glasses she   found-unsure if rx or reading glasses. Denies any eye pain. No flashes or   floaters. BSL fluctuates.     Hemoglobin A1C       Date                     Value               Ref Range             Status                04/30/2019               6.6 (H)             4.0 - 5.6 %           Final              Comment:    ADA Screening Guidelines:  5.7-6.4%  Consistent with   prediabetes  >or=6.5%  Consistent with diabetes  High levels of fetal   hemoglobin interfere with the HbA1C  assay. Heterozygous hemoglobin   variants (HbS, HgC, etc)do  not significantly interfere with this assay.     However, presence of multiple variants may affect accuracy.         11/06/2017               5.9 (H)             0.0 - 5.6 %           Final              Comment:    Reference Interval:  5.0 - 5.6 Normal   5.7 - 6.4 High Risk   > 6.5   Diabetic    Hgb A1c results are standardized based on the (NGSP) National     Glycohemoglobin Standardization Program.    Hemoglobin A1C levels are   related to mean serum/plasma glucose   during the preceding 2-3 months.              09/26/2017               5.7 (H)             4.0 - 5.6 %           Final              Comment:    According to ADA guidelines, hemoglobin A1c <7.0% represents  optimal   control in non-pregnant diabetic patients. Different  metrics may apply to   specific patient populations.   Standards of Medical Care in   Diabetes-2016.  For the purpose of screening for the presence of   diabetes:  <5.7%     Consistent with the absence of diabetes  5.7-6.4%    Consistent with increasing risk for diabetes   (prediabetes)  >or=6.5%    Consistent with diabetes  Currently, no consensus exists for use of   hemoglobin A1c  for diagnosis of diabetes for children.  This Hemoglobin   A1c assay has significant interference with fetal   hemoglobin   (HbF).   The results are invalid  for patients with abnormal amounts of   HbF,     including those with known Hereditary Persistence   of Fetal Hemoglobin.   Heterozygous hemoglobin variants (HbAS, HbAC,   HbAD, HbAE, HbA2) do not   significantly interfere with this assay;   however, presence of multiple   variants in a sample may impact the %   interference.    ----------      Last edited by Edilia John on 5/30/2019  9:21 AM. (History)            Assessment /Plan     For exam results, see Encounter Report.    Type 2 diabetes mellitus without retinopathy    Nuclear sclerosis of both eyes    Hyperopia of both eyes with astigmatism and presbyopia      1. No diabetic retinopathy, no csme. Return in 1 year for dilated eye exam.  2. Educated pt on presence of cataracts and effects on vision. No surgery at this time. Recheck in one year.  3. New Spec Rx given. Different lens options discussed with patient. RTC 1 year full exam.

## 2019-07-18 RX ORDER — ATORVASTATIN CALCIUM 40 MG/1
TABLET, FILM COATED ORAL
Qty: 90 TABLET | Refills: 0 | Status: SHIPPED | OUTPATIENT
Start: 2019-07-18 | End: 2019-09-18 | Stop reason: SDUPTHER

## 2019-07-18 RX ORDER — METFORMIN HYDROCHLORIDE 1000 MG/1
TABLET ORAL
Qty: 180 TABLET | Refills: 0 | Status: SHIPPED | OUTPATIENT
Start: 2019-07-18 | End: 2019-09-18 | Stop reason: SDUPTHER

## 2019-07-18 RX ORDER — GLIPIZIDE 10 MG/1
TABLET ORAL
Qty: 180 TABLET | Refills: 0 | Status: SHIPPED | OUTPATIENT
Start: 2019-07-18 | End: 2019-09-18 | Stop reason: SDUPTHER

## 2019-07-18 NOTE — TELEPHONE ENCOUNTER
Needs an appointment. Needs appointment with me in my next available regular appointment time slot (do not use any hold spots).   Refill to apt  Pt not seen by me in almost 2 yr

## 2019-08-06 RX ORDER — TRIAMCINOLONE ACETONIDE 1 MG/G
OINTMENT TOPICAL 2 TIMES DAILY
Qty: 30 G | Refills: 1 | Status: SHIPPED | OUTPATIENT
Start: 2019-08-06 | End: 2019-11-11 | Stop reason: SDUPTHER

## 2019-08-07 DIAGNOSIS — N18.9 CHRONIC KIDNEY DISEASE, UNSPECIFIED CKD STAGE: ICD-10-CM

## 2019-08-13 ENCOUNTER — TELEPHONE (OUTPATIENT)
Dept: FAMILY MEDICINE | Facility: CLINIC | Age: 74
End: 2019-08-13

## 2019-08-13 NOTE — TELEPHONE ENCOUNTER
Called and spoke with patient, she wanted to know what some of her medications that she is taking were for. I left her know that amlodipine and lisinopril is for blood pressure. Annual appointment with Dr. Mendiola has been scheduled in October.

## 2019-08-13 NOTE — TELEPHONE ENCOUNTER
----- Message from Johana Krause sent at 8/13/2019 10:56 AM CDT -----  Type: Needs Medical Advice    Who Called:  Patient   Best Call Back Number: [phone]  Additional Information: patient is requesting a return call concerning her blood pressure medication, she states she gets her medication form mail order and the bottles do not tell her what each medication is for, she is not sure which one is for blood pressure    Thank you

## 2019-08-27 RX ORDER — CLOPIDOGREL BISULFATE 75 MG/1
TABLET ORAL
Qty: 90 TABLET | Refills: 1 | Status: SHIPPED | OUTPATIENT
Start: 2019-08-27 | End: 2020-01-09

## 2019-09-19 RX ORDER — ATORVASTATIN CALCIUM 40 MG/1
TABLET, FILM COATED ORAL
Qty: 90 TABLET | Refills: 0 | Status: SHIPPED | OUTPATIENT
Start: 2019-09-19 | End: 2019-11-25 | Stop reason: SDUPTHER

## 2019-09-19 RX ORDER — METFORMIN HYDROCHLORIDE 1000 MG/1
TABLET ORAL
Qty: 180 TABLET | Refills: 0 | Status: SHIPPED | OUTPATIENT
Start: 2019-09-19 | End: 2019-11-25 | Stop reason: SDUPTHER

## 2019-09-19 RX ORDER — GLIPIZIDE 10 MG/1
TABLET ORAL
Qty: 180 TABLET | Refills: 0 | Status: SHIPPED | OUTPATIENT
Start: 2019-09-19 | End: 2019-11-25 | Stop reason: SDUPTHER

## 2019-10-02 ENCOUNTER — OFFICE VISIT (OUTPATIENT)
Dept: FAMILY MEDICINE | Facility: CLINIC | Age: 74
End: 2019-10-02
Payer: MEDICARE

## 2019-10-02 ENCOUNTER — HOSPITAL ENCOUNTER (OUTPATIENT)
Dept: RADIOLOGY | Facility: HOSPITAL | Age: 74
Discharge: HOME | End: 2019-10-02
Attending: INTERNAL MEDICINE
Payer: MEDICARE

## 2019-10-02 VITALS
SYSTOLIC BLOOD PRESSURE: 132 MMHG | OXYGEN SATURATION: 95 % | HEIGHT: 63 IN | WEIGHT: 153.13 LBS | BODY MASS INDEX: 27.13 KG/M2 | DIASTOLIC BLOOD PRESSURE: 60 MMHG | HEART RATE: 100 BPM

## 2019-10-02 DIAGNOSIS — J44.9 CHRONIC OBSTRUCTIVE PULMONARY DISEASE, UNSPECIFIED COPD TYPE: ICD-10-CM

## 2019-10-02 DIAGNOSIS — R91.8 LUNG NODULES: ICD-10-CM

## 2019-10-02 DIAGNOSIS — I10 ESSENTIAL HYPERTENSION: ICD-10-CM

## 2019-10-02 DIAGNOSIS — E78.5 DYSLIPIDEMIA: ICD-10-CM

## 2019-10-02 DIAGNOSIS — I25.10 CORONARY ARTERY DISEASE, ANGINA PRESENCE UNSPECIFIED, UNSPECIFIED VESSEL OR LESION TYPE, UNSPECIFIED WHETHER NATIVE OR TRANSPLANTED HEART: ICD-10-CM

## 2019-10-02 DIAGNOSIS — E11.9 CONTROLLED TYPE 2 DIABETES MELLITUS WITHOUT COMPLICATION, WITHOUT LONG-TERM CURRENT USE OF INSULIN: ICD-10-CM

## 2019-10-02 DIAGNOSIS — K22.89 ESOPHAGEAL THICKENING: ICD-10-CM

## 2019-10-02 DIAGNOSIS — Z00.00 ROUTINE PHYSICAL EXAMINATION: Primary | ICD-10-CM

## 2019-10-02 DIAGNOSIS — R13.10 DYSPHAGIA, UNSPECIFIED TYPE: ICD-10-CM

## 2019-10-02 PROCEDURE — 90662 FLU VACCINE - HIGH DOSE (65+) PRESERVATIVE FREE IM: ICD-10-PCS | Mod: HCNC,S$GLB,, | Performed by: INTERNAL MEDICINE

## 2019-10-02 PROCEDURE — 99397 PER PM REEVAL EST PAT 65+ YR: CPT | Mod: 25,HCNC,S$GLB, | Performed by: INTERNAL MEDICINE

## 2019-10-02 PROCEDURE — 71250 CT THORAX DX C-: CPT | Mod: 26,HCNC,, | Performed by: RADIOLOGY

## 2019-10-02 PROCEDURE — 90662 IIV NO PRSV INCREASED AG IM: CPT | Mod: HCNC,S$GLB,, | Performed by: INTERNAL MEDICINE

## 2019-10-02 PROCEDURE — 71250 CT CHEST WITHOUT CONTRAST: ICD-10-PCS | Mod: 26,HCNC,, | Performed by: RADIOLOGY

## 2019-10-02 PROCEDURE — 3078F PR MOST RECENT DIASTOLIC BLOOD PRESSURE < 80 MM HG: ICD-10-PCS | Mod: HCNC,CPTII,S$GLB, | Performed by: INTERNAL MEDICINE

## 2019-10-02 PROCEDURE — 99999 PR PBB SHADOW E&M-EST. PATIENT-LVL III: ICD-10-PCS | Mod: PBBFAC,HCNC,, | Performed by: INTERNAL MEDICINE

## 2019-10-02 PROCEDURE — 3044F PR MOST RECENT HEMOGLOBIN A1C LEVEL <7.0%: ICD-10-PCS | Mod: HCNC,CPTII,S$GLB, | Performed by: INTERNAL MEDICINE

## 2019-10-02 PROCEDURE — 3075F PR MOST RECENT SYSTOLIC BLOOD PRESS GE 130-139MM HG: ICD-10-PCS | Mod: HCNC,CPTII,S$GLB, | Performed by: INTERNAL MEDICINE

## 2019-10-02 PROCEDURE — 71250 CT THORAX DX C-: CPT | Mod: TC,HCNC,PO

## 2019-10-02 PROCEDURE — 99999 PR PBB SHADOW E&M-EST. PATIENT-LVL III: CPT | Mod: PBBFAC,HCNC,, | Performed by: INTERNAL MEDICINE

## 2019-10-02 PROCEDURE — G0008 ADMIN INFLUENZA VIRUS VAC: HCPCS | Mod: HCNC,S$GLB,, | Performed by: INTERNAL MEDICINE

## 2019-10-02 PROCEDURE — 99397 PR PREVENTIVE VISIT,EST,65 & OVER: ICD-10-PCS | Mod: 25,HCNC,S$GLB, | Performed by: INTERNAL MEDICINE

## 2019-10-02 PROCEDURE — 3044F HG A1C LEVEL LT 7.0%: CPT | Mod: HCNC,CPTII,S$GLB, | Performed by: INTERNAL MEDICINE

## 2019-10-02 PROCEDURE — G0008 FLU VACCINE - HIGH DOSE (65+) PRESERVATIVE FREE IM: ICD-10-PCS | Mod: HCNC,S$GLB,, | Performed by: INTERNAL MEDICINE

## 2019-10-02 PROCEDURE — 3078F DIAST BP <80 MM HG: CPT | Mod: HCNC,CPTII,S$GLB, | Performed by: INTERNAL MEDICINE

## 2019-10-02 PROCEDURE — 3075F SYST BP GE 130 - 139MM HG: CPT | Mod: HCNC,CPTII,S$GLB, | Performed by: INTERNAL MEDICINE

## 2019-10-02 NOTE — PROGRESS NOTES
Subjective:       Patient ID: Shirlene Ortiz is a 74 y.o. female.    Chief Complaint: Annual Exam    Patient not seen by me in 2 years.    Here for routine health maintenance.      CAD s/p CABG 8/17 after an NSTEMI  PVD - 3 new stents in left leg 12/2016;   13 stents in total  Poorly compliant with office f/u in past     CT in April dx multiple pulmonary nodules.  Most were granulomas.  Showed esophageal thickening - concentric.  Admits to food getting stuck in esophagus past couple months - bread with peanut butter.     DM - controlled   HTN - controlled; takes 1/2 10 mg amlodipine daily; if BP low, will take 1/4 of a 10 mg per Dr Sandoval's instructions.   HLD - uncontrolled ldl goal < 70; good triglycerides; low HDL  Smoke abuse - not ready to quit  CKD III - slightly worse last time checked.   COPD - stable    Review of Systems   Constitutional: Negative for appetite change and fever.   HENT: Negative for nosebleeds and trouble swallowing.    Eyes: Negative for discharge and visual disturbance.   Respiratory: Negative for choking and shortness of breath.    Cardiovascular: Negative for chest pain and palpitations.   Gastrointestinal: Negative for abdominal pain, nausea and vomiting.   Musculoskeletal: Negative for arthralgias and joint swelling.   Skin: Negative for rash and wound.   Neurological: Negative for dizziness and syncope.   Psychiatric/Behavioral: Negative for confusion and dysphoric mood.       Objective:      Vitals:    10/02/19 1434   BP: 132/60   Pulse: 100     Physical Exam   Constitutional: She appears well-nourished.   Eyes: Conjunctivae and EOM are normal.   Neck: Trachea normal and normal range of motion. No thyromegaly present.   Cardiovascular:   Murmur heard.   Systolic murmur is present with a grade of 2/6.  Edema negative  + clubbing on fingers    Pulmonary/Chest: Effort normal and breath sounds normal.   Abdominal: Soft. There is no hepatomegaly.   Musculoskeletal:   ROM normal  bilateral  Strength normal bilateral   Neurological: She has normal reflexes. No cranial nerve deficit.   Skin: Skin is warm, dry and intact.   Psychiatric: She has a normal mood and affect.   Alert and Oriented    Vitals reviewed.        Assessment:       1. Routine physical examination    2. Lung nodules    3. Esophageal thickening    4. Controlled type 2 diabetes mellitus without complication, without long-term current use of insulin    5. Essential hypertension    6. Dyslipidemia    7. Coronary artery disease, angina presence unspecified, unspecified vessel or lesion type, unspecified whether native or transplanted heart    8. Chronic obstructive pulmonary disease, unspecified COPD type    9. Dysphagia, unspecified type        Plan:       Routine physical examination    Lung nodules  -     CT Chest Without Contrast; Future; Expected date: 10/02/2019    Esophageal thickening  -     CT Chest Without Contrast; Future; Expected date: 10/02/2019  -     Case request GI: ESOPHAGOGASTRODUODENOSCOPY (EGD)    Controlled type 2 diabetes mellitus without complication, without long-term current use of insulin  -     Hemoglobin A1c; Future; Expected date: 10/02/2019  -     Hemoglobin A1c; Future; Expected date: 03/30/2020    Essential hypertension  -     Comprehensive metabolic panel; Future; Expected date: 10/02/2019  -     Comprehensive metabolic panel; Future; Expected date: 03/30/2020    Dyslipidemia    Coronary artery disease, angina presence unspecified, unspecified vessel or lesion type, unspecified whether native or transplanted heart    Chronic obstructive pulmonary disease, unspecified COPD type    Dysphagia, unspecified type  -     Case request GI: ESOPHAGOGASTRODUODENOSCOPY (EGD)            Medication List with Changes/Refills   Current Medications    ACCU-CHEK ISRAEL CONTROL SOLN SOLN        ACCU-CHEK ISRAEL PLUS METER MISC    USE AS DIRECTED    ACCU-CHEK [place]RA[place] PLUS TEST STRP STRP    TEST  2  TO  4  TIMES  EVERY   DAY    ALBUTEROL (PROVENTIL HFA) 90 MCG/ACTUATION INHALER    Inhale 2 puffs into the lungs every 6 (six) hours as needed for Wheezing. Rescue    AMLODIPINE (NORVASC) 5 MG TABLET    Take 1 tablet (5 mg total) by mouth every evening.    ASPIRIN (ECOTRIN) 81 MG EC TABLET    Take 1 tablet (81 mg total) by mouth once daily.    ATORVASTATIN (LIPITOR) 40 MG TABLET    TAKE 1 TABLET EVERY DAY    BD ALCOHOL SWABS PADM        BLOOD SUGAR DIAGNOSTIC STRP    To check BG 6 times daily, to use with insurance preferred meter    BLOOD-GLUCOSE METER KIT    To check BG 6  times daily, to use with insurance preferred meter    CARVEDILOL (COREG) 3.125 MG TABLET    Take 1 tablet (3.125 mg total) by mouth 2 (two) times daily.    CLOPIDOGREL (PLAVIX) 75 MG TABLET    TAKE 1 TABLET EVERY DAY    FENOFIBRATE 160 MG TAB    TAKE 1 TABLET EVERY DAY    GLIPIZIDE (GLUCOTROL) 10 MG TABLET    TAKE 1 TABLET TWICE DAILY    LANCETS MISC    To check BG 6 times daily, to use with insurance preferred meter    LISINOPRIL 10 MG TABLET    TAKE 1 TABLET (10 MG TOTAL) BY MOUTH ONCE DAILY.    METFORMIN (GLUCOPHAGE) 1000 MG TABLET    TAKE 1 TABLET TWICE DAILY WITH MEALS    TRIAMCINOLONE ACETONIDE 0.1% (KENALOG) 0.1 % OINTMENT    APPLY TOPICALLY 2 (TWO) TIMES DAILY.     Wellness reviewed   Continue current management and monitor.    Counseled on regular exercise, maintenance of a healthy weight, balanced diet rich in fruits/vegetables and lean protein, and avoidance of unhealthy habits like smoking and excessive alcohol intake.   Also, counseled on importance of being compliant with medication, health appointments, diet and exercise.     Follow up in about 6 months (around 4/2/2020).

## 2019-10-03 RX ORDER — FENOFIBRATE 160 MG/1
TABLET ORAL
Qty: 90 TABLET | Refills: 4 | Status: SHIPPED | OUTPATIENT
Start: 2019-10-03 | End: 2020-08-28

## 2019-10-04 ENCOUNTER — TELEPHONE (OUTPATIENT)
Dept: FAMILY MEDICINE | Facility: CLINIC | Age: 74
End: 2019-10-04

## 2019-10-04 NOTE — TELEPHONE ENCOUNTER
----- Message from Hanna Medina sent at 10/4/2019  9:43 AM CDT -----  Contact: self  Type:  Test Results    Who Called:  self  Name of Test (Lab/Mammo/Etc):  CT Scan  Date of Test:  10/02/19  Ordering Provider:  Dr Mendiola  Where the test was performed:  [facility]  Best Call Back Number:  [phone] (home)   Additional Information:  Patient would like to discuss results. Thanks!

## 2019-10-08 ENCOUNTER — TELEPHONE (OUTPATIENT)
Dept: FAMILY MEDICINE | Facility: CLINIC | Age: 74
End: 2019-10-08

## 2019-10-08 DIAGNOSIS — N18.30 CKD (CHRONIC KIDNEY DISEASE), STAGE III: Primary | ICD-10-CM

## 2019-10-08 NOTE — TELEPHONE ENCOUNTER
----- Message from Sovex sent at 10/8/2019 10:18 AM CDT -----  Contact: pt  Type:  Patient Returning Call    Who Called:  pt  Who Left Message for Patient:  Unknown Nurse no name  Does the patient know what this is regarding?:  Yes test results  Best Call Back Number:    Additional Information:  Patient is returning call from Nurse,please call back

## 2019-10-24 ENCOUNTER — CLINICAL SUPPORT (OUTPATIENT)
Dept: CARDIOLOGY | Facility: CLINIC | Age: 74
End: 2019-10-24
Attending: INTERNAL MEDICINE
Payer: MEDICARE

## 2019-10-24 ENCOUNTER — OFFICE VISIT (OUTPATIENT)
Dept: NEPHROLOGY | Facility: CLINIC | Age: 74
End: 2019-10-24
Payer: MEDICARE

## 2019-10-24 VITALS
BODY MASS INDEX: 26.99 KG/M2 | OXYGEN SATURATION: 98 % | SYSTOLIC BLOOD PRESSURE: 144 MMHG | WEIGHT: 152.31 LBS | HEART RATE: 82 BPM | HEIGHT: 63 IN | DIASTOLIC BLOOD PRESSURE: 90 MMHG

## 2019-10-24 DIAGNOSIS — I49.5 SINUS NODE DYSFUNCTION: ICD-10-CM

## 2019-10-24 DIAGNOSIS — Z95.0 PACEMAKER: Primary | ICD-10-CM

## 2019-10-24 DIAGNOSIS — E11.21 DIABETIC NEPHROPATHY ASSOCIATED WITH TYPE 2 DIABETES MELLITUS: ICD-10-CM

## 2019-10-24 DIAGNOSIS — N17.9 ACUTE RENAL FAILURE, UNSPECIFIED ACUTE RENAL FAILURE TYPE: Primary | ICD-10-CM

## 2019-10-24 DIAGNOSIS — Z95.0 PACEMAKER: ICD-10-CM

## 2019-10-24 DIAGNOSIS — N18.30 CHRONIC KIDNEY DISEASE, STAGE III (MODERATE): ICD-10-CM

## 2019-10-24 DIAGNOSIS — I10 ESSENTIAL HYPERTENSION: ICD-10-CM

## 2019-10-24 PROCEDURE — 3044F PR MOST RECENT HEMOGLOBIN A1C LEVEL <7.0%: ICD-10-PCS | Mod: HCNC,CPTII,S$GLB, | Performed by: INTERNAL MEDICINE

## 2019-10-24 PROCEDURE — 99204 OFFICE O/P NEW MOD 45 MIN: CPT | Mod: HCNC,S$GLB,, | Performed by: INTERNAL MEDICINE

## 2019-10-24 PROCEDURE — 3080F DIAST BP >= 90 MM HG: CPT | Mod: HCNC,CPTII,S$GLB, | Performed by: INTERNAL MEDICINE

## 2019-10-24 PROCEDURE — 99999 PR PBB SHADOW E&M-EST. PATIENT-LVL III: CPT | Mod: PBBFAC,HCNC,, | Performed by: INTERNAL MEDICINE

## 2019-10-24 PROCEDURE — 99204 PR OFFICE/OUTPT VISIT, NEW, LEVL IV, 45-59 MIN: ICD-10-PCS | Mod: HCNC,S$GLB,, | Performed by: INTERNAL MEDICINE

## 2019-10-24 PROCEDURE — 3077F PR MOST RECENT SYSTOLIC BLOOD PRESSURE >= 140 MM HG: ICD-10-PCS | Mod: HCNC,CPTII,S$GLB, | Performed by: INTERNAL MEDICINE

## 2019-10-24 PROCEDURE — 1101F PR PT FALLS ASSESS DOC 0-1 FALLS W/OUT INJ PAST YR: ICD-10-PCS | Mod: HCNC,CPTII,S$GLB, | Performed by: INTERNAL MEDICINE

## 2019-10-24 PROCEDURE — 99999 PR PBB SHADOW E&M-EST. PATIENT-LVL III: ICD-10-PCS | Mod: PBBFAC,HCNC,, | Performed by: INTERNAL MEDICINE

## 2019-10-24 PROCEDURE — 3044F HG A1C LEVEL LT 7.0%: CPT | Mod: HCNC,CPTII,S$GLB, | Performed by: INTERNAL MEDICINE

## 2019-10-24 PROCEDURE — 1101F PT FALLS ASSESS-DOCD LE1/YR: CPT | Mod: HCNC,CPTII,S$GLB, | Performed by: INTERNAL MEDICINE

## 2019-10-24 PROCEDURE — 3080F PR MOST RECENT DIASTOLIC BLOOD PRESSURE >= 90 MM HG: ICD-10-PCS | Mod: HCNC,CPTII,S$GLB, | Performed by: INTERNAL MEDICINE

## 2019-10-24 PROCEDURE — 3077F SYST BP >= 140 MM HG: CPT | Mod: HCNC,CPTII,S$GLB, | Performed by: INTERNAL MEDICINE

## 2019-10-24 NOTE — LETTER
October 24, 2019      Hiram Mendiola MD  [address] [facility] [address] [place] [address]           [facility]  [address] [place] [address]  Phone: [phone]          Patient: Shirlene Ortiz   MR Number: [id number]   YOB: 1945   Date of Visit: 10/24/2019       Dear Dr. Hiram Mendiola:    Thank you for referring Shirlene Ortiz to me for evaluation. Attached you will find relevant portions of my assessment and plan of care.    If you have questions, please do not hesitate to call me. I look forward to following Shirlene Ortiz along with you.    Sincerely,    Angus Coffey MD    Enclosure  CC:  No Recipients    If you would like to receive this communication electronically, please contact [email or web address] or [phone] to request more information on [facility] Link access.    For providers and/or their staff who would like to refer a patient to [facility], please contact us through our one-stop-shop provider referral line, [facility] , at [phone].    If you feel you have received this communication in error or would no longer like to receive these types of communications, please e-mail [email or web address]

## 2019-10-24 NOTE — PROGRESS NOTES
Subjective:       Patient ID: Shirlene Ortiz is a 74 y.o. White female who presents for new patient evaluation for chronic renal failure.    Shirlene Ortiz is referred by Hiram Mendiola MD to be evaluated for chronic renal failure.  She was asked to see us after recent labs showed an elevated creatinine from her baseline.  She denies NSAIDs.  She has no uremic symptoms and is in her usual state of health.  There have been no recent illnesses, hospitalizations or procedures.        Review of Systems   Constitutional: Negative for appetite change, chills and fever.   HENT: Negative for congestion.    Eyes: Negative for visual disturbance.   Respiratory: Positive for shortness of breath (occasional - active smoker). Negative for cough.    Cardiovascular: Negative for chest pain and leg swelling.   Gastrointestinal: Positive for abdominal distention. Negative for diarrhea, nausea and vomiting.   Genitourinary: Positive for difficulty urinating (inconsistent stream) and frequency (nocturia q 2 hours). Negative for dysuria and hematuria.   Musculoskeletal: Positive for back pain. Negative for myalgias.   Skin: Negative for rash.   Neurological: Negative for headaches.   Hematological: Bruises/bleeds easily.   Psychiatric/Behavioral: Negative for confusion and sleep disturbance.       The past medical, family and social histories were reviewed for this encounter.     Past Medical History:   Diagnosis Date    Acute coronary artery obstruction without MI     DM (diabetes mellitus)     HTN (hypertension)     Hyperlipidemia     NSTEMI (non-ST elevated myocardial infarction) 8/17/2017 11/17    PVD (peripheral vascular disease)     S/P CABG (coronary artery bypass graft) 9/14/2017 8/17 CABG x5 LIMA-LAD, VG-PDA, VG-D, YVG-OM-PLB  mammary artery to left anterior descending coronary artery and separate segments  of saphenous vein from the aorta to the posterior descending coronary artery  and from the aorta to the  diagonal coronary artery and from the aorta to the  obtuse marginal coronary artery and to the posterolateral branch of the  circumflex in a sequential fashion using a combination of antegrade and  retrograde cardioplegia with mild systemic hypothermia and endoscopic vein  harvest.    Sinus node dysfunction     Stroke 14     Past Surgical History:   Procedure Laterality Date    APPENDECTOMY      CARDIAC PACEMAKER PLACEMENT      CHOLECYSTECTOMY      coranary stent      CORONARY ANGIOPLASTY      ILIAC ARTERY STENT      POLYPECTOMY      TOTAL ABDOMINAL HYSTERECTOMY       Social History     Socioeconomic History    Marital status: Single     Spouse name: Not on file    Number of children: Not on file    Years of education: Not on file    Highest education level: Not on file   Occupational History    Not on file   Social Needs    Financial resource strain: Not on file    Food insecurity:     Worry: Not on file     Inability: Not on file    Transportation needs:     Medical: Not on file     Non-medical: Not on file   Tobacco Use    Smoking status: Current Every Day Smoker     Packs/day: 1.50     Years: 60.00     Pack years: 90.00     Types: Cigarettes     Last attempt to quit: 8/15/2017     Years since quittin.1    Smokeless tobacco: Never Used    Tobacco comment: Tobacco treatment specialist consulted.   Substance and Sexual Activity    Alcohol use: No    Drug use: No    Sexual activity: Not on file   Lifestyle    Physical activity:     Days per week: Not on file     Minutes per session: Not on file    Stress: Not on file   Relationships    Social connections:     Talks on phone: Not on file     Gets together: Not on file     Attends Je[employer]'s witness service: Not on file     Active member of club or organization: Not on file     Attends meetings of clubs or organizations: Not on file     Relationship status: Not on file   Other Topics Concern    Not on file   Social History Narrative    Not  "on file     Current Outpatient Medications   Medication Sig    ACCU-CHEK ISRAEL CONTROL SOLN Soln     ACCU-CHEK ISRAEL PLUS METER Misc USE AS DIRECTED    ACCU-CHEK ISRAEL PLUS TEST STRP Strp TEST  2  TO  4  TIMES  EVERY  DAY    amLODIPine (NORVASC) 5 MG tablet Take 1 tablet (5 mg total) by mouth every evening.    aspirin (ECOTRIN) 81 MG EC tablet Take 1 tablet (81 mg total) by mouth once daily. (Patient taking differently: Take 325 mg by mouth once daily. )    atorvastatin (LIPITOR) 40 MG tablet TAKE 1 TABLET EVERY DAY    blood sugar diagnostic Strp To check BG 6 times daily, to use with insurance preferred meter    blood-glucose meter kit To check BG 6  times daily, to use with insurance preferred meter    carvedilol (COREG) 3.125 MG tablet Take 1 tablet (3.125 mg total) by mouth 2 (two) times daily.    clopidogrel (PLAVIX) 75 mg tablet TAKE 1 TABLET EVERY DAY    fenofibrate 160 MG Tab TAKE 1 TABLET EVERY DAY    glipiZIDE (GLUCOTROL) 10 MG tablet TAKE 1 TABLET TWICE DAILY    lancets Misc To check BG 6 times daily, to use with insurance preferred meter    lisinopril 10 MG tablet TAKE 1 TABLET (10 MG TOTAL) BY MOUTH ONCE DAILY.    metFORMIN (GLUCOPHAGE) 1000 MG tablet TAKE 1 TABLET TWICE DAILY WITH MEALS    triamcinolone acetonide 0.1% (KENALOG) 0.1 % ointment APPLY TOPICALLY 2 (TWO) TIMES DAILY.    albuterol (PROVENTIL HFA) 90 mcg/actuation inhaler Inhale 2 puffs into the lungs every 6 (six) hours as needed for Wheezing. Rescue (Patient not taking: Reported on 10/2/2019)    BD ALCOHOL SWABS PadM      No current facility-administered medications for this visit.        BP (!) 144/90 (BP Location: Right arm, Patient Position: Sitting)   Pulse 82   Ht 5' 3" (1.6 m)   Wt 69.1 kg (152 lb 5.4 oz)   SpO2 98%   BMI 26.99 kg/m²     Objective:      Physical Exam   Constitutional: She is oriented to person, place, and time. She appears well-developed and well-nourished. No distress.   HENT:   Head: " Normocephalic and atraumatic.   Eyes: Conjunctivae are normal.   Neck: Neck supple. No JVD present.   Cardiovascular: Normal rate, regular rhythm and normal heart sounds. Exam reveals no gallop and no friction rub.   No murmur heard.  Pulmonary/Chest: Effort normal and breath sounds normal. No respiratory distress. She has no wheezes. She has no rales.   Abdominal: Soft. Bowel sounds are normal. She exhibits no distension. There is no tenderness.   Musculoskeletal: She exhibits no edema.   Neurological: She is alert and oriented to person, place, and time.   Skin: Skin is warm and dry. No rash noted.   Psychiatric: She has a normal mood and affect.   Vitals reviewed.      Assessment:       1. Acute renal failure, unspecified acute renal failure type    2. Chronic kidney disease, stage III (moderate)    3. Essential hypertension    4. Diabetic nephropathy associated with type 2 diabetes mellitus        Plan:   Return to clinic on 12/27.  Labs for next visit include rp, ua, upc, pth, renal US.  Baseline creatinine is 1.0-1.3 since 2012.  Please have patient follow-up with your PCP or Endocrinology regarding the control and management of diabetes.  Continue current medications as prescribed and reviewed.   Blood pressure is controlled on the current regimen.  Plan to check urine and renal US to reassess her renal function.  I provided her with a handout on diabetes and chronic kidney disease.

## 2019-11-06 ENCOUNTER — HOSPITAL ENCOUNTER (OUTPATIENT)
Dept: RADIOLOGY | Facility: HOSPITAL | Age: 74
Discharge: HOME | End: 2019-11-06
Attending: INTERNAL MEDICINE
Payer: MEDICARE

## 2019-11-06 DIAGNOSIS — N17.9 ACUTE RENAL FAILURE, UNSPECIFIED ACUTE RENAL FAILURE TYPE: ICD-10-CM

## 2019-11-06 PROCEDURE — 76770 US EXAM ABDO BACK WALL COMP: CPT | Mod: 26,HCNC,, | Performed by: RADIOLOGY

## 2019-11-06 PROCEDURE — 76770 US RETROPERITONEAL COMPLETE: ICD-10-PCS | Mod: 26,HCNC,, | Performed by: RADIOLOGY

## 2019-11-06 PROCEDURE — 76770 US EXAM ABDO BACK WALL COMP: CPT | Mod: TC,HCNC,PO

## 2019-11-08 ENCOUNTER — TELEPHONE (OUTPATIENT)
Dept: NEPHROLOGY | Facility: CLINIC | Age: 74
End: 2019-11-08

## 2019-11-08 DIAGNOSIS — N18.30 CHRONIC KIDNEY DISEASE, STAGE III (MODERATE): Primary | ICD-10-CM

## 2019-11-08 DIAGNOSIS — N17.9 ACUTE RENAL FAILURE, UNSPECIFIED ACUTE RENAL FAILURE TYPE: ICD-10-CM

## 2019-11-08 NOTE — TELEPHONE ENCOUNTER
Patient had back problem in October took aleve for  about 4 days.     Her lab has declined a bit.  Her appointment is 12.27 and she is scheduled for CMP and lipid panel on 12.20.      Please advise.

## 2019-11-12 ENCOUNTER — LAB VISIT (OUTPATIENT)
Dept: LAB | Facility: HOSPITAL | Age: 74
End: 2019-11-12
Attending: INTERNAL MEDICINE
Payer: MEDICARE

## 2019-11-12 DIAGNOSIS — N18.30 CHRONIC KIDNEY DISEASE, STAGE III (MODERATE): ICD-10-CM

## 2019-11-12 DIAGNOSIS — N17.9 ACUTE RENAL FAILURE, UNSPECIFIED ACUTE RENAL FAILURE TYPE: ICD-10-CM

## 2019-11-12 LAB
ALBUMIN SERPL BCP-MCNC: 3.6 G/DL (ref 3.5–5.2)
ANION GAP SERPL CALC-SCNC: 9 MMOL/L (ref 8–16)
BUN SERPL-MCNC: 26 MG/DL (ref 8–23)
CALCIUM SERPL-MCNC: 10 MG/DL (ref 8.7–10.5)
CHLORIDE SERPL-SCNC: 105 MMOL/L (ref 95–110)
CO2 SERPL-SCNC: 26 MMOL/L (ref 23–29)
CREAT SERPL-MCNC: 1.4 MG/DL (ref 0.5–1.4)
EST. GFR  (AFRICAN AMERICAN): 42.7 ML/MIN/1.73 M^2
EST. GFR  (NON AFRICAN AMERICAN): 37 ML/MIN/1.73 M^2
GLUCOSE SERPL-MCNC: 154 MG/DL (ref 70–110)
PHOSPHATE SERPL-MCNC: 3.9 MG/DL (ref 2.7–4.5)
POTASSIUM SERPL-SCNC: 4.1 MMOL/L (ref 3.5–5.1)
SODIUM SERPL-SCNC: 140 MMOL/L (ref 136–145)

## 2019-11-12 PROCEDURE — 36415 COLL VENOUS BLD VENIPUNCTURE: CPT | Mod: HCNC,PO

## 2019-11-12 PROCEDURE — 80069 RENAL FUNCTION PANEL: CPT | Mod: HCNC

## 2019-11-12 RX ORDER — LISINOPRIL 10 MG/1
10 TABLET ORAL DAILY
Qty: 90 TABLET | Refills: 4 | Status: SHIPPED | OUTPATIENT
Start: 2019-11-12 | End: 2019-12-27

## 2019-11-14 ENCOUNTER — TELEPHONE (OUTPATIENT)
Dept: NEPHROLOGY | Facility: CLINIC | Age: 74
End: 2019-11-14

## 2019-11-14 NOTE — TELEPHONE ENCOUNTER
----- Message from Payton Woodson sent at 11/14/2019  9:03 AM CST -----  Contact: Patient  Type:  Test Results    Who Called:  Patient  Name of Test (Lab/Mammo/Etc):  Lab  Date of Test:  11/12  Ordering Provider:  Alexx Jose Call Back Number:  [phone]  Additional Information:

## 2019-11-14 NOTE — TELEPHONE ENCOUNTER
Went over labs with her.  Her GFR and creatinine are going back in the right directtion, but the doctor will want to continue to monitor. I will forward to the doctor and he will let us know if she needs to do anything before her lab scheduled on 12.20.

## 2019-11-26 RX ORDER — METFORMIN HYDROCHLORIDE 1000 MG/1
TABLET ORAL
Qty: 180 TABLET | Refills: 1 | Status: SHIPPED | OUTPATIENT
Start: 2019-11-26 | End: 2021-06-29 | Stop reason: CLARIF

## 2019-11-26 RX ORDER — GLIPIZIDE 10 MG/1
TABLET ORAL
Qty: 180 TABLET | Refills: 1 | Status: SHIPPED | OUTPATIENT
Start: 2019-11-26 | End: 2020-05-07

## 2019-11-26 RX ORDER — ATORVASTATIN CALCIUM 40 MG/1
TABLET, FILM COATED ORAL
Qty: 90 TABLET | Refills: 0 | Status: SHIPPED | OUTPATIENT
Start: 2019-11-26 | End: 2019-12-27 | Stop reason: SDUPTHER

## 2019-11-26 NOTE — PROGRESS NOTES
Refill Authorization Note     is requesting a refill authorization.    Brief assessment and rationale for refill: APPROVE: prr  Name and strength of medication: glipiZIDE (GLUCOTROL) 10 MG tablet // metFORMIN (GLUCOPHAGE) 1000 MG tablet // atorvastatin (LIPITOR) 40 MG tablet        Medication Therapy Plan: FLOS                              Comments:   Requested Prescriptions   Pending Prescriptions Disp Refills    atorvastatin (LIPITOR) 40 MG tablet [Pharmacy Med Name: ATORVASTATIN CALCIUM 40 MG Tablet] 90 tablet 0     Sig: TAKE 1 TABLET EVERY DAY       Cardiovascular:  Antilipid - Statins Failed - 11/25/2019  2:11 PM        Failed - Lipid Panel completed in last 360 days     Lab Results   Component Value Date    CHOL 130 10/22/2018    HDL 30 (L) 10/22/2018    LDLCALC 71.0 10/22/2018    TRIG 145 10/22/2018             Passed - Patient is at least 18 years old        Passed - Office visit in past 12 months or future 90 days     Recent Outpatient Visits            1 month ago Acute renal failure, unspecified acute renal failure type    [place] - Nephrology Angus Coffey MD    1 month ago Routine physical examination    [facility] Hiram Mendiola MD    6 months ago Type 2 diabetes mellitus without retinopathy    [place] - Optometry Brian Parisi, PADMINI    7 months ago Encounter for preventive health examination    [facility] Ivonne Campbell, NP    8 months ago Pacemaker    [facility] Cardiology Caesar Loo MD          Future Appointments              In 3 weeks LAB, [place] [place]    In 1 month [facility]    In 1 month [facility]    In 5 months LAB, [place] [facility] [place]    In 5 months [facility]    In 5 months Hiram Mendiola MD [facility],  [place]                Passed - ALT is 94 or below and within 360 days     ALT   Date Value Ref Range Status   10/02/2019 10 10 - 44 U/L Final   10/22/2018 11 10 - 44 U/L Final   11/06/2017 23 10 - 44 U/L Final              Passed - AST is 54 or below and within 360 days     AST   Date Value Ref Range Status   10/02/2019 12 10 - 40 U/L Final   10/22/2018 10 10 - 40 U/L Final   11/06/2017 17 14 - 36 U/L Final             metFORMIN (GLUCOPHAGE) 1000 MG tablet [Pharmacy Med Name: METFORMIN HYDROCHLORIDE 1000 MG Tablet] 180 tablet 1     Sig: TAKE 1 TABLET TWICE DAILY WITH MEALS       Endocrinology:  Diabetes - Biguanides Passed - 11/25/2019  2:11 PM        Passed - Patient is at least 18 years old        Passed - Office visit in past 12 months or future 90 days     Recent Outpatient Visits            1 month ago Acute renal failure, unspecified acute renal failure type    [place] - Nephrology Angus Coffey MD    1 month ago Routine physical examination    [facility] Hiram Mendiola MD    6 months ago Type 2 diabetes mellitus without retinopathy    [place] - Optometry Brian Parisi, PADMINI    7 months ago Encounter for preventive health examination    [facility] Ivonne Campbell, NP    8 months ago Pacemaker    [facility] Cardiology Caesar Loo MD          Future Appointments              In 3 weeks LAB, [place], [place]    In 1 month [facility] [facility]    In 1 month [facility]    In 5 months LAB, [place] [place]    In 5 months [facility]    In 5 months Hiram [facility]ola MD [facility]                Passed - Cr is 1.4 or below and within 360 days     Creatinine   Date Value Ref Range Status   11/12/2019 1.4 0.5 - 1.4 mg/dL Final   11/06/2019 1.9 (H) 0.5 -  1.4 mg/dL Final   10/02/2019 1.6 (H) 0.5 - 1.4 mg/dL Final              Passed - HBA1C is 7.9 or below and within 180 days     Hemoglobin A1C   Date Value Ref Range Status   10/02/2019 6.1 (H) 4.0 - 5.6 % Final     Comment:     ADA Screening Guidelines:  5.7-6.4%  Consistent with prediabetes  >or=6.5%  Consistent with diabetes  High levels of fetal hemoglobin interfere with the HbA1C  assay. Heterozygous hemoglobin variants (HbS, HgC, etc)do  not significantly interfere with this assay.   However, presence of multiple variants may affect accuracy.     04/30/2019 6.6 (H) 4.0 - 5.6 % Final     Comment:     ADA Screening Guidelines:  5.7-6.4%  Consistent with prediabetes  >or=6.5%  Consistent with diabetes  High levels of fetal hemoglobin interfere with the HbA1C  assay. Heterozygous hemoglobin variants (HbS, HgC, etc)do  not significantly interfere with this assay.   However, presence of multiple variants may affect accuracy.     11/06/2017 5.9 (H) 0.0 - 5.6 % Final     Comment:     Reference Interval:  5.0 - 5.6 Normal   5.7 - 6.4 High Risk   > 6.5 Diabetic    Hgb A1c results are standardized based on the (NGSP) National   Glycohemoglobin Standardization Program.    Hemoglobin A1C levels are related to mean serum/plasma glucose   during the preceding 2-3 months.                   Passed - eGFR is 30 or above and within 360 days     eGFR if non    Date Value Ref Range Status   11/12/2019 37.0 (A) >60 mL/min/1.73 m^2 Final     Comment:     Calculation used to obtain the estimated glomerular filtration  rate (eGFR) is the CKD-EPI equation.      11/06/2019 25.6 (A) >60 mL/min/1.73 m^2 Final     Comment:     Calculation used to obtain the estimated glomerular filtration  rate (eGFR) is the CKD-EPI equation.      10/02/2019 31.5 (A) >60 mL/min/1.73 m^2 Final     Comment:     Calculation used to obtain the estimated glomerular filtration  rate (eGFR) is the CKD-EPI equation.                glipiZIDE  (GLUCOTROL) 10 MG tablet [Pharmacy Med Name: GLIPIZIDE 10 MG Tablet] 180 tablet 1     Sig: TAKE 1 TABLET TWICE DAILY       Endocrinology:  Diabetes - Sulfonylureas Passed - 11/25/2019  2:11 PM        Passed - Patient is at least 18 years old        Passed - Office visit in past 12 months or future 90 days     Recent Outpatient Visits            1 month ago Acute renal failure, unspecified acute renal failure type    [place] - Nephrology Angus Coffey MD    1 month ago Routine physical examination    [facility] Hiram Mendiola MD    6 months ago Type 2 diabetes mellitus without retinopathy    [place] - Optometry Brian Parisi, PADMINI    7 months ago Encounter for preventive health examination    [facility] Ivonne Campbell, NP    8 months ago Pacemaker    [facility] Cardiology Caesar Loo MD          Future Appointments              In 3 weeks LAB, [facility], [place]    In 1 month [facility] [place] [facility]    In 1 month [facility]    In 5 months LAB, [place], [place]    In 5 months PACEMAKER, [facility] Cardiology, [place]    In 5 months Hiram Mendiola MD [facility]                Passed - HBA1C is 7.9 or below and within 180 days     Hemoglobin A1C   Date Value Ref Range Status   10/02/2019 6.1 (H) 4.0 - 5.6 % Final     Comment:     ADA Screening Guidelines:  5.7-6.4%  Consistent with prediabetes  >or=6.5%  Consistent with diabetes  High levels of fetal hemoglobin interfere with the HbA1C  assay. Heterozygous hemoglobin variants (HbS, HgC, etc)do  not significantly interfere with this assay.   However, presence of multiple variants may affect accuracy.     04/30/2019 6.6 (H) 4.0 - 5.6 % Final     Comment:     ADA Screening Guidelines:  5.7-6.4%  Consistent with prediabetes  >or=6.5%   Consistent with diabetes  High levels of fetal hemoglobin interfere with the HbA1C  assay. Heterozygous hemoglobin variants (HbS, HgC, etc)do  not significantly interfere with this assay.   However, presence of multiple variants may affect accuracy.     11/06/2017 5.9 (H) 0.0 - 5.6 % Final     Comment:     Reference Interval:  5.0 - 5.6 Normal   5.7 - 6.4 High Risk   > 6.5 Diabetic    Hgb A1c results are standardized based on the (NGSP) National   Glycohemoglobin Standardization Program.    Hemoglobin A1C levels are related to mean serum/plasma glucose   during the preceding 2-3 months.                   Passed - Cr is 1.4 or below and within 360 days     Creatinine   Date Value Ref Range Status   11/12/2019 1.4 0.5 - 1.4 mg/dL Final   11/06/2019 1.9 (H) 0.5 - 1.4 mg/dL Final   10/02/2019 1.6 (H) 0.5 - 1.4 mg/dL Final              Passed - eGFR is 30 or above and within 360 days     eGFR if non    Date Value Ref Range Status   11/12/2019 37.0 (A) >60 mL/min/1.73 m^2 Final     Comment:     Calculation used to obtain the estimated glomerular filtration  rate (eGFR) is the CKD-EPI equation.      11/06/2019 25.6 (A) >60 mL/min/1.73 m^2 Final     Comment:     Calculation used to obtain the estimated glomerular filtration  rate (eGFR) is the CKD-EPI equation.      10/02/2019 31.5 (A) >60 mL/min/1.73 m^2 Final     Comment:     Calculation used to obtain the estimated glomerular filtration  rate (eGFR) is the CKD-EPI equation.

## 2019-12-06 DIAGNOSIS — E11.8 TYPE 2 DIABETES MELLITUS WITH COMPLICATION, WITHOUT LONG-TERM CURRENT USE OF INSULIN: ICD-10-CM

## 2019-12-09 RX ORDER — BLOOD SUGAR DIAGNOSTIC
STRIP MISCELLANEOUS
Qty: 400 STRIP | Refills: 3 | Status: SHIPPED | OUTPATIENT
Start: 2019-12-09

## 2019-12-09 NOTE — TELEPHONE ENCOUNTER
Refill Authorization Note     is requesting a refill authorization.    Brief assessment and rationale for refill: APPROVE: prr                                         Comments:   Requested Prescriptions   Pending Prescriptions Disp Refills    ACCU-CHEK ISRAEL PLUS TEST STRP Strp [Pharmacy Med Name: ACCU-CHEK ISRAEL PLUS   Strip] 350 strip 0     Sig: TEST 2 TO 4 TIMES EVERY DAY       Endocrinology: Diabetes - Supplies Passed - 12/6/2019  7:25 PM        Passed - Patient is at least 18 years old        Passed - Office visit in past 12 months or future 90 days     Recent Outpatient Visits            1 month ago Acute renal failure, unspecified acute renal failure type    [facility] Nephrology Angus Coffey MD    2 months ago Routine physical examination    [facility] Hiram Mendiola MD    6 months ago Type 2 diabetes mellitus without retinopathy    [place] - Optometry Brian Parisi, PADMINI    7 months ago Encounter for preventive health examination    [facility] Ivonne Campbell, NP    8 months ago Pacemaker    [facility] Cardiology Caesar Loo MD          Future Appointments              In 1 week LAB, [facility] [place]    In 2 weeks [facility]    In 2 weeks [facility]oo [facility]    In 4 months LAB, [place] [place]    In 4 months PACEMAKER, [facility]    In 4 months Hiram eMndiola MD [facility]                Passed - HBA1C is 7.9 or below and within 180 days     Hemoglobin A1C   Date Value Ref Range Status   10/02/2019 6.1 (H) 4.0 - 5.6 % Final     Comment:     ADA Screening Guidelines:  5.7-6.4%  Consistent with prediabetes  >or=6.5%  Consistent with diabetes  High levels of fetal hemoglobin interfere with the HbA1C  assay. Heterozygous hemoglobin variants  (HbS, HgC, etc)do  not significantly interfere with this assay.   However, presence of multiple variants may affect accuracy.     04/30/2019 6.6 (H) 4.0 - 5.6 % Final     Comment:     ADA Screening Guidelines:  5.7-6.4%  Consistent with prediabetes  >or=6.5%  Consistent with diabetes  High levels of fetal hemoglobin interfere with the HbA1C  assay. Heterozygous hemoglobin variants (HbS, HgC, etc)do  not significantly interfere with this assay.   However, presence of multiple variants may affect accuracy.     11/06/2017 5.9 (H) 0.0 - 5.6 % Final     Comment:     Reference Interval:  5.0 - 5.6 Normal   5.7 - 6.4 High Risk   > 6.5 Diabetic    Hgb A1c results are standardized based on the (NGSP) National   Glycohemoglobin Standardization Program.    Hemoglobin A1C levels are related to mean serum/plasma glucose   during the preceding 2-3 months.                     Appointments  past 15m or future 3m with PCP    Date Provider   Last Visit   10/2/2019 Hiram Mendiola MD   Next Visit   5/4/2020 Hiram Mendiola MD

## 2019-12-11 RX ORDER — TRIAMCINOLONE ACETONIDE 1 MG/G
OINTMENT TOPICAL 2 TIMES DAILY
Qty: 30 G | Refills: 0 | Status: SHIPPED | OUTPATIENT
Start: 2019-12-11 | End: 2020-05-07

## 2019-12-21 ENCOUNTER — LAB VISIT (OUTPATIENT)
Dept: LAB | Facility: HOSPITAL | Age: 74
End: 2019-12-21
Attending: INTERNAL MEDICINE
Payer: MEDICARE

## 2019-12-21 DIAGNOSIS — I25.10 CORONARY ARTERY DISEASE INVOLVING NATIVE CORONARY ARTERY OF NATIVE HEART WITHOUT ANGINA PECTORIS: ICD-10-CM

## 2019-12-21 DIAGNOSIS — I73.9 PVD (PERIPHERAL VASCULAR DISEASE): ICD-10-CM

## 2019-12-21 DIAGNOSIS — I10 ESSENTIAL HYPERTENSION: ICD-10-CM

## 2019-12-21 DIAGNOSIS — Z95.5 S/P CORONARY ARTERY STENT PLACEMENT: ICD-10-CM

## 2019-12-21 DIAGNOSIS — Z95.1 S/P CABG (CORONARY ARTERY BYPASS GRAFT): ICD-10-CM

## 2019-12-21 DIAGNOSIS — Z95.0 PACEMAKER: ICD-10-CM

## 2019-12-21 LAB
ALBUMIN SERPL BCP-MCNC: 3 G/DL (ref 3.5–5.2)
ALP SERPL-CCNC: 57 U/L (ref 55–135)
ALT SERPL W/O P-5'-P-CCNC: 8 U/L (ref 10–44)
ANION GAP SERPL CALC-SCNC: 9 MMOL/L (ref 8–16)
AST SERPL-CCNC: 9 U/L (ref 10–40)
BASOPHILS # BLD AUTO: 0.03 K/UL (ref 0–0.2)
BASOPHILS NFR BLD: 0.3 % (ref 0–1.9)
BILIRUB SERPL-MCNC: 0.3 MG/DL (ref 0.1–1)
BUN SERPL-MCNC: 22 MG/DL (ref 8–23)
CALCIUM SERPL-MCNC: 9.5 MG/DL (ref 8.7–10.5)
CHLORIDE SERPL-SCNC: 110 MMOL/L (ref 95–110)
CHOLEST SERPL-MCNC: 149 MG/DL (ref 120–199)
CHOLEST/HDLC SERPL: 4.5 {RATIO} (ref 2–5)
CO2 SERPL-SCNC: 23 MMOL/L (ref 23–29)
CREAT SERPL-MCNC: 1.4 MG/DL (ref 0.5–1.4)
DIFFERENTIAL METHOD: ABNORMAL
EOSINOPHIL # BLD AUTO: 0.1 K/UL (ref 0–0.5)
EOSINOPHIL NFR BLD: 1.4 % (ref 0–8)
ERYTHROCYTE [DISTWIDTH] IN BLOOD BY AUTOMATED COUNT: 13.5 % (ref 11.5–14.5)
EST. GFR  (AFRICAN AMERICAN): 42.7 ML/MIN/1.73 M^2
EST. GFR  (NON AFRICAN AMERICAN): 37 ML/MIN/1.73 M^2
GLUCOSE SERPL-MCNC: 65 MG/DL (ref 70–110)
HCT VFR BLD AUTO: 33 % (ref 37–48.5)
HDLC SERPL-MCNC: 33 MG/DL (ref 40–75)
HDLC SERPL: 22.1 % (ref 20–50)
HGB BLD-MCNC: 9.8 G/DL (ref 12–16)
IMM GRANULOCYTES # BLD AUTO: 0.03 K/UL (ref 0–0.04)
IMM GRANULOCYTES NFR BLD AUTO: 0.3 % (ref 0–0.5)
LDLC SERPL CALC-MCNC: 86.8 MG/DL (ref 63–159)
LYMPHOCYTES # BLD AUTO: 1.4 K/UL (ref 1–4.8)
LYMPHOCYTES NFR BLD: 15.7 % (ref 18–48)
MCH RBC QN AUTO: 27.5 PG (ref 27–31)
MCHC RBC AUTO-ENTMCNC: 29.7 G/DL (ref 32–36)
MCV RBC AUTO: 92 FL (ref 82–98)
MONOCYTES # BLD AUTO: 0.8 K/UL (ref 0.3–1)
MONOCYTES NFR BLD: 8.7 % (ref 4–15)
NEUTROPHILS # BLD AUTO: 6.3 K/UL (ref 1.8–7.7)
NEUTROPHILS NFR BLD: 73.6 % (ref 38–73)
NONHDLC SERPL-MCNC: 116 MG/DL
NRBC BLD-RTO: 0 /100 WBC
PLATELET # BLD AUTO: 255 K/UL (ref 150–350)
PMV BLD AUTO: 10.5 FL (ref 9.2–12.9)
POTASSIUM SERPL-SCNC: 4.4 MMOL/L (ref 3.5–5.1)
PROT SERPL-MCNC: 6.7 G/DL (ref 6–8.4)
RBC # BLD AUTO: 3.57 M/UL (ref 4–5.4)
SODIUM SERPL-SCNC: 142 MMOL/L (ref 136–145)
TRIGL SERPL-MCNC: 146 MG/DL (ref 30–150)
WBC # BLD AUTO: 8.6 K/UL (ref 3.9–12.7)

## 2019-12-21 PROCEDURE — 85025 COMPLETE CBC W/AUTO DIFF WBC: CPT | Mod: HCNC

## 2019-12-21 PROCEDURE — 80061 LIPID PANEL: CPT | Mod: HCNC

## 2019-12-21 PROCEDURE — 36415 COLL VENOUS BLD VENIPUNCTURE: CPT | Mod: HCNC,PO

## 2019-12-21 PROCEDURE — 80053 COMPREHEN METABOLIC PANEL: CPT | Mod: HCNC

## 2019-12-27 ENCOUNTER — OFFICE VISIT (OUTPATIENT)
Dept: NEPHROLOGY | Facility: CLINIC | Age: 74
End: 2019-12-27
Payer: MEDICARE

## 2019-12-27 ENCOUNTER — OFFICE VISIT (OUTPATIENT)
Dept: CARDIOLOGY | Facility: CLINIC | Age: 74
End: 2019-12-27
Payer: MEDICARE

## 2019-12-27 VITALS
BODY MASS INDEX: 26.45 KG/M2 | OXYGEN SATURATION: 97 % | WEIGHT: 149.25 LBS | SYSTOLIC BLOOD PRESSURE: 124 MMHG | DIASTOLIC BLOOD PRESSURE: 64 MMHG | HEIGHT: 63 IN | HEART RATE: 86 BPM

## 2019-12-27 VITALS
HEART RATE: 86 BPM | DIASTOLIC BLOOD PRESSURE: 64 MMHG | SYSTOLIC BLOOD PRESSURE: 124 MMHG | WEIGHT: 149.25 LBS | HEIGHT: 63 IN | BODY MASS INDEX: 26.45 KG/M2 | OXYGEN SATURATION: 97 %

## 2019-12-27 DIAGNOSIS — Z95.1 S/P CABG (CORONARY ARTERY BYPASS GRAFT): ICD-10-CM

## 2019-12-27 DIAGNOSIS — N18.30 CHRONIC KIDNEY DISEASE, STAGE III (MODERATE): Primary | ICD-10-CM

## 2019-12-27 DIAGNOSIS — I73.9 PVD (PERIPHERAL VASCULAR DISEASE): ICD-10-CM

## 2019-12-27 DIAGNOSIS — I10 ESSENTIAL HYPERTENSION: ICD-10-CM

## 2019-12-27 DIAGNOSIS — I25.10 CORONARY ARTERY DISEASE INVOLVING NATIVE CORONARY ARTERY OF NATIVE HEART WITHOUT ANGINA PECTORIS: ICD-10-CM

## 2019-12-27 DIAGNOSIS — Z95.0 PACEMAKER: ICD-10-CM

## 2019-12-27 DIAGNOSIS — E11.21 DIABETIC NEPHROPATHY ASSOCIATED WITH TYPE 2 DIABETES MELLITUS: ICD-10-CM

## 2019-12-27 DIAGNOSIS — E78.2 MIXED HYPERLIPIDEMIA: ICD-10-CM

## 2019-12-27 DIAGNOSIS — Z95.5 S/P CORONARY ARTERY STENT PLACEMENT: Primary | ICD-10-CM

## 2019-12-27 PROCEDURE — 3044F HG A1C LEVEL LT 7.0%: CPT | Mod: HCNC,CPTII,S$GLB, | Performed by: INTERNAL MEDICINE

## 2019-12-27 PROCEDURE — 1101F PR PT FALLS ASSESS DOC 0-1 FALLS W/OUT INJ PAST YR: ICD-10-PCS | Mod: HCNC,CPTII,S$GLB, | Performed by: INTERNAL MEDICINE

## 2019-12-27 PROCEDURE — 1126F AMNT PAIN NOTED NONE PRSNT: CPT | Mod: HCNC,S$GLB,, | Performed by: INTERNAL MEDICINE

## 2019-12-27 PROCEDURE — 3078F PR MOST RECENT DIASTOLIC BLOOD PRESSURE < 80 MM HG: ICD-10-PCS | Mod: HCNC,CPTII,S$GLB, | Performed by: INTERNAL MEDICINE

## 2019-12-27 PROCEDURE — 1159F MED LIST DOCD IN RCRD: CPT | Mod: HCNC,S$GLB,, | Performed by: INTERNAL MEDICINE

## 2019-12-27 PROCEDURE — 99999 PR PBB SHADOW E&M-EST. PATIENT-LVL II: ICD-10-PCS | Mod: PBBFAC,HCNC,, | Performed by: INTERNAL MEDICINE

## 2019-12-27 PROCEDURE — 3078F DIAST BP <80 MM HG: CPT | Mod: HCNC,CPTII,S$GLB, | Performed by: INTERNAL MEDICINE

## 2019-12-27 PROCEDURE — 1101F PT FALLS ASSESS-DOCD LE1/YR: CPT | Mod: HCNC,CPTII,S$GLB, | Performed by: INTERNAL MEDICINE

## 2019-12-27 PROCEDURE — 1159F PR MEDICATION LIST DOCUMENTED IN MEDICAL RECORD: ICD-10-PCS | Mod: HCNC,S$GLB,, | Performed by: INTERNAL MEDICINE

## 2019-12-27 PROCEDURE — 1126F PR PAIN SEVERITY QUANTIFIED, NO PAIN PRESENT: ICD-10-PCS | Mod: HCNC,S$GLB,, | Performed by: INTERNAL MEDICINE

## 2019-12-27 PROCEDURE — 99999 PR PBB SHADOW E&M-EST. PATIENT-LVL II: CPT | Mod: PBBFAC,HCNC,, | Performed by: INTERNAL MEDICINE

## 2019-12-27 PROCEDURE — 3044F PR MOST RECENT HEMOGLOBIN A1C LEVEL <7.0%: ICD-10-PCS | Mod: HCNC,CPTII,S$GLB, | Performed by: INTERNAL MEDICINE

## 2019-12-27 PROCEDURE — 99214 OFFICE O/P EST MOD 30 MIN: CPT | Mod: HCNC,S$GLB,, | Performed by: INTERNAL MEDICINE

## 2019-12-27 PROCEDURE — 99999 PR PBB SHADOW E&M-EST. PATIENT-LVL III: CPT | Mod: PBBFAC,HCNC,, | Performed by: INTERNAL MEDICINE

## 2019-12-27 PROCEDURE — 3074F SYST BP LT 130 MM HG: CPT | Mod: HCNC,CPTII,S$GLB, | Performed by: INTERNAL MEDICINE

## 2019-12-27 PROCEDURE — 99499 UNLISTED E&M SERVICE: CPT | Mod: HCNC,S$GLB,, | Performed by: INTERNAL MEDICINE

## 2019-12-27 PROCEDURE — 3074F PR MOST RECENT SYSTOLIC BLOOD PRESSURE < 130 MM HG: ICD-10-PCS | Mod: HCNC,CPTII,S$GLB, | Performed by: INTERNAL MEDICINE

## 2019-12-27 PROCEDURE — 99214 PR OFFICE/OUTPT VISIT, EST, LEVL IV, 30-39 MIN: ICD-10-PCS | Mod: HCNC,S$GLB,, | Performed by: INTERNAL MEDICINE

## 2019-12-27 PROCEDURE — 99499 RISK ADDL DX/OHS AUDIT: ICD-10-PCS | Mod: HCNC,S$GLB,, | Performed by: INTERNAL MEDICINE

## 2019-12-27 PROCEDURE — 99999 PR PBB SHADOW E&M-EST. PATIENT-LVL III: ICD-10-PCS | Mod: PBBFAC,HCNC,, | Performed by: INTERNAL MEDICINE

## 2019-12-27 RX ORDER — ATORVASTATIN CALCIUM 20 MG/1
20 TABLET, FILM COATED ORAL DAILY
Qty: 90 TABLET | Refills: 5 | Status: SHIPPED | OUTPATIENT
Start: 2019-12-27 | End: 2020-12-31

## 2019-12-27 RX ORDER — VALSARTAN 80 MG/1
80 TABLET ORAL DAILY
Qty: 90 TABLET | Refills: 3 | Status: SHIPPED | OUTPATIENT
Start: 2019-12-27 | End: 2020-01-06

## 2019-12-27 RX ORDER — EZETIMIBE 10 MG/1
10 TABLET ORAL DAILY
Qty: 90 TABLET | Refills: 3 | Status: SHIPPED | OUTPATIENT
Start: 2019-12-27 | End: 2021-06-02

## 2019-12-27 NOTE — PROGRESS NOTES
Subjective:    Patient ID:  Shirlene Ortiz is a 74 y.o. female who presents for follow-up of No chief complaint on file.      HPI  Here for follow up of CABG-PCI (BELLE 11/17)/PPM/PAD. Does all ADLs w/o angina.   C/o bilateral calf claudications FC 2a. limited by back pain. Stable SILVA. Patient denies palpitations, syncope, presyncope, lightheadedness or dizziness      Review of Systems   Constitution: Negative for malaise/fatigue.   Eyes: Negative for blurred vision.   Cardiovascular: Negative for chest pain, claudication, cyanosis, dyspnea on exertion, irregular heartbeat, leg swelling, near-syncope, orthopnea, palpitations, paroxysmal nocturnal dyspnea and syncope.   Respiratory: Negative for cough and shortness of breath.    Hematologic/Lymphatic: Does not bruise/bleed easily.   Musculoskeletal: Negative for back pain, falls, joint pain, muscle cramps, muscle weakness and myalgias.   Gastrointestinal: Negative for abdominal pain, change in bowel habit, nausea and vomiting.   Genitourinary: Negative for urgency.   Neurological: Negative for dizziness, focal weakness and light-headedness.       Past Medical History:   Diagnosis Date    Acute coronary artery obstruction without MI     Anemia 8/27/2017    COPD (chronic obstructive pulmonary disease) 8/25/2017    DM (diabetes mellitus)     Essential hypertension     HTN (hypertension)     Hyperlipidemia     Mixed hyperlipidemia     NSTEMI (non-ST elevated myocardial infarction) 8/17/2017 11/17    Pacemaker 6/25/2012    left chest PACEMAKER [employer] SEDR01 Sensia   S/N:[id number] Deion Link 8/3/2010 - current   right atrium LEAD [employer]TR[employer] 5076 CapSure Fix Novus  S/N:[id number] Deion Link 8/3/2010 - current   right ventricle LEAD [employer]TR[employer] 5076 CapSure Fix Novus  S/N:[id number] Deion Link 8/3/2010 - current     PVD (peripheral vascular disease)     S/P CABG (coronary artery bypass graft) 9/14/2017 8/17 CABG x5 LIMA-LAD, VG-PDA, VG-D, YVG-OM-PLB   mammary artery to left anterior descending coronary artery and separate segments  of saphenous vein from the aorta to the posterior descending coronary artery  and from the aorta to the diagonal coronary artery and from the aorta to the  obtuse marginal coronary artery and to the posterolateral branch of the  circumflex in a sequential fashion using a combination of antegrade and  retrograde cardioplegia with mild systemic hypothermia and endoscopic vein  harvest.    S/P coronary artery stent placement     11/17 proximal circumflex using a 2.5 x 38, post-dilated to 3.0 with 0% residual stenosis.    Sinus node dysfunction     Stroke 7/4/14    Tobacco abuse 8/15/2017    Uncontrolled type 2 diabetes mellitus with hyperosmolarity without coma, without long-term current use of insulin     Documented by Maury on 10/26/15.          Objective:     There were no vitals filed for this visit.     Physical Exam   Constitutional: She is oriented to person, place, and time. She appears well-developed and well-nourished.   HENT:   Head: Normocephalic.   Eyes: Conjunctivae are normal.   Neck: Normal range of motion. Neck supple. No JVD present.   Cardiovascular: Normal rate, regular rhythm, normal heart sounds and intact distal pulses.   Pulses:       Carotid pulses are 2+ on the right side, and 2+ on the left side.       Radial pulses are 2+ on the right side, and 2+ on the left side.        Dorsalis pedis pulses are 2+ on the right side, and 2+ on the left side.        Posterior tibial pulses are 2+ on the right side, and 2+ on the left side.   Well healed midline sternal incision.  Pacer site clean and dry, well healed.       Pulmonary/Chest: Effort normal and breath sounds normal.   Abdominal: Soft. Bowel sounds are normal.   Musculoskeletal: She exhibits no edema or tenderness.   Neurological: She is alert and oriented to person, place, and time. Gait normal.   Skin: Skin is warm, dry and intact. No cyanosis. Nails show  no clubbing.   Psychiatric: She has a normal mood and affect. Her speech is normal and behavior is normal. Thought content normal.   Nursing note and vitals reviewed.            ..    Chemistry        Component Value Date/Time     12/21/2019 1017    K 4.4 12/21/2019 1017     12/21/2019 1017    CO2 23 12/21/2019 1017    BUN 22 12/21/2019 1017    CREATININE 1.4 12/21/2019 1017    GLU 65 (L) 12/21/2019 1017        Component Value Date/Time    CALCIUM 9.5 12/21/2019 1017    ALKPHOS 57 12/21/2019 1017    AST 9 (L) 12/21/2019 1017    ALT 8 (L) 12/21/2019 1017    BILITOT 0.3 12/21/2019 1017    ESTGFRAFRICA 42.7 (A) 12/21/2019 1017    EGFRNONAA 37.0 (A) 12/21/2019 1017            ..  Lab Results   Component Value Date    CHOL 149 12/21/2019    CHOL 130 10/22/2018    CHOL 144 09/26/2017     Lab Results   Component Value Date    HDL 33 (L) 12/21/2019    HDL 30 (L) 10/22/2018    HDL 29 (L) 09/26/2017     Lab Results   Component Value Date    LDLCALC 86.8 12/21/2019    LDLCALC 71.0 10/22/2018    LDLCALC 75.6 09/26/2017     Lab Results   Component Value Date    TRIG 146 12/21/2019    TRIG 145 10/22/2018    TRIG 197 (H) 09/26/2017     Lab Results   Component Value Date    CHOLHDL 22.1 12/21/2019    CHOLHDL 23.1 10/22/2018    CHOLHDL 20.1 09/26/2017     ..  Lab Results   Component Value Date    WBC 8.60 12/21/2019    HGB 9.8 (L) 12/21/2019    HCT 33.0 (L) 12/21/2019    MCV 92 12/21/2019     12/21/2019       Test(s) Reviewed  I have reviewed the following in detail:  [] Stress test   [] Angiography   [x] Echocardiogram   [x] Labs   [x] Other:       Assessment:         ICD-10-CM ICD-9-CM   1. S/P coronary artery stent placement Z95.5 V45.82   2. S/P CABG (coronary artery bypass graft) Z95.1 V45.81   3. Pacemaker Z95.0 V45.01   4. Coronary artery disease involving native coronary artery of native heart without angina pectoris I25.10 414.01   5. Essential hypertension I10 401.9   6. Mixed hyperlipidemia E78.2 272.2    7. PVD (peripheral vascular disease) I73.9 443.9     Problem List Items Addressed This Visit     S/P coronary artery stent placement - Primary    Overview     11/17  proximal circumflex using a 2.5 x 38, post-dilated to 3.0 with 0% residual  stenosis.         S/P CABG (coronary artery bypass graft)    Overview     8/17  CABG x5 LIMA-LAD, VG-PDA, VG-D, YVG-OM-PLB            PVD (peripheral vascular disease)    Overview     Significantly reduced MAURICIO on the left leg.  70-89% stenosis seen in the left popliteal artery.   SIGNED BY: Caesar Loo MD On: 08/12/2016 17:05         Pacemaker    Overview     left chest PACEMAKER  [employer] SEDR01 Sensia   S/N:[id number]  Deion Link  8/3/2010 - current     right atrium LEAD  [employer] 5076 CapSure Fix Novus  S/N:[id number]  Deion Link  8/3/2010 - current     right ventricle LEAD  [employer] 5076 CapSure Fix Novus  S/N:[id number]  Deion Link  8/3/2010 - current          Mixed hyperlipidemia    Essential hypertension    CAD (coronary artery disease)    Overview     11/17  Circumflex: Native circumflex has a 70% stenosis proximal to the stent in  the mid portion and has a short discrete 95% in-stent restenosis just prior to  the obtuse marginal and another 80% right at the obtuse marginal and a 70% in  the distal portion of the stent. There was a stent in the obtuse marginal into  the stent of the circumflex with a 70% in-stent restenosis.  --  Graft to the mid LAD: The graft was a LIMA from the aorta.  --  Graft to the 1st obtuse marginal: Vein graft to the circumflex is occluded.                  Plan:           Return to clinic 6 months   Low level/low impact aerobic exercise 5x's/wk. Heart healthy diet and risk factor modification.    See labs and med orders.  Add zeta decrease lipitor  Change to ARB due to cough    Portions of this note may have been created with voice recognition software.  Grammatical, syntax and spelling errors may be inevitable.

## 2019-12-27 NOTE — PROGRESS NOTES
"Subjective:       Patient ID: Shirlene Ortiz is a 74 y.o. White female who presents for return patient evaluation for chronic renal failure.    She has no uremic symptoms and is in her usual state of health.  There have been no recent illnesses, hospitalizations or procedures.        Review of Systems   Constitutional: Negative for appetite change, chills and fever.   HENT: Negative for congestion.    Eyes: Negative for visual disturbance.   Respiratory: Positive for shortness of breath (occasional - active smoker). Negative for cough.    Cardiovascular: Negative for chest pain and leg swelling.   Gastrointestinal: Positive for abdominal distention. Negative for diarrhea, nausea and vomiting.   Genitourinary: Positive for frequency (nocturia q 2 hours). Negative for dysuria and hematuria.   Musculoskeletal: Positive for back pain and gait problem (occasional sciatica). Negative for myalgias.   Skin: Negative for rash.   Neurological: Negative for headaches.   Hematological: Bruises/bleeds easily.   Psychiatric/Behavioral: Negative for confusion and sleep disturbance.       The past medical, family and social histories were reviewed for this encounter.     /64 (BP Location: Right arm)   Pulse 86   Ht 5' 3" (1.6 m)   Wt 67.7 kg (149 lb 4 oz)   SpO2 97%   BMI 26.44 kg/m²     Objective:      Physical Exam   Constitutional: She is oriented to person, place, and time. She appears well-developed and well-nourished. No distress.   HENT:   Head: Normocephalic and atraumatic.   Eyes: Conjunctivae are normal.   Neck: Neck supple. No JVD present.   Cardiovascular: Normal rate, regular rhythm and normal heart sounds. Exam reveals no gallop and no friction rub.   No murmur heard.  Pulmonary/Chest: Effort normal and breath sounds normal. No respiratory distress. She has no wheezes. She has no rales.   Abdominal: Soft. Bowel sounds are normal. She exhibits no distension. There is no tenderness.   Musculoskeletal: She " exhibits no edema.   Neurological: She is alert and oriented to person, place, and time.   Skin: Skin is warm and dry. No rash noted.   Psychiatric: She has a normal mood and affect.   Vitals reviewed.      Assessment:       1. Chronic kidney disease, stage III (moderate)    2. Essential hypertension    3. Diabetic nephropathy associated with type 2 diabetes mellitus        Plan:   Return to clinic in 4 months.  Labs for next visit include rp, upc, pth.  Baseline creatinine is 1.0-1.4 since 2012.  UPC is 0.27.  PTH is 108 with a calcium of 9.5.  Please have patient follow-up with your PCP or Endocrinology regarding the control and management of diabetes.  Continue current medications as prescribed and reviewed.   Blood pressure is controlled on the current regimen.  Renal US shows R 10.6 cm, L 11.2 cm.

## 2020-01-02 NOTE — TELEPHONE ENCOUNTER
----- Message from Sayra Mon sent at 1/2/2020 10:24 AM CST -----  Contact: Patient   Type: Needs Medical Advice    Who Called:  Pt  Best Call Back Number:[phone]  Additional Information: Pt states Dr changed her bp medication to a different type of medication. Pt states the new medication is to expensive and would like to know if the medication can be changed to a brand that's covered by pt insurance. Please call and advise.

## 2020-01-07 RX ORDER — LOSARTAN POTASSIUM 50 MG/1
50 TABLET ORAL DAILY
Qty: 90 TABLET | Refills: 4 | Status: SHIPPED | OUTPATIENT
Start: 2020-01-07 | End: 2020-12-31

## 2020-01-09 RX ORDER — CLOPIDOGREL BISULFATE 75 MG/1
TABLET ORAL
Qty: 90 TABLET | Refills: 1 | Status: SHIPPED | OUTPATIENT
Start: 2020-01-09 | End: 2020-05-14

## 2020-01-22 ENCOUNTER — TELEPHONE (OUTPATIENT)
Dept: FAMILY MEDICINE | Facility: CLINIC | Age: 75
End: 2020-01-22

## 2020-01-22 RX ORDER — CARVEDILOL 3.12 MG/1
TABLET ORAL
Qty: 180 TABLET | Refills: 4 | Status: SHIPPED | OUTPATIENT
Start: 2020-01-22 | End: 2021-03-30

## 2020-01-22 NOTE — TELEPHONE ENCOUNTER
----- Message from Miranda Hall sent at 1/22/2020  1:06 PM CST -----  Contact: patient  Type: Needs Medical Advice    Who Called:  patient  Symptoms (please be specific):    How long has patient had these symptoms:    Pharmacy name and phone #:  [facility]  Best Call Back Number: [phone]  Additional Information: called to advise that [facility] will be sending request for medication amLODIPine (NORVASC) 5 MG tablet

## 2020-01-23 ENCOUNTER — TELEPHONE (OUTPATIENT)
Dept: GASTROENTEROLOGY | Facility: CLINIC | Age: 75
End: 2020-01-23

## 2020-01-23 RX ORDER — AMLODIPINE BESYLATE 5 MG/1
5 TABLET ORAL NIGHTLY
Qty: 90 TABLET | Refills: 5 | OUTPATIENT
Start: 2020-01-23 | End: 2021-01-22

## 2020-01-23 RX ORDER — AMLODIPINE BESYLATE 5 MG/1
5 TABLET ORAL NIGHTLY
Qty: 90 TABLET | Refills: 4 | Status: ON HOLD | OUTPATIENT
Start: 2020-01-23 | End: 2020-07-03 | Stop reason: SDUPTHER

## 2020-01-23 NOTE — TELEPHONE ENCOUNTER
Spoke with pt and ordered EGD scheduled for 3/9/2020. Pt verbalized understanding of information given and instructions mailed to pt home. Phone number provided to pt for any questions or concerns.    Pt currently on Plavix. Is it ok to hold for 5 days prior to procedure?

## 2020-01-23 NOTE — PROGRESS NOTES
Refill Authorization Note     is requesting a refill authorization.    Brief assessment and rationale for refill: QUICK DC: duplicate request                                         Comments:     Requested Prescriptions     Refused Prescriptions Disp Refills    amLODIPine (NORVASC) 5 MG tablet 90 tablet 5     Sig: Take 1 tablet (5 mg total) by mouth every evening.     Refused By: CRISTA LÓPEZ     Reason for Refusal: Request already responded to by other means (e.g. phone or fax)       Last Prescribed Info:   Original Order:  amLODIPine (NORVASC) 5 MG tablet [[id number]]      Pharmacy:  [facility] [place] [address] [facility] #:  --     Pharmacy Comments:  --          Fill quantity remaining:  -- Fill quantity used:  -- Next fill due: --       Outpatient Medication Detail      Disp Refills Start End    amLODIPine (NORVASC) 5 MG tablet 90 tablet 4 1/23/2020 1/22/2021    Sig - Route: TAKE 1 TABLET (5 MG TOTAL) BY MOUTH EVERY EVENING. - Oral    Sent to pharmacy as: amLODIPine (NORVASC) 5 MG tablet    E-Prescribing Status: Receipt confirmed by pharmacy (1/23/2020  4:01 PM CST)

## 2020-03-05 RX ORDER — ASPIRIN 81 MG/1
81 TABLET ORAL DAILY
COMMUNITY
End: 2020-05-22

## 2020-03-09 ENCOUNTER — ANESTHESIA EVENT (OUTPATIENT)
Dept: ENDOSCOPY | Facility: HOSPITAL | Age: 75
End: 2020-03-09
Payer: MEDICARE

## 2020-03-09 ENCOUNTER — HOSPITAL ENCOUNTER (OUTPATIENT)
Facility: HOSPITAL | Age: 75
Discharge: HOME | End: 2020-03-09
Attending: INTERNAL MEDICINE | Admitting: INTERNAL MEDICINE
Payer: MEDICARE

## 2020-03-09 ENCOUNTER — ANESTHESIA (OUTPATIENT)
Dept: ENDOSCOPY | Facility: HOSPITAL | Age: 75
End: 2020-03-09
Payer: MEDICARE

## 2020-03-09 DIAGNOSIS — R13.10 DYSPHAGIA: ICD-10-CM

## 2020-03-09 LAB — GLUCOSE SERPL-MCNC: 87 MG/DL (ref 70–110)

## 2020-03-09 PROCEDURE — 82962 GLUCOSE BLOOD TEST: CPT | Mod: HCNC,PO | Performed by: INTERNAL MEDICINE

## 2020-03-09 PROCEDURE — D9220A PRA ANESTHESIA: Mod: HCNC,CRNA,, | Performed by: NURSE ANESTHETIST, CERTIFIED REGISTERED

## 2020-03-09 PROCEDURE — D9220A PRA ANESTHESIA: ICD-10-PCS | Mod: HCNC,CRNA,, | Performed by: NURSE ANESTHETIST, CERTIFIED REGISTERED

## 2020-03-09 PROCEDURE — 43248 PR EGD, FLEX, W/DILATION OVER GUIDEWIRE: ICD-10-PCS | Mod: HCNC,,, | Performed by: INTERNAL MEDICINE

## 2020-03-09 PROCEDURE — 88305 TISSUE EXAM BY PATHOLOGIST: ICD-10-PCS | Mod: 26,HCNC,, | Performed by: PATHOLOGY

## 2020-03-09 PROCEDURE — D9220A PRA ANESTHESIA: ICD-10-PCS | Mod: HCNC,ANES,, | Performed by: ANESTHESIOLOGY

## 2020-03-09 PROCEDURE — 63600175 PHARM REV CODE 636 W HCPCS: Mod: HCNC,PO | Performed by: NURSE ANESTHETIST, CERTIFIED REGISTERED

## 2020-03-09 PROCEDURE — 63600175 PHARM REV CODE 636 W HCPCS: Mod: HCNC,PO | Performed by: INTERNAL MEDICINE

## 2020-03-09 PROCEDURE — 43239 EGD BIOPSY SINGLE/MULTIPLE: CPT | Mod: HCNC,PO | Performed by: INTERNAL MEDICINE

## 2020-03-09 PROCEDURE — D9220A PRA ANESTHESIA: Mod: HCNC,ANES,, | Performed by: ANESTHESIOLOGY

## 2020-03-09 PROCEDURE — 43239 PR EGD, FLEX, W/BIOPSY, SGL/MULTI: ICD-10-PCS | Mod: 59,HCNC,, | Performed by: INTERNAL MEDICINE

## 2020-03-09 PROCEDURE — 88305 TISSUE EXAM BY PATHOLOGIST: CPT | Mod: 59,HCNC | Performed by: PATHOLOGY

## 2020-03-09 PROCEDURE — 88305 TISSUE EXAM BY PATHOLOGIST: CPT | Mod: 26,HCNC,, | Performed by: PATHOLOGY

## 2020-03-09 PROCEDURE — 27201012 HC FORCEPS, HOT/COLD, DISP: Mod: HCNC,PO | Performed by: INTERNAL MEDICINE

## 2020-03-09 PROCEDURE — 43239 EGD BIOPSY SINGLE/MULTIPLE: CPT | Mod: 59,HCNC,, | Performed by: INTERNAL MEDICINE

## 2020-03-09 PROCEDURE — 43248 EGD GUIDE WIRE INSERTION: CPT | Mod: HCNC,,, | Performed by: INTERNAL MEDICINE

## 2020-03-09 PROCEDURE — 37000008 HC ANESTHESIA 1ST 15 MINUTES: Mod: HCNC,PO | Performed by: INTERNAL MEDICINE

## 2020-03-09 PROCEDURE — 43248 EGD GUIDE WIRE INSERTION: CPT | Mod: HCNC,PO | Performed by: INTERNAL MEDICINE

## 2020-03-09 PROCEDURE — 37000009 HC ANESTHESIA EA ADD 15 MINS: Mod: HCNC,PO | Performed by: INTERNAL MEDICINE

## 2020-03-09 RX ORDER — LIDOCAINE HCL/PF 100 MG/5ML
SYRINGE (ML) INTRAVENOUS
Status: DISCONTINUED | OUTPATIENT
Start: 2020-03-09 | End: 2020-03-09

## 2020-03-09 RX ORDER — SODIUM CHLORIDE 0.9 % (FLUSH) 0.9 %
10 SYRINGE (ML) INJECTION
Status: DISCONTINUED | OUTPATIENT
Start: 2020-03-09 | End: 2020-03-09 | Stop reason: HOSPADM

## 2020-03-09 RX ORDER — SODIUM CHLORIDE, SODIUM LACTATE, POTASSIUM CHLORIDE, CALCIUM CHLORIDE 600; 310; 30; 20 MG/100ML; MG/100ML; MG/100ML; MG/100ML
INJECTION, SOLUTION INTRAVENOUS CONTINUOUS
Status: DISCONTINUED | OUTPATIENT
Start: 2020-03-09 | End: 2020-03-09 | Stop reason: HOSPADM

## 2020-03-09 RX ORDER — PANTOPRAZOLE SODIUM 40 MG/1
40 TABLET, DELAYED RELEASE ORAL DAILY
Qty: 30 TABLET | Refills: 2 | Status: SHIPPED | OUTPATIENT
Start: 2020-03-09 | End: 2020-06-08

## 2020-03-09 RX ORDER — PROPOFOL 10 MG/ML
VIAL (ML) INTRAVENOUS
Status: DISCONTINUED | OUTPATIENT
Start: 2020-03-09 | End: 2020-03-09

## 2020-03-09 RX ADMIN — PROPOFOL 125 MG: 10 INJECTION, EMULSION INTRAVENOUS at 07:03

## 2020-03-09 RX ADMIN — LIDOCAINE HYDROCHLORIDE 100 MG: 20 INJECTION, SOLUTION INTRAVENOUS at 07:03

## 2020-03-09 RX ADMIN — PROPOFOL 50 MG: 10 INJECTION, EMULSION INTRAVENOUS at 07:03

## 2020-03-09 RX ADMIN — SODIUM CHLORIDE, SODIUM LACTATE, POTASSIUM CHLORIDE, AND CALCIUM CHLORIDE: .6; .31; .03; .02 INJECTION, SOLUTION INTRAVENOUS at 07:03

## 2020-03-09 NOTE — DISCHARGE INSTRUCTIONS
Esophageal Dilation     A balloon dilator may be used to widen a stricture in the esophagus.   An esophageal dilation is a procedure used to widen a narrowed section of your esophagus. This is the tube that leads from your throat to your stomach. Narrowing (stricture) of the esophagus can cause problems. These include trouble swallowing. This sheet explains what to expect with esophageal dilation.  Why esophageal dilation is needed  Several problems can be treated with esophageal dilation. They include:  · Peptic stricture. This is caused by reflux esophagitis. With this problem, the esophagus is irritated by acid reflux (heartburn). This occurs when acid from your stomach flows back up into the esophagus. Stomach acid damages the lining of the esophagus. This leads to a buildup of scar tissue. As a result, the esophagus is narrowed.  · Schatzkis ring. This is an abnormal ring of tissue. It forms where the esophagus meets the stomach. It can cause trouble swallowing. It can also cause food to get stuck in the esophagus. The cause of this condition is not known.  · Achalasia. This condition stops food and liquids from moving into your stomach from the esophagus. It affects the lower esophageal sphincter (LES). The LES is a muscular ring that opens (relaxes) when you swallow. With achalasia, the LES does not relax. This condition can also cause problems with peristalsis. This is the normal muscular action of the esophagus that moves food into the stomach.  · Eosinophilic esophagitis. This is a redness and swelling (inflammation) in the esophagus. It is caused by an environmental trigger such as a food allergy. It can lead to pain, trouble swallowing, and strictures.  · Less common causes of stricture. Other causes of stricture include radiation treatment and cancer.  Before you have esophageal dilation  · Tell your provider about any medicines you take. This includes prescription medicines, over-the-counter  medicines, herbs, vitamins, and other supplements. Be sure to mention aspirin or any blood thinners youre taking.  · Let your provider know if you need to take antibiotics before dental procedures. You may need to take them before esophageal dilation as well.  · Tell your provider about any health conditions you have, such as heart or lung disease. Also mention any allergies to medicines.  · Youll need to have an empty stomach for the procedure. Follow your providers instructions for not eating and drinking before the procedure.  · Arrange to have a family member or friend drive you home after the procedure.  During the procedure  · You may be given local anesthesia to numb your throat. Youll also likely be given medicine to relax you. The procedure takes about 15 minutes. It does not cause trouble breathing.  · A tube called an endoscope (scope) is used. This is a narrow tube with a tiny light and camera at the end. The scope is inserted through your mouth and into your esophagus. It lets your provider see inside your esophagus. To help guide your provider, an imaging method called fluoroscopy may also be used. This creates a moving X-ray image on a computer screen.   · Next, special tiny tools are carefully guided through your mouth and down into the esophagus. They widen the stricture and are then removed. Different types of instruments are used. The type used depends on the size and cause of the stricture. Types include:  ¨ Balloon dilator. A tiny empty balloon is put into the stricture using an endoscope. The balloon is slowly filled with air. The air is removed from the balloon when the stricture is widened to the right size. Balloon dilators are used to treat many types of strictures.  ¨ Guided wire dilator. A thin wire is eased down the esophagus. A small tube thats wider on one end is guided down the wire. It is put into the stricture to stretch it. These dilators are used to treat all kinds of  strictures.  ¨ Bougies. These are weighted, cone-shaped tubes. Starting with smaller cones, your provider uses increasingly larger cones until the stricture is stretched the right amount. Bougies are often used to treat strictures that are simple (short, straight, and not very narrow).  After the procedure  · Youll be watched closely until your provider says youre ready to go home. Youll need to have a friend or family member drive you home.  · You may have a sore throat for the rest of the day.  · You may have pain behind your breastbone for a short time afterwards.  · You can start drinking fluids again after the numbness in your throat goes away. You can resume eating the same day or the next day.  Risks and possible complications  Risks and possible complications for esophageal dilation include:  · Infection  · A tear or hole in the esophagus lining, causing bleeding and possibly needing surgery to fix  · Risks of anesthesia  Follow-up  You may need to have the procedure repeated one or more times. This depends on the cause and extent of the narrowing. Repeat procedures can allow the dilation to take place more slowly. This reduces the risks of the procedure.  If your stricture was caused by reflux esophagitis, youll likely need to take medicine to treat that condition. Your provider will tell you more.  When to call your provider  Call your healthcare provider right away if you have any of the following after the procedure:  · Fever of 100.4°F (38.0°C)  · Chest pain  · Trouble swallowing  · Vomiting blood or material that looks like coffee grounds  · Bleeding  · Black, tarry, or bloody stools   Date Last Reviewed: 7/1/2016 © 2000-2017 [employer]. [address] [place] [address], [place] [address]. All rights reserved. This information is not intended as a substitute for professional medical care. Always follow your healthcare professional's instructions.

## 2020-03-09 NOTE — H&P
History & Physical - Short Stay  Gastroenterology      SUBJECTIVE:     Procedure: EGD    Chief Complaint/Indication for Procedure: Dysphagia    PTA Medications   Medication Sig    ACCU-CHEK ISRAEL CONTROL SOLN Soln     ACCU-CHEK ISRAEL PLUS METER Misc USE AS DIRECTED    ACCU-CHEK [place]RA[place] PLUS TEST STRP Strp TEST 2 TO 4 TIMES EVERY DAY     amLODIPine (NORVASC) 5 MG tablet TAKE 1 TABLET (5 MG TOTAL) BY MOUTH EVERY EVENING.    aspirin (ECOTRIN) 81 MG EC tablet Take 81 mg by mouth once daily.    atorvastatin (LIPITOR) 20 MG tablet Take 1 tablet (20 mg total) by mouth once daily.    BD ALCOHOL SWABS PadM     carvedilol (COREG) 3.125 MG tablet TAKE 1 TABLET TWICE DAILY    clopidogrel (PLAVIX) 75 mg tablet TAKE 1 TABLET EVERY DAY    ezetimibe (ZETIA) 10 mg tablet Take 1 tablet (10 mg total) by mouth once daily.    fenofibrate 160 MG Tab TAKE 1 TABLET EVERY DAY    glipiZIDE (GLUCOTROL) 10 MG tablet TAKE 1 TABLET TWICE DAILY    lancets Misc To check BG 6 times daily, to use with insurance preferred meter    losartan (COZAAR) 50 MG tablet Take 1 tablet (50 mg total) by mouth once daily.    metFORMIN (GLUCOPHAGE) 1000 MG tablet TAKE 1 TABLET TWICE DAILY WITH MEALS    triamcinolone acetonide 0.1% (KENALOG) 0.1 % ointment APPLY TOPICALLY 2 (TWO) TIMES DAILY.       Review of patient's allergies indicates:   Allergen Reactions    Nicotine      puritis from nicotine patch. Patient is a smoker.        Past Medical History:   Diagnosis Date    Acute coronary artery obstruction without MI     Anemia 8/27/2017    COPD (chronic obstructive pulmonary disease) 8/25/2017    DM (diabetes mellitus)     Essential hypertension     HTN (hypertension)     Hyperlipidemia     Mixed hyperlipidemia     NSTEMI (non-ST elevated myocardial infarction) 8/17/2017 11/17    Pacemaker 6/25/2012    left chest PACEMAKER [employer] SEDR01 Sensia   S/N:[id number] Deion Link 8/3/2010 - current   right atrium LEAD [employer] 5076 CapSure  Fix Novus  S/N:[id number] Deion Link 8/3/2010 - current   right ventricle LEAD [employer] 5076 CapSure Fix Novus  S/N:[id number] Deion Link 8/3/2010 - current     PVD (peripheral vascular disease)     S/P CABG (coronary artery bypass graft) 2017 CABG x5 LIMA-LAD, VG-PDA, VG-D, YVG-OM-PLB  mammary artery to left anterior descending coronary artery and separate segments  of saphenous vein from the aorta to the posterior descending coronary artery  and from the aorta to the diagonal coronary artery and from the aorta to the  obtuse marginal coronary artery and to the posterolateral branch of the  circumflex in a sequential fashion using a combination of antegrade and  retrograde cardioplegia with mild systemic hypothermia and endoscopic vein  harvest.    S/P coronary artery stent placement      proximal circumflex using a 2.5 x 38, post-dilated to 3.0 with 0% residual stenosis.    Sinus node dysfunction     Stroke 14    Tobacco abuse 8/15/2017    Uncontrolled type 2 diabetes mellitus with hyperosmolarity without coma, without long-term current use of insulin     Documented by Maury on 10/26/15.     Past Surgical History:   Procedure Laterality Date    APPENDECTOMY      CARDIAC PACEMAKER PLACEMENT      CHOLECYSTECTOMY      coranary stent      CORONARY ANGIOPLASTY      ILIAC ARTERY STENT      POLYPECTOMY      TOTAL ABDOMINAL HYSTERECTOMY       Family History   Problem Relation Age of Onset    Kidney disease Mother     Hypertension Mother     Diabetes Mother     Melanoma Sister     Lung disease Sister     Hypertension Sister      Social History     Tobacco Use    Smoking status: Current Every Day Smoker     Packs/day: 1.50     Years: 60.00     Pack years: 90.00     Types: Cigarettes     Last attempt to quit: 8/15/2017     Years since quittin.5    Smokeless tobacco: Never Used    Tobacco comment: Tobacco treatment specialist consulted.   Substance Use Topics    Alcohol  use: No    Drug use: No         OBJECTIVE:     Vital Signs (Most Recent)       Physical Exam:                                                       GENERAL:  Comfortable, in no acute distress.                                 HEENT EXAM:  Nonicteric.  No adenopathy.  Oropharynx is clear.               NECK:  Supple.                                                               LUNGS:  Clear.                                                               CARDIAC:  Regular rate and rhythm.  S1, S2.  No murmur.                      ABDOMEN:  Soft, positive bowel sounds, nontender.  No hepatosplenomegaly or masses.  No rebound or guarding.                                             EXTREMITIES:  No edema.     MENTAL STATUS:  Normal, alert and oriented.      ASSESSMENT/PLAN:     Assessment: Dysphagia    Plan: EGD    Anesthesia Plan: General    ASA Grade: ASA 2 - Patient with mild systemic disease with no functional limitations    MALLAMPATI SCORE:  I (soft palate, uvula, fauces, and tonsillar pillars visible)

## 2020-03-09 NOTE — [employer] PATIENT INSTRUCTIONS
Discharge Summary/Instructions after an Endoscopic Procedure  Patient Name: Shirlene Ortiz  Patient MRN: [id number]  Patient YOB: 1945 Monday, March 09, 2020  Alonzo Marinelli MD  RESTRICTIONS:  During your procedure today, you received medications for sedation.  These   medications may affect your judgment, balance and coordination.  Therefore,   for 24 hours, you have the following restrictions:   - DO NOT drive a car, operate machinery, make legal/financial decisions,   sign important papers or drink alcohol.    ACTIVITY:  Today: no heavy lifting, straining or running due to procedural   sedation/anesthesia.  The following day: return to full activity including work.  DIET:  Eat and drink normally unless instructed otherwise.     TREATMENT FOR COMMON SIDE EFFECTS:  - Mild abdominal pain, nausea, belching, bloating or excessive gas:  rest,   eat lightly and use a heating pad.  - Sore Throat: treat with throat lozenges and/or gargle with warm salt   water.  - Because air was used during the procedure, expelling large amounts of air   from your rectum or belching is normal.  - If a bowel prep was taken, you may not have a bowel movement for 1-3 days.    This is normal.  SYMPTOMS TO WATCH FOR AND REPORT TO YOUR PHYSICIAN:  1. Abdominal pain or bloating, other than gas cramps.  2. Chest pain.  3. Back pain.  4. Signs of infection such as: chills or fever occurring within 24 hours   after the procedure.  5. Rectal bleeding, which would show as bright red, maroon, or black stools.   (A tablespoon of blood from the rectum is not serious, especially if   hemorrhoids are present.)  6. Vomiting.  7. Weakness or dizziness.  GO DIRECTLY TO THE NEAREST EMERGENCY ROOM IF YOU HAVE ANY OF THE FOLLOWING:      Difficulty breathing              Chills and/or fever over 101 F   Persistent vomiting and/or vomiting blood   Severe abdominal pain   Severe chest pain   Black, tarry stools   Bleeding- more than one  tablespoon   Any other symptom or condition that you feel may need urgent attention  Your doctor recommends these additional instructions:  If any biopsies were taken, your doctors clinic will contact you in 1 to 2   weeks with any results.  We are waiting for your pathology results.   Continue your present medications.   You are being discharged to home.  For questions, problems or results please call your physician - Alonzo Marinelli MD at Work:  [phone].  EMERGENCY PHONE NUMBER: [phone], LAB RESULTS: [phone]  IF A COMPLICATION OR EMERGENCY SITUATION ARISES AND YOU ARE UNABLE TO REACH   YOUR PHYSICIAN - GO DIRECTLY TO THE EMERGENCY ROOM.  ___________________________________________  Nurse Signature  ___________________________________________  Patient/Designated Responsible Party Signature  Alonzo Marinelli MD  3/9/2020 7:51:30 AM  This report has been verified and signed electronically.  [employer]

## 2020-03-09 NOTE — TRANSFER OF CARE
"Anesthesia Transfer of Care Note    Patient: Shirlene Ortiz    Procedure(s) Performed: Procedure(s) (LRB):  ESOPHAGOGASTRODUODENOSCOPY (EGD) (N/A)    Patient location: PACU    Anesthesia Type: general    Transport from OR: Transported from OR on room air with adequate spontaneous ventilation    Post pain: adequate analgesia    Post assessment: no apparent anesthetic complications and tolerated procedure well    Post vital signs: stable    Level of consciousness: awake    Nausea/Vomiting: no nausea/vomiting    Complications: none    Transfer of care protocol was followed      Last vitals:   Visit Vitals  BP (!) 121/58 (BP Location: Left arm, Patient Position: Lying)   Pulse 60   Temp 36.6 °C (97.8 °F) (Skin)   Resp 16   Ht 5' 2" (1.575 m)   Wt 68 kg (150 lb)   SpO2 98%   Breastfeeding? No   BMI 27.44 kg/m²     "

## 2020-03-09 NOTE — DISCHARGE SUMMARY
Discharge Note  Short Stay      SUMMARY     Admit Date: 3/9/2020    Attending Physician: Alonzo Marinelli MD     Discharge Physician: Alonzo Marinelli MD    Discharge Date: 3/9/2020 7:52 AM    Final Diagnosis: Esophageal thickening [K22.8]  Dysphagia, unspecified type [R13.10]    Disposition: HOME OR SELF CARE    Patient Instructions:   Current Discharge Medication List      START taking these medications    Details   pantoprazole (PROTONIX) 40 MG tablet Take 1 tablet (40 mg total) by mouth once daily.  Qty: 30 tablet, Refills: 2         CONTINUE these medications which have NOT CHANGED    Details   ACCU-CHEK ISRAEL CONTROL SOLN Soln       ACCU-CHEK ISRAEL PLUS METER Misc USE AS DIRECTED  Qty: 1 each, Refills: 0      ACCU-CHEK ISRAEL PLUS TEST STRP Strp TEST 2 TO 4 TIMES EVERY DAY   Qty: 400 strip, Refills: 3    Associated Diagnoses: Type 2 diabetes mellitus with complication, without long-term current use of insulin      amLODIPine (NORVASC) 5 MG tablet TAKE 1 TABLET (5 MG TOTAL) BY MOUTH EVERY EVENING.  Qty: 90 tablet, Refills: 4      aspirin (ECOTRIN) 81 MG EC tablet Take 81 mg by mouth once daily.      atorvastatin (LIPITOR) 20 MG tablet Take 1 tablet (20 mg total) by mouth once daily.  Qty: 90 tablet, Refills: 5      BD ALCOHOL SWABS PadM       carvedilol (COREG) 3.125 MG tablet TAKE 1 TABLET TWICE DAILY  Qty: 180 tablet, Refills: 4      clopidogrel (PLAVIX) 75 mg tablet TAKE 1 TABLET EVERY DAY  Qty: 90 tablet, Refills: 1      ezetimibe (ZETIA) 10 mg tablet Take 1 tablet (10 mg total) by mouth once daily.  Qty: 90 tablet, Refills: 3      fenofibrate 160 MG Tab TAKE 1 TABLET EVERY DAY  Qty: 90 tablet, Refills: 4      glipiZIDE (GLUCOTROL) 10 MG tablet TAKE 1 TABLET TWICE DAILY  Qty: 180 tablet, Refills: 1      lancets Misc To check BG 6 times daily, to use with insurance preferred meter  Qty: 200 each, Refills: 11      losartan (COZAAR) 50 MG tablet Take 1 tablet (50 mg total) by mouth once daily.  Qty: 90  tablet, Refills: 4      metFORMIN (GLUCOPHAGE) 1000 MG tablet TAKE 1 TABLET TWICE DAILY WITH MEALS  Qty: 180 tablet, Refills: 1      triamcinolone acetonide 0.1% (KENALOG) 0.1 % ointment APPLY TOPICALLY 2 (TWO) TIMES DAILY.  Qty: 30 g, Refills: 0             Discharge Procedure Orders (must include Diet, Follow-up, Activity)    Follow Up:  Follow up with PCP as previously scheduled  Resume routine diet.  Activity as tolerated.    No driving day of procedure.

## 2020-03-09 NOTE — ANESTHESIA PREPROCEDURE EVALUATION
03/09/2020  Shirlene Ortiz is a 74 y.o., female.    Anesthesia Evaluation    I have reviewed the Patient Summary Reports.    I have reviewed the Nursing Notes.   I have reviewed the Medications.     Review of Systems  Social:  Smoker    Hematology/Oncology:         -- Anemia:   Cardiovascular:   Hypertension Past MI CAD  CABG/stent  PVD CABG 2017, coronary stents, LAE, off ASA/Plavix 5 days, iliac stents, pacemaker   Pulmonary:   COPD    Renal/:   Chronic Renal Disease, CRI    Neurological:   CVA, residual symptoms speech   Endocrine:   Diabetes, poorly controlled        Physical Exam  General:  Well nourished    Airway/Jaw/Neck:  Airway Findings: Mouth Opening: Normal Tongue: Normal  General Airway Assessment: Adult  Mallampati: III  Improves to II with phonation.      Dental:  Dental Findings: Edentulous   Chest/Lungs:  Chest/Lungs Findings: Clear to auscultation, Normal Respiratory Rate     Heart/Vascular:  Heart Findings: Rate: Normal  Rhythm: Regular Rhythm  Sounds: Normal        Mental Status:  Mental Status Findings:  Cooperative, Alert and Oriented         Anesthesia Plan  Type of Anesthesia, risks & benefits discussed:  Anesthesia Type:  general  Patient's Preference:   Intra-op Monitoring Plan: standard ASA monitors  Intra-op Monitoring Plan Comments:   Post Op Pain Control Plan:   Post Op Pain Control Plan Comments:   Induction:   IV  Beta Blocker:  Patient is on a Beta-Blocker and has received one dose within the past 24 hours (No further documentation required).       Informed Consent: Patient understands risks and agrees with Anesthesia plan.  Questions answered. Anesthesia consent signed with patient.  ASA Score: 3     Day of Surgery Review of History & Physical:            Ready For Surgery From Anesthesia Perspective.

## 2020-03-09 NOTE — ANESTHESIA POSTPROCEDURE EVALUATION
Anesthesia Post Evaluation    Patient: Shirlene Ortiz    Procedure(s) Performed: Procedure(s) (LRB):  ESOPHAGOGASTRODUODENOSCOPY (EGD) (N/A)    Final Anesthesia Type: general    Patient location during evaluation: PACU  Patient participation: Yes- Able to Participate  Level of consciousness: sedated and awake  Post-procedure vital signs: reviewed and stable  Pain management: adequate  Airway patency: patent    PONV status at discharge: No PONV  Anesthetic complications: no      Cardiovascular status: blood pressure returned to baseline  Respiratory status: spontaneous ventilation  Hydration status: euvolemic  Follow-up not needed.          Vitals Value Taken Time   /58 3/9/2020  7:53 AM   Temp 36.6 °C (97.8 °F) 3/9/2020  7:53 AM   Pulse 60 3/9/2020  7:53 AM   Resp 16 3/9/2020  7:53 AM   SpO2 98 % 3/9/2020  7:53 AM         No case tracking events are documented in the log.      Pain/Quyen Score: Quyen Score: 6 (3/9/2020  7:53 AM)

## 2020-03-10 VITALS
OXYGEN SATURATION: 99 % | HEIGHT: 62 IN | HEART RATE: 64 BPM | RESPIRATION RATE: 18 BRPM | BODY MASS INDEX: 27.6 KG/M2 | DIASTOLIC BLOOD PRESSURE: 56 MMHG | TEMPERATURE: 98 F | WEIGHT: 150 LBS | SYSTOLIC BLOOD PRESSURE: 112 MMHG

## 2020-03-20 LAB — FINAL PATHOLOGIC DIAGNOSIS: NORMAL

## 2020-03-30 ENCOUNTER — TELEPHONE (OUTPATIENT)
Dept: GASTROENTEROLOGY | Facility: CLINIC | Age: 75
End: 2020-03-30

## 2020-03-30 NOTE — TELEPHONE ENCOUNTER
Spoke with pt. Scheduled 3 month repeat EGD. Pt verbalized understanding to date.   Prep instructions & reminder letter placed in mail

## 2020-03-30 NOTE — TELEPHONE ENCOUNTER
I reviewed EGD biopsy results with pt. Esophageal biopsies Jonas's with low-grade dysplasia. Pt started protonix after EGD. I recommended continue protonix and repeat EGD/biopsies in 3 months. If dysplasia persists, will need RFA. Pt understands and agreeable.

## 2020-04-08 RX ORDER — GLIPIZIDE 10 MG/1
TABLET ORAL
Qty: 180 TABLET | Refills: 1 | OUTPATIENT
Start: 2020-04-08

## 2020-04-08 RX ORDER — METFORMIN HYDROCHLORIDE 1000 MG/1
TABLET ORAL
Qty: 180 TABLET | Refills: 1 | OUTPATIENT
Start: 2020-04-08

## 2020-04-08 NOTE — PROGRESS NOTES
Quick DC. Duplicate Request   Refill Authorization Note     is requesting a refill authorization.    Brief assessment and rationale for refill: Quick DC: addressed in previous encounter  Name and strength of medication: metFORMIN (GLUCOPHAGE) 1000 MG tablet // glipiZIDE (GLUCOTROL) 10 MG tablet        Medication Therapy Plan: pt states she does not need refills at this time and will wait to see Dr. Mendiola in may, per adherence data pt should have enough to last until august 2020, quick c    Medication reconciliation completed: No                         Comments:   Pended Medication(s)   Requested Prescriptions     Refused Prescriptions Disp Refills    metFORMIN (GLUCOPHAGE) 1000 MG tablet [Pharmacy Med Name: METFORMIN HYDROCHLORIDE 1000 MG Tablet] 180 tablet 1     Sig: TAKE 1 TABLET TWICE DAILY WITH MEALS     Refused By: BAMBI GTZ     Reason for Refusal: Request already responded to by other means (e.g. phone or fax)    glipiZIDE (GLUCOTROL) 10 MG tablet [Pharmacy Med Name: GLIPIZIDE 10 MG Tablet] 180 tablet 1     Sig: TAKE 1 TABLET TWICE DAILY     Refused By: BAMBI GTZ     Reason for Refusal: Request already responded to by other means (e.g. phone or fax)        Duplicate Pended Encounter(s)/ Last Prescribed Details:    Ordering User:  Mara Solis MD               Original Order:  glipiZIDE (GLUCOTROL) 10 MG tablet [[id number]]      Pharmacy:  [facility] [address] MARGARET #:  --     Pharmacy Comments:  --          Fill quantity remaining:  -- Fill quantity used:  -- Next fill due: --       Outpatient Medication Detail      Disp Refills Start End    glipiZIDE (GLUCOTROL) 10 MG tablet 180 tablet 1 4/8/2020     Refill refused: Other (See comments) (Patient does not need a refill right now)    Sig - Route: Take 1 tablet (10 mg total) by mouth 2 (two) times daily. - Oral    Ordering Encounter Report     Associated Reports   View Encounter     Dispenses       Dispensed Days Supply Quantity Provider Pharmacy   metformin 1,000 mg tablet 02/04/2020 90 180 tablet  [facility] Mail D...   metformin 1,000 mg tablet 11/27/2019 90 180 tablet Hiram Mendiola MD [facility] Mail D...   metformin 1,000 mg tablet 09/20/2019 90 180 tablet Hiram Mendiola MD [facility] Mail D...   metformin 1,000 mg tablet 07/18/2019 90 180 tablet Hiram Mendiola MD [facility] Mail D...   metformin 1,000 mg tablet 05/15/2019 90 180 tablet Hiram Mendiola MD [facility] Mail D...                     Note composed:12:06 PM 04/08/2020

## 2020-04-23 ENCOUNTER — TELEPHONE (OUTPATIENT)
Dept: FAMILY MEDICINE | Facility: CLINIC | Age: 75
End: 2020-04-23

## 2020-04-24 ENCOUNTER — TELEPHONE (OUTPATIENT)
Dept: FAMILY MEDICINE | Facility: CLINIC | Age: 75
End: 2020-04-24

## 2020-04-24 NOTE — TELEPHONE ENCOUNTER
Called pt, she has an appointment 5/22 with nephrologist and wanted her follow up with pcp same day. Scheduled appt and she verbally understood.

## 2020-04-24 NOTE — TELEPHONE ENCOUNTER
----- Message from Jordan Chavez sent at 4/24/2020  9:32 AM CDT -----  Contact: pt  Type: Needs Medical Advice    Who Called:  pt    Best Call Back Number: [phone]  Additional Information: pt would like to know if she can be fit in with  on 05/22/2020. I tried to schedule and there was nothing available with him for that day. Please call to advise.

## 2020-04-27 ENCOUNTER — LAB VISIT (OUTPATIENT)
Dept: LAB | Facility: HOSPITAL | Age: 75
End: 2020-04-27
Attending: INTERNAL MEDICINE
Payer: MEDICARE

## 2020-04-27 DIAGNOSIS — E11.9 CONTROLLED TYPE 2 DIABETES MELLITUS WITHOUT COMPLICATION, WITHOUT LONG-TERM CURRENT USE OF INSULIN: ICD-10-CM

## 2020-04-27 DIAGNOSIS — N18.30 CHRONIC KIDNEY DISEASE, STAGE III (MODERATE): ICD-10-CM

## 2020-04-27 DIAGNOSIS — I10 ESSENTIAL HYPERTENSION: ICD-10-CM

## 2020-04-27 LAB
ALBUMIN SERPL BCP-MCNC: 3.5 G/DL (ref 3.5–5.2)
ALBUMIN SERPL BCP-MCNC: 3.5 G/DL (ref 3.5–5.2)
ALP SERPL-CCNC: 55 U/L (ref 55–135)
ALT SERPL W/O P-5'-P-CCNC: 12 U/L (ref 10–44)
ANION GAP SERPL CALC-SCNC: 10 MMOL/L (ref 8–16)
ANION GAP SERPL CALC-SCNC: 10 MMOL/L (ref 8–16)
AST SERPL-CCNC: 13 U/L (ref 10–40)
BILIRUB SERPL-MCNC: 0.3 MG/DL (ref 0.1–1)
BUN SERPL-MCNC: 26 MG/DL (ref 8–23)
BUN SERPL-MCNC: 26 MG/DL (ref 8–23)
CALCIUM SERPL-MCNC: 9.2 MG/DL (ref 8.7–10.5)
CALCIUM SERPL-MCNC: 9.2 MG/DL (ref 8.7–10.5)
CHLORIDE SERPL-SCNC: 105 MMOL/L (ref 95–110)
CHLORIDE SERPL-SCNC: 105 MMOL/L (ref 95–110)
CO2 SERPL-SCNC: 24 MMOL/L (ref 23–29)
CO2 SERPL-SCNC: 24 MMOL/L (ref 23–29)
CREAT SERPL-MCNC: 1.8 MG/DL (ref 0.5–1.4)
CREAT SERPL-MCNC: 1.8 MG/DL (ref 0.5–1.4)
EST. GFR  (AFRICAN AMERICAN): 31.5 ML/MIN/1.73 M^2
EST. GFR  (AFRICAN AMERICAN): 31.5 ML/MIN/1.73 M^2
EST. GFR  (NON AFRICAN AMERICAN): 27.3 ML/MIN/1.73 M^2
EST. GFR  (NON AFRICAN AMERICAN): 27.3 ML/MIN/1.73 M^2
ESTIMATED AVG GLUCOSE: 140 MG/DL (ref 68–131)
GLUCOSE SERPL-MCNC: 212 MG/DL (ref 70–110)
GLUCOSE SERPL-MCNC: 212 MG/DL (ref 70–110)
HBA1C MFR BLD HPLC: 6.5 % (ref 4–5.6)
PHOSPHATE SERPL-MCNC: 4.5 MG/DL (ref 2.7–4.5)
POTASSIUM SERPL-SCNC: 4.8 MMOL/L (ref 3.5–5.1)
POTASSIUM SERPL-SCNC: 4.8 MMOL/L (ref 3.5–5.1)
PROT SERPL-MCNC: 6.9 G/DL (ref 6–8.4)
PTH-INTACT SERPL-MCNC: 147 PG/ML (ref 9–77)
SODIUM SERPL-SCNC: 139 MMOL/L (ref 136–145)
SODIUM SERPL-SCNC: 139 MMOL/L (ref 136–145)

## 2020-04-27 PROCEDURE — 83036 HEMOGLOBIN GLYCOSYLATED A1C: CPT | Mod: HCNC

## 2020-04-27 PROCEDURE — 83970 ASSAY OF PARATHORMONE: CPT | Mod: HCNC

## 2020-04-27 PROCEDURE — 80053 COMPREHEN METABOLIC PANEL: CPT | Mod: HCNC

## 2020-04-27 PROCEDURE — 80069 RENAL FUNCTION PANEL: CPT | Mod: HCNC

## 2020-04-27 PROCEDURE — 36415 COLL VENOUS BLD VENIPUNCTURE: CPT | Mod: HCNC,PO

## 2020-05-05 ENCOUNTER — PATIENT MESSAGE (OUTPATIENT)
Dept: ADMINISTRATIVE | Facility: HOSPITAL | Age: 75
End: 2020-05-05

## 2020-05-07 RX ORDER — TRIAMCINOLONE ACETONIDE 1 MG/G
OINTMENT TOPICAL 2 TIMES DAILY
Qty: 30 G | Refills: 0 | Status: SHIPPED | OUTPATIENT
Start: 2020-05-07 | End: 2020-07-02

## 2020-05-07 RX ORDER — GLIPIZIDE 10 MG/1
TABLET ORAL
Qty: 180 TABLET | Refills: 1 | Status: ON HOLD | OUTPATIENT
Start: 2020-05-07 | End: 2020-07-03 | Stop reason: SDUPTHER

## 2020-05-07 RX ORDER — METFORMIN HYDROCHLORIDE 1000 MG/1
TABLET ORAL
Qty: 180 TABLET | Refills: 1 | OUTPATIENT
Start: 2020-05-07

## 2020-05-07 NOTE — TELEPHONE ENCOUNTER
Creatinine has increased over the past 4 months.  In addition GFR is < 30, therefore metformin is contraindicated;  Recommend pt schedules visit to discuss possible alternative to metformin;

## 2020-05-14 RX ORDER — CLOPIDOGREL BISULFATE 75 MG/1
TABLET ORAL
Qty: 90 TABLET | Refills: 1 | Status: SHIPPED | OUTPATIENT
Start: 2020-05-14 | End: 2020-10-08

## 2020-05-15 ENCOUNTER — TELEPHONE (OUTPATIENT)
Dept: GASTROENTEROLOGY | Facility: CLINIC | Age: 75
End: 2020-05-15

## 2020-05-15 NOTE — TELEPHONE ENCOUNTER
Spoke with pt. Rescheduled c-scope as Dr. Marinelli will not be in on the day of her procedure. Pt verbalized understanding to her new date.

## 2020-05-20 ENCOUNTER — PATIENT OUTREACH (OUTPATIENT)
Dept: ADMINISTRATIVE | Facility: OTHER | Age: 75
End: 2020-05-20

## 2020-05-20 DIAGNOSIS — Z12.31 ENCOUNTER FOR SCREENING MAMMOGRAM FOR MALIGNANT NEOPLASM OF BREAST: Primary | ICD-10-CM

## 2020-05-20 NOTE — PROGRESS NOTES
Patient's chart was reviewed.   Requested updates within Care Everywhere.  Screening mammogram ordered

## 2020-05-22 ENCOUNTER — OFFICE VISIT (OUTPATIENT)
Dept: NEPHROLOGY | Facility: CLINIC | Age: 75
End: 2020-05-22
Payer: MEDICARE

## 2020-05-22 ENCOUNTER — HOSPITAL ENCOUNTER (OUTPATIENT)
Dept: RADIOLOGY | Facility: HOSPITAL | Age: 75
Discharge: HOME | End: 2020-05-22
Attending: INTERNAL MEDICINE
Payer: MEDICARE

## 2020-05-22 ENCOUNTER — OFFICE VISIT (OUTPATIENT)
Dept: FAMILY MEDICINE | Facility: CLINIC | Age: 75
End: 2020-05-22
Payer: MEDICARE

## 2020-05-22 ENCOUNTER — TELEPHONE (OUTPATIENT)
Dept: NEPHROLOGY | Facility: CLINIC | Age: 75
End: 2020-05-22

## 2020-05-22 VITALS
TEMPERATURE: 99 F | WEIGHT: 154.13 LBS | OXYGEN SATURATION: 97 % | BODY MASS INDEX: 28.36 KG/M2 | HEART RATE: 99 BPM | HEIGHT: 62 IN | SYSTOLIC BLOOD PRESSURE: 112 MMHG | DIASTOLIC BLOOD PRESSURE: 68 MMHG

## 2020-05-22 VITALS — SYSTOLIC BLOOD PRESSURE: 112 MMHG | DIASTOLIC BLOOD PRESSURE: 68 MMHG | WEIGHT: 154 LBS | BODY MASS INDEX: 28.17 KG/M2

## 2020-05-22 DIAGNOSIS — G89.29 CHRONIC BILATERAL LOW BACK PAIN WITH BILATERAL SCIATICA: ICD-10-CM

## 2020-05-22 DIAGNOSIS — M54.41 CHRONIC BILATERAL LOW BACK PAIN WITH BILATERAL SCIATICA: ICD-10-CM

## 2020-05-22 DIAGNOSIS — I10 ESSENTIAL HYPERTENSION: ICD-10-CM

## 2020-05-22 DIAGNOSIS — I10 ESSENTIAL HYPERTENSION: Primary | ICD-10-CM

## 2020-05-22 DIAGNOSIS — D64.9 NORMOCHROMIC ANEMIA: ICD-10-CM

## 2020-05-22 DIAGNOSIS — E11.21 DIABETIC NEPHROPATHY ASSOCIATED WITH TYPE 2 DIABETES MELLITUS: ICD-10-CM

## 2020-05-22 DIAGNOSIS — N18.30 CHRONIC KIDNEY DISEASE, STAGE III (MODERATE): Primary | ICD-10-CM

## 2020-05-22 DIAGNOSIS — M54.42 CHRONIC BILATERAL LOW BACK PAIN WITH BILATERAL SCIATICA: ICD-10-CM

## 2020-05-22 DIAGNOSIS — R91.8 LUNG NODULES: ICD-10-CM

## 2020-05-22 DIAGNOSIS — Z12.11 SCREEN FOR COLON CANCER: ICD-10-CM

## 2020-05-22 DIAGNOSIS — E11.9 CONTROLLED TYPE 2 DIABETES MELLITUS WITHOUT COMPLICATION, WITHOUT LONG-TERM CURRENT USE OF INSULIN: ICD-10-CM

## 2020-05-22 PROCEDURE — 72100 X-RAY EXAM L-S SPINE 2/3 VWS: CPT | Mod: TC,HCNC,FY,PO

## 2020-05-22 PROCEDURE — 72100 X-RAY EXAM L-S SPINE 2/3 VWS: CPT | Mod: 26,HCNC,, | Performed by: RADIOLOGY

## 2020-05-22 PROCEDURE — 99999 PR PBB SHADOW E&M-EST. PATIENT-LVL III: CPT | Mod: PBBFAC,HCNC,, | Performed by: INTERNAL MEDICINE

## 2020-05-22 PROCEDURE — 99442 PR PHYSICIAN TELEPHONE EVALUATION 11-20 MIN: ICD-10-PCS | Mod: 95,HCNC,S$GLB, | Performed by: INTERNAL MEDICINE

## 2020-05-22 PROCEDURE — 1159F MED LIST DOCD IN RCRD: CPT | Mod: HCNC,S$GLB,, | Performed by: INTERNAL MEDICINE

## 2020-05-22 PROCEDURE — 99999 PR PBB SHADOW E&M-EST. PATIENT-LVL III: ICD-10-PCS | Mod: PBBFAC,HCNC,, | Performed by: INTERNAL MEDICINE

## 2020-05-22 PROCEDURE — 99499 RISK ADDL DX/OHS AUDIT: ICD-10-PCS | Mod: HCNC,S$GLB,, | Performed by: INTERNAL MEDICINE

## 2020-05-22 PROCEDURE — 3044F PR MOST RECENT HEMOGLOBIN A1C LEVEL <7.0%: ICD-10-PCS | Mod: HCNC,CPTII,S$GLB, | Performed by: INTERNAL MEDICINE

## 2020-05-22 PROCEDURE — 3074F PR MOST RECENT SYSTOLIC BLOOD PRESSURE < 130 MM HG: ICD-10-PCS | Mod: HCNC,CPTII,S$GLB, | Performed by: INTERNAL MEDICINE

## 2020-05-22 PROCEDURE — 3044F HG A1C LEVEL LT 7.0%: CPT | Mod: HCNC,CPTII,S$GLB, | Performed by: INTERNAL MEDICINE

## 2020-05-22 PROCEDURE — 3074F SYST BP LT 130 MM HG: CPT | Mod: HCNC,CPTII,S$GLB, | Performed by: INTERNAL MEDICINE

## 2020-05-22 PROCEDURE — 99999 PR PBB SHADOW E&M-EST. PATIENT-LVL II: ICD-10-PCS | Mod: PBBFAC,HCNC,, | Performed by: INTERNAL MEDICINE

## 2020-05-22 PROCEDURE — 99214 PR OFFICE/OUTPT VISIT, EST, LEVL IV, 30-39 MIN: ICD-10-PCS | Mod: HCNC,S$GLB,, | Performed by: INTERNAL MEDICINE

## 2020-05-22 PROCEDURE — 1101F PT FALLS ASSESS-DOCD LE1/YR: CPT | Mod: HCNC,CPTII,S$GLB, | Performed by: INTERNAL MEDICINE

## 2020-05-22 PROCEDURE — 3078F PR MOST RECENT DIASTOLIC BLOOD PRESSURE < 80 MM HG: ICD-10-PCS | Mod: HCNC,CPTII,S$GLB, | Performed by: INTERNAL MEDICINE

## 2020-05-22 PROCEDURE — 1159F PR MEDICATION LIST DOCUMENTED IN MEDICAL RECORD: ICD-10-PCS | Mod: HCNC,S$GLB,, | Performed by: INTERNAL MEDICINE

## 2020-05-22 PROCEDURE — 72100 XR LUMBAR SPINE AP AND LATERAL: ICD-10-PCS | Mod: 26,HCNC,, | Performed by: RADIOLOGY

## 2020-05-22 PROCEDURE — 99442 PR PHYSICIAN TELEPHONE EVALUATION 11-20 MIN: CPT | Mod: 95,HCNC,S$GLB, | Performed by: INTERNAL MEDICINE

## 2020-05-22 PROCEDURE — 99214 OFFICE O/P EST MOD 30 MIN: CPT | Mod: HCNC,S$GLB,, | Performed by: INTERNAL MEDICINE

## 2020-05-22 PROCEDURE — 3078F DIAST BP <80 MM HG: CPT | Mod: HCNC,CPTII,S$GLB, | Performed by: INTERNAL MEDICINE

## 2020-05-22 PROCEDURE — 99999 PR PBB SHADOW E&M-EST. PATIENT-LVL II: CPT | Mod: PBBFAC,HCNC,, | Performed by: INTERNAL MEDICINE

## 2020-05-22 PROCEDURE — 1101F PR PT FALLS ASSESS DOC 0-1 FALLS W/OUT INJ PAST YR: ICD-10-PCS | Mod: HCNC,CPTII,S$GLB, | Performed by: INTERNAL MEDICINE

## 2020-05-22 PROCEDURE — 99499 UNLISTED E&M SERVICE: CPT | Mod: HCNC,S$GLB,, | Performed by: INTERNAL MEDICINE

## 2020-05-22 RX ORDER — ASPIRIN 325 MG
325 TABLET ORAL DAILY
Status: ON HOLD | COMMUNITY
End: 2021-07-01 | Stop reason: HOSPADM

## 2020-05-22 NOTE — PROGRESS NOTES
Subjective:       Patient ID: Shirlene Ortiz is a 74 y.o. female.    Chief Complaint: Diabetes    Patient with many issues.      Normocytic anemia - was slightly worse on December labs.  No melena.  Told me today that when she had her colonoscopy 5 years ago, she was called and told they were not able to remove all of one of her polyps and she needed to have another cscp to complete the removal oh an adenomatous polyp.  She states she could not afford the price and did not do it.      Complains of b/l low back pain radiating to her b/l posterior thighs and calves.  Occurs with standing and walking.  She frequently has to stop walking to get relief.  Sitting makes it better.      CAD s/p CABG 8/17 after an NSTEMI  PVD - 3 new stents in left leg 12/2016;   13 stents in total  Poorly compliant with office f/u in past     CT in April dx multiple pulmonary nodules.  Repeat at 4.5 mo showed no change.  Needs another at 18 mo [place] = November 2020.   Had EGD with polypectomy.  Has another scheduled.     DM - controlled   HTN - controlled; takes 1/2 10 mg amlodipine daily; if BP low, will take 1/4 of a 10 mg per Dr Sandoval's instructions.   HLD - uncontrolled ldl goal < 70; good triglycerides; low HDL  Smoke abuse - not ready to quit  CKD III - slightly worse last time checked. Seeing renal.  COPD - stable    Review of Systems   Constitutional: Negative for appetite change and fever.   HENT: Negative for nosebleeds and trouble swallowing.    Eyes: Negative for discharge and visual disturbance.   Respiratory: Negative for choking and shortness of breath.    Cardiovascular: Negative for chest pain and palpitations.   Gastrointestinal: Negative for abdominal pain, nausea and vomiting.   Musculoskeletal: Positive for back pain. Negative for arthralgias and joint swelling.   Skin: Negative for rash and wound.   Neurological: Negative for dizziness and syncope.   Psychiatric/Behavioral: Negative for confusion and dysphoric  mood.       Objective:      Vitals:    05/22/20 0857   BP: 112/68   Pulse: 99   Temp: 98.6 °F (37 °C)     Physical Exam   Constitutional: She appears well-nourished.   Eyes: Conjunctivae and EOM are normal.   Neck: Normal range of motion.   Cardiovascular: Normal rate and regular rhythm.   Pulses:       Dorsalis pedis pulses are 2+ on the right side, and 2+ on the left side.   Pulmonary/Chest: Effort normal and breath sounds normal.   Musculoskeletal:   Normal ROM bilateral   Low back + TTP   Feet:   Right Foot:   Protective Sensation: 4 sites tested. 4 sites sensed.   Left Foot:   Protective Sensation: 4 sites tested. 4 sites sensed.   Neurological: No cranial nerve deficit (grossly intact).   Skin: Skin is warm and dry.   Psychiatric: She has a normal mood and affect.   Alert and orientated   Vitals reviewed.        Assessment:       1. Essential hypertension    2. Controlled type 2 diabetes mellitus without complication, without long-term current use of insulin    3. Chronic bilateral low back pain with bilateral sciatica    4. Lung nodules    5. Screen for colon cancer    6. Normochromic anemia        Plan:       Essential hypertension    Controlled type 2 diabetes mellitus without complication, without long-term current use of insulin    Chronic bilateral low back pain with bilateral sciatica  -     X-Ray Lumbar Spine Ap And Lateral; Future; Expected date: 05/22/2020  -     Ambulatory referral/consult to Pain Clinic; Future; Expected date: 05/29/2020    Lung nodules    Screen for colon cancer    Normochromic anemia  -     Ferritin; Future; Expected date: 05/22/2020  -     CBC auto differential; Future; Expected date: 05/22/2020  -     Iron and TIBC; Future; Expected date: 05/22/2020  -     Reticulocytes; Future; Expected date: 05/22/2020  -     Occult blood x 1, stool; Future  -     Occult blood x 1, stool; Future  -     Occult blood x 1, stool; Future            Medication List with Changes/Refills   Current  Medications    ACCU-CHEK ISRAEL CONTROL SOLN SOLN        ACCU-CHEK ISRAEL PLUS METER MISC    USE AS DIRECTED    ACCU-CHEK ISRAEL PLUS TEST STRP STRP    TEST 2 TO 4 TIMES EVERY DAY     AMLODIPINE (NORVASC) 5 MG TABLET    TAKE 1 TABLET (5 MG TOTAL) BY MOUTH EVERY EVENING.    ASPIRIN 325 MG TABLET    Take 325 mg by mouth once daily.    ATORVASTATIN (LIPITOR) 20 MG TABLET    Take 1 tablet (20 mg total) by mouth once daily.    BD ALCOHOL SWABS PADM        CARVEDILOL (COREG) 3.125 MG TABLET    TAKE 1 TABLET TWICE DAILY    CLOPIDOGREL (PLAVIX) 75 MG TABLET    TAKE 1 TABLET EVERY DAY    EZETIMIBE (ZETIA) 10 MG TABLET    Take 1 tablet (10 mg total) by mouth once daily.    FENOFIBRATE 160 MG TAB    TAKE 1 TABLET EVERY DAY    GLIPIZIDE (GLUCOTROL) 10 MG TABLET    TAKE 1 TABLET TWICE DAILY    LANCETS MISC    To check BG 6 times daily, to use with insurance preferred meter    LOSARTAN (COZAAR) 50 MG TABLET    Take 1 tablet (50 mg total) by mouth once daily.    METFORMIN (GLUCOPHAGE) 1000 MG TABLET    TAKE 1 TABLET TWICE DAILY WITH MEALS    PANTOPRAZOLE (PROTONIX) 40 MG TABLET    Take 1 tablet (40 mg total) by mouth once daily.    TRIAMCINOLONE ACETONIDE 0.1% (KENALOG) 0.1 % OINTMENT    APPLY TOPICALLY 2 (TWO) TIMES DAILY.   Discontinued Medications    ASPIRIN (ECOTRIN) 81 MG EC TABLET    Take 81 mg by mouth once daily.       Will need cscp asap  CT in November  Low back and b/l leg pain likely low back etiology because occurs standing (PVD would improve or not occur with standing).     Follow up in about 3 months (around 8/22/2020).

## 2020-05-22 NOTE — TELEPHONE ENCOUNTER
----- Message from Fabiola Villela sent at 5/22/2020 10:29 AM CDT -----  Contact: patient  Type:  Patient Returning Call    Who Called:  patient  Who Left Message for Patient:  unsure  Does the patient know what this is regarding?: yes  Best Call Back Number:    Additional Information:  Please advise-thank you

## 2020-05-26 ENCOUNTER — TELEPHONE (OUTPATIENT)
Dept: FAMILY MEDICINE | Facility: CLINIC | Age: 75
End: 2020-05-26

## 2020-05-26 ENCOUNTER — OFFICE VISIT (OUTPATIENT)
Dept: PAIN MEDICINE | Facility: CLINIC | Age: 75
End: 2020-05-26
Payer: MEDICARE

## 2020-05-26 ENCOUNTER — PATIENT OUTREACH (OUTPATIENT)
Dept: ADMINISTRATIVE | Facility: OTHER | Age: 75
End: 2020-05-26

## 2020-05-26 VITALS
HEIGHT: 62 IN | RESPIRATION RATE: 20 BRPM | TEMPERATURE: 98 F | OXYGEN SATURATION: 97 % | DIASTOLIC BLOOD PRESSURE: 68 MMHG | BODY MASS INDEX: 28.34 KG/M2 | HEART RATE: 83 BPM | WEIGHT: 154 LBS | SYSTOLIC BLOOD PRESSURE: 136 MMHG

## 2020-05-26 DIAGNOSIS — M54.42 CHRONIC BILATERAL LOW BACK PAIN WITH BILATERAL SCIATICA: Primary | ICD-10-CM

## 2020-05-26 DIAGNOSIS — M47.816 LUMBAR SPONDYLOSIS: ICD-10-CM

## 2020-05-26 DIAGNOSIS — M54.41 CHRONIC BILATERAL LOW BACK PAIN WITH BILATERAL SCIATICA: Primary | ICD-10-CM

## 2020-05-26 DIAGNOSIS — G89.29 CHRONIC BILATERAL LOW BACK PAIN WITH BILATERAL SCIATICA: Primary | ICD-10-CM

## 2020-05-26 PROCEDURE — 1101F PR PT FALLS ASSESS DOC 0-1 FALLS W/OUT INJ PAST YR: ICD-10-PCS | Mod: HCNC,CPTII,S$GLB, | Performed by: ANESTHESIOLOGY

## 2020-05-26 PROCEDURE — 1125F PR PAIN SEVERITY QUANTIFIED, PAIN PRESENT: ICD-10-PCS | Mod: HCNC,S$GLB,, | Performed by: ANESTHESIOLOGY

## 2020-05-26 PROCEDURE — 1101F PT FALLS ASSESS-DOCD LE1/YR: CPT | Mod: HCNC,CPTII,S$GLB, | Performed by: ANESTHESIOLOGY

## 2020-05-26 PROCEDURE — 1159F MED LIST DOCD IN RCRD: CPT | Mod: HCNC,S$GLB,, | Performed by: ANESTHESIOLOGY

## 2020-05-26 PROCEDURE — 3075F PR MOST RECENT SYSTOLIC BLOOD PRESS GE 130-139MM HG: ICD-10-PCS | Mod: HCNC,CPTII,S$GLB, | Performed by: ANESTHESIOLOGY

## 2020-05-26 PROCEDURE — 1125F AMNT PAIN NOTED PAIN PRSNT: CPT | Mod: HCNC,S$GLB,, | Performed by: ANESTHESIOLOGY

## 2020-05-26 PROCEDURE — 1159F PR MEDICATION LIST DOCUMENTED IN MEDICAL RECORD: ICD-10-PCS | Mod: HCNC,S$GLB,, | Performed by: ANESTHESIOLOGY

## 2020-05-26 PROCEDURE — 3078F DIAST BP <80 MM HG: CPT | Mod: HCNC,CPTII,S$GLB, | Performed by: ANESTHESIOLOGY

## 2020-05-26 PROCEDURE — 3075F SYST BP GE 130 - 139MM HG: CPT | Mod: HCNC,CPTII,S$GLB, | Performed by: ANESTHESIOLOGY

## 2020-05-26 PROCEDURE — 99203 PR OFFICE/OUTPT VISIT, NEW, LEVL III, 30-44 MIN: ICD-10-PCS | Mod: HCNC,S$GLB,, | Performed by: ANESTHESIOLOGY

## 2020-05-26 PROCEDURE — 99999 PR PBB SHADOW E&M-EST. PATIENT-LVL IV: CPT | Mod: PBBFAC,HCNC,, | Performed by: ANESTHESIOLOGY

## 2020-05-26 PROCEDURE — 99203 OFFICE O/P NEW LOW 30 MIN: CPT | Mod: HCNC,S$GLB,, | Performed by: ANESTHESIOLOGY

## 2020-05-26 PROCEDURE — 99999 PR PBB SHADOW E&M-EST. PATIENT-LVL IV: ICD-10-PCS | Mod: PBBFAC,HCNC,, | Performed by: ANESTHESIOLOGY

## 2020-05-26 PROCEDURE — 3078F PR MOST RECENT DIASTOLIC BLOOD PRESSURE < 80 MM HG: ICD-10-PCS | Mod: HCNC,CPTII,S$GLB, | Performed by: ANESTHESIOLOGY

## 2020-05-26 NOTE — TELEPHONE ENCOUNTER
----- Message from Georgina Sweeney sent at 5/26/2020 10:16 AM CDT -----  Contact: Pt  Type: Needs Medical Advice  Who called:pt  Best Call Back Number:[phone]  Additional Information: Patient is calling to ask questions about the stool samples.Patient is asking for a call back before 12.Please call back and advise.

## 2020-05-26 NOTE — LETTER
May 26, 2020      Hiram Mendiola MD  [address] [facility] [address] [place] [address]           [place] [facility]  [address] [place] [address]  Phone: [phone]  Fax: [phone]          Patient: Shirlene Ortiz   MR Number: [id number]   YOB: 1945   Date of Visit: 5/26/2020       Dear Dr. Hiram Mendiola:    Thank you for referring Shirlene Ortiz to me for evaluation. Attached you will find relevant portions of my assessment and plan of care.    If you have questions, please do not hesitate to call me. I look forward to following Shirlene Ortiz along with you.    Sincerely,    Genaro Lugo MD    Enclosure  CC:  No Recipients    If you would like to receive this communication electronically, please contact [email or web address] or [phone] to request more information on [employer] Link access.    For providers and/or their staff who would like to refer a patient to [facility], please contact us through our one-stop-shop provider referral line, [facility], at [phone].    If you feel you have received this communication in error or would no longer like to receive these types of communications, please e-mail [email or web address]

## 2020-05-26 NOTE — PROGRESS NOTES
[facility] New Patient Evaluation    Referred by: Dr. Mendiola  Reason for referral: back pain    CC:   Chief Complaint   Patient presents with    Establish Care    Low-back Pain      Last 3 PDI Scores 5/26/2020   Pain Disability Index (PDI) 12       HPI:   Shirlene Ortiz is a 75 y.o. 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  Progression: since onset, pain is gradually worsening  Current Pain Score: 7/10  Timing: constant  Quality: sharp, aching, full  Radiation: yes, down back of both legs to the calves and also anterior thighs  Associated numbness or weakness: yes numbness, weakness  Exacerbated by: standing, walking  Allievated by: leaning forward, rest  Is Pain Level Acceptable?: No    Previous Therapies:  PT/OT: no  HEP:   Interventions:   Surgery:  Medications:   - NSAIDS:   - MSK Relaxants:   - TCAs:   - SNRIs:   - Topicals:   - Anticonvulsants:  - Opioids:     History:    Current Outpatient Medications:     ACCU-CHEK ISRAEL CONTROL ANGELLA [place], , Disp: , Rfl:     ACCU-CHEK ISRAEL PLUS METER Misc, USE AS DIRECTED, Disp: 1 each, Rfl: 0    ACCU-CHEK ISRAEL PLUS TEST STRP Strp, TEST 2 TO 4 TIMES EVERY DAY , Disp: 400 strip, Rfl: 3    amLODIPine (NORVASC) 5 MG tablet, TAKE 1 TABLET (5 MG TOTAL) BY MOUTH EVERY EVENING., Disp: 90 tablet, Rfl: 4    aspirin 325 MG tablet, Take 325 mg by mouth once daily., Disp: , Rfl:     atorvastatin (LIPITOR) 20 MG tablet, Take 1 tablet (20 mg total) by mouth once daily., Disp: 90 tablet, Rfl: 5    BD ALCOHOL SWABS PadM, , Disp: , Rfl:     carvedilol (COREG) 3.125 MG tablet, TAKE 1 TABLET TWICE DAILY, Disp: 180 tablet, Rfl: 4    clopidogreL (PLAVIX) 75 mg tablet, TAKE 1 TABLET EVERY DAY, Disp: 90 tablet, Rfl: 1    ezetimibe (ZETIA) 10 mg tablet, Take 1 tablet (10 mg total) by mouth once daily., Disp: 90 tablet, Rfl: 3    fenofibrate 160 MG Tab, TAKE 1 TABLET EVERY DAY, Disp: 90 tablet, Rfl: 4     glipiZIDE (GLUCOTROL) 10 MG tablet, TAKE 1 TABLET TWICE DAILY, Disp: 180 tablet, Rfl: 1    lancets Misc, To check BG 6 times daily, to use with insurance preferred meter, Disp: 200 each, Rfl: 11    losartan (COZAAR) 50 MG tablet, Take 1 tablet (50 mg total) by mouth once daily., Disp: 90 tablet, Rfl: 4    metFORMIN (GLUCOPHAGE) 1000 MG tablet, TAKE 1 TABLET TWICE DAILY WITH MEALS, Disp: 180 tablet, Rfl: 1    pantoprazole (PROTONIX) 40 MG tablet, Take 1 tablet (40 mg total) by mouth once daily., Disp: 30 tablet, Rfl: 2    triamcinolone acetonide 0.1% (KENALOG) 0.1 % ointment, APPLY TOPICALLY 2 (TWO) TIMES DAILY. (Patient not taking: Reported on 5/22/2020), Disp: 30 g, Rfl: 0    Past Medical History:   Diagnosis Date    Acute coronary artery obstruction without MI     Anemia 8/27/2017    COPD (chronic obstructive pulmonary disease) 8/25/2017    DM (diabetes mellitus)     Essential hypertension     HTN (hypertension)     Hyperlipidemia     Mixed hyperlipidemia     NSTEMI (non-ST elevated myocardial infarction) 8/17/2017 11/17    Pacemaker 6/25/2012    left chest PACEMAKER MED[employer] SEDR01 Sensia   S/N:[id number] [place] Link 8/3/2010 - current   right atrium LEAD [employer]TR[employer] 5076 CapSure Fix Novus  S/N:[id number] [place] Link 8/3/2010 - current   right ventricle LEAD MEDTRONIC 5076 CapSure Fix Novus  S/N:[id number] [place] Link 8/3/2010 - current     PVD (peripheral vascular disease)     S/P CABG (coronary artery bypass graft) 9/14/2017 8/17 CABG x5 LIMA-LAD, VG-PDA, VG-D, YVG-OM-PLB  mammary artery to left anterior descending coronary artery and separate segments  of saphenous vein from the aorta to the posterior descending coronary artery  and from the aorta to the diagonal coronary artery and from the aorta to the  obtuse marginal coronary artery and to the posterolateral branch of the  circumflex in a sequential fashion using a combination of antegrade and  retrograde cardioplegia with mild  systemic hypothermia and endoscopic vein  harvest.    S/P coronary artery stent placement      proximal circumflex using a 2.5 x 38, post-dilated to 3.0 with 0% residual stenosis.    Sinus node dysfunction     Stroke 14    Tobacco abuse 8/15/2017    Uncontrolled type 2 diabetes mellitus with hyperosmolarity without coma, without long-term current use of insulin     Documented by [facility] on 10/26/15.       Past Surgical History:   Procedure Laterality Date    APPENDECTOMY      CARDIAC PACEMAKER PLACEMENT      CHOLECYSTECTOMY      coranary stent      CORONARY ANGIOPLASTY      ESOPHAGOGASTRODUODENOSCOPY N/A 3/9/2020    Procedure: ESOPHAGOGASTRODUODENOSCOPY (EGD);  Surgeon: Alonzo Marinelli MD;  Location: [facility];  Service: Endoscopy;  Laterality: N/A;    ILIAC ARTERY STENT      POLYPECTOMY      TOTAL ABDOMINAL HYSTERECTOMY         Family History   Problem Relation Age of Onset    Kidney disease Mother     Hypertension Mother     Diabetes Mother     Melanoma Sister     Lung disease Sister     Hypertension Sister        Social History     Socioeconomic History    Marital status: Single     Spouse name: Not on file    Number of children: Not on file    Years of education: Not on file    Highest education level: Not on file   Occupational History    Not on file   Social Needs    Financial resource strain: Not on file    Food insecurity:     Worry: Not on file     Inability: Not on file    Transportation needs:     Medical: Not on file     Non-medical: Not on file   Tobacco Use    Smoking status: Current Every Day Smoker     Packs/day: 1.50     Years: 60.00     Pack years: 90.00     Types: Cigarettes     Last attempt to quit: 8/15/2017     Years since quittin.7    Smokeless tobacco: Never Used    Tobacco comment: Tobacco treatment specialist consulted.   Substance and Sexual Activity    Alcohol use: No    Drug use: No    Sexual activity: Not on file   Lifestyle    Physical  "activity:     Days per week: Not on file     Minutes per session: Not on file    Stress: Not on file   Relationships    Social connections:     Talks on phone: Not on file     Gets together: Not on file     Attends Restorationism service: Not on file     Active member of club or organization: Not on file     Attends meetings of clubs or organizations: Not on file     Relationship status: Not on file   Other Topics Concern    Not on file   Social History Narrative    Not on file       Review of patient's allergies indicates:   Allergen Reactions    Nicotine      puritis from nicotine patch. Patient is a smoker.       Review of Systems:  General ROS: negative for - fever  Psychological ROS: negative for - hostility  Hematological and Lymphatic ROS: positive for - bruising  Endocrine ROS: negative for - unexpected weight changes  Respiratory ROS: no cough, shortness of breath, or wheezing  Cardiovascular ROS: no chest pain or dyspnea on exertion  Gastrointestinal ROS: no abdominal pain, change in bowel habits, or black or bloody stools  Musculoskeletal ROS: positive for - muscular weakness  Neurological ROS: positive for - bowel and bladder control changes  negative for - numbness/tingling  Dermatological ROS: negative for rash    Physical Exam:  Vitals:    05/26/20 1311   BP: 136/68   Pulse: 83   Resp: 20   Temp: 97.6 °F (36.4 °C)   TempSrc: Temporal   SpO2: 97%   Weight: 69.9 kg (154 lb)   Height: 5' 2" (1.575 m)   PainSc:   6   PainLoc: Back     Body mass index is 28.17 kg/m².     Gen: NAD  Gait: gait intact  Psych:  Mood appropriate for given condition  HEENT: eyes anicteric   GI: Abd soft  CV: RRR  Lungs: breathing unlabored   ROM: limited AROM of the L spine in all planes, full ROM at ankles, knees and hips  Lumbar flexion 90 degrees, extension 50 degrees, side bending 30 degrees.    Sensation: intact to light touch in all dermatomes tested from L2-S1 bilaterally  Reflexes: 2+ b/l patella and 0/0 b/l " achilles  Palpation: Diffusely tender over lumbar paraspinals  -TTP over the b/l greater trochanters and bilateral SI joint  Tone: normal in the b/l knees and hips   Skin: intact  Extremities: No edema in b/l ankles or hands  Provacative tests: + b/l axial facet loading       Right Left   L2/3 Iliacus Hip flexion  5  5   L3/4 Qudratus Femoris Knee Extension  5  5   L4/5 Tib Anterior Ankle Dorsiflexion   5  5   L5/S1 Extensor Hallicus Longus Great toe extension  5  5                 S1/S2 Gastroc/Soleus Plantar Flexion  5  5       Imaging:  Xray lumbar spine 5/22/20  FINDINGS:  The bones are mildly osteopenic.  The lumbar vertebral bodies maintain normal height and alignment.  There is moderate disc space narrowing at the L5-S1 level without significant disc space narrowing at the remainder of the lumbar levels.  There is also facet joint arthropathy at L5-S1.    There is marked atherosclerosis.  There is a left iliac stent.  The patient has a dual lead pacemaker.    Labs:  BMP  Lab Results   Component Value Date     04/27/2020     04/27/2020    K 4.8 04/27/2020    K 4.8 04/27/2020     04/27/2020     04/27/2020    CO2 24 04/27/2020    CO2 24 04/27/2020    BUN 26 (H) 04/27/2020    BUN 26 (H) 04/27/2020    CREATININE 1.8 (H) 04/27/2020    CREATININE 1.8 (H) 04/27/2020    CALCIUM 9.2 04/27/2020    CALCIUM 9.2 04/27/2020    ANIONGAP 10 04/27/2020    ANIONGAP 10 04/27/2020    ESTGFRAFRICA 31.5 (A) 04/27/2020    ESTGFRAFRICA 31.5 (A) 04/27/2020    EGFRNONAA 27.3 (A) 04/27/2020    EGFRNONAA 27.3 (A) 04/27/2020     Lab Results   Component Value Date    ALT 12 04/27/2020    AST 13 04/27/2020    ALKPHOS 55 04/27/2020    BILITOT 0.3 04/27/2020       Assessment:   Problem List Items Addressed This Visit        Neuro    Lumbar spondylosis       Orthopedic    Chronic bilateral low back pain with bilateral sciatica - Primary    Relevant Orders    MRI Lumbar Spine Without Contrast    Ambulatory  referral/consult to Physical/Occupational Therapy          75 y.o. year old female with PMH COPD HTN, CAD s/p stent placement, DM II presents to the office with back pain.  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  Today her pain is intermittent, tight, aching, in her axial lower back, can get to 7/10.  She says when she walks her pain and aching radiates down her anterior thighs and also down the back of her legs into her calves.  She reports numbness and weakness.  She denies any bowel/bladder dysfunction.  Her pain is worse with standing, back extension, and walking and relieved with sitting and rest.  On exam she has + b/l axial facet loading, 5/5 strength b/l LE's, 2+ b/l patella and 0/0 b/l achilles dtr, and intact sensation to light touch b/l L2-S1.  Lumbar xray c/w facet arthropathy.  I think her axial back pain is coming from her facets, but she also describes claudication.  We will get a lumbar MRI to evaluate her neuroanatomy.  She feels like her pain is too severe to start PT, but I've placed a referral so hopefully if we can get her pain better under control the PT will be set up for her.  She also doesn't take any medication for her pain.  I'd like her to try taking some tylenol while we get imaging set up.  If she doesn't respond I can start a low dose tramadol to try and improve her pain and quality of life.  Ultimately my hope is to improve her pain and help her gain some mobility without the need for chronic medication use.  I will call her once imaging is complete to review it with her.      Treatment Plan:   Procedures: none until imaging reviewed  PT/OT/HEP: Referral given for physical therapy to improve function and strength, and to receive training towards establishing a safe and effective home exercise program (HEP).   Medications: start tylenol.  We can consider tramadol in the  future  Labs: Reviewed and medications are appropriately dosed for current hepatorenal function.  Imaging: MRI lumbar spine.  If pacemaker not compatible we will get CT lumbar spine    : Not applicable    Genaro Lugo M.D.  Interventional Pain Medicine / Anesthesiology

## 2020-05-28 ENCOUNTER — TELEPHONE (OUTPATIENT)
Dept: FAMILY MEDICINE | Facility: CLINIC | Age: 75
End: 2020-05-28

## 2020-05-28 NOTE — TELEPHONE ENCOUNTER
Called pt, notified there was two appts for may and just one wasn't canceled and she verbally understood.

## 2020-05-28 NOTE — TELEPHONE ENCOUNTER
----- Message from Jordan Chavez sent at 5/28/2020  2:08 PM CDT -----  Contact: pt  Type:  Patient Returning Call    Who Called:  pt  Who Left Message for Patient:  Not sure  Does the patient know what this is regarding?:  Missed appointment  Best Call Back Number:  [phone]  Additional Information:  Pt states she saw Dr. Mendiola last week. Not sure what the appointment yesterday was for. Would like to speak to a nurse to discuss.

## 2020-06-02 ENCOUNTER — CLINICAL SUPPORT (OUTPATIENT)
Dept: CARDIOLOGY | Facility: CLINIC | Age: 75
End: 2020-06-02
Attending: INTERNAL MEDICINE
Payer: MEDICARE

## 2020-06-02 DIAGNOSIS — Z95.0 PACEMAKER: ICD-10-CM

## 2020-06-02 DIAGNOSIS — I49.5 SINUS NODE DYSFUNCTION: ICD-10-CM

## 2020-06-05 ENCOUNTER — TELEPHONE (OUTPATIENT)
Dept: FAMILY MEDICINE | Facility: CLINIC | Age: 75
End: 2020-06-05

## 2020-06-05 DIAGNOSIS — E11.9 TYPE 2 DIABETES MELLITUS WITHOUT COMPLICATION, UNSPECIFIED WHETHER LONG TERM INSULIN USE: ICD-10-CM

## 2020-06-05 DIAGNOSIS — R10.9 ABDOMINAL PAIN, UNSPECIFIED ABDOMINAL LOCATION: Primary | ICD-10-CM

## 2020-06-05 NOTE — TELEPHONE ENCOUNTER
Spoke to pt to discuss reason for referral, pt stated having stomach issues since last week, problems with stomach swelling, gassy, achy took metamucil stomach is still swollen can hear gurgling in intestines, afraid to eat anything because stomach is so sore. Advised pt I will notify provider. Pt verbalized understanding.

## 2020-06-06 ENCOUNTER — PATIENT OUTREACH (OUTPATIENT)
Dept: ADMINISTRATIVE | Facility: OTHER | Age: 75
End: 2020-06-06

## 2020-06-08 ENCOUNTER — OFFICE VISIT (OUTPATIENT)
Dept: GASTROENTEROLOGY | Facility: CLINIC | Age: 75
End: 2020-06-08
Payer: MEDICARE

## 2020-06-08 VITALS — BODY MASS INDEX: 28.11 KG/M2 | WEIGHT: 152.75 LBS | RESPIRATION RATE: 20 BRPM | HEIGHT: 62 IN

## 2020-06-08 DIAGNOSIS — K22.710 BARRETT'S ESOPHAGUS WITH LOW GRADE DYSPLASIA: ICD-10-CM

## 2020-06-08 DIAGNOSIS — R14.0 BLOATING SYMPTOM: ICD-10-CM

## 2020-06-08 DIAGNOSIS — Z12.11 SCREENING FOR COLON CANCER: ICD-10-CM

## 2020-06-08 DIAGNOSIS — Z86.010 HISTORY OF COLON POLYPS: ICD-10-CM

## 2020-06-08 DIAGNOSIS — R10.84 GENERALIZED ABDOMINAL PAIN: Primary | ICD-10-CM

## 2020-06-08 DIAGNOSIS — K59.09 CHRONIC CONSTIPATION: ICD-10-CM

## 2020-06-08 DIAGNOSIS — Z86.2 HISTORY OF ANEMIA: ICD-10-CM

## 2020-06-08 DIAGNOSIS — Z79.01 ANTICOAGULANT LONG-TERM USE: ICD-10-CM

## 2020-06-08 PROCEDURE — 99214 PR OFFICE/OUTPT VISIT, EST, LEVL IV, 30-39 MIN: ICD-10-PCS | Mod: HCNC,S$GLB,, | Performed by: NURSE PRACTITIONER

## 2020-06-08 PROCEDURE — 1159F MED LIST DOCD IN RCRD: CPT | Mod: HCNC,S$GLB,, | Performed by: NURSE PRACTITIONER

## 2020-06-08 PROCEDURE — 99214 OFFICE O/P EST MOD 30 MIN: CPT | Mod: HCNC,S$GLB,, | Performed by: NURSE PRACTITIONER

## 2020-06-08 PROCEDURE — 1101F PR PT FALLS ASSESS DOC 0-1 FALLS W/OUT INJ PAST YR: ICD-10-PCS | Mod: HCNC,CPTII,S$GLB, | Performed by: NURSE PRACTITIONER

## 2020-06-08 PROCEDURE — 99999 PR PBB SHADOW E&M-EST. PATIENT-LVL V: ICD-10-PCS | Mod: PBBFAC,HCNC,, | Performed by: NURSE PRACTITIONER

## 2020-06-08 PROCEDURE — 99999 PR PBB SHADOW E&M-EST. PATIENT-LVL V: CPT | Mod: PBBFAC,HCNC,, | Performed by: NURSE PRACTITIONER

## 2020-06-08 PROCEDURE — 1101F PT FALLS ASSESS-DOCD LE1/YR: CPT | Mod: HCNC,CPTII,S$GLB, | Performed by: NURSE PRACTITIONER

## 2020-06-08 PROCEDURE — 1159F PR MEDICATION LIST DOCUMENTED IN MEDICAL RECORD: ICD-10-PCS | Mod: HCNC,S$GLB,, | Performed by: NURSE PRACTITIONER

## 2020-06-08 PROCEDURE — 1125F PR PAIN SEVERITY QUANTIFIED, PAIN PRESENT: ICD-10-PCS | Mod: HCNC,S$GLB,, | Performed by: NURSE PRACTITIONER

## 2020-06-08 PROCEDURE — 1125F AMNT PAIN NOTED PAIN PRSNT: CPT | Mod: HCNC,S$GLB,, | Performed by: NURSE PRACTITIONER

## 2020-06-08 RX ORDER — PANTOPRAZOLE SODIUM 40 MG/1
40 TABLET, DELAYED RELEASE ORAL DAILY
Qty: 90 TABLET | Refills: 3 | Status: SHIPPED | OUTPATIENT
Start: 2020-06-08 | End: 2021-04-05

## 2020-06-08 RX ORDER — PANTOPRAZOLE SODIUM 40 MG/1
40 TABLET, DELAYED RELEASE ORAL DAILY
Qty: 30 TABLET | Refills: 2 | Status: SHIPPED | OUTPATIENT
Start: 2020-06-08 | End: 2020-06-08 | Stop reason: SDUPTHER

## 2020-06-08 NOTE — PROGRESS NOTES
"Subjective:       Patient ID: Shirlene Ortiz is a 75 y.o. female Body mass index is 27.94 kg/m².    Chief Complaint: Abdominal Pain    This patient is new to me.  Referring Provider: Dr. Hiram Mendiola for abdominal pain.  Established patient of Dr. Marinelli.     Patient seen for colorectal cancer screening, high risk due to personal history of colon polyp, last colonoscopy was in 4/2015: see surgical history. Denies family history of colon cancer.    Abdominal Pain   This is a recurrent (intermittent maybe once a week) problem. Episode onset: started 5/26/2020, after eating oriental foods for her birthday. The problem occurs daily. The pain is located in the epigastric region. The pain is at a severity of 6/10 (currently). The quality of the pain is a sensation of fullness (bloating). The abdominal pain radiates to the periumbilical region, suprapubic region, LUQ and RUQ. Associated symptoms include belching (occasional), constipation (bowel movements once every 4 days of formed to mushy stool; milk of magnesia PRN- helped, denies change in bowl habits) and flatus. Pertinent negatives include no diarrhea, dysuria, fever, frequency, hematochezia, melena, nausea, vomiting or weight loss. Associated symptoms comments: Reports gets "hot saliva" in her mouth frequently. Exacerbated by: lying on left side, spicy foods. She has tried proton pump inhibitors (protonix 40 mg once daily) for the symptoms. Prior diagnostic workup includes upper endoscopy. Her past medical history is significant for abdominal surgery, gallstones, GERD (controlled on protonix) and irritable bowel syndrome. There is no history of Crohn's disease, pancreatitis or ulcerative colitis.     Review of Systems   Constitutional: Positive for appetite change (decreased). Negative for chills, fatigue, fever and weight loss.   HENT: Negative for sore throat and trouble swallowing.    Respiratory: Negative for cough, choking and shortness of breath.  "   Cardiovascular: Negative for chest pain.   Gastrointestinal: Positive for abdominal pain, constipation (bowel movements once every 4 days of formed to mushy stool; milk of magnesia PRN- helped, denies change in bowl habits) and flatus. Negative for anal bleeding, blood in stool, diarrhea, hematochezia, melena, nausea, rectal pain and vomiting.   Genitourinary: Negative for difficulty urinating, dysuria, flank pain and frequency.   Neurological: Negative for weakness.       No LMP recorded. Patient has had a hysterectomy.  Past Medical History:   Diagnosis Date    Acute coronary artery obstruction without MI     Anemia 8/27/2017    Colon polyp     COPD (chronic obstructive pulmonary disease) 8/25/2017    DM (diabetes mellitus)     Essential hypertension     Hepatomegaly     HTN (hypertension)     Hyperlipidemia     Mixed hyperlipidemia     NSTEMI (non-ST elevated myocardial infarction) 8/17/2017 11/17    Pacemaker 6/25/2012    left chest PACEMAKER [employer] SEDR01 Sensia   S/N:[id number] [place] Link 8/3/2010 - current   right atrium LEAD [employer] 5076 CapSure Fix Novus  S/N:[id number] De[place] Link 8/3/2010 - current   right ventricle LEAD [employer] 5076 CapSure Fix Novus  S/N:[id number] Deion Link 8/3/2010 - current     PVD (peripheral vascular disease)     S/P CABG (coronary artery bypass graft) 9/14/2017 8/17 CABG x5 LIMA-LAD, VG-PDA, VG-D, YVG-OM-PLB  mammary artery to left anterior descending coronary artery and separate segments  of saphenous vein from the aorta to the posterior descending coronary artery  and from the aorta to the diagonal coronary artery and from the aorta to the  obtuse marginal coronary artery and to the posterolateral branch of the  circumflex in a sequential fashion using a combination of antegrade and  retrograde cardioplegia with mild systemic hypothermia and endoscopic vein  harvest.    S/P coronary artery stent placement     11/17 proximal circumflex using a 2.5  x 38, post-dilated to 3.0 with 0% residual stenosis.    Sinus node dysfunction     Stroke 7/4/14    Tobacco abuse 8/15/2017    Uncontrolled type 2 diabetes mellitus with hyperosmolarity without coma, without long-term current use of insulin     Documented by [facility] on 10/26/15.     Past Surgical History:   Procedure Laterality Date    APPENDECTOMY      CARDIAC PACEMAKER PLACEMENT      CHOLECYSTECTOMY      COLONOSCOPY  04/27/2015    Dr. Marinelli: 2 colon polyps removed (one not completely removed), hemorrhoids; repeat in 6 weeks; biopsy: ADENOMATOUS POLYPS    coranary stent      CORONARY ANGIOPLASTY      ESOPHAGOGASTRODUODENOSCOPY N/A 3/9/2020    Procedure: ESOPHAGOGASTRODUODENOSCOPY (EGD);  Surgeon: Alonzo Marinelli MD;  Location: [facility];  Service: Endoscopy;  Laterality: N/A; Mild Schatzki ring. Biopsied. Dilated. gastritis; biopsy: stomach- chemical/reactive gastropathy, esophagus- Squamocolumnar mucosa with focal intestinal metaplasia and low-grade dysplasia    ILIAC ARTERY STENT      POLYPECTOMY      TOTAL ABDOMINAL HYSTERECTOMY       Family History   Problem Relation Age of Onset    Kidney disease Mother     Hypertension Mother     Diabetes Mother     Melanoma Sister     Lung disease Sister     Hypertension Sister     Colon cancer Neg Hx     Crohn's disease Neg Hx     Ulcerative colitis Neg Hx     Stomach cancer Neg Hx     Esophageal cancer Neg Hx      Wt Readings from Last 10 Encounters:   06/08/20 69.3 kg (152 lb 12.5 oz)   05/26/20 69.9 kg (154 lb)   05/22/20 69.9 kg (154 lb)   05/22/20 69.9 kg (154 lb 1.6 oz)   03/05/20 68 kg (150 lb)   12/27/19 67.7 kg (149 lb 4 oz)   12/27/19 67.7 kg (149 lb 4 oz)   10/24/19 69.1 kg (152 lb 5.4 oz)   10/02/19 69.4 kg (153 lb 1.8 oz)   04/30/19 70.7 kg (155 lb 13.8 oz)     Lab Results   Component Value Date    WBC 8.60 12/21/2019    HGB 9.8 (L) 12/21/2019    HCT 33.0 (L) 12/21/2019    MCV 92 12/21/2019     12/21/2019     Lab Results    Component Value Date    OCCULTBLOOD Negative 05/26/2020    OCCULTBLOOD Negative 05/26/2020    OCCULTBLOOD Negative 05/26/2020     CMP  Sodium   Date Value Ref Range Status   04/27/2020 139 136 - 145 mmol/L Final   04/27/2020 139 136 - 145 mmol/L Final     Potassium   Date Value Ref Range Status   04/27/2020 4.8 3.5 - 5.1 mmol/L Final   04/27/2020 4.8 3.5 - 5.1 mmol/L Final     Chloride   Date Value Ref Range Status   04/27/2020 105 95 - 110 mmol/L Final   04/27/2020 105 95 - 110 mmol/L Final     CO2   Date Value Ref Range Status   04/27/2020 24 23 - 29 mmol/L Final   04/27/2020 24 23 - 29 mmol/L Final     Glucose   Date Value Ref Range Status   04/27/2020 212 (H) 70 - 110 mg/dL Final   04/27/2020 212 (H) 70 - 110 mg/dL Final     BUN, Bld   Date Value Ref Range Status   04/27/2020 26 (H) 8 - 23 mg/dL Final   04/27/2020 26 (H) 8 - 23 mg/dL Final     Creatinine   Date Value Ref Range Status   04/27/2020 1.8 (H) 0.5 - 1.4 mg/dL Final   04/27/2020 1.8 (H) 0.5 - 1.4 mg/dL Final     Calcium   Date Value Ref Range Status   04/27/2020 9.2 8.7 - 10.5 mg/dL Final   04/27/2020 9.2 8.7 - 10.5 mg/dL Final     Total Protein   Date Value Ref Range Status   04/27/2020 6.9 6.0 - 8.4 g/dL Final     Albumin   Date Value Ref Range Status   04/27/2020 3.5 3.5 - 5.2 g/dL Final   04/27/2020 3.5 3.5 - 5.2 g/dL Final     Total Bilirubin   Date Value Ref Range Status   04/27/2020 0.3 0.1 - 1.0 mg/dL Final     Comment:     For infants and newborns, interpretation of results should be based  on gestational age, weight and in agreement with clinical  observations.  Premature Infant recommended reference ranges:  Up to 24 hours.............<8.0 mg/dL  Up to 48 hours............<12.0 mg/dL  3-5 days..................<15.0 mg/dL  6-29 days.................<15.0 mg/dL       Alkaline Phosphatase   Date Value Ref Range Status   04/27/2020 55 55 - 135 U/L Final     AST   Date Value Ref Range Status   04/27/2020 13 10 - 40 U/L Final     ALT   Date  Value Ref Range Status   04/27/2020 12 10 - 44 U/L Final     Anion Gap   Date Value Ref Range Status   04/27/2020 10 8 - 16 mmol/L Final   04/27/2020 10 8 - 16 mmol/L Final     eGFR if    Date Value Ref Range Status   04/27/2020 31.5 (A) >60 mL/min/1.73 m^2 Final   04/27/2020 31.5 (A) >60 mL/min/1.73 m^2 Final     eGFR if non    Date Value Ref Range Status   04/27/2020 27.3 (A) >60 mL/min/1.73 m^2 Final     Comment:     Calculation used to obtain the estimated glomerular filtration  rate (eGFR) is the CKD-EPI equation.      04/27/2020 27.3 (A) >60 mL/min/1.73 m^2 Final     Comment:     Calculation used to obtain the estimated glomerular filtration  rate (eGFR) is the CKD-EPI equation.        Lab Results   Component Value Date    TSH 1.789 10/22/2018     Reviewed prior medical records including radiology report of 11/6/19 renal ultrasound; 10/2/19 CT chest; 1/21/15 abdominal x-ray; 8/26/14 abdominal ultrasound; & endoscopy history (see surgical history).    Objective:      Physical Exam   Constitutional: She is oriented to person, place, and time. She appears well-developed and well-nourished. No distress.   HENT:   Patient is wearing a face mask, which covers patient's mouth and nose, due to COVID 19 concerns.   Eyes: Pupils are equal, round, and reactive to light. Conjunctivae are normal. No scleral icterus.   Pulmonary/Chest: Effort normal and breath sounds normal. No respiratory distress. She has no wheezes.   Abdominal: Soft. Normal appearance and bowel sounds are normal. She exhibits no distension, no abdominal bruit and no mass. There is generalized tenderness (mild). There is no rigidity, no rebound, no guarding, no tenderness at McBurney's point and negative Dumont's sign.   Well-healed surgical scars noted.   Neurological: She is alert and oriented to person, place, and time.   Skin: Skin is warm and dry. No rash noted. She is not diaphoretic. No erythema. No pallor.    Non-jaundiced   Psychiatric: She has a normal mood and affect. Her behavior is normal. Judgment and thought content normal.   Nursing note and vitals reviewed.      Assessment:       1. Generalized abdominal pain    2. Jonas's esophagus with low grade dysplasia    3. Screening for colon cancer    4. Anticoagulant long-term use    5. History of anemia    6. History of colon polyps    7. Bloating symptom    8. Chronic constipation        Plan:       Generalized abdominal pain  -     CT Abdomen Pelvis  Without Contrast; Future; Expected date: 06/08/2020  -     Hepatic function panel; Future; Expected date: 07/08/2020  -     Lipase; Future; Expected date: 06/08/2020  -     H. PYLORI ANTIBODY, IGG; Future; Expected date: 06/08/2020  - patient scheduled for blood work with PCP & nephrology on 6/25/2020, which includes CBC and iron levels  - CONTINUE WITH EGD AS SCHEDULED FOR 6/30/2020  - schedule Colonoscopy, discussed procedure with the patient, including risks and benefits, patient verbalized understanding  - avoid use of NSAIDs- since they can cause GI upset, bleeding and/or ulcers.    Jonas's esophagus with low grade dysplasia  -     CT Abdomen Pelvis  Without Contrast; Future; Expected date: 06/08/2020  -  REFILL   pantoprazole (PROTONIX) 40 MG tablet; Take 1 tablet (40 mg total) by mouth once daily.  Dispense: 90 tablet; Refill: 3  - CONTINUE WITH EGD AS SCHEDULED FOR 6/30/2020  Recommend follow-up with Dr. Marinelli for continued evaluation and management.  - discussed diagnosis in detail with patient and the need for long term reflux medication and surveillance EGDs; patient verbalized understanding and agreed with management plan    Screening for colon cancer & History of colon polyps  - schedule Colonoscopy, discussed procedure with the patient, including risks and benefits, patient verbalized understanding    Anticoagulant long-term use  - informed patient that the anticoagulant(s) will likely need to be  held for endoscopy, nurse will confirm with endoscopist, cardiologist, and/or PCP.    History of anemia  - discussed with patient the different ways that anemia occurs: blood loss (such as from the gi tract), the body is not making enough, or the body is breaking down the rbcs too quickly; follow-up with PCP and/or hematology for continued evaluation and management    Bloating symptom  -     H. PYLORI ANTIBODY, IGG; Future; Expected date: 06/08/2020  - CONTINUE WITH EGD AS SCHEDULED FOR 6/30/2020  - schedule Colonoscopy, discussed procedure with the patient, including risks and benefits, patient verbalized understanding  -     CT Abdomen Pelvis  Without Contrast; Future; Expected date: 06/08/2020    Chronic constipation  - schedule Colonoscopy, discussed procedure with the patient, including risks and benefits, patient verbalized understanding  -     CT Abdomen Pelvis  Without Contrast; Future; Expected date: 06/08/2020  Recommend daily exercise as tolerated, adequate water intake (six 8-oz glasses of water daily), and high fiber diet. OTC fiber supplements are recommended if diet does not reach daily fiber goal (20-30 grams daily), such as Metamucil or Benefiber (take as directed, separate from other oral medications by >2 hours).  -Recommend taking an OTC stool softener such as Colace as directed to avoid hard stools and straining with bowel movements PRN  - recommended OTC probiotic, such as Align, taken as directed  -If still no improvement with these measures, call/follow-up    Follow up in about 1 month (around 7/8/2020), or if symptoms worsen or fail to improve.      If no improvement in symptoms or symptoms worsen, call/follow-up at clinic or go to ER.

## 2020-06-08 NOTE — PATIENT INSTRUCTIONS
What Is Jonas Esophagus?          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 This means that there have been changes to the lining of the esophagus near the stomach. The changes may have been caused by the acid reflux that happens with GERD (gastroesophageal reflux disease). The changed lining is not cancerous, but may increase your chances of developing cancer later on.      When you have GERD  The esophagus is the tube that carries food and liquid from the mouth to the stomach. Your lower esophageal sphincter (LES) is a one-way valve at the top of the stomach. It keeps food and stomach acid from flowing backward. If the LES is weakened, food and stomach acid flow back (reflux) into your esophagus. If this happens often, the condition is called GERD.  Changes in the lining  The stomach is kept safe from its own acid by a special lining. The esophagus isnt meant to contact stomach acid. With GERD, acid flows back into the esophagus often. This damages the esophagus. In response to the damage, new tissue forms that is not normal. This is Jonas esophagus. The new tissue may keep changing. This is why it is more likely to become cancer in the future.  Preventing further damage  Your healthcare provider may suggest regular tests to keep track of changes in the esophagus. This usually includes an endoscopy, when a flexible lighted scope is placed through the mouth into the esophagus. Biopsies (tissue samples) can be taken of the abnormal areas. You are usually sedated with an IV medicine for comfort. He or she may also suggest ways for you to control GERD. This includes lifestyle changes, medicine, or even surgery. This should help keep your Jonas esophagus from getting worse.  Symptoms of GERD  Symptoms include the following:  · Heartburn  · Sour-tasting fluid backing up into your mouth  · Frequent burping or belching  · Symptoms that get worse after you eat, bend over, or lie down  · Coughing repeatedly to clear your  throat  · Hoarseness   Date Last Reviewed: 6/1/2016  © 0501-7969 [employer]. [address] [place] [address], [place] [address]. All rights reserved. This information is not intended as a substitute for professional medical care. Always follow your healthcare professional's instructions.    Abdominal Pain    Abdominal pain is pain in the stomach or belly area. Everyone has this pain from time to time. In many cases it goes away on its own. But abdominal pain can sometimes be due to a serious problem, such as appendicitis. So its important to know when to seek help.  Causes of abdominal pain  There are many possible causes of abdominal pain. Common causes in adults include:  · Constipation, diarrhea, or gas  · Stomach acid flowing back up into the esophagus (acid reflux or heartburn)  · Severe acid reflux, called GERD (gastroesophageal reflux disease)  · A sore in the lining of the stomach or small intestine (peptic ulcer)  · Inflammation of the gallbladder, liver, or pancreas  · Gallstones or kidney stones  · Appendicitis   · Intestinal blockage   · An internal organ pushing through a muscle or other tissue (hernia)  · Urinary tract infections  · In women, menstrual cramps, fibroids, or endometriosis  · Inflammation or infection of the intestines  Diagnosing the cause of abdominal pain  Your healthcare provider will do a physical exam help find the cause of your pain. If needed, tests will be ordered. Belly pain has many possible causes. So it can be hard to find the reason for your pain. Giving details about your pain can help. Tell your provider where and when you feel the pain, and what makes it better or worse. Also let your provider know if you have other symptoms such as:  · Fever  · Tiredness  · Upset stomach (nausea)  · Vomiting  · Changes in bathroom habits  Treating abdominal pain  Some causes of pain need emergency medical treatment right away. These include appendicitis or a bowel blockage. Other  problems can be treated with rest, fluids, or medicines. Your healthcare provider can give you specific instructions for treatment or self-care based on what is causing your pain.  If you have vomiting or diarrhea, sip water or other clear fluids. When you are ready to eat solid foods again, start with small amounts of easy-to-digest, low-fat foods. These include apple sauce, toast, or crackers.   When to seek medical care  Call 911 or go to the hospital right away if you:  · Cant pass stool and are vomiting  · Are vomiting blood or have bloody diarrhea or black, tarry diarrhea  · Have chest, neck, or shoulder pain  · Feel like you might pass out  · Have pain in your shoulder blades with nausea  · Have sudden, severe belly pain  · Have new, severe pain unlike any you have felt before  · Have a belly that is rigid, hard, and tender to touch  Call your healthcare provider if you have:  · Pain for more than 5 days  · Bloating for more than 2 days  · Diarrhea for more than 5 days  · A fever of 100.4°F (38.0°C) or higher, or as directed by your provider  · Pain that gets worse  · Weight loss for no reason  · Continued lack of appetite  · Blood in your stool  How to prevent abdominal pain  Here are some tips to help prevent abdominal pain:  · Eat smaller amounts of food at one time.  · Avoid greasy, fried, or other high-fat foods.  · Avoid foods that give you gas.  · Exercise regularly.  · Drink plenty of fluids.  To help prevent GERD symptoms:  · Quit smoking.  · Reduce alcohol and certain foods that increase stomach acid.  · Avoid aspirin and over-the-counter pain and fever medicines (NSAIDS or nonsteroidal anti-inflammatory drugs), if possible  · Lose extra weight.  · Finish eating at least 2 hours before you go to bed or lie down.  · Raise the head of your bed.  Date Last Reviewed: 7/1/2016  © 4678-3760 [employer]. [address] [place] [address], [place] [address]. All rights reserved. This information is not  intended as a substitute for professional medical care. Always follow your healthcare professional's instructions.          Constipation (Adult)  Constipation means that you have bowel movements that are less frequent than usual. Stools often become very hard and difficult to pass.  Constipation is very common. At some point in life it affects almost everyone. Since everyone's bowel habits are different, what is constipation to one person may not be to another. Your healthcare provider may do tests to diagnose constipation. It depends on what he or she finds when evaluating you.    Symptoms of constipation include:  · Abdominal pain  · Bloating  · Vomiting  · Painful bowel movements  · Itching, swelling, bleeding, or pain around the anus  Causes  Constipation can have many causes. These include:  · Diet low in fiber  · Too much dairy  · Not drinking enough liquids  · Lack of exercise or physical activity. This is especially true for older adults.  · Changes in lifestyle or daily routine, including pregnancy, aging, work, and travel  · Frequent use or misuse of laxatives  · Ignoring the urge to have a bowel movement or delaying it until later  · Medicines, such as certain prescription pain medicines, iron supplements, antacids, certain antidepressants, and calcium supplements  · Diseases like irritable bowel syndrome, bowel obstructions, stroke, diabetes, thyroid disease, Parkinson disease, hemorrhoids, and colon cancer  Complications  Potential complications of constipation can include:  · Hemorrhoids  · Rectal bleeding from hemorrhoids or anal fissures (skin tears)  · Hernias  · Dependency on laxatives  · Chronic constipation  · Fecal impaction  · Bowel obstruction or perforation  Home care  All treatment should be done after talking with your healthcare provider. This is especially true if you have another medical problems, are taking prescription medicines, or are an older adult. Treatment most often involves  lifestyle changes. You may also need medicines. Your healthcare provider will tell you which will work best for you. Follow the advice below to help avoid this problem in the future.  Lifestyle changes  These lifestyle changes can help prevent constipation:  · Diet. Eat a high-fiber diet, with fresh fruit and vegetables, and reduce dairy intake, meats, and processed foods  · Fluids. It's important to get enough fluids each day. Drink plenty of water when you eat more fiber. If you are on diet that limits the amount of fluid you can have, talk about this with your healthcare provider.  · Regular exercise. Check with your healthcare provider first.  Medications  Take any medicines as directed. Some laxatives are safe to use only every now and then. Others can be taken on a regular basis. Talk with your doctor or pharmacist if you have questions.  Prescription pain medicines can cause constipation. If you are taking this kind of medicine, ask your healthcare provider if you should also take a stool softener.  Medicines you may take to treat constipation include:  · Fiber supplements  · Stool softeners  · Laxatives  · Enemas  · Rectal suppositories  Follow-up care  Follow up with your healthcare provider if symptoms don't get better in the next few days. You may need to have more tests or see a specialist.  Call 911  Call 911 if any of these occur:  · Trouble breathing  · Stiff, rigid abdomen that is severely painful to touch  · Confusion  · Fainting or loss of consciousness  · Rapid heart rate  · Chest pain  When to seek medical advice  Call your healthcare provider right away if any of these occur:  · Fever over 100.4°F (38°C)  · Failure to resume normal bowel movements  · Pain in your abdomen or back gets worse  · Nausea or vomiting  · Swelling in your abdomen  · Blood in the stool  · Black, tarry stool  · Involuntary weight loss  · Weakness  Date Last Reviewed: 12/30/2015  © 5917-8960 [employer] [address] [id number]. All rights reserved. This information is not intended as a substitute for professional medical care. Always follow your healthcare professional's instructions.    Excess Gas  Certain foods produce gas when digested. In some people, these foods make an excessive amount of gas. This may cause bloating, burping, or increased gas passing through the rectum (flatulence).     Foods that cause gas  The following foods are more likely to cause this problem. Limit them, or remove them from your diet:  · Broccoli  · Cauliflower  · Eccles sprouts  · Cabbage  · Cooked dried beans  · Fizzy (carbonated) drinks, such as sparkling water, soda, beer, and champagne  Other causes  Other causes of excess gas include:  · Eating too fast or talking while you chew. This may cause you to swallow air. This increases the amount of gas in your stomach. And it may make your symptoms worse. Chew each mouthful completely before you swallow. Take your time.  · Chewing on gum or sucking on hard candy. These cause you to swallow more often. And some of what you are swallowing is air. This leads to more gas in your stomach. Avoid chewing gum and hard candy.  · Overeating. This may increase the feeling of being bloated and cause more gas. When you are full, stop eating.   · Being constipated. This can increase the amount of normal intestinal gas. Avoid constipation by getting more fiber in your diet. Good sources of fiber include whole-grain cereal, fresh vegetables (except those in the above list), and fresh fruits. High-fiber foods absorb water and carry it out of the body. When adding more fiber to your diet, you also need to drink more water. You should drink at least 8, 8-ounce glasses of water (2 quarts) per day.  Date Last Reviewed: 8/1/2016  © 2216-8777 [employer]. [address] [place] [address]. All rights reserved. This information is not intended as a substitute for  professional medical care. Always follow your healthcare professional's instructions.

## 2020-06-08 NOTE — LETTER
June 8, 2020      Hiram Mendiola MD  [address] [facility] [address] [place] [address]           [facility]  [address] [place] [address]  Phone: [phone]          Patient: Shirlene Ortiz   MR Number: [id number]   YOB: 1945   Date of Visit: 6/8/2020       Dear Dr. Hiram Mendiola:    Thank you for referring Shirlene Ortiz to me for evaluation. Attached you will find relevant portions of my assessment and plan of care.    If you have questions, please do not hesitate to call me. I look forward to following Shirlene Ortiz along with you.    Sincerely,    Anne-Marie Solano, Health system    Enclosure  CC:  No Recipients    If you would like to receive this communication electronically, please contact [email or web address] or [phone] to request more information on [employer] Link access.    For providers and/or their staff who would like to refer a patient to [facility], please contact us through our one-stop-shop provider referral line, Pricilla Price, at [phone].    If you feel you have received this communication in error or would no longer like to receive these types of communications, please e-mail [email or web address]

## 2020-06-09 ENCOUNTER — HOSPITAL ENCOUNTER (OUTPATIENT)
Dept: RADIOLOGY | Facility: HOSPITAL | Age: 75
Discharge: HOME | End: 2020-06-09
Attending: NURSE PRACTITIONER
Payer: MEDICARE

## 2020-06-09 ENCOUNTER — TELEPHONE (OUTPATIENT)
Dept: GASTROENTEROLOGY | Facility: CLINIC | Age: 75
End: 2020-06-09

## 2020-06-09 DIAGNOSIS — K59.09 CHRONIC CONSTIPATION: ICD-10-CM

## 2020-06-09 DIAGNOSIS — R14.0 BLOATING SYMPTOM: ICD-10-CM

## 2020-06-09 DIAGNOSIS — K22.710 BARRETT'S ESOPHAGUS WITH LOW GRADE DYSPLASIA: ICD-10-CM

## 2020-06-09 DIAGNOSIS — R10.84 GENERALIZED ABDOMINAL PAIN: ICD-10-CM

## 2020-06-09 PROCEDURE — 25500020 PHARM REV CODE 255: Mod: HCNC,PO | Performed by: NURSE PRACTITIONER

## 2020-06-09 PROCEDURE — 74176 CT ABD & PELVIS W/O CONTRAST: CPT | Mod: 26,HCNC,, | Performed by: RADIOLOGY

## 2020-06-09 PROCEDURE — 74176 CT ABD & PELVIS W/O CONTRAST: CPT | Mod: TC,HCNC,PO

## 2020-06-09 PROCEDURE — 74176 CT ABDOMEN PELVIS WITHOUT CONTRAST: ICD-10-PCS | Mod: 26,HCNC,, | Performed by: RADIOLOGY

## 2020-06-09 PROCEDURE — A9698 NON-RAD CONTRAST MATERIALNOC: HCPCS | Mod: HCNC,PO | Performed by: NURSE PRACTITIONER

## 2020-06-09 RX ADMIN — IOHEXOL 1000 ML: 9 SOLUTION ORAL at 10:06

## 2020-06-09 NOTE — TELEPHONE ENCOUNTER
"Please call to inform & review the results with the patient- radiology report of the ct abdomen pelvis showed "No acute findings."  "small hiatal hernia versus mild circumferential wall thickening of the distal esophagus": continue GERD recommendations & EGD as scheduled for 7/6/2020.    Fatty liver: For fatty liver recommend: low fat, low cholesterol diet, maintain good control of blood sugars and cholesterol levels, exercise, weight loss (if overweight), minimize/avoid alcohol and tylenol products, & follow-up with PCP for continued evaluation and management; if specialist is needed, recommend seeing hepatology.    "Diverticulosis without CT findings of acute diverticulitis": no infection or inflammation. Recommend high fiber diet (20-30 grams of fiber daily)/OTC fiber supplements daily as directed.  Otherwise, unremarkable findings of the GI tract/GI organs.    Other incidental findings showed "Dilated aorta, vascular calcifications with suspected severe stenosis of common and external iliac arteries": Recommend follow-up with cardiology for continued evaluation and management of this finding.  "10 mm right ovarian cyst": Recommend follow-up with GYN for continued evaluation and management of this finding.  "Nodules in the lung bases which as visualized do not appear significantly changed compared to prior chest CT 10/02/2019": patient needs to follow-up with Primary Care Provider for continued evaluation and management of this finding. Otherwise, unremarkable findings.    Continue with previous recommendations. If no improvement in symptoms or symptoms worsen, call/follow-up at clinic or go to ER.  Please release results to patient's mychart once you have discussed results and recommendations with patient.  Thanks,        "

## 2020-06-10 NOTE — TELEPHONE ENCOUNTER
Spoke with pt and informed of results and recommendations per Britt Solano NP. Pt verbalized understanding.

## 2020-06-16 ENCOUNTER — TELEPHONE (OUTPATIENT)
Dept: CARDIOLOGY | Facility: CLINIC | Age: 75
End: 2020-06-16

## 2020-06-16 NOTE — TELEPHONE ENCOUNTER
----- Message from Marilee Solomon sent at 6/16/2020  8:51 AM CDT -----  Type:  Patient Returning Call    Who Called:  Patient  Who Left Message for Patient:  n/a  Does the patient know what this is regarding?:  rescheduling appointment  Best Call Back Number:  [phone] (home)     Additional Information:  I tried to reschedule this and ultrasound, lab but Epic would not let me for some reason. Please call with availability.

## 2020-06-25 ENCOUNTER — LAB VISIT (OUTPATIENT)
Dept: LAB | Facility: HOSPITAL | Age: 75
End: 2020-06-25
Attending: INTERNAL MEDICINE
Payer: MEDICARE

## 2020-06-25 ENCOUNTER — CLINICAL SUPPORT (OUTPATIENT)
Dept: CARDIOLOGY | Facility: CLINIC | Age: 75
End: 2020-06-25
Attending: INTERNAL MEDICINE
Payer: MEDICARE

## 2020-06-25 DIAGNOSIS — D64.9 NORMOCHROMIC ANEMIA: ICD-10-CM

## 2020-06-25 DIAGNOSIS — Z95.1 S/P CABG (CORONARY ARTERY BYPASS GRAFT): ICD-10-CM

## 2020-06-25 DIAGNOSIS — E78.2 MIXED HYPERLIPIDEMIA: ICD-10-CM

## 2020-06-25 DIAGNOSIS — I25.10 CORONARY ARTERY DISEASE INVOLVING NATIVE CORONARY ARTERY OF NATIVE HEART WITHOUT ANGINA PECTORIS: ICD-10-CM

## 2020-06-25 DIAGNOSIS — Z95.5 S/P CORONARY ARTERY STENT PLACEMENT: ICD-10-CM

## 2020-06-25 DIAGNOSIS — I10 ESSENTIAL HYPERTENSION: ICD-10-CM

## 2020-06-25 DIAGNOSIS — Z95.0 PACEMAKER: ICD-10-CM

## 2020-06-25 DIAGNOSIS — I73.9 PVD (PERIPHERAL VASCULAR DISEASE): ICD-10-CM

## 2020-06-25 DIAGNOSIS — N18.30 CHRONIC KIDNEY DISEASE, STAGE III (MODERATE): ICD-10-CM

## 2020-06-25 DIAGNOSIS — R10.84 GENERALIZED ABDOMINAL PAIN: ICD-10-CM

## 2020-06-25 DIAGNOSIS — R14.0 BLOATING SYMPTOM: ICD-10-CM

## 2020-06-25 LAB
ALBUMIN SERPL BCP-MCNC: 3.6 G/DL (ref 3.5–5.2)
ALP SERPL-CCNC: 59 U/L (ref 55–135)
ALP SERPL-CCNC: 59 U/L (ref 55–135)
ALT SERPL W/O P-5'-P-CCNC: 9 U/L (ref 10–44)
ALT SERPL W/O P-5'-P-CCNC: 9 U/L (ref 10–44)
ANION GAP SERPL CALC-SCNC: 8 MMOL/L (ref 8–16)
ANION GAP SERPL CALC-SCNC: 8 MMOL/L (ref 8–16)
AST SERPL-CCNC: 11 U/L (ref 10–40)
AST SERPL-CCNC: 11 U/L (ref 10–40)
BASOPHILS # BLD AUTO: 0.05 K/UL (ref 0–0.2)
BASOPHILS # BLD AUTO: 0.05 K/UL (ref 0–0.2)
BASOPHILS NFR BLD: 0.9 % (ref 0–1.9)
BASOPHILS NFR BLD: 0.9 % (ref 0–1.9)
BILIRUB DIRECT SERPL-MCNC: 0.2 MG/DL (ref 0.1–0.3)
BILIRUB SERPL-MCNC: 0.4 MG/DL (ref 0.1–1)
BILIRUB SERPL-MCNC: 0.4 MG/DL (ref 0.1–1)
BUN SERPL-MCNC: 27 MG/DL (ref 8–23)
BUN SERPL-MCNC: 27 MG/DL (ref 8–23)
CALCIUM SERPL-MCNC: 9.4 MG/DL (ref 8.7–10.5)
CALCIUM SERPL-MCNC: 9.4 MG/DL (ref 8.7–10.5)
CHLORIDE SERPL-SCNC: 111 MMOL/L (ref 95–110)
CHLORIDE SERPL-SCNC: 111 MMOL/L (ref 95–110)
CHOLEST SERPL-MCNC: 129 MG/DL (ref 120–199)
CHOLEST/HDLC SERPL: 4.8 {RATIO} (ref 2–5)
CK SERPL-CCNC: 17 U/L (ref 20–180)
CO2 SERPL-SCNC: 23 MMOL/L (ref 23–29)
CO2 SERPL-SCNC: 23 MMOL/L (ref 23–29)
CREAT SERPL-MCNC: 1.4 MG/DL (ref 0.5–1.4)
CREAT SERPL-MCNC: 1.4 MG/DL (ref 0.5–1.4)
DIFFERENTIAL METHOD: ABNORMAL
DIFFERENTIAL METHOD: ABNORMAL
EOSINOPHIL # BLD AUTO: 0.2 K/UL (ref 0–0.5)
EOSINOPHIL # BLD AUTO: 0.2 K/UL (ref 0–0.5)
EOSINOPHIL NFR BLD: 2.9 % (ref 0–8)
EOSINOPHIL NFR BLD: 2.9 % (ref 0–8)
ERYTHROCYTE [DISTWIDTH] IN BLOOD BY AUTOMATED COUNT: 15 % (ref 11.5–14.5)
ERYTHROCYTE [DISTWIDTH] IN BLOOD BY AUTOMATED COUNT: 15 % (ref 11.5–14.5)
EST. GFR  (AFRICAN AMERICAN): 42.4 ML/MIN/1.73 M^2
EST. GFR  (AFRICAN AMERICAN): 42.4 ML/MIN/1.73 M^2
EST. GFR  (NON AFRICAN AMERICAN): 36.8 ML/MIN/1.73 M^2
EST. GFR  (NON AFRICAN AMERICAN): 36.8 ML/MIN/1.73 M^2
FERRITIN SERPL-MCNC: 38 NG/ML (ref 20–300)
GLUCOSE SERPL-MCNC: 104 MG/DL (ref 70–110)
GLUCOSE SERPL-MCNC: 104 MG/DL (ref 70–110)
HCT VFR BLD AUTO: 35.2 % (ref 37–48.5)
HCT VFR BLD AUTO: 35.2 % (ref 37–48.5)
HDLC SERPL-MCNC: 27 MG/DL (ref 40–75)
HDLC SERPL: 20.9 % (ref 20–50)
HGB BLD-MCNC: 10.6 G/DL (ref 12–16)
HGB BLD-MCNC: 10.6 G/DL (ref 12–16)
IMM GRANULOCYTES # BLD AUTO: 0.02 K/UL (ref 0–0.04)
IMM GRANULOCYTES # BLD AUTO: 0.02 K/UL (ref 0–0.04)
IMM GRANULOCYTES NFR BLD AUTO: 0.4 % (ref 0–0.5)
IMM GRANULOCYTES NFR BLD AUTO: 0.4 % (ref 0–0.5)
IRON SERPL-MCNC: 43 UG/DL (ref 30–160)
LDLC SERPL CALC-MCNC: 57.2 MG/DL (ref 63–159)
LIPASE SERPL-CCNC: 51 U/L (ref 4–60)
LYMPHOCYTES # BLD AUTO: 1.1 K/UL (ref 1–4.8)
LYMPHOCYTES # BLD AUTO: 1.1 K/UL (ref 1–4.8)
LYMPHOCYTES NFR BLD: 19.6 % (ref 18–48)
LYMPHOCYTES NFR BLD: 19.6 % (ref 18–48)
MCH RBC QN AUTO: 26.8 PG (ref 27–31)
MCH RBC QN AUTO: 26.8 PG (ref 27–31)
MCHC RBC AUTO-ENTMCNC: 30.1 G/DL (ref 32–36)
MCHC RBC AUTO-ENTMCNC: 30.1 G/DL (ref 32–36)
MCV RBC AUTO: 89 FL (ref 82–98)
MCV RBC AUTO: 89 FL (ref 82–98)
MONOCYTES # BLD AUTO: 0.4 K/UL (ref 0.3–1)
MONOCYTES # BLD AUTO: 0.4 K/UL (ref 0.3–1)
MONOCYTES NFR BLD: 7.1 % (ref 4–15)
MONOCYTES NFR BLD: 7.1 % (ref 4–15)
NEUTROPHILS # BLD AUTO: 3.8 K/UL (ref 1.8–7.7)
NEUTROPHILS # BLD AUTO: 3.8 K/UL (ref 1.8–7.7)
NEUTROPHILS NFR BLD: 69.1 % (ref 38–73)
NEUTROPHILS NFR BLD: 69.1 % (ref 38–73)
NONHDLC SERPL-MCNC: 102 MG/DL
NRBC BLD-RTO: 0 /100 WBC
NRBC BLD-RTO: 0 /100 WBC
PHOSPHATE SERPL-MCNC: 3.7 MG/DL (ref 2.7–4.5)
PLATELET # BLD AUTO: 225 K/UL (ref 150–350)
PLATELET # BLD AUTO: 225 K/UL (ref 150–350)
PMV BLD AUTO: 10.3 FL (ref 9.2–12.9)
PMV BLD AUTO: 10.3 FL (ref 9.2–12.9)
POTASSIUM SERPL-SCNC: 4.1 MMOL/L (ref 3.5–5.1)
POTASSIUM SERPL-SCNC: 4.1 MMOL/L (ref 3.5–5.1)
PROT SERPL-MCNC: 7 G/DL (ref 6–8.4)
PROT SERPL-MCNC: 7 G/DL (ref 6–8.4)
RBC # BLD AUTO: 3.96 M/UL (ref 4–5.4)
RBC # BLD AUTO: 3.96 M/UL (ref 4–5.4)
RETICS/RBC NFR AUTO: 1.8 % (ref 0.5–2.5)
SATURATED IRON: 9 % (ref 20–50)
SODIUM SERPL-SCNC: 142 MMOL/L (ref 136–145)
SODIUM SERPL-SCNC: 142 MMOL/L (ref 136–145)
TOTAL IRON BINDING CAPACITY: 456 UG/DL (ref 250–450)
TRANSFERRIN SERPL-MCNC: 308 MG/DL (ref 200–375)
TRIGL SERPL-MCNC: 224 MG/DL (ref 30–150)
TSH SERPL DL<=0.005 MIU/L-ACNC: 2.38 UIU/ML (ref 0.4–4)
WBC # BLD AUTO: 5.51 K/UL (ref 3.9–12.7)
WBC # BLD AUTO: 5.51 K/UL (ref 3.9–12.7)

## 2020-06-25 PROCEDURE — 86677 HELICOBACTER PYLORI ANTIBODY: CPT | Mod: HCNC

## 2020-06-25 PROCEDURE — 80076 HEPATIC FUNCTION PANEL: CPT | Mod: HCNC

## 2020-06-25 PROCEDURE — 84443 ASSAY THYROID STIM HORMONE: CPT | Mod: HCNC

## 2020-06-25 PROCEDURE — 85045 AUTOMATED RETICULOCYTE COUNT: CPT | Mod: HCNC

## 2020-06-25 PROCEDURE — 85025 COMPLETE CBC W/AUTO DIFF WBC: CPT | Mod: HCNC

## 2020-06-25 PROCEDURE — 83540 ASSAY OF IRON: CPT | Mod: HCNC

## 2020-06-25 PROCEDURE — 93925 LOWER EXTREMITY STUDY: CPT | Mod: HCNC,S$GLB,, | Performed by: INTERNAL MEDICINE

## 2020-06-25 PROCEDURE — 80053 COMPREHEN METABOLIC PANEL: CPT | Mod: HCNC

## 2020-06-25 PROCEDURE — 80061 LIPID PANEL: CPT | Mod: HCNC

## 2020-06-25 PROCEDURE — 80069 RENAL FUNCTION PANEL: CPT | Mod: HCNC

## 2020-06-25 PROCEDURE — 93925 CV US DOPPLER ARTERIAL LEGS BILATERAL (CUPID ONLY): ICD-10-PCS | Mod: HCNC,S$GLB,, | Performed by: INTERNAL MEDICINE

## 2020-06-25 PROCEDURE — 82550 ASSAY OF CK (CPK): CPT | Mod: HCNC

## 2020-06-25 PROCEDURE — 36415 COLL VENOUS BLD VENIPUNCTURE: CPT | Mod: HCNC,PO

## 2020-06-25 PROCEDURE — 83690 ASSAY OF LIPASE: CPT | Mod: HCNC

## 2020-06-25 PROCEDURE — 82728 ASSAY OF FERRITIN: CPT | Mod: HCNC

## 2020-06-26 LAB
LEFT ANT TIBIAL SYS PSV: 35 CM/S
LEFT CFA PSV: 134 CM/S
LEFT PERONEAL SYS PSV: 37 CM/S
LEFT POPLITEAL PSV: 68 CM/S
LEFT POST TIBIAL SYS PSV: 25 CM/S
LEFT PROFUNDA SYS PSV: 83 CM/S
LEFT SUPER FEMORAL OSTIAL SYS PSV: 92 CM/S
LEFT SUPER FEMORAL PROX SYS PSV: 102 CM/S
LEFT TIB/PER TRUNK SYS PSV: 78 CM/S
OHS CV LEFT LOWER EXTREMITY ABI (NO CALC): 0.9
OHS CV LOWER EXTREMITY STENT MEASUREMENTS - LEFT - MSFA S1 - DIST: 210
OHS CV LOWER EXTREMITY STENT MEASUREMENTS - LEFT - MSFA S1 - MID: 99
OHS CV LOWER EXTREMITY STENT MEASUREMENTS - LEFT - MSFA S1 - OST: 104
OHS CV LOWER EXTREMITY STENT MEASUREMENTS - LEFT - MSFA S1 - PROX: 108
OHS CV RIGHT ABI LOWER EXTREMITY (NO CALC): 0.8
RIGHT ANT TIBIAL SYS PSV: 68 CM/S
RIGHT CFA PSV: 76 CM/S
RIGHT PERONEAL SYS PSV: 65 CM/S
RIGHT POPLITEAL PSV: 76 CM/S
RIGHT POST TIBIAL SYS PSV: 104 CM/S
RIGHT PROFUNDA SYS PSV: 57 CM/S
RIGHT SUPER FEMORAL DIST SYS PSV: 155 CM/S
RIGHT SUPER FEMORAL MID SYS PSV: 116 CM/S
RIGHT SUPER FEMORAL OSTIAL SYS PSV: 92 CM/S
RIGHT SUPER FEMORAL PROX SYS PSV: 128 CM/S
RIGHT TIB/PER TRUNK SYS PSV: 215 CM/S

## 2020-06-30 ENCOUNTER — OFFICE VISIT (OUTPATIENT)
Dept: FAMILY MEDICINE | Facility: CLINIC | Age: 75
End: 2020-06-30
Payer: MEDICARE

## 2020-06-30 VITALS
SYSTOLIC BLOOD PRESSURE: 100 MMHG | HEIGHT: 62 IN | TEMPERATURE: 98 F | WEIGHT: 149.69 LBS | DIASTOLIC BLOOD PRESSURE: 64 MMHG | BODY MASS INDEX: 27.55 KG/M2 | HEART RATE: 105 BPM | OXYGEN SATURATION: 98 %

## 2020-06-30 DIAGNOSIS — I10 ESSENTIAL HYPERTENSION: ICD-10-CM

## 2020-06-30 DIAGNOSIS — G89.29 CHRONIC BILATERAL LOW BACK PAIN WITH BILATERAL SCIATICA: Primary | ICD-10-CM

## 2020-06-30 DIAGNOSIS — M54.41 CHRONIC BILATERAL LOW BACK PAIN WITH BILATERAL SCIATICA: Primary | ICD-10-CM

## 2020-06-30 DIAGNOSIS — I73.9 PVD (PERIPHERAL VASCULAR DISEASE): ICD-10-CM

## 2020-06-30 DIAGNOSIS — D64.9 NORMOCHROMIC ANEMIA: ICD-10-CM

## 2020-06-30 DIAGNOSIS — M54.42 CHRONIC BILATERAL LOW BACK PAIN WITH BILATERAL SCIATICA: Primary | ICD-10-CM

## 2020-06-30 DIAGNOSIS — E78.5 DYSLIPIDEMIA: ICD-10-CM

## 2020-06-30 DIAGNOSIS — E11.9 CONTROLLED TYPE 2 DIABETES MELLITUS WITHOUT COMPLICATION, WITHOUT LONG-TERM CURRENT USE OF INSULIN: ICD-10-CM

## 2020-06-30 PROCEDURE — 3074F SYST BP LT 130 MM HG: CPT | Mod: HCNC,CPTII,S$GLB, | Performed by: INTERNAL MEDICINE

## 2020-06-30 PROCEDURE — 99999 PR PBB SHADOW E&M-EST. PATIENT-LVL IV: CPT | Mod: PBBFAC,HCNC,, | Performed by: INTERNAL MEDICINE

## 2020-06-30 PROCEDURE — 99214 PR OFFICE/OUTPT VISIT, EST, LEVL IV, 30-39 MIN: ICD-10-PCS | Mod: HCNC,S$GLB,, | Performed by: INTERNAL MEDICINE

## 2020-06-30 PROCEDURE — 3074F PR MOST RECENT SYSTOLIC BLOOD PRESSURE < 130 MM HG: ICD-10-PCS | Mod: HCNC,CPTII,S$GLB, | Performed by: INTERNAL MEDICINE

## 2020-06-30 PROCEDURE — 1101F PT FALLS ASSESS-DOCD LE1/YR: CPT | Mod: HCNC,CPTII,S$GLB, | Performed by: INTERNAL MEDICINE

## 2020-06-30 PROCEDURE — 1101F PR PT FALLS ASSESS DOC 0-1 FALLS W/OUT INJ PAST YR: ICD-10-PCS | Mod: HCNC,CPTII,S$GLB, | Performed by: INTERNAL MEDICINE

## 2020-06-30 PROCEDURE — 99999 PR PBB SHADOW E&M-EST. PATIENT-LVL IV: ICD-10-PCS | Mod: PBBFAC,HCNC,, | Performed by: INTERNAL MEDICINE

## 2020-06-30 PROCEDURE — 1159F PR MEDICATION LIST DOCUMENTED IN MEDICAL RECORD: ICD-10-PCS | Mod: HCNC,S$GLB,, | Performed by: INTERNAL MEDICINE

## 2020-06-30 PROCEDURE — 1159F MED LIST DOCD IN RCRD: CPT | Mod: HCNC,S$GLB,, | Performed by: INTERNAL MEDICINE

## 2020-06-30 PROCEDURE — 3044F PR MOST RECENT HEMOGLOBIN A1C LEVEL <7.0%: ICD-10-PCS | Mod: HCNC,CPTII,S$GLB, | Performed by: INTERNAL MEDICINE

## 2020-06-30 PROCEDURE — 3078F DIAST BP <80 MM HG: CPT | Mod: HCNC,CPTII,S$GLB, | Performed by: INTERNAL MEDICINE

## 2020-06-30 PROCEDURE — 3078F PR MOST RECENT DIASTOLIC BLOOD PRESSURE < 80 MM HG: ICD-10-PCS | Mod: HCNC,CPTII,S$GLB, | Performed by: INTERNAL MEDICINE

## 2020-06-30 PROCEDURE — 99214 OFFICE O/P EST MOD 30 MIN: CPT | Mod: HCNC,S$GLB,, | Performed by: INTERNAL MEDICINE

## 2020-06-30 PROCEDURE — 3044F HG A1C LEVEL LT 7.0%: CPT | Mod: HCNC,CPTII,S$GLB, | Performed by: INTERNAL MEDICINE

## 2020-06-30 RX ORDER — TRAMADOL HYDROCHLORIDE 50 MG/1
50 TABLET ORAL EVERY 12 HOURS PRN
Qty: 60 TABLET | Refills: 5 | Status: SHIPPED | OUTPATIENT
Start: 2020-06-30 | End: 2022-11-01 | Stop reason: CLARIF

## 2020-06-30 NOTE — PROGRESS NOTES
Subjective:       Patient ID: Shirlene Ortiz is a 75 y.o. female.    Chief Complaint: Diabetes    Patient with many issues.      Normocytic anemia - likely ACD.  FOBT x3 neg.  Stable Hb at 10.  Has [facility] and EGD on Monday.    Still complains of severe low back pain with standing or walking.  Standing still does not improve it.  Sitting immediately improves it.  Leaning forward (shopping cart) improves it.  She did have one day of b/l groin pain last week with standing but none since.  Has to stop after about 1/2 block but standing does not help.  Can only stand to peal 3 potatoes before needing to sit.   Saw Dr Lugo.  Scared to go to PT.  Needs imaging.  Pacemaker not compatible with MRI, so he wanted a lumbar CT but has not happened yet.  Recommended Tylenol and if nw, tramadol.  She has no relief from Tylenol and is now open to trying tramadol.   Recall:  Complains of b/l low back pain radiating to her b/l posterior thighs and calves.  Occurs with standing and walking.  She frequently has to stop walking to get relief.  Sitting makes it better.      CT of abdomin showed severe stenosis in LE.  Has appointment with Dr Loo 9/1.  CAD s/p CABG 8/17 after an NSTEMI  PVD - 3 new stents in left leg 12/2016;   13 stents in total  Poorly compliant with office f/u in past; seems good now.    CT in April dx multiple pulmonary nodules.  Repeat at 4.5 mo showed no change.  Needs another at 18 mo [place] = November 2020.   Had EGD with polypectomy.  Has another scheduled.     DM - controlled   HTN - controlled; takes 1/2 10 mg amlodipine daily; if BP low, will take 1/4 of a 10 mg per Dr Sandoval's instructions.   HLD - controlled ldl goal < 70; high triglycerides;   Smoke abuse - not ready to quit  CKD III - stable in 30s. Seeing renal.  COPD - stable    Review of Systems   Constitutional: Negative for appetite change and fever.   HENT: Negative for nosebleeds and trouble swallowing.    Eyes: Negative for discharge and visual  disturbance.   Respiratory: Negative for choking and shortness of breath.    Cardiovascular: Negative for chest pain and palpitations.   Gastrointestinal: Negative for abdominal pain, nausea and vomiting.   Musculoskeletal: Positive for arthralgias and back pain. Negative for joint swelling.   Skin: Negative for rash and wound.   Neurological: Negative for dizziness and syncope.   Psychiatric/Behavioral: Negative for confusion and dysphoric mood.       Objective:      Vitals:    06/30/20 1018   BP: 100/64   Pulse: 105   Temp: 97.5 °F (36.4 °C)     Physical Exam  Vitals signs reviewed.   Eyes:      Conjunctiva/sclera: Conjunctivae normal.   Neck:      Musculoskeletal: Normal range of motion.   Cardiovascular:      Rate and Rhythm: Normal rate and regular rhythm.   Pulmonary:      Effort: Pulmonary effort is normal.      Breath sounds: Normal breath sounds.   Musculoskeletal:      Comments: Normal ROM bilateral    Skin:     General: Skin is warm and dry.   Neurological:      Cranial Nerves: No cranial nerve deficit (grossly intact).   Psychiatric:      Comments: Alert and orientated           Assessment:       1. Chronic bilateral low back pain with bilateral sciatica    2. PVD (peripheral vascular disease)    3. Controlled type 2 diabetes mellitus without complication, without long-term current use of insulin    4. Essential hypertension    5. Dyslipidemia        Plan:       Chronic bilateral low back pain with bilateral sciatica  -     traMADoL (ULTRAM) 50 mg tablet; Take 1 tablet (50 mg total) by mouth every 12 (twelve) hours as needed for Pain.  Dispense: 60 tablet; Refill: 5    PVD (peripheral vascular disease)    Controlled type 2 diabetes mellitus without complication, without long-term current use of insulin  -     Hemoglobin A1C; Future; Expected date: 09/28/2020  -     Microalbumin/creatinine urine ratio; Future; Expected date: 09/28/2020    Essential hypertension    Dyslipidemia            Medication List  with Changes/Refills   New Medications    TRAMADOL (ULTRAM) 50 MG TABLET    Take 1 tablet (50 mg total) by mouth every 12 (twelve) hours as needed for Pain.   Current Medications    ACCU-CHEK ISRAEL CONTROL SOLN SOLN        ACCU-CHEK ISRAEL PLUS METER MISC    USE AS DIRECTED    ACCU-CHEK ISRAEL PLUS TEST STRP STRP    TEST 2 TO 4 TIMES EVERY DAY     AMLODIPINE (NORVASC) 5 MG TABLET    TAKE 1 TABLET (5 MG TOTAL) BY MOUTH EVERY EVENING.    ASPIRIN 325 MG TABLET    Take 325 mg by mouth once daily.    ATORVASTATIN (LIPITOR) 20 MG TABLET    Take 1 tablet (20 mg total) by mouth once daily.    BD ALCOHOL SWABS PADM        CARVEDILOL (COREG) 3.125 MG TABLET    TAKE 1 TABLET TWICE DAILY    CLOPIDOGREL (PLAVIX) 75 MG TABLET    TAKE 1 TABLET EVERY DAY    EZETIMIBE (ZETIA) 10 MG TABLET    Take 1 tablet (10 mg total) by mouth once daily.    FENOFIBRATE 160 MG TAB    TAKE 1 TABLET EVERY DAY    GLIPIZIDE (GLUCOTROL) 10 MG TABLET    TAKE 1 TABLET TWICE DAILY    LANCETS MISC    To check BG 6 times daily, to use with insurance preferred meter    LOSARTAN (COZAAR) 50 MG TABLET    Take 1 tablet (50 mg total) by mouth once daily.    METFORMIN (GLUCOPHAGE) 1000 MG TABLET    TAKE 1 TABLET TWICE DAILY WITH MEALS    PANTOPRAZOLE (PROTONIX) 40 MG TABLET    Take 1 tablet (40 mg total) by mouth once daily.    POLYETHYLENE GLYCOL (GOLYTELY,NULYTELY) 236-22.74-6.74 -5.86 GRAM SUSPENSION    Take as directed    TRIAMCINOLONE ACETONIDE 0.1% (KENALOG) 0.1 % OINTMENT    APPLY TOPICALLY 2 (TWO) TIMES DAILY.     Likely cause of pain is low back vs PVD because pain severe with standing and improves leaning forward.  Severe stenosis may be contributing but I think discussion of her PVD with this can wait until she sees Dr Loo 9/1.  Encouraged to do PT trial and to contact Dr Lugo's office for lumbar CT - concern for spinal stenosis.     Continue current management and monitor.    Counseled on regular exercise, maintenance of a healthy weight, balanced diet  rich in fruits/vegetables and lean protein, and avoidance of unhealthy habits like smoking and excessive alcohol intake.   Also, counseled on importance of being compliant with medication, health appointments, diet and exercise.     Follow up in about 3 months (around 9/30/2020).

## 2020-07-01 ENCOUNTER — TELEPHONE (OUTPATIENT)
Dept: GASTROENTEROLOGY | Facility: CLINIC | Age: 75
End: 2020-07-01

## 2020-07-01 DIAGNOSIS — R14.0 BLOATING SYMPTOM: Primary | ICD-10-CM

## 2020-07-01 LAB — H PYLORI IGG SERPL QL IA: ABNORMAL

## 2020-07-01 NOTE — TELEPHONE ENCOUNTER
Please call to inform & review the results with the patient- blood work showed unremarkable liver function levels; normal pancreatic enzyme; and h pylori blood level showed equivocal test result. Need to repeat blood work in 2-3 weeks.  Continue with previous recommendations. If no improvement in symptoms or symptoms worsen, call/follow-up at clinic or go to ER.  Please release results to patient's mychart once you have discussed results and recommendations with patient.  Thanks,

## 2020-07-02 ENCOUNTER — TELEPHONE (OUTPATIENT)
Dept: GASTROENTEROLOGY | Facility: CLINIC | Age: 75
End: 2020-07-02

## 2020-07-02 ENCOUNTER — TELEPHONE (OUTPATIENT)
Dept: CARDIOLOGY | Facility: CLINIC | Age: 75
End: 2020-07-02

## 2020-07-02 ENCOUNTER — TELEPHONE (OUTPATIENT)
Dept: FAMILY MEDICINE | Facility: CLINIC | Age: 75
End: 2020-07-02

## 2020-07-02 PROBLEM — G45.9 TIA (TRANSIENT ISCHEMIC ATTACK): Status: ACTIVE | Noted: 2020-07-02

## 2020-07-02 NOTE — TELEPHONE ENCOUNTER
New onset, beginning last night.   Numbness from elbow to fingers, and baby finger and fingers next to it are numb and she is not able to move them now.     When she tried to hold her cigarette it is falling out of her index and middle finger, and when she lays her hand flat on a surface the pinky finger is splayed out from the hand and she cannot make it move in.     NO change in cognition, no slurred speech, no dizziness.

## 2020-07-02 NOTE — TELEPHONE ENCOUNTER
Spoke with pt and informed of results and recommendations per Britt Solano NP. Scheduled pt for follow up labs. Pt verbalized understanding.

## 2020-07-02 NOTE — TELEPHONE ENCOUNTER
Call placed to patient, advised that symptoms are concerning for a stroke, and that she is high risk. She reports that she feels fine this morning, but it was discussed that the symptoms she is having may be the signal for something bigger brewing. Shared the concern from PCP and encouraged ED visit as recommended.   She reports intention to do so now. .

## 2020-07-02 NOTE — TELEPHONE ENCOUNTER
Attempted to inform pt that she could come before 12 on tomorrow and still be tested. If she cannot make it she would need to be rescheduled. LMOM with callback number.

## 2020-07-02 NOTE — TELEPHONE ENCOUNTER
----- Message from Claudia Villalba sent at 7/2/2020  9:14 AM CDT -----  Regarding: Pt advice  Contact: Pt  Type:  Patient Returning Call    Who Called:  pt  Does the patient know what this is regarding?:  pt is experiencing numbness in fingers please follow up  Best Call Back Number:    Additional Information:  Please follow up

## 2020-07-02 NOTE — TELEPHONE ENCOUNTER
----- Message from Marilee Solomon sent at 7/2/2020  4:28 PM CDT -----  Regarding: PreCovid Procedure Test  Contact: Patient/[phone] (home)  Type: Needs Medical Advice  Who Called:  Patient/[phone] (home)     Additional Information: Patient is in [facility] now for her hand. She will not be out in time for her Covid pre procedure test at  9:00am tomorrow. I cannot schedule her a covid test until 7/4/20 which would be less than 72 hours for her procedure. Please call to advise.

## 2020-07-02 NOTE — TELEPHONE ENCOUNTER
Contacted by surgery, pt. Scheduled for EGD on 7/6  Pt. With a dual chamber PPM Med[employer]onic Sensia  Last in clinic check: 6/2/20  AP- 25% - 41%  Underlying rhythm- normal sinus rhythm  Pt. not pacer dependent, nothing needs to be done.      PACEMAKERS:   If no electrocautery is planned, nothing needs to be done.   If electrocautery only below the umbilicus is planned, nothing needs to be done.   If electrocautery above the umbilicus is planned, a magnet may be placed over the device during surgery. A magnet makes the pacing mode asynchronous (i.e., the pacer will pace no matter what). Magnet removal returns the pacer function to baseline without any programming required. If the patient is not pacer dependent, a magnet is not required.      Pt. Needs To be on continuous cardiac monitoring throughout procedure.    Any questions may contact ext. [phone]

## 2020-07-02 NOTE — TELEPHONE ENCOUNTER
----- Message from Johana Krause sent at 7/2/2020  7:57 AM CDT -----  Regarding: return call  Contact: self  Type:  Patient Returning Call    Who Called:  patient   Who Left Message for Patient:  Sayra Danilo  Does the patient know what this is regarding?:  No  Best Call Back Number:  [phone]  Additional Information:

## 2020-07-05 ENCOUNTER — TELEPHONE (OUTPATIENT)
Dept: SURGERY | Facility: HOSPITAL | Age: 75
End: 2020-07-05

## 2020-07-05 DIAGNOSIS — Z01.812 PRE-PROCEDURE LAB EXAM: ICD-10-CM

## 2020-07-05 NOTE — TELEPHONE ENCOUNTER
Patient was hospitalized recently due to a stroke and will need to reschedule her procedure. Patient unable to stop Plavix and ASA at this time. Please contact patient to reschedule as indicated and safe for procedure.

## 2020-07-06 ENCOUNTER — TELEPHONE (OUTPATIENT)
Dept: GASTROENTEROLOGY | Facility: CLINIC | Age: 75
End: 2020-07-06

## 2020-07-06 NOTE — TELEPHONE ENCOUNTER
----- Message from Freda Azevedo, RN sent at 7/3/2020 11:32 AM CDT -----  Pt is discharging from [facility] today with a TIA, and started on Plavix.  Dr. Bridges is aware that the patient is scheduled for Monday 7/62020 to have a colonoscopy with Dr. Marinelli, and may now need to be pushed back since Plavix has been started.  I am unable to call the office.  We only have the scheduling number [phone], but y'all are closed today anyway.    HELP!  Dr. Bridges is trying to also text Dr. Marinelli.

## 2020-07-06 NOTE — TELEPHONE ENCOUNTER
Spoke with pt. Informed that due to her not being able to hold plavix at this time, her procedures will be rescheduled for a later date. Pt verbalized understanding. Pt is okay with waiting & would like to know Dr. Marinelli's recommendation on when to reschedule. Pt informed she would get a call back within the week on this. Pt verbalized understanding.

## 2020-07-16 ENCOUNTER — LAB VISIT (OUTPATIENT)
Dept: LAB | Facility: HOSPITAL | Age: 75
End: 2020-07-16
Attending: INTERNAL MEDICINE
Payer: MEDICARE

## 2020-07-16 DIAGNOSIS — R14.0 BLOATING SYMPTOM: ICD-10-CM

## 2020-07-16 PROCEDURE — 36415 COLL VENOUS BLD VENIPUNCTURE: CPT | Mod: HCNC,PO

## 2020-07-16 PROCEDURE — 86677 HELICOBACTER PYLORI ANTIBODY: CPT | Mod: HCNC

## 2020-07-20 ENCOUNTER — TELEPHONE (OUTPATIENT)
Dept: GASTROENTEROLOGY | Facility: CLINIC | Age: 75
End: 2020-07-20

## 2020-07-20 LAB — H PYLORI IGG SERPL QL IA: NEGATIVE

## 2020-07-20 NOTE — TELEPHONE ENCOUNTER
----- Message from FEDERICO Pace sent at 7/20/2020  1:04 PM CDT -----  Please call to inform & review the results with the patient- Lab results are normal: repeat H. Pylori testing showed negative result.  Continue with previous recommendations.  Thanks,  Anne-Marie CABALLERO-C

## 2020-07-24 DIAGNOSIS — Z01.812 PRE-PROCEDURE LAB EXAM: ICD-10-CM

## 2020-08-02 ENCOUNTER — LAB VISIT (OUTPATIENT)
Dept: FAMILY MEDICINE | Facility: CLINIC | Age: 75
End: 2020-08-02
Payer: MEDICARE

## 2020-08-02 DIAGNOSIS — Z01.812 PRE-PROCEDURE LAB EXAM: ICD-10-CM

## 2020-08-02 PROCEDURE — U0003 INFECTIOUS AGENT DETECTION BY NUCLEIC ACID (DNA OR RNA); SEVERE ACUTE RESPIRATORY SYNDROME CORONAVIRUS 2 (SARS-COV-2) (CORONAVIRUS DISEASE [COVID-19]), AMPLIFIED PROBE TECHNIQUE, MAKING USE OF HIGH THROUGHPUT TECHNOLOGIES AS DESCRIBED BY CMS-2020-01-R: HCPCS | Mod: HCNC

## 2020-08-03 LAB — SARS-COV-2 RNA RESP QL NAA+PROBE: NOT DETECTED

## 2020-08-05 ENCOUNTER — HOSPITAL ENCOUNTER (OUTPATIENT)
Facility: HOSPITAL | Age: 75
Discharge: HOME | End: 2020-08-05
Attending: INTERNAL MEDICINE | Admitting: INTERNAL MEDICINE
Payer: MEDICARE

## 2020-08-05 ENCOUNTER — ANESTHESIA (OUTPATIENT)
Dept: ENDOSCOPY | Facility: HOSPITAL | Age: 75
End: 2020-08-05
Payer: MEDICARE

## 2020-08-05 ENCOUNTER — ANESTHESIA EVENT (OUTPATIENT)
Dept: ENDOSCOPY | Facility: HOSPITAL | Age: 75
End: 2020-08-05
Payer: MEDICARE

## 2020-08-05 VITALS
OXYGEN SATURATION: 98 % | WEIGHT: 152 LBS | SYSTOLIC BLOOD PRESSURE: 112 MMHG | BODY MASS INDEX: 26.93 KG/M2 | HEART RATE: 74 BPM | HEIGHT: 63 IN | TEMPERATURE: 98 F | RESPIRATION RATE: 16 BRPM | DIASTOLIC BLOOD PRESSURE: 74 MMHG

## 2020-08-05 DIAGNOSIS — K22.70 BARRETT'S ESOPHAGUS: ICD-10-CM

## 2020-08-05 LAB — GLUCOSE SERPL-MCNC: 76 MG/DL (ref 70–110)

## 2020-08-05 PROCEDURE — 37000008 HC ANESTHESIA 1ST 15 MINUTES: Mod: HCNC,PO | Performed by: INTERNAL MEDICINE

## 2020-08-05 PROCEDURE — 45385 COLONOSCOPY W/LESION REMOVAL: CPT | Mod: HCNC,PO | Performed by: INTERNAL MEDICINE

## 2020-08-05 PROCEDURE — 27201028 HC NEEDLE, SCLERO: Mod: HCNC,PO | Performed by: INTERNAL MEDICINE

## 2020-08-05 PROCEDURE — D9220A PRA ANESTHESIA: ICD-10-PCS | Mod: HCNC,CRNA,, | Performed by: NURSE ANESTHETIST, CERTIFIED REGISTERED

## 2020-08-05 PROCEDURE — D9220A PRA ANESTHESIA: Mod: HCNC,CRNA,, | Performed by: NURSE ANESTHETIST, CERTIFIED REGISTERED

## 2020-08-05 PROCEDURE — 43239 PR EGD, FLEX, W/BIOPSY, SGL/MULTI: ICD-10-PCS | Mod: 51,HCNC,, | Performed by: INTERNAL MEDICINE

## 2020-08-05 PROCEDURE — 88305 TISSUE EXAM BY PATHOLOGIST: ICD-10-PCS | Mod: 26,HCNC,, | Performed by: STUDENT IN AN ORGANIZED HEALTH CARE EDUCATION/TRAINING PROGRAM

## 2020-08-05 PROCEDURE — 88305 TISSUE EXAM BY PATHOLOGIST: CPT | Mod: HCNC | Performed by: STUDENT IN AN ORGANIZED HEALTH CARE EDUCATION/TRAINING PROGRAM

## 2020-08-05 PROCEDURE — 25000003 PHARM REV CODE 250: Mod: HCNC,PO | Performed by: NURSE ANESTHETIST, CERTIFIED REGISTERED

## 2020-08-05 PROCEDURE — 45385 COLONOSCOPY W/LESION REMOVAL: CPT | Mod: PT,HCNC,, | Performed by: INTERNAL MEDICINE

## 2020-08-05 PROCEDURE — 63600175 PHARM REV CODE 636 W HCPCS: Mod: HCNC,PO | Performed by: INTERNAL MEDICINE

## 2020-08-05 PROCEDURE — 88305 TISSUE EXAM BY PATHOLOGIST: CPT | Mod: 26,HCNC,, | Performed by: STUDENT IN AN ORGANIZED HEALTH CARE EDUCATION/TRAINING PROGRAM

## 2020-08-05 PROCEDURE — 45385 PR COLONOSCOPY,REMV LESN,SNARE: ICD-10-PCS | Mod: PT,HCNC,, | Performed by: INTERNAL MEDICINE

## 2020-08-05 PROCEDURE — 27201012 HC FORCEPS, HOT/COLD, DISP: Mod: HCNC,PO | Performed by: INTERNAL MEDICINE

## 2020-08-05 PROCEDURE — 43239 EGD BIOPSY SINGLE/MULTIPLE: CPT | Mod: 51,HCNC,, | Performed by: INTERNAL MEDICINE

## 2020-08-05 PROCEDURE — 82962 GLUCOSE BLOOD TEST: CPT | Mod: HCNC,PO | Performed by: INTERNAL MEDICINE

## 2020-08-05 PROCEDURE — 37000009 HC ANESTHESIA EA ADD 15 MINS: Mod: HCNC,PO | Performed by: INTERNAL MEDICINE

## 2020-08-05 PROCEDURE — 27201089 HC SNARE, DISP (ANY): Mod: HCNC,PO | Performed by: INTERNAL MEDICINE

## 2020-08-05 PROCEDURE — 63600175 PHARM REV CODE 636 W HCPCS: Mod: HCNC,PO | Performed by: NURSE ANESTHETIST, CERTIFIED REGISTERED

## 2020-08-05 PROCEDURE — 45381 COLONOSCOPY SUBMUCOUS NJX: CPT | Mod: HCNC,PO | Performed by: INTERNAL MEDICINE

## 2020-08-05 PROCEDURE — 43239 EGD BIOPSY SINGLE/MULTIPLE: CPT | Mod: HCNC,PO | Performed by: INTERNAL MEDICINE

## 2020-08-05 PROCEDURE — D9220A PRA ANESTHESIA: ICD-10-PCS | Mod: HCNC,ANES,, | Performed by: ANESTHESIOLOGY

## 2020-08-05 PROCEDURE — D9220A PRA ANESTHESIA: Mod: HCNC,ANES,, | Performed by: ANESTHESIOLOGY

## 2020-08-05 RX ORDER — PROPOFOL 10 MG/ML
VIAL (ML) INTRAVENOUS
Status: DISCONTINUED | OUTPATIENT
Start: 2020-08-05 | End: 2020-08-05

## 2020-08-05 RX ORDER — SODIUM CHLORIDE, SODIUM LACTATE, POTASSIUM CHLORIDE, CALCIUM CHLORIDE 600; 310; 30; 20 MG/100ML; MG/100ML; MG/100ML; MG/100ML
INJECTION, SOLUTION INTRAVENOUS CONTINUOUS
Status: DISCONTINUED | OUTPATIENT
Start: 2020-08-05 | End: 2020-08-05 | Stop reason: HOSPADM

## 2020-08-05 RX ORDER — SODIUM CHLORIDE 0.9 % (FLUSH) 0.9 %
10 SYRINGE (ML) INJECTION
Status: DISCONTINUED | OUTPATIENT
Start: 2020-08-05 | End: 2020-08-05 | Stop reason: HOSPADM

## 2020-08-05 RX ORDER — LIDOCAINE HCL/PF 100 MG/5ML
SYRINGE (ML) INTRAVENOUS
Status: DISCONTINUED | OUTPATIENT
Start: 2020-08-05 | End: 2020-08-05

## 2020-08-05 RX ADMIN — PROPOFOL 50 MG: 10 INJECTION, EMULSION INTRAVENOUS at 09:08

## 2020-08-05 RX ADMIN — PROPOFOL 25 MG: 10 INJECTION, EMULSION INTRAVENOUS at 10:08

## 2020-08-05 RX ADMIN — SODIUM CHLORIDE, SODIUM LACTATE, POTASSIUM CHLORIDE, AND CALCIUM CHLORIDE: .6; .31; .03; .02 INJECTION, SOLUTION INTRAVENOUS at 09:08

## 2020-08-05 RX ADMIN — PROPOFOL 100 MG: 10 INJECTION, EMULSION INTRAVENOUS at 09:08

## 2020-08-05 RX ADMIN — PROPOFOL 50 MG: 10 INJECTION, EMULSION INTRAVENOUS at 10:08

## 2020-08-05 RX ADMIN — LIDOCAINE HYDROCHLORIDE 100 MG: 20 INJECTION, SOLUTION INTRAVENOUS at 09:08

## 2020-08-05 NOTE — PROVATION PATIENT INSTRUCTIONS
Discharge Summary/Instructions after an Endoscopic Procedure  Patient Name: Shirlene Ortiz  Patient MRN: [id number]  Patient YOB: 1945 Wednesday, August 5, 2020  Alonzo Marinelli MD  RESTRICTIONS:  During your procedure today, you received medications for sedation.  These   medications may affect your judgment, balance and coordination.  Therefore,   for 24 hours, you have the following restrictions:   - DO NOT drive a car, operate machinery, make legal/financial decisions,   sign important papers or drink alcohol.    ACTIVITY:  Today: no heavy lifting, straining or running due to procedural   sedation/anesthesia.  The following day: return to full activity including work.  DIET:  Eat and drink normally unless instructed otherwise.     TREATMENT FOR COMMON SIDE EFFECTS:  - Mild abdominal pain, nausea, belching, bloating or excessive gas:  rest,   eat lightly and use a heating pad.  - Sore Throat: treat with throat lozenges and/or gargle with warm salt   water.  - Because air was used during the procedure, expelling large amounts of air   from your rectum or belching is normal.  - If a bowel prep was taken, you may not have a bowel movement for 1-3 days.    This is normal.  SYMPTOMS TO WATCH FOR AND REPORT TO YOUR PHYSICIAN:  1. Abdominal pain or bloating, other than gas cramps.  2. Chest pain.  3. Back pain.  4. Signs of infection such as: chills or fever occurring within 24 hours   after the procedure.  5. Rectal bleeding, which would show as bright red, maroon, or black stools.   (A tablespoon of blood from the rectum is not serious, especially if   hemorrhoids are present.)  6. Vomiting.  7. Weakness or dizziness.  GO DIRECTLY TO THE NEAREST EMERGENCY ROOM IF YOU HAVE ANY OF THE FOLLOWING:      Difficulty breathing              Chills and/or fever over 101 F   Persistent vomiting and/or vomiting blood   Severe abdominal pain   Severe chest pain   Black, tarry stools   Bleeding- more than one  tablespoon   Any other symptom or condition that you feel may need urgent attention  Your doctor recommends these additional instructions:  If any biopsies were taken, your doctors clinic will contact you in 1 to 2   weeks with any results.  We are waiting for your pathology results.   An appointment has been made (or make an appointment) to see a colo-rectal   surgeon at appointment to be scheduled.   Your physician has recommended a repeat colonoscopy (date to be determined   after pending pathology results are reviewed) for surveillance based on   pathology results.   You are being discharged to home.  For questions, problems or results please call your physician - Alonzo Marinelli MD at Work:  [phone].  EMERGENCY PHONE NUMBER: [phone], LAB RESULTS: [phone]  IF A COMPLICATION OR EMERGENCY SITUATION ARISES AND YOU ARE UNABLE TO REACH   YOUR PHYSICIAN - GO DIRECTLY TO THE EMERGENCY ROOM.  ___________________________________________  Nurse Signature  ___________________________________________  Patient/Designated Responsible Party Signature  Alonzo Marinelli MD  8/5/2020 10:33:33 AM  This report has been verified and signed electronically.  [employer]ATION

## 2020-08-05 NOTE — DISCHARGE SUMMARY
Discharge Note  Short Stay      SUMMARY     Admit Date: 8/5/2020    Attending Physician: Alonzo Marinelli MD     Discharge Physician: Alonzo Marinelli MD    Discharge Date: 8/5/2020 10:34 AM    Final Diagnosis: Colon cancer screening [Z12.11]  History of colon polyps [Z86.010]  Constipation, unspecified constipation type [K59.00]  History of Jonas's esophagus [Z87.19]    Disposition: HOME OR SELF CARE    Patient Instructions:   Current Discharge Medication List      CONTINUE these medications which have NOT CHANGED    Details   ACCU-CHEK ISRAEL CONTROL SOLN Soln       ACCU-CHEK ISRAEL PLUS METER Misc USE AS DIRECTED  Qty: 1 each, Refills: 0      ACCU-CHEK ISRAEL PLUS TEST STRP Strp TEST 2 TO 4 TIMES EVERY DAY   Qty: 400 strip, Refills: 3    Associated Diagnoses: Type 2 diabetes mellitus with complication, without long-term current use of insulin      amLODIPine (NORVASC) 5 MG tablet Take 1 tablet (5 mg total) by mouth once daily.    Comments: .      aspirin 325 MG tablet Take 325 mg by mouth once daily.      atorvastatin (LIPITOR) 20 MG tablet Take 1 tablet (20 mg total) by mouth once daily.  Qty: 90 tablet, Refills: 5      BD ALCOHOL SWABS PadM       carvedilol (COREG) 3.125 MG tablet TAKE 1 TABLET TWICE DAILY  Qty: 180 tablet, Refills: 4      clopidogreL (PLAVIX) 75 mg tablet TAKE 1 TABLET EVERY DAY  Qty: 90 tablet, Refills: 1      ezetimibe (ZETIA) 10 mg tablet Take 1 tablet (10 mg total) by mouth once daily.  Qty: 90 tablet, Refills: 3      fenofibrate 160 MG Tab TAKE 1 TABLET EVERY DAY  Qty: 90 tablet, Refills: 4      glipiZIDE (GLUCOTROL) 10 MG tablet Take 1 tablet (10 mg total) by mouth 2 (two) times daily with meals.      Lactobacillus rhamnosus GG (CULTURELLE) 10 billion cell capsule Take 1 capsule by mouth once daily.      lancets Misc To check BG 6 times daily, to use with insurance preferred meter  Qty: 200 each, Refills: 11      losartan (COZAAR) 50 MG tablet Take 1 tablet (50 mg total) by mouth  once daily.  Qty: 90 tablet, Refills: 4      metFORMIN (GLUCOPHAGE) 1000 MG tablet TAKE 1 TABLET TWICE DAILY WITH MEALS  Qty: 180 tablet, Refills: 1      pantoprazole (PROTONIX) 40 MG tablet Take 1 tablet (40 mg total) by mouth once daily.  Qty: 90 tablet, Refills: 3    Associated Diagnoses: Jonas's esophagus with low grade dysplasia      psyllium (METAMUCIL) powder Take 1 packet by mouth 2 (two) times daily.      traMADoL (ULTRAM) 50 mg tablet Take 1 tablet (50 mg total) by mouth every 12 (twelve) hours as needed for Pain.  Qty: 60 tablet, Refills: 5    Comments: Quantity prescribed more than 7 day supply? Yes, quantity medically necessary  Associated Diagnoses: Chronic bilateral low back pain with bilateral sciatica             Discharge Procedure Orders (must include Diet, Follow-up, Activity)    Follow Up:  Follow up with PCP as previously scheduled  Resume routine diet.  Activity as tolerated.    No driving day of procedure.

## 2020-08-05 NOTE — H&P
History & Physical - Short Stay  Gastroenterology      SUBJECTIVE:     Procedure: Colonoscopy and EGD    Chief Complaint/Indication for Procedure: Abdominal Pain, Jonas's Esophagus, Change in Bowel Habits and Previous Polyps    PTA Medications   Medication Sig    ACCU-CHEK ISRAEL CONTROL SOLN Soln     ACCU-CHEK ISRAEL PLUS METER Misc USE AS DIRECTED    ACCU-CHEK ISRAEL PLUS TEST STRP Strp TEST 2 TO 4 TIMES EVERY DAY     amLODIPine (NORVASC) 5 MG tablet Take 1 tablet (5 mg total) by mouth once daily.    aspirin 325 MG tablet Take 325 mg by mouth once daily.    atorvastatin (LIPITOR) 20 MG tablet Take 1 tablet (20 mg total) by mouth once daily.    BD ALCOHOL SWABS PadM     carvedilol (COREG) 3.125 MG tablet TAKE 1 TABLET TWICE DAILY (Patient taking differently: Take 3.125 mg by mouth 2 (two) times daily. )    clopidogreL (PLAVIX) 75 mg tablet TAKE 1 TABLET EVERY DAY (Patient taking differently: Take 75 mg by mouth once daily. )    ezetimibe (ZETIA) 10 mg tablet Take 1 tablet (10 mg total) by mouth once daily.    fenofibrate 160 MG Tab TAKE 1 TABLET EVERY DAY (Patient taking differently: Take 160 mg by mouth once daily. )    glipiZIDE (GLUCOTROL) 10 MG tablet Take 1 tablet (10 mg total) by mouth 2 (two) times daily with meals.    Lactobacillus rhamnosus GG (CULTURELLE) 10 billion cell capsule Take 1 capsule by mouth once daily.    lancets Misc To check BG 6 times daily, to use with insurance preferred meter    losartan (COZAAR) 50 MG tablet Take 1 tablet (50 mg total) by mouth once daily.    metFORMIN (GLUCOPHAGE) 1000 MG tablet TAKE 1 TABLET TWICE DAILY WITH MEALS (Patient taking differently: Take 1,000 mg by mouth 2 (two) times daily with meals. )    pantoprazole (PROTONIX) 40 MG tablet Take 1 tablet (40 mg total) by mouth once daily.    psyllium (METAMUCIL) powder Take 1 packet by mouth 2 (two) times daily.    traMADoL (ULTRAM) 50 mg tablet Take 1 tablet (50 mg total) by mouth every 12 (twelve)  hours as needed for Pain.       Review of patient's allergies indicates:   Allergen Reactions    Nicotine      puritis from nicotine patch. Patient is a smoker.        Past Medical History:   Diagnosis Date    Acute coronary artery obstruction without MI     14 stents    Anemia 8/27/2017    Arthritis     Colon polyp     COPD (chronic obstructive pulmonary disease) 8/25/2017    DM (diabetes mellitus)     Essential hypertension     Fatty liver     Hepatomegaly     HTN (hypertension)     Hyperlipidemia     Mixed hyperlipidemia     NSTEMI (non-ST elevated myocardial infarction) 8/17/2017 11/17    Pacemaker 6/25/2012    left chest PACEMAKER [employer] SEDR01 Sensia   S/N:[id number] Deion Link 8/3/2010 - current   right atrium LEAD MEDTRONIC 5076 CapSure Fix Novus  S/N:[id number] Deion Link 8/3/2010 - current   right ventricle LEAD [employer]TRON[employer] 5076 CapSure Fix Novus  S/N:[id number] Deion Link 8/3/2010 - current     PVD (peripheral vascular disease)     S/P CABG (coronary artery bypass graft) 9/14/2017 8/17 CABG x5 LIMA-LAD, VG-PDA, VG-D, YVG-OM-PLB  mammary artery to left anterior descending coronary artery and separate segments  of saphenous vein from the aorta to the posterior descending coronary artery  and from the aorta to the diagonal coronary artery and from the aorta to the  obtuse marginal coronary artery and to the posterolateral branch of the  circumflex in a sequential fashion using a combination of antegrade and  retrograde cardioplegia with mild systemic hypothermia and endoscopic vein  harvest.    S/P coronary artery stent placement     11/17 proximal circumflex using a 2.5 x 38, post-dilated to 3.0 with 0% residual stenosis.    Sinus node dysfunction     Stroke 7/4/14    Tobacco abuse 8/15/2017    Uncontrolled type 2 diabetes mellitus with hyperosmolarity without coma, without long-term current use of insulin     Documented by Maury on 10/26/15.     Past Surgical History:    Procedure Laterality Date    ABDOMINAL SURGERY      APPENDECTOMY      CARDIAC PACEMAKER PLACEMENT      CHOLECYSTECTOMY      COLONOSCOPY  2015    Dr. Marinelli: 2 colon polyps removed (one not completely removed), hemorrhoids; repeat in 6 weeks; biopsy: ADENOMATOUS POLYPS    coranary stent      CORONARY ANGIOPLASTY      CORONARY ARTERY BYPASS GRAFT      ESOPHAGOGASTRODUODENOSCOPY N/A 3/9/2020    Procedure: ESOPHAGOGASTRODUODENOSCOPY (EGD);  Surgeon: Alonzo Marinelli MD;  Location: [facility];  Service: Endoscopy;  Laterality: N/A; Mild Schatzki ring. Biopsied. Dilated. gastritis; biopsy: stomach- chemical/reactive gastropathy, esophagus- Squamocolumnar mucosa with focal intestinal metaplasia and low-grade dysplasia    ILIAC ARTERY STENT      POLYPECTOMY      TOTAL ABDOMINAL HYSTERECTOMY       Family History   Problem Relation Age of Onset    Kidney disease Mother     Hypertension Mother     Diabetes Mother     Melanoma Sister     Lung disease Sister     Hypertension Sister     Colon cancer Neg Hx     Crohn's disease Neg Hx     Ulcerative colitis Neg Hx     Stomach cancer Neg Hx     Esophageal cancer Neg Hx      Social History     Tobacco Use    Smoking status: Current Every Day Smoker     Packs/day: 1.50     Years: 60.00     Pack years: 90.00     Types: Cigarettes     Last attempt to quit: 8/15/2017     Years since quittin.9    Smokeless tobacco: Never Used    Tobacco comment: Tobacco treatment specialist consulted.   Substance Use Topics    Alcohol use: No    Drug use: No         OBJECTIVE:     Vital Signs (Most Recent)  Temp: 98.1 °F (36.7 °C) (20)  Pulse: 65 (20)  Resp: 16 (20)  BP: (!) 186/85 (20)  SpO2: 98 % (20)    Physical Exam:                                                       GENERAL:  Comfortable, in no acute distress.                                 HEENT EXAM:  Nonicteric.  No adenopathy.  Oropharynx is  clear.               NECK:  Supple.                                                               LUNGS:  Clear.                                                               CARDIAC:  Regular rate and rhythm.  S1, S2.  No murmur.                      ABDOMEN:  Soft, positive bowel sounds, nontender.  No hepatosplenomegaly or masses.  No rebound or guarding.                                             EXTREMITIES:  No edema.     MENTAL STATUS:  Normal, alert and oriented.      ASSESSMENT/PLAN:     Assessment: Abdominal Pain, Jonas's Esophagus, Change in Bowel Habits and Previous Polyps    Plan: Colonoscopy and EGD    Anesthesia Plan: General    ASA Grade: ASA 2 - Patient with mild systemic disease with no functional limitations    MALLAMPATI SCORE:  I (soft palate, uvula, fauces, and tonsillar pillars visible)     In my medical opinion and judgment, this medical or surgical procedure was not able to be safely postponed in accordance with [place] Department of Health, Healthcare Facility Notice #2020-COVID19-ALL-007.

## 2020-08-05 NOTE — TRANSFER OF CARE
"Anesthesia Transfer of Care Note    Patient: Shirlene Ortiz    Procedure(s) Performed: Procedure(s) (LRB):  EGD (ESOPHAGOGASTRODUODENOSCOPY) (N/A)  COLONOSCOPY (N/A)    Patient location: PACU    Anesthesia Type: general    Transport from OR: Transported from OR on room air with adequate spontaneous ventilation    Post pain: adequate analgesia    Post assessment: no apparent anesthetic complications and tolerated procedure well    Post vital signs: stable    Level of consciousness: awake    Nausea/Vomiting: no nausea/vomiting    Complications: none    Transfer of care protocol was followed      Last vitals:   Visit Vitals  BP (!) 186/85 (BP Location: Right arm, Patient Position: Lying)   Pulse 65   Temp 36.7 °C (98.1 °F) (Skin)   Resp 16   Ht 5' 2.5" (1.588 m)   Wt 68.9 kg (152 lb)   SpO2 98%   Breastfeeding No   BMI 27.36 kg/m²     "

## 2020-08-05 NOTE — DISCHARGE INSTRUCTIONS

## 2020-08-05 NOTE — ANESTHESIA POSTPROCEDURE EVALUATION
Anesthesia Post Evaluation    Patient: Shirlene Ortiz    Procedure(s) Performed: Procedure(s) (LRB):  EGD (ESOPHAGOGASTRODUODENOSCOPY) (N/A)  COLONOSCOPY (N/A)    Final Anesthesia Type: general    Patient location during evaluation: PACU  Patient participation: Yes- Able to Participate  Level of consciousness: awake and alert  Post-procedure vital signs: reviewed and stable  Pain management: adequate  Airway patency: patent    PONV status at discharge: No PONV  Anesthetic complications: no      Cardiovascular status: blood pressure returned to baseline  Respiratory status: unassisted  Hydration status: euvolemic  Follow-up not needed.          Vitals Value Taken Time   /74 08/05/20 1053   Temp 36.6 °C (97.8 °F) 08/05/20 1026   Pulse 74 08/05/20 1053   Resp 16 08/05/20 1053   SpO2 98 % 08/05/20 1053         No case tracking events are documented in the log.      Pain/Quyen Score: Quyen Score: 9 (8/5/2020 10:26 AM)

## 2020-08-05 NOTE — ANESTHESIA PREPROCEDURE EVALUATION
08/05/2020  Shirlene Ortiz is a 75 y.o., female.    Pre-op Assessment    I have reviewed the Patient Summary Reports.     I have reviewed the Nursing Notes. I have reviewed the NPO Status.   I have reviewed the Medications.     Review of Systems  Anesthesia Hx:  Denies Family Hx of Anesthesia complications.   Denies Personal Hx of Anesthesia complications.   Social:  Smoker    Hematology/Oncology:         -- Anemia:   Cardiovascular:   Hypertension Past MI CAD  CABG/stent  PVD CABG 2017, coronary stents, LAE, off ASA/Plavix 1 day, iliac stents, pacemaker   Pulmonary:   COPD, moderate    Renal/:   Chronic Renal Disease, CRI    Hepatic/GI:   Bowel Prep. Liver Disease,    Musculoskeletal:   Arthritis     Neurological:   TIA, CVA, residual symptoms speech   Endocrine:   Diabetes, poorly controlled        Physical Exam  General:  Well nourished    Airway/Jaw/Neck:  Airway Findings: Mouth Opening: Normal Tongue: Normal  General Airway Assessment: Adult  Mallampati: III  Improves to II with phonation.      Dental:  Dental Findings: Edentulous   Chest/Lungs:  Chest/Lungs Findings: Clear to auscultation, Normal Respiratory Rate     Heart/Vascular:  Heart Findings: Rate: Normal  Rhythm: Regular Rhythm  Sounds: Normal        Mental Status:  Mental Status Findings:  Cooperative, Alert and Oriented         Anesthesia Plan  Type of Anesthesia, risks & benefits discussed:  Anesthesia Type:  general  Patient's Preference:   Intra-op Monitoring Plan: standard ASA monitors  Intra-op Monitoring Plan Comments:   Post Op Pain Control Plan:   Post Op Pain Control Plan Comments:   Induction:   IV  Beta Blocker:  Patient is on a Beta-Blocker and has received one dose within the past 24 hours (No further documentation required).       Informed Consent: Patient understands risks and agrees with Anesthesia plan.  Questions answered.  Anesthesia consent signed with patient.  ASA Score: 4     Day of Surgery Review of History & Physical:    H&P update referred to the provider.         Ready For Surgery From Anesthesia Perspective.

## 2020-08-05 NOTE — PROVATION PATIENT INSTRUCTIONS
Discharge Summary/Instructions after an Endoscopic Procedure  Patient Name: Shirlene Ortiz  Patient MRN: [id number]  Patient YOB: 1945 Wednesday, August 5, 2020  Alonzo Marinelli MD  RESTRICTIONS:  During your procedure today, you received medications for sedation.  These   medications may affect your judgment, balance and coordination.  Therefore,   for 24 hours, you have the following restrictions:   - DO NOT drive a car, operate machinery, make legal/financial decisions,   sign important papers or drink alcohol.    ACTIVITY:  Today: no heavy lifting, straining or running due to procedural   sedation/anesthesia.  The following day: return to full activity including work.  DIET:  Eat and drink normally unless instructed otherwise.     TREATMENT FOR COMMON SIDE EFFECTS:  - Mild abdominal pain, nausea, belching, bloating or excessive gas:  rest,   eat lightly and use a heating pad.  - Sore Throat: treat with throat lozenges and/or gargle with warm salt   water.  - Because air was used during the procedure, expelling large amounts of air   from your rectum or belching is normal.  - If a bowel prep was taken, you may not have a bowel movement for 1-3 days.    This is normal.  SYMPTOMS TO WATCH FOR AND REPORT TO YOUR PHYSICIAN:  1. Abdominal pain or bloating, other than gas cramps.  2. Chest pain.  3. Back pain.  4. Signs of infection such as: chills or fever occurring within 24 hours   after the procedure.  5. Rectal bleeding, which would show as bright red, maroon, or black stools.   (A tablespoon of blood from the rectum is not serious, especially if   hemorrhoids are present.)  6. Vomiting.  7. Weakness or dizziness.  GO DIRECTLY TO THE NEAREST EMERGENCY ROOM IF YOU HAVE ANY OF THE FOLLOWING:      Difficulty breathing              Chills and/or fever over 101 F   Persistent vomiting and/or vomiting blood   Severe abdominal pain   Severe chest pain   Black, tarry stools   Bleeding- more than one  tablespoon   Any other symptom or condition that you feel may need urgent attention  Your doctor recommends these additional instructions:  If any biopsies were taken, your doctors clinic will contact you in 1 to 2   weeks with any results.  We are waiting for your pathology results.   Continue your present medications.   You are being discharged to home.  For questions, problems or results please call your physician - Alonzo Marinelli MD at Work:  [phone].  EMERGENCY PHONE NUMBER: [phone], LAB RESULTS: [phone]  IF A COMPLICATION OR EMERGENCY SITUATION ARISES AND YOU ARE UNABLE TO REACH   YOUR PHYSICIAN - GO DIRECTLY TO THE EMERGENCY ROOM.  ___________________________________________  Nurse Signature  ___________________________________________  Patient/Designated Responsible Party Signature  Alonzo Marinelli MD  8/5/2020 9:58:28 AM  This report has been verified and signed electronically.  [employer]

## 2020-08-05 NOTE — OR NURSING
Kenzie Gomez and Dr. Marinelli informed of pt's pre-op blood sugar of 75.  No new orders received. NM

## 2020-08-06 ENCOUNTER — NURSE TRIAGE (OUTPATIENT)
Dept: ADMINISTRATIVE | Facility: CLINIC | Age: 75
End: 2020-08-06

## 2020-08-06 NOTE — TELEPHONE ENCOUNTER
"Pt had a colonoscopy and endoscopy yesterday. Today abdomen is swollen and tender. States she is having constant 4/10 pain. Per protocol, advised pt to go to the ED. Pt states she will go tomorrow if she is still feeling bad. Again advised pt to go to the ED for her safety. States she will go if she gets any worse.    Reason for Disposition   [1] Pain in upper abdomen  lasts > 10 minutes AND [2] age > 35 AND [3] at least one cardiac risk factor (i.e., hypertension, diabetes, obesity, smoker or strong family history of heart disease)    Additional Information   Negative: Shock suspected (e.g., cold/pale/clammy skin, too weak to stand, low BP, rapid pulse)   Negative: Difficult to awaken or acting confused (e.g., disoriented, slurred speech)   Negative: Passed out (i.e., lost consciousness, collapsed and was not responding)   Negative: Sounds like a life-threatening emergency to the triager   Negative: SEVERE abdomen pain (e.g., excruciating)   Negative: SEVERE dizziness (e.g., unable to stand, requires support to walk, feels like passing out now)   Negative: [1] Vomited blood AND [2] more than a few streaks of blood  (Exception: few streaks and only occurred once)   Negative: [1] Vomiting AND [2] contains black ("coffee ground") material   Negative: Tarry or jet black-colored stool   Negative: Area of skin on neck or upper chest swells or feels crunchy (crackles) when touched    Protocols used: ENDOSCOPY (UPPER GI) SYMPTOMS AND [id number]      "

## 2020-08-17 LAB
FINAL PATHOLOGIC DIAGNOSIS: NORMAL
GROSS: NORMAL

## 2020-08-18 ENCOUNTER — TELEPHONE (OUTPATIENT)
Dept: ENDOSCOPY | Facility: HOSPITAL | Age: 75
End: 2020-08-18

## 2020-08-18 DIAGNOSIS — K63.5 POLYP OF COLON, UNSPECIFIED PART OF COLON, UNSPECIFIED TYPE: Primary | ICD-10-CM

## 2020-08-18 NOTE — TELEPHONE ENCOUNTER
----- Message from Theodore Zimmerman MD sent at 8/18/2020  3:02 PM CDT -----  We can try EMR but the previous partial polypectomy will make it difficult.  Theodore Zimmerman MD  ----- Message -----  From: Mally Anthony MA  Sent: 8/18/2020   2:38 PM CDT  To: Theodore Zimmerman MD    Please advise  ----- Message -----  From: Alonzo Marinelli MD  Sent: 8/18/2020   2:11 PM CDT  To: Mally Anthony MA Rae,   Can you have one of the EMR docs look at my C-scope pics from 8/5/2020 to see if pt candidate for EMR (biopsies returned benign adenoma)? Thanks

## 2020-08-19 ENCOUNTER — TELEPHONE (OUTPATIENT)
Dept: GASTROENTEROLOGY | Facility: HOSPITAL | Age: 75
End: 2020-08-19

## 2020-08-19 NOTE — TELEPHONE ENCOUNTER
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 I asked Dr Zimmerman at [facility] for opinion re: EMR of the polyp, and his office will arrange f/u with the pt. I explained to the pt the options of EMR vs surgical resection, including risks/benefits to each approach, which pt understands, and is agreeable to pursue with Dr Zimmerman at [facility]. Pt will also need repeat EGD with me for Jonas's in 6 months.

## 2020-08-21 NOTE — TELEPHONE ENCOUNTER
MD Mally Ojeda MA   Caller: Unspecified (2 days ago,  3:41 PM)             Make sure she is schedule for colonoscopy.   Theodore

## 2020-08-26 ENCOUNTER — TELEPHONE (OUTPATIENT)
Dept: ENDOSCOPY | Facility: HOSPITAL | Age: 75
End: 2020-08-26

## 2020-08-28 ENCOUNTER — PATIENT OUTREACH (OUTPATIENT)
Dept: ADMINISTRATIVE | Facility: OTHER | Age: 75
End: 2020-08-28

## 2020-08-28 RX ORDER — FENOFIBRATE 160 MG/1
TABLET ORAL
Qty: 90 TABLET | Refills: 4 | Status: SHIPPED | OUTPATIENT
Start: 2020-08-28 | End: 2022-11-01 | Stop reason: CLARIF

## 2020-08-28 NOTE — PROGRESS NOTES
LINKS immunization registry updated  Care Everywhere updated  Health Maintenance updated  DIS/[place] reviewed for overdue Proactive [facility] Encounters ([facility]) health maintenance testing (CRS, Breast Ca, Diabetic Eye Exam)   Orders entered:N/A  Portal message sent to patient with scheduling link for mammogram

## 2020-09-01 ENCOUNTER — OFFICE VISIT (OUTPATIENT)
Dept: CARDIOLOGY | Facility: CLINIC | Age: 75
End: 2020-09-01
Payer: MEDICARE

## 2020-09-01 VITALS
SYSTOLIC BLOOD PRESSURE: 113 MMHG | DIASTOLIC BLOOD PRESSURE: 68 MMHG | BODY MASS INDEX: 28.16 KG/M2 | HEART RATE: 86 BPM | HEIGHT: 62 IN | WEIGHT: 153 LBS

## 2020-09-01 DIAGNOSIS — Z95.1 S/P CABG (CORONARY ARTERY BYPASS GRAFT): ICD-10-CM

## 2020-09-01 DIAGNOSIS — E78.2 MIXED HYPERLIPIDEMIA: ICD-10-CM

## 2020-09-01 DIAGNOSIS — I10 ESSENTIAL HYPERTENSION: ICD-10-CM

## 2020-09-01 DIAGNOSIS — I25.10 CORONARY ARTERY DISEASE INVOLVING NATIVE CORONARY ARTERY OF NATIVE HEART WITHOUT ANGINA PECTORIS: Primary | ICD-10-CM

## 2020-09-01 DIAGNOSIS — I65.23 BILATERAL CAROTID ARTERY STENOSIS: ICD-10-CM

## 2020-09-01 DIAGNOSIS — I73.9 PVD (PERIPHERAL VASCULAR DISEASE): ICD-10-CM

## 2020-09-01 DIAGNOSIS — E11.22 TYPE 2 DIABETES MELLITUS WITH STAGE 3 CHRONIC KIDNEY DISEASE, WITHOUT LONG-TERM CURRENT USE OF INSULIN: ICD-10-CM

## 2020-09-01 DIAGNOSIS — N18.30 TYPE 2 DIABETES MELLITUS WITH STAGE 3 CHRONIC KIDNEY DISEASE, WITHOUT LONG-TERM CURRENT USE OF INSULIN: ICD-10-CM

## 2020-09-01 DIAGNOSIS — Z95.0 PACEMAKER: ICD-10-CM

## 2020-09-01 DIAGNOSIS — Z95.5 S/P CORONARY ARTERY STENT PLACEMENT: ICD-10-CM

## 2020-09-01 DIAGNOSIS — G45.9 TIA (TRANSIENT ISCHEMIC ATTACK): ICD-10-CM

## 2020-09-01 PROCEDURE — 3044F PR MOST RECENT HEMOGLOBIN A1C LEVEL <7.0%: ICD-10-PCS | Mod: HCNC,CPTII,S$GLB, | Performed by: INTERNAL MEDICINE

## 2020-09-01 PROCEDURE — 99214 OFFICE O/P EST MOD 30 MIN: CPT | Mod: HCNC,S$GLB,, | Performed by: INTERNAL MEDICINE

## 2020-09-01 PROCEDURE — 99499 UNLISTED E&M SERVICE: CPT | Mod: HCNC,S$GLB,, | Performed by: INTERNAL MEDICINE

## 2020-09-01 PROCEDURE — 99499 RISK ADDL DX/OHS AUDIT: ICD-10-PCS | Mod: HCNC,S$GLB,, | Performed by: INTERNAL MEDICINE

## 2020-09-01 PROCEDURE — 99999 PR PBB SHADOW E&M-EST. PATIENT-LVL IV: ICD-10-PCS | Mod: PBBFAC,HCNC,, | Performed by: INTERNAL MEDICINE

## 2020-09-01 PROCEDURE — 3078F DIAST BP <80 MM HG: CPT | Mod: HCNC,CPTII,S$GLB, | Performed by: INTERNAL MEDICINE

## 2020-09-01 PROCEDURE — 3074F SYST BP LT 130 MM HG: CPT | Mod: HCNC,CPTII,S$GLB, | Performed by: INTERNAL MEDICINE

## 2020-09-01 PROCEDURE — 3074F PR MOST RECENT SYSTOLIC BLOOD PRESSURE < 130 MM HG: ICD-10-PCS | Mod: HCNC,CPTII,S$GLB, | Performed by: INTERNAL MEDICINE

## 2020-09-01 PROCEDURE — 3044F HG A1C LEVEL LT 7.0%: CPT | Mod: HCNC,CPTII,S$GLB, | Performed by: INTERNAL MEDICINE

## 2020-09-01 PROCEDURE — 99999 PR PBB SHADOW E&M-EST. PATIENT-LVL IV: CPT | Mod: PBBFAC,HCNC,, | Performed by: INTERNAL MEDICINE

## 2020-09-01 PROCEDURE — 1101F PR PT FALLS ASSESS DOC 0-1 FALLS W/OUT INJ PAST YR: ICD-10-PCS | Mod: HCNC,CPTII,S$GLB, | Performed by: INTERNAL MEDICINE

## 2020-09-01 PROCEDURE — 1159F MED LIST DOCD IN RCRD: CPT | Mod: HCNC,S$GLB,, | Performed by: INTERNAL MEDICINE

## 2020-09-01 PROCEDURE — 1126F PR PAIN SEVERITY QUANTIFIED, NO PAIN PRESENT: ICD-10-PCS | Mod: HCNC,S$GLB,, | Performed by: INTERNAL MEDICINE

## 2020-09-01 PROCEDURE — 1159F PR MEDICATION LIST DOCUMENTED IN MEDICAL RECORD: ICD-10-PCS | Mod: HCNC,S$GLB,, | Performed by: INTERNAL MEDICINE

## 2020-09-01 PROCEDURE — 99214 PR OFFICE/OUTPT VISIT, EST, LEVL IV, 30-39 MIN: ICD-10-PCS | Mod: HCNC,S$GLB,, | Performed by: INTERNAL MEDICINE

## 2020-09-01 PROCEDURE — 1101F PT FALLS ASSESS-DOCD LE1/YR: CPT | Mod: HCNC,CPTII,S$GLB, | Performed by: INTERNAL MEDICINE

## 2020-09-01 PROCEDURE — 1126F AMNT PAIN NOTED NONE PRSNT: CPT | Mod: HCNC,S$GLB,, | Performed by: INTERNAL MEDICINE

## 2020-09-01 PROCEDURE — 3078F PR MOST RECENT DIASTOLIC BLOOD PRESSURE < 80 MM HG: ICD-10-PCS | Mod: HCNC,CPTII,S$GLB, | Performed by: INTERNAL MEDICINE

## 2020-09-01 NOTE — PROGRESS NOTES
Subjective:    Patient ID:  Shirlene Ortiz is a 75 y.o. female who presents for follow-up of CAD F/U and Cardiac Clearance      HPI  Here for follow up of CABG-PCI (BELLE 11/17)/PPM/PAD. Very active no angina. ?? TIA earlier this year. C/o increasing SILVA    Review of Systems   Constitution: Negative for malaise/fatigue.   Eyes: Negative for blurred vision.   Cardiovascular: Negative for chest pain, claudication, cyanosis, dyspnea on exertion, irregular heartbeat, leg swelling, near-syncope, orthopnea, palpitations, paroxysmal nocturnal dyspnea and syncope.   Respiratory: Negative for cough and shortness of breath.    Hematologic/Lymphatic: Does not bruise/bleed easily.   Musculoskeletal: Negative for back pain, falls, joint pain, muscle cramps, muscle weakness and myalgias.   Gastrointestinal: Negative for abdominal pain, change in bowel habit, nausea and vomiting.   Genitourinary: Negative for urgency.   Neurological: Negative for dizziness, focal weakness and light-headedness.       Past Medical History:   Diagnosis Date    Acute coronary artery obstruction without MI     14 stents    Anemia 8/27/2017    Arthritis     Colon polyp     COPD (chronic obstructive pulmonary disease) 8/25/2017    DM (diabetes mellitus)     Essential hypertension     Fatty liver     Hepatomegaly     HTN (hypertension)     Hyperlipidemia     Mixed hyperlipidemia     NSTEMI (non-ST elevated myocardial infarction) 8/17/2017 11/17    Pacemaker 6/25/2012    left chest PACEMAKER [employer] SEDR01 Sensia   S/N:[id number] Deion Link 8/3/2010 - current   right atrium LEAD [employer] 5076 CapSure Fix Novus  S/N:[id number] Deion Link 8/3/2010 - current   right ventricle LEAD [employer]TR[employer] 5076 CapSure Fix Novus  S/N:[id number] Deion Link 8/3/2010 - current     PVD (peripheral vascular disease)     S/P CABG (coronary artery bypass graft) 9/14/2017 8/17 CABG x5 LIMA-LAD, VG-PDA, VG-D, YVG-OM-PLB  mammary artery to left anterior  descending coronary artery and separate segments  of saphenous vein from the aorta to the posterior descending coronary artery  and from the aorta to the diagonal coronary artery and from the aorta to the  obtuse marginal coronary artery and to the posterolateral branch of the  circumflex in a sequential fashion using a combination of antegrade and  retrograde cardioplegia with mild systemic hypothermia and endoscopic vein  harvest.    S/P coronary artery stent placement     11/17 proximal circumflex using a 2.5 x 38, post-dilated to 3.0 with 0% residual stenosis.    Sinus node dysfunction     Stroke 7/4/14    Tobacco abuse 8/15/2017    Uncontrolled type 2 diabetes mellitus with hyperosmolarity without coma, without long-term current use of insulin     Documented by Maury on 10/26/15.     Patient Active Problem List   Diagnosis    Sinus node dysfunction    Pacemaker    CAD (coronary artery disease)    PVD (peripheral vascular disease)    Uncontrolled type 2 diabetes mellitus with hyperosmolarity without coma, without long-term current use of insulin    Mixed hyperlipidemia    Essential hypertension    Functional gait disorder    Speech defect    Hx of colonic polyps    Tobacco abuse    COPD (chronic obstructive pulmonary disease)    Anemia    S/P CABG (coronary artery bypass graft)    S/P coronary artery stent placement    Atherosclerosis of aorta    Tortuous aorta    Atypical nevi    Type 2 diabetes mellitus with stage 3 chronic kidney disease, without long-term current use of insulin    Uncontrolled type 2 diabetes mellitus with hypoglycemia without coma    Eczema of left external ear    Bilateral carotid artery stenosis    Dysphagia    Chronic bilateral low back pain with bilateral sciatica    Lumbar spondylosis    TIA (transient ischemic attack)    Jonas's esophagus        Objective:     Vitals:    09/01/20 1428   BP: 113/68   Pulse: 86        Physical Exam   Constitutional: She  is oriented to person, place, and time. She appears well-developed and well-nourished.   HENT:   Head: Normocephalic.   Eyes: Conjunctivae are normal.   Neck: Normal range of motion. Neck supple. No JVD present.   Cardiovascular: Normal rate, regular rhythm, normal heart sounds and intact distal pulses.   Pulses:       Carotid pulses are 2+ on the right side and 2+ on the left side.       Radial pulses are 2+ on the right side and 2+ on the left side.        Dorsalis pedis pulses are 2+ on the right side and 2+ on the left side.        Posterior tibial pulses are 2+ on the right side and 2+ on the left side.   Well healed midline sternal incision.  Pacer site clean and dry, well healed.       Pulmonary/Chest: Effort normal and breath sounds normal.   Abdominal: Soft. Bowel sounds are normal.   Musculoskeletal:         General: No tenderness or edema.   Neurological: She is alert and oriented to person, place, and time. Gait normal.   Skin: Skin is warm, dry and intact. No cyanosis. Nails show no clubbing.   Psychiatric: She has a normal mood and affect. Her speech is normal and behavior is normal. Thought content normal.   Nursing note and vitals reviewed.            ..    Chemistry        Component Value Date/Time     07/03/2020 0659    K 4.7 07/03/2020 0659     07/03/2020 0659    CO2 25 07/03/2020 0659    BUN 30 (H) 07/03/2020 0659    CREATININE 1.60 (H) 07/03/2020 0659    GLU 93 07/03/2020 0659        Component Value Date/Time    CALCIUM 10.0 07/03/2020 0659    ALKPHOS 65 07/03/2020 0659    AST 21 07/03/2020 0659    ALT 12 07/03/2020 0659    BILITOT 0.5 07/03/2020 0659    ESTGFRAFRICA 36 (A) 07/03/2020 0659    EGFRNONAA 31 (A) 07/03/2020 0659            ..  Lab Results   Component Value Date    CHOL 136 07/02/2020    CHOL 129 06/25/2020    CHOL 149 12/21/2019     Lab Results   Component Value Date    HDL 29 (L) 07/02/2020    HDL 27 (L) 06/25/2020    HDL 33 (L) 12/21/2019     Lab Results   Component  Value Date    LDLCALC 47.0 (L) 07/02/2020    LDLCALC 57.2 (L) 06/25/2020    LDLCALC 86.8 12/21/2019     Lab Results   Component Value Date    TRIG 300 (H) 07/02/2020    TRIG 224 (H) 06/25/2020    TRIG 146 12/21/2019     Lab Results   Component Value Date    CHOLHDL 21.3 07/02/2020    CHOLHDL 20.9 06/25/2020    CHOLHDL 22.1 12/21/2019     ..  Lab Results   Component Value Date    WBC 4.95 07/03/2020    HGB 10.0 (L) 07/03/2020    HCT 32.0 (L) 07/03/2020    MCV 84 07/03/2020     07/03/2020       Test(s) Reviewed  I have reviewed the following in detail:  [] Stress test   [] Angiography   [x] Echocardiogram   [x] Labs   [x] Other:       Assessment:         ICD-10-CM ICD-9-CM   1. Coronary artery disease involving native coronary artery of native heart without angina pectoris  I25.10 414.01   2. PVD (peripheral vascular disease)  I73.9 443.9   3. Mixed hyperlipidemia  E78.2 272.2   4. Essential hypertension  I10 401.9   5. Bilateral carotid artery stenosis  I65.23 433.10     433.30   6. Pacemaker  Z95.0 V45.01   7. TIA (transient ischemic attack)  G45.9 435.9   8. S/P CABG (coronary artery bypass graft)  Z95.1 V45.81   9. S/P coronary artery stent placement  Z95.5 V45.82   10. Type 2 diabetes mellitus with stage 3 chronic kidney disease, without long-term current use of insulin  E11.22 250.40    N18.3 585.3     Problem List Items Addressed This Visit     Bilateral carotid artery stenosis    Overview     caortid US 8/16/17         CAD (coronary artery disease) - Primary    Overview     11/17  Circumflex: Native circumflex has a 70% stenosis proximal to the stent in  the mid portion and has a short discrete 95% in-stent restenosis just prior to  the obtuse marginal and another 80% right at the obtuse marginal and a 70% in  the distal portion of the stent. There was a stent in the obtuse marginal into  the stent of the circumflex with a 70% in-stent restenosis.  --  Graft to the mid LAD: The graft was a LIMA from the  aorta.  --  Graft to the 1st obtuse marginal: Vein graft to the circumflex is occluded.           Essential hypertension    Mixed hyperlipidemia    Pacemaker    Overview     left chest PACEMAKER  [employer] SEDR01 Sensia   S/N:[id number]  Deion Link  8/3/2010 - current     right atrium LEAD  [employer] 5076 CapSure Fix Novus  S/N:[id number]  Deion Link  8/3/2010 - current     right ventricle LEAD  MEDTRON[employer] 5076 CapSure Fix Novus  S/N:[id number]  Deion Link  8/3/2010 - current          PVD (peripheral vascular disease)    Overview     Significantly reduced MAURICIO on the left leg.  70-89% stenosis seen in the left popliteal artery.   SIGNED BY: Caesar Loo MD On: 08/12/2016 17:05         S/P CABG (coronary artery bypass graft)    Overview     8/17  CABG x5 LIMA-LAD, VG-PDA, VG-D, YVG-OM-PLB            S/P coronary artery stent placement    Overview     11/17  proximal circumflex using a 2.5 x 38, post-dilated to 3.0 with 0% residual  stenosis.         TIA (transient ischemic attack)    Type 2 diabetes mellitus with stage 3 chronic kidney disease, without long-term current use of insulin           Plan:           Return to clinic 9 months   Low level/low impact aerobic exercise 5x's/wk. Heart healthy diet and risk factor modification.    See labs and med orders.  Mild/moderate PAD by MIC-US.   Leg pain appears more neurogenic, has LS spine dsx.  If has claudication can do angio but prefer to avoid due to CRI  Stop smoking !  Patient is a low/modedrate CV risk for a low risk surgery. Continue BP/antiplatelet meds la nena-op.  Unless patient has had a coronary PCI within the last 6 months that is not known to me/available in epic it is ok  to hold plavix for 5 days preprocedure, resume ASAP post procedure, do not hold ASA pt has TIA when off both.   meredith stress due to SILVA         Portions of this note may have been created with voice recognition software.  Grammatical, syntax and spelling errors may be inevitable.

## 2020-09-08 ENCOUNTER — CLINICAL SUPPORT (OUTPATIENT)
Dept: CARDIOLOGY | Facility: CLINIC | Age: 75
End: 2020-09-08
Attending: INTERNAL MEDICINE
Payer: MEDICARE

## 2020-09-08 ENCOUNTER — HOSPITAL ENCOUNTER (OUTPATIENT)
Dept: RADIOLOGY | Facility: HOSPITAL | Age: 75
Discharge: HOME | End: 2020-09-08
Attending: INTERNAL MEDICINE
Payer: MEDICARE

## 2020-09-08 VITALS — WEIGHT: 153 LBS | HEIGHT: 62 IN | BODY MASS INDEX: 28.16 KG/M2

## 2020-09-08 DIAGNOSIS — E78.2 MIXED HYPERLIPIDEMIA: ICD-10-CM

## 2020-09-08 DIAGNOSIS — I73.9 PVD (PERIPHERAL VASCULAR DISEASE): ICD-10-CM

## 2020-09-08 DIAGNOSIS — Z95.1 S/P CABG (CORONARY ARTERY BYPASS GRAFT): ICD-10-CM

## 2020-09-08 LAB
CV STRESS BASE HR: 67 BPM
DIASTOLIC BLOOD PRESSURE: 66 MMHG
NUC REST DIASTOLIC VOLUME INDEX: 76
NUC REST EJECTION FRACTION: 69
NUC REST SYSTOLIC VOLUME INDEX: 24
OHS CV CPX 1 MINUTE RECOVERY HEART RATE: 88 BPM
OHS CV CPX 85 PERCENT MAX PREDICTED HEART RATE MALE: 119
OHS CV CPX MAX PREDICTED HEART RATE: 140
OHS CV CPX PATIENT IS FEMALE: 1
OHS CV CPX PATIENT IS MALE: 0
OHS CV CPX PEAK DIASTOLIC BLOOD PRESSURE: 59 MMHG
OHS CV CPX PEAK HEAR RATE: 101 BPM
OHS CV CPX PEAK RATE PRESSURE PRODUCT: NORMAL
OHS CV CPX PEAK SYSTOLIC BLOOD PRESSURE: 152 MMHG
OHS CV CPX PERCENT MAX PREDICTED HEART RATE ACHIEVED: 72
OHS CV CPX RATE PRESSURE PRODUCT PRESENTING: 8509
SYSTOLIC BLOOD PRESSURE: 127 MMHG

## 2020-09-08 PROCEDURE — 93018 CV STRESS TEST I&R ONLY: CPT | Mod: ,,, | Performed by: INTERNAL MEDICINE

## 2020-09-08 PROCEDURE — 93018 PR CARDIAC STRESS TST,INTERP/REPT ONLY: ICD-10-PCS | Mod: ,,, | Performed by: INTERNAL MEDICINE

## 2020-09-08 PROCEDURE — 99999 PR PBB SHADOW E&M-EST. PATIENT-LVL I: ICD-10-PCS | Mod: PBBFAC,HCNC,,

## 2020-09-08 PROCEDURE — 99999 PR PBB SHADOW E&M-EST. PATIENT-LVL I: CPT | Mod: PBBFAC,HCNC,,

## 2020-09-08 PROCEDURE — 78452 STRESS TEST WITH MYOCARDIAL PERFUSION (CUPID ONLY): ICD-10-PCS | Mod: 26,HCNC,, | Performed by: INTERNAL MEDICINE

## 2020-09-08 PROCEDURE — 93016 CV STRESS TEST SUPVJ ONLY: CPT | Mod: HCNC,,, | Performed by: INTERNAL MEDICINE

## 2020-09-08 PROCEDURE — 93016 STRESS TEST WITH MYOCARDIAL PERFUSION (CUPID ONLY): ICD-10-PCS | Mod: HCNC,,, | Performed by: INTERNAL MEDICINE

## 2020-09-08 PROCEDURE — 78452 HT MUSCLE IMAGE SPECT MULT: CPT | Mod: 26,HCNC,, | Performed by: INTERNAL MEDICINE

## 2020-09-08 PROCEDURE — 93017 CV STRESS TEST TRACING ONLY: CPT

## 2020-09-10 ENCOUNTER — TELEPHONE (OUTPATIENT)
Dept: CARDIOLOGY | Facility: CLINIC | Age: 75
End: 2020-09-10

## 2020-09-10 RX ORDER — GLIPIZIDE 10 MG/1
TABLET ORAL
Qty: 180 TABLET | Refills: 1 | Status: SHIPPED | OUTPATIENT
Start: 2020-09-10 | End: 2021-01-30

## 2020-09-10 NOTE — TELEPHONE ENCOUNTER
----- Message from Marilee Solomon sent at 9/10/2020  9:52 AM CDT -----  Regarding: test results  Contact: Patient/[phone] (home)  Type:  Test Results    Who Called:  Patient/[phone] (home)     Name of Test (Lab/Mammo/Etc):  Stress Test  Date of Test:  9/8/20  Ordering Provider:  kaley  Where the test was performed:  [facility]

## 2020-09-21 ENCOUNTER — LAB VISIT (OUTPATIENT)
Dept: LAB | Facility: HOSPITAL | Age: 75
End: 2020-09-21
Attending: INTERNAL MEDICINE
Payer: MEDICARE

## 2020-09-21 DIAGNOSIS — N18.30 CHRONIC KIDNEY DISEASE, STAGE III (MODERATE): ICD-10-CM

## 2020-09-21 PROCEDURE — 83970 ASSAY OF PARATHORMONE: CPT | Mod: HCNC

## 2020-09-21 PROCEDURE — 36415 COLL VENOUS BLD VENIPUNCTURE: CPT | Mod: HCNC,PO

## 2020-09-21 PROCEDURE — 80069 RENAL FUNCTION PANEL: CPT | Mod: HCNC

## 2020-09-22 ENCOUNTER — OFFICE VISIT (OUTPATIENT)
Dept: NEPHROLOGY | Facility: CLINIC | Age: 75
End: 2020-09-22
Payer: MEDICARE

## 2020-09-22 VITALS
HEIGHT: 62 IN | WEIGHT: 154.31 LBS | SYSTOLIC BLOOD PRESSURE: 126 MMHG | BODY MASS INDEX: 28.39 KG/M2 | HEART RATE: 101 BPM | OXYGEN SATURATION: 98 % | DIASTOLIC BLOOD PRESSURE: 66 MMHG

## 2020-09-22 DIAGNOSIS — I10 ESSENTIAL HYPERTENSION: ICD-10-CM

## 2020-09-22 DIAGNOSIS — E11.21 DIABETIC NEPHROPATHY ASSOCIATED WITH TYPE 2 DIABETES MELLITUS: ICD-10-CM

## 2020-09-22 DIAGNOSIS — N18.30 CHRONIC KIDNEY DISEASE, STAGE III (MODERATE): Primary | ICD-10-CM

## 2020-09-22 LAB
ALBUMIN SERPL BCP-MCNC: 3.3 G/DL (ref 3.5–5.2)
ANION GAP SERPL CALC-SCNC: 9 MMOL/L (ref 8–16)
BUN SERPL-MCNC: 30 MG/DL (ref 8–23)
CALCIUM SERPL-MCNC: 9.3 MG/DL (ref 8.7–10.5)
CHLORIDE SERPL-SCNC: 108 MMOL/L (ref 95–110)
CO2 SERPL-SCNC: 22 MMOL/L (ref 23–29)
CREAT SERPL-MCNC: 1.5 MG/DL (ref 0.5–1.4)
EST. GFR  (AFRICAN AMERICAN): 39 ML/MIN/1.73 M^2
EST. GFR  (NON AFRICAN AMERICAN): 33.8 ML/MIN/1.73 M^2
GLUCOSE SERPL-MCNC: 122 MG/DL (ref 70–110)
PHOSPHATE SERPL-MCNC: 4.3 MG/DL (ref 2.7–4.5)
POTASSIUM SERPL-SCNC: 4.4 MMOL/L (ref 3.5–5.1)
PTH-INTACT SERPL-MCNC: 136 PG/ML (ref 9–77)
SODIUM SERPL-SCNC: 139 MMOL/L (ref 136–145)

## 2020-09-22 PROCEDURE — 3044F PR MOST RECENT HEMOGLOBIN A1C LEVEL <7.0%: ICD-10-PCS | Mod: HCNC,CPTII,S$GLB, | Performed by: INTERNAL MEDICINE

## 2020-09-22 PROCEDURE — 3074F PR MOST RECENT SYSTOLIC BLOOD PRESSURE < 130 MM HG: ICD-10-PCS | Mod: HCNC,CPTII,S$GLB, | Performed by: INTERNAL MEDICINE

## 2020-09-22 PROCEDURE — 99999 PR PBB SHADOW E&M-EST. PATIENT-LVL III: ICD-10-PCS | Mod: PBBFAC,HCNC,, | Performed by: INTERNAL MEDICINE

## 2020-09-22 PROCEDURE — 99214 OFFICE O/P EST MOD 30 MIN: CPT | Mod: HCNC,S$GLB,, | Performed by: INTERNAL MEDICINE

## 2020-09-22 PROCEDURE — 3074F SYST BP LT 130 MM HG: CPT | Mod: HCNC,CPTII,S$GLB, | Performed by: INTERNAL MEDICINE

## 2020-09-22 PROCEDURE — 1101F PR PT FALLS ASSESS DOC 0-1 FALLS W/OUT INJ PAST YR: ICD-10-PCS | Mod: HCNC,CPTII,S$GLB, | Performed by: INTERNAL MEDICINE

## 2020-09-22 PROCEDURE — 99214 PR OFFICE/OUTPT VISIT, EST, LEVL IV, 30-39 MIN: ICD-10-PCS | Mod: HCNC,S$GLB,, | Performed by: INTERNAL MEDICINE

## 2020-09-22 PROCEDURE — 99999 PR PBB SHADOW E&M-EST. PATIENT-LVL III: CPT | Mod: PBBFAC,HCNC,, | Performed by: INTERNAL MEDICINE

## 2020-09-22 PROCEDURE — 3078F PR MOST RECENT DIASTOLIC BLOOD PRESSURE < 80 MM HG: ICD-10-PCS | Mod: HCNC,CPTII,S$GLB, | Performed by: INTERNAL MEDICINE

## 2020-09-22 PROCEDURE — 3078F DIAST BP <80 MM HG: CPT | Mod: HCNC,CPTII,S$GLB, | Performed by: INTERNAL MEDICINE

## 2020-09-22 PROCEDURE — 1159F MED LIST DOCD IN RCRD: CPT | Mod: HCNC,S$GLB,, | Performed by: INTERNAL MEDICINE

## 2020-09-22 PROCEDURE — 1159F PR MEDICATION LIST DOCUMENTED IN MEDICAL RECORD: ICD-10-PCS | Mod: HCNC,S$GLB,, | Performed by: INTERNAL MEDICINE

## 2020-09-22 PROCEDURE — 3044F HG A1C LEVEL LT 7.0%: CPT | Mod: HCNC,CPTII,S$GLB, | Performed by: INTERNAL MEDICINE

## 2020-09-22 PROCEDURE — 1101F PT FALLS ASSESS-DOCD LE1/YR: CPT | Mod: HCNC,CPTII,S$GLB, | Performed by: INTERNAL MEDICINE

## 2020-09-22 NOTE — PROGRESS NOTES
"Subjective:       Patient ID: Shirlene Ortiz is a 75 y.o. White female who presents for return patient evaluation for chronic renal failure.      She has no uremic or urinary symptoms and is in her usual state of health.  There have been no recent illnesses, hospitalizations or procedures.   She has an upcoming TC to remove a polyp.      Review of Systems   Constitutional: Negative for appetite change, chills and fever.   HENT: Negative for congestion.    Eyes: Negative for visual disturbance.   Respiratory: Positive for shortness of breath (occasional - active smoker). Negative for cough.    Cardiovascular: Negative for chest pain and leg swelling.   Gastrointestinal: Positive for abdominal distention. Negative for diarrhea, nausea and vomiting.   Genitourinary: Positive for frequency (nocturia q 2 hours). Negative for dysuria and hematuria.   Musculoskeletal: Positive for back pain and gait problem (occasional sciatica). Negative for myalgias.   Skin: Negative for rash.   Neurological: Negative for headaches.   Hematological: Bruises/bleeds easily.   Psychiatric/Behavioral: Negative for confusion and sleep disturbance.       The past medical, family and social histories were reviewed for this encounter.     /66 (BP Location: Left arm, Patient Position: Sitting)   Pulse 101   Ht 5' 2" (1.575 m)   Wt 70 kg (154 lb 5.2 oz)   SpO2 98%   BMI 28.23 kg/m²     Objective:      Physical Exam  Vitals signs reviewed.   Constitutional:       General: She is not in acute distress.     Appearance: She is well-developed.   HENT:      Head: Normocephalic and atraumatic.   Eyes:      Conjunctiva/sclera: Conjunctivae normal.   Neck:      Musculoskeletal: Neck supple.      Vascular: No JVD.   Cardiovascular:      Rate and Rhythm: Normal rate and regular rhythm.      Heart sounds: Normal heart sounds. No murmur. No friction rub. No gallop.    Pulmonary:      Effort: Pulmonary effort is normal. No respiratory distress.      " Breath sounds: Normal breath sounds. No wheezing or rales.   Abdominal:      General: Bowel sounds are normal. There is no distension.      Palpations: Abdomen is soft.      Tenderness: There is no abdominal tenderness.   Skin:     General: Skin is warm and dry.      Findings: No rash.   Neurological:      Mental Status: She is alert and oriented to person, place, and time.        Assessment:       1. Chronic kidney disease, stage III (moderate)    2. Essential hypertension    3. Diabetic nephropathy associated with type 2 diabetes mellitus        Plan:   Return to clinic in 4 months.  Labs for next visit include rp, upc, pth.  Baseline creatinine is 1.0-1.4 since 2012.  UPC is 0.3.  PTH is 136 with a calcium of 9.3.  Please have patient follow-up with your PCP or Endocrinology regarding the control and management of diabetes.  Continue current medications as prescribed and reviewed.   Blood pressure is controlled on the current regimen.  Renal US shows R 10.6 cm, L 11.2 cm.

## 2020-09-29 ENCOUNTER — PATIENT MESSAGE (OUTPATIENT)
Dept: OTHER | Facility: OTHER | Age: 75
End: 2020-09-29

## 2020-10-02 ENCOUNTER — LAB VISIT (OUTPATIENT)
Dept: LAB | Facility: HOSPITAL | Age: 75
End: 2020-10-02
Attending: INTERNAL MEDICINE
Payer: MEDICARE

## 2020-10-02 DIAGNOSIS — E11.9 CONTROLLED TYPE 2 DIABETES MELLITUS WITHOUT COMPLICATION, WITHOUT LONG-TERM CURRENT USE OF INSULIN: ICD-10-CM

## 2020-10-02 LAB
ESTIMATED AVG GLUCOSE: 148 MG/DL (ref 68–131)
HBA1C MFR BLD HPLC: 6.8 % (ref 4–5.6)

## 2020-10-02 PROCEDURE — 83036 HEMOGLOBIN GLYCOSYLATED A1C: CPT | Mod: HCNC

## 2020-10-02 PROCEDURE — 36415 COLL VENOUS BLD VENIPUNCTURE: CPT | Mod: HCNC,PO

## 2020-10-08 ENCOUNTER — TELEPHONE (OUTPATIENT)
Dept: FAMILY MEDICINE | Facility: CLINIC | Age: 75
End: 2020-10-08

## 2020-10-08 NOTE — TELEPHONE ENCOUNTER
----- Message from Cecile Baez sent at 10/8/2020  9:35 AM CDT -----  Contact: patient  Type: Needs Medical Advice  Who Called:  patient    Best Call Back Number: [phone] (home)     Additional Information: patient states woke up and could not see out one eye, has appt for 12:50 today and wants to know if they should come in.

## 2020-10-08 NOTE — TELEPHONE ENCOUNTER
Called and spoke to patient states she is blind in left eye when woke up this morning. She now still can not see out of her eye all she sees is bright colors. She states having a TIA 3 months ago also 1 a year ago and something 8 years ago.     She state not having any other symptoms. Her speech is a lil off but not sure if that is from before.     Advised to go to ED for her symptoms     Canceled apt here with Dr. Mendiola today.   Spoke with Delgado and advised of going to Ed

## 2020-10-08 NOTE — TELEPHONE ENCOUNTER
Called patient back to inform that Dr. Mendiola states do not drive and to call 911 if can not get a ride asap.     She states going to ed someone is driving her.

## 2020-10-09 ENCOUNTER — TELEPHONE (OUTPATIENT)
Dept: FAMILY MEDICINE | Facility: CLINIC | Age: 75
End: 2020-10-09

## 2020-10-09 NOTE — TELEPHONE ENCOUNTER
----- Message from Princess LAKISHA Mendiola sent at 10/9/2020 12:43 PM CDT -----  Contact: pt  Type: Needs Medical Advice  Who Called:  pt   Best Call Back Number: [phone] (home)     Additional Information: patient is wanting to advise the office on her ED visit on yesterday. Please advise

## 2020-10-09 NOTE — TELEPHONE ENCOUNTER
----- Message from Princess LAKISHA Mendiola sent at 10/9/2020 12:43 PM CDT -----  Contact: pt  Type: Needs Medical Advice  Who Called:  pt   Best Call Back Number: [phone] (home)     Additional Information: patient is wanting to advise the office on her ED visit on yesterday. Please advise

## 2020-10-09 NOTE — TELEPHONE ENCOUNTER
Patient s/p hospital eval for optic stroke.  Put into 40 min hospital follow up slots on Tuesday.

## 2020-10-09 NOTE — TELEPHONE ENCOUNTER
Spoke with patient states ed did not find nothing wrong they sent her to ophtomology in [place]. opto could not find anything either wrong with her eye. She is calling to state it is a lil clearer today. But she would like to reschedule her apt with you.    Please advise where you would like to fit her in at.     Thank you

## 2020-10-13 ENCOUNTER — TELEPHONE (OUTPATIENT)
Dept: NEPHROLOGY | Facility: CLINIC | Age: 75
End: 2020-10-13

## 2020-10-13 ENCOUNTER — OFFICE VISIT (OUTPATIENT)
Dept: FAMILY MEDICINE | Facility: CLINIC | Age: 75
End: 2020-10-13
Payer: MEDICARE

## 2020-10-13 VITALS
HEIGHT: 63 IN | WEIGHT: 153.25 LBS | HEART RATE: 108 BPM | SYSTOLIC BLOOD PRESSURE: 138 MMHG | BODY MASS INDEX: 27.15 KG/M2 | DIASTOLIC BLOOD PRESSURE: 86 MMHG | OXYGEN SATURATION: 98 %

## 2020-10-13 DIAGNOSIS — H53.9 VISION CHANGES: Primary | ICD-10-CM

## 2020-10-13 DIAGNOSIS — E11.9 CONTROLLED TYPE 2 DIABETES MELLITUS WITHOUT COMPLICATION, WITHOUT LONG-TERM CURRENT USE OF INSULIN: ICD-10-CM

## 2020-10-13 DIAGNOSIS — Z78.0 POST-MENOPAUSAL: ICD-10-CM

## 2020-10-13 DIAGNOSIS — N18.32 STAGE 3B CHRONIC KIDNEY DISEASE: ICD-10-CM

## 2020-10-13 DIAGNOSIS — E78.5 DYSLIPIDEMIA: ICD-10-CM

## 2020-10-13 DIAGNOSIS — R91.8 LUNG NODULES: ICD-10-CM

## 2020-10-13 DIAGNOSIS — I10 ESSENTIAL HYPERTENSION: ICD-10-CM

## 2020-10-13 PROCEDURE — 3075F SYST BP GE 130 - 139MM HG: CPT | Mod: HCNC,CPTII,S$GLB, | Performed by: INTERNAL MEDICINE

## 2020-10-13 PROCEDURE — 3044F PR MOST RECENT HEMOGLOBIN A1C LEVEL <7.0%: ICD-10-PCS | Mod: HCNC,CPTII,S$GLB, | Performed by: INTERNAL MEDICINE

## 2020-10-13 PROCEDURE — 99214 OFFICE O/P EST MOD 30 MIN: CPT | Mod: HCNC,25,S$GLB, | Performed by: INTERNAL MEDICINE

## 2020-10-13 PROCEDURE — G0008 ADMIN INFLUENZA VIRUS VAC: HCPCS | Mod: HCNC,S$GLB,, | Performed by: INTERNAL MEDICINE

## 2020-10-13 PROCEDURE — 1101F PT FALLS ASSESS-DOCD LE1/YR: CPT | Mod: HCNC,CPTII,S$GLB, | Performed by: INTERNAL MEDICINE

## 2020-10-13 PROCEDURE — 3075F PR MOST RECENT SYSTOLIC BLOOD PRESS GE 130-139MM HG: ICD-10-PCS | Mod: HCNC,CPTII,S$GLB, | Performed by: INTERNAL MEDICINE

## 2020-10-13 PROCEDURE — 99999 PR PBB SHADOW E&M-EST. PATIENT-LVL V: CPT | Mod: PBBFAC,HCNC,, | Performed by: INTERNAL MEDICINE

## 2020-10-13 PROCEDURE — 90694 FLU VACCINE - QUADRIVALENT - ADJUVANTED: ICD-10-PCS | Mod: HCNC,S$GLB,, | Performed by: INTERNAL MEDICINE

## 2020-10-13 PROCEDURE — 1101F PR PT FALLS ASSESS DOC 0-1 FALLS W/OUT INJ PAST YR: ICD-10-PCS | Mod: HCNC,CPTII,S$GLB, | Performed by: INTERNAL MEDICINE

## 2020-10-13 PROCEDURE — 3044F HG A1C LEVEL LT 7.0%: CPT | Mod: HCNC,CPTII,S$GLB, | Performed by: INTERNAL MEDICINE

## 2020-10-13 PROCEDURE — 1159F PR MEDICATION LIST DOCUMENTED IN MEDICAL RECORD: ICD-10-PCS | Mod: HCNC,S$GLB,, | Performed by: INTERNAL MEDICINE

## 2020-10-13 PROCEDURE — 99999 PR PBB SHADOW E&M-EST. PATIENT-LVL V: ICD-10-PCS | Mod: PBBFAC,HCNC,, | Performed by: INTERNAL MEDICINE

## 2020-10-13 PROCEDURE — 3079F DIAST BP 80-89 MM HG: CPT | Mod: HCNC,CPTII,S$GLB, | Performed by: INTERNAL MEDICINE

## 2020-10-13 PROCEDURE — 3079F PR MOST RECENT DIASTOLIC BLOOD PRESSURE 80-89 MM HG: ICD-10-PCS | Mod: HCNC,CPTII,S$GLB, | Performed by: INTERNAL MEDICINE

## 2020-10-13 PROCEDURE — 1159F MED LIST DOCD IN RCRD: CPT | Mod: HCNC,S$GLB,, | Performed by: INTERNAL MEDICINE

## 2020-10-13 PROCEDURE — G0008 FLU VACCINE - QUADRIVALENT - ADJUVANTED: ICD-10-PCS | Mod: HCNC,S$GLB,, | Performed by: INTERNAL MEDICINE

## 2020-10-13 PROCEDURE — 99214 PR OFFICE/OUTPT VISIT, EST, LEVL IV, 30-39 MIN: ICD-10-PCS | Mod: HCNC,25,S$GLB, | Performed by: INTERNAL MEDICINE

## 2020-10-13 PROCEDURE — 90694 VACC AIIV4 NO PRSRV 0.5ML IM: CPT | Mod: HCNC,S$GLB,, | Performed by: INTERNAL MEDICINE

## 2020-10-13 NOTE — PROGRESS NOTES
Subjective:       Patient ID: Shirlene Ortiz is a 75 y.o. female.    Chief Complaint: Follow-up    Patient with many issues.      Episode of loss of central vision.  ER and then ophthalmology.  evaluation negative and was told not concerning.  Get records from [facility]    CT in April dx multiple pulmonary nodules.  Repeat at 4.5 mo showed no change.  Needs another at 18 mo [place] = November 2020.     Incomplete polyp removal.  Scheduled [place] at [facility] [place].   Cardiology ok hold Plavix but not ASA.  Jonas's esophagus.  EGD 2020   Normocytic anemia - likely ACD.  FOBT x3 neg.  Stable Hb at 10.      DM - controlled   HTN - controlled; takes 1/2 10 mg amlodipine daily; if BP low, will take 1/4 of a 10 mg per Dr Sandoval's instructions.   HLD - controlled ldl goal < 70; high triglycerides;   Smoke abuse - advised, but does not want to quit  CKD III 3b - stable in 30s. Seeing renal.  COPD - stable    Likely pseudoclaudication from spinal pathology.  Saw cardiology who feels the same.    Saw Dr Lugo.  Scared to go to PT.  Needs imaging.  Pacemaker not compatible with MRI, so he wanted a lumbar CT but has not happened yet.    Recommended Tylenol and if nw, tramadol.  She has no relief from Tylenol and is now open to trying tramadol.   Recall:  Complains of b/l low back pain radiating to her b/l posterior thighs and calves.  Occurs with standing and walking.  She frequently has to stop walking to get relief.  Sitting makes it better.  Leaning forward (shopping cart) improves it.    CT of abdomin showed severe stenosis in LE.  Has appointment with Dr Loo 9/1.    CAD s/p CABG 8/17 after an NSTEMI;  Lexiscan 2020 good.   PVD - 3 new stents in left leg 12/2016; Mild/moderate PAD by ultrasound per cardiology.    13 stents in total              Review of Systems   Constitutional: Negative for appetite change and fever.   HENT: Negative for nosebleeds and trouble swallowing.    Eyes: Negative for discharge and visual disturbance.    Respiratory: Negative for choking and shortness of breath.    Cardiovascular: Negative for chest pain and palpitations.   Gastrointestinal: Negative for abdominal pain, nausea and vomiting.   Musculoskeletal: Negative for arthralgias and joint swelling.   Skin: Negative for rash and wound.   Neurological: Negative for dizziness and syncope.   Psychiatric/Behavioral: Negative for confusion and dysphoric mood.       Objective:      Vitals:    10/13/20 0940   BP: 138/86   Pulse: 108     Physical Exam  Vitals signs reviewed.   Eyes:      Conjunctiva/sclera: Conjunctivae normal.   Neck:      Musculoskeletal: Normal range of motion.   Cardiovascular:      Rate and Rhythm: Normal rate and regular rhythm.   Pulmonary:      Effort: Pulmonary effort is normal.      Breath sounds: Normal breath sounds.   Musculoskeletal:      Comments: Normal ROM bilateral    Skin:     General: Skin is warm and dry.   Neurological:      Cranial Nerves: No cranial nerve deficit (grossly intact).   Psychiatric:      Comments: Alert and orientated           Assessment:       1. Vision changes    2. Controlled type 2 diabetes mellitus without complication, without long-term current use of insulin    3. Essential hypertension    4. Dyslipidemia    5. Stage 3b chronic kidney disease    6. Lung nodules    7. Post-menopausal        Plan:       Vision changes    Controlled type 2 diabetes mellitus without complication, without long-term current use of insulin    Essential hypertension    Dyslipidemia    Stage 3b chronic kidney disease    Lung nodules  -     CT Chest Without Contrast; Future; Expected date: 11/13/2020    Post-menopausal  -     DXA Bone Density Spine And Hip; Future; Expected date: 10/13/2020    Other orders  -     Influenza - Quadrivalent (Adjuvanted)            Medication List with Changes/Refills   Current Medications    ACCU-CHEK ISRAEL CONTROL SOLN SOLN        ACCU-CHEK ISRAEL PLUS METER MISC    USE AS DIRECTED    ACCU-CHEK ISRAEL  PLUS TEST STRP STRP    TEST 2 TO 4 TIMES EVERY DAY     AMLODIPINE (NORVASC) 5 MG TABLET    Take 1 tablet (5 mg total) by mouth once daily.    ASPIRIN 325 MG TABLET    Take 325 mg by mouth once daily.    ATORVASTATIN (LIPITOR) 20 MG TABLET    Take 1 tablet (20 mg total) by mouth once daily.    BD ALCOHOL SWABS PADM        CARVEDILOL (COREG) 3.125 MG TABLET    TAKE 1 TABLET TWICE DAILY    CLOPIDOGREL (PLAVIX) 75 MG TABLET    Take 1 tablet (75 mg total) by mouth once daily.    EZETIMIBE (ZETIA) 10 MG TABLET    Take 1 tablet (10 mg total) by mouth once daily.    FENOFIBRATE 160 MG TAB    TAKE 1 TABLET EVERY DAY    GLIPIZIDE (GLUCOTROL) 10 MG TABLET    TAKE 1 TABLET TWICE DAILY    LACTOBACILLUS RHAMNOSUS GG (CULTURELLE) 10 BILLION CELL CAPSULE    Take 1 capsule by mouth once daily.    LANCETS MISC    To check BG 6 times daily, to use with insurance preferred meter    LOSARTAN (COZAAR) 50 MG TABLET    Take 1 tablet (50 mg total) by mouth once daily.    METFORMIN (GLUCOPHAGE) 1000 MG TABLET    TAKE 1 TABLET TWICE DAILY WITH MEALS    PANTOPRAZOLE (PROTONIX) 40 MG TABLET    Take 1 tablet (40 mg total) by mouth once daily.    PSYLLIUM (METAMUCIL) POWDER    Take 1 packet by mouth 2 (two) times daily.    TRAMADOL (ULTRAM) 50 MG TABLET    Take 1 tablet (50 mg total) by mouth every 12 (twelve) hours as needed for Pain.       Continue current management and monitor.    Counseled on regular exercise, maintenance of a healthy weight, balanced diet rich in fruits/vegetables and lean protein, and avoidance of unhealthy habits like smoking and excessive alcohol intake.   Also, counseled on importance of being compliant with medication, health appointments, diet and exercise.     Follow up in about 2 months (around 12/14/2020). reset labs; ct abn - sooner

## 2020-10-22 ENCOUNTER — TELEPHONE (OUTPATIENT)
Dept: ENDOSCOPY | Facility: HOSPITAL | Age: 75
End: 2020-10-22

## 2020-10-22 DIAGNOSIS — Z01.818 PRE-OP TESTING: ICD-10-CM

## 2020-10-22 NOTE — TELEPHONE ENCOUNTER
lvm to order covid swab and review meds and medical hx prior to procedure on 11/4/20. Call back number provided.

## 2020-10-23 ENCOUNTER — TELEPHONE (OUTPATIENT)
Dept: ENDOSCOPY | Facility: HOSPITAL | Age: 75
End: 2020-10-23

## 2020-10-27 ENCOUNTER — PATIENT MESSAGE (OUTPATIENT)
Dept: ENDOSCOPY | Facility: HOSPITAL | Age: 75
End: 2020-10-27

## 2020-10-27 ENCOUNTER — TELEPHONE (OUTPATIENT)
Dept: ENDOSCOPY | Facility: HOSPITAL | Age: 75
End: 2020-10-27

## 2020-10-27 DIAGNOSIS — Z12.11 SPECIAL SCREENING FOR MALIGNANT NEOPLASMS, COLON: Primary | ICD-10-CM

## 2020-10-27 RX ORDER — POLYETHYLENE GLYCOL 3350, SODIUM SULFATE ANHYDROUS, SODIUM BICARBONATE, SODIUM CHLORIDE, POTASSIUM CHLORIDE 236; 22.74; 6.74; 5.86; 2.97 G/4L; G/4L; G/4L; G/4L; G/4L
4 POWDER, FOR SOLUTION ORAL ONCE
Qty: 4000 ML | Refills: 0 | Status: SHIPPED | OUTPATIENT
Start: 2020-10-27 | End: 2020-11-04

## 2020-10-27 NOTE — TELEPHONE ENCOUNTER
Received request to schedule patient for Colonoscopy on 11/4/20 at 9:30am. Spoke with Patient. Reviewed medical history and medications. Instructed on procedure and prep. Patient verbalized understanding of instructions.  Copy of instructions sent via my chart.

## 2020-11-01 ENCOUNTER — LAB VISIT (OUTPATIENT)
Dept: FAMILY MEDICINE | Facility: CLINIC | Age: 75
End: 2020-11-01
Payer: MEDICARE

## 2020-11-01 DIAGNOSIS — Z01.818 PRE-OP TESTING: ICD-10-CM

## 2020-11-01 PROCEDURE — U0003 INFECTIOUS AGENT DETECTION BY NUCLEIC ACID (DNA OR RNA); SEVERE ACUTE RESPIRATORY SYNDROME CORONAVIRUS 2 (SARS-COV-2) (CORONAVIRUS DISEASE [COVID-19]), AMPLIFIED PROBE TECHNIQUE, MAKING USE OF HIGH THROUGHPUT TECHNOLOGIES AS DESCRIBED BY CMS-2020-01-R: HCPCS | Mod: HCNC

## 2020-11-02 LAB — SARS-COV-2 RNA RESP QL NAA+PROBE: NOT DETECTED

## 2020-11-04 ENCOUNTER — ANESTHESIA (OUTPATIENT)
Dept: ENDOSCOPY | Facility: HOSPITAL | Age: 75
End: 2020-11-04
Payer: MEDICARE

## 2020-11-04 ENCOUNTER — ANESTHESIA EVENT (OUTPATIENT)
Dept: ENDOSCOPY | Facility: HOSPITAL | Age: 75
End: 2020-11-04
Payer: MEDICARE

## 2020-11-04 ENCOUNTER — HOSPITAL ENCOUNTER (OUTPATIENT)
Facility: HOSPITAL | Age: 75
Discharge: HOME | End: 2020-11-04
Attending: INTERNAL MEDICINE | Admitting: INTERNAL MEDICINE
Payer: MEDICARE

## 2020-11-04 VITALS
TEMPERATURE: 97 F | DIASTOLIC BLOOD PRESSURE: 68 MMHG | WEIGHT: 152 LBS | SYSTOLIC BLOOD PRESSURE: 152 MMHG | OXYGEN SATURATION: 97 % | HEART RATE: 64 BPM | BODY MASS INDEX: 27.97 KG/M2 | RESPIRATION RATE: 20 BRPM | HEIGHT: 62 IN

## 2020-11-04 DIAGNOSIS — K63.5 COLON POLYP: ICD-10-CM

## 2020-11-04 DIAGNOSIS — D12.6 ADENOMATOUS POLYP OF COLON, UNSPECIFIED PART OF COLON: Primary | ICD-10-CM

## 2020-11-04 LAB
POCT GLUCOSE: 62 MG/DL (ref 70–110)
POCT GLUCOSE: 63 MG/DL (ref 70–110)
POCT GLUCOSE: 97 MG/DL (ref 70–110)
POCT GLUCOSE: 99 MG/DL (ref 70–110)

## 2020-11-04 PROCEDURE — 45390: ICD-10-PCS | Mod: HCNC,,, | Performed by: INTERNAL MEDICINE

## 2020-11-04 PROCEDURE — 37000009 HC ANESTHESIA EA ADD 15 MINS: Mod: HCNC | Performed by: INTERNAL MEDICINE

## 2020-11-04 PROCEDURE — 88309 PR  SURG PATH,LEVEL VI: ICD-10-PCS | Mod: 26,HCNC,, | Performed by: PATHOLOGY

## 2020-11-04 PROCEDURE — D9220A PRA ANESTHESIA: ICD-10-PCS | Mod: HCNC,CRNA,, | Performed by: NURSE ANESTHETIST, CERTIFIED REGISTERED

## 2020-11-04 PROCEDURE — 45390 COLONOSCOPY W/RESECTION: CPT | Mod: HCNC | Performed by: INTERNAL MEDICINE

## 2020-11-04 PROCEDURE — 45385 COLONOSCOPY W/LESION REMOVAL: CPT

## 2020-11-04 PROCEDURE — 27201089 HC SNARE, DISP (ANY): Mod: HCNC | Performed by: INTERNAL MEDICINE

## 2020-11-04 PROCEDURE — 88309 TISSUE EXAM BY PATHOLOGIST: CPT | Mod: HCNC | Performed by: PATHOLOGY

## 2020-11-04 PROCEDURE — 27201028 HC NEEDLE, SCLERO: Mod: HCNC | Performed by: INTERNAL MEDICINE

## 2020-11-04 PROCEDURE — 82962 GLUCOSE BLOOD TEST: CPT | Mod: HCNC | Performed by: INTERNAL MEDICINE

## 2020-11-04 PROCEDURE — D9220A PRA ANESTHESIA: Mod: HCNC,ANES,, | Performed by: ANESTHESIOLOGY

## 2020-11-04 PROCEDURE — 63600175 PHARM REV CODE 636 W HCPCS: Mod: HCNC | Performed by: NURSE ANESTHETIST, CERTIFIED REGISTERED

## 2020-11-04 PROCEDURE — 27201012 HC FORCEPS, HOT/COLD, DISP: Mod: HCNC | Performed by: INTERNAL MEDICINE

## 2020-11-04 PROCEDURE — 37000008 HC ANESTHESIA 1ST 15 MINUTES: Mod: HCNC | Performed by: INTERNAL MEDICINE

## 2020-11-04 PROCEDURE — 27202363 HC INJECTION AGENT, SUBMUCOSAL, ANY: Mod: HCNC | Performed by: INTERNAL MEDICINE

## 2020-11-04 PROCEDURE — 45390 COLONOSCOPY W/RESECTION: CPT | Mod: HCNC,,, | Performed by: INTERNAL MEDICINE

## 2020-11-04 PROCEDURE — 45381 COLONOSCOPY SUBMUCOUS NJX: CPT | Performed by: INTERNAL MEDICINE

## 2020-11-04 PROCEDURE — D9220A PRA ANESTHESIA: ICD-10-PCS | Mod: HCNC,ANES,, | Performed by: ANESTHESIOLOGY

## 2020-11-04 PROCEDURE — 25000003 PHARM REV CODE 250: Mod: HCNC | Performed by: NURSE ANESTHETIST, CERTIFIED REGISTERED

## 2020-11-04 PROCEDURE — 25000003 PHARM REV CODE 250: Mod: HCNC | Performed by: INTERNAL MEDICINE

## 2020-11-04 PROCEDURE — D9220A PRA ANESTHESIA: Mod: HCNC,CRNA,, | Performed by: NURSE ANESTHETIST, CERTIFIED REGISTERED

## 2020-11-04 PROCEDURE — 27201042 HC RETRIEVAL NET: Mod: HCNC | Performed by: INTERNAL MEDICINE

## 2020-11-04 PROCEDURE — 27202087 HC PROBE, APC: Mod: HCNC | Performed by: INTERNAL MEDICINE

## 2020-11-04 PROCEDURE — 88309 TISSUE EXAM BY PATHOLOGIST: CPT | Mod: 26,HCNC,, | Performed by: PATHOLOGY

## 2020-11-04 RX ORDER — SODIUM CHLORIDE 9 MG/ML
INJECTION, SOLUTION INTRAVENOUS CONTINUOUS
Status: DISCONTINUED | OUTPATIENT
Start: 2020-11-04 | End: 2020-11-04 | Stop reason: HOSPADM

## 2020-11-04 RX ORDER — PROPOFOL 10 MG/ML
VIAL (ML) INTRAVENOUS
Status: DISCONTINUED | OUTPATIENT
Start: 2020-11-04 | End: 2020-11-04

## 2020-11-04 RX ORDER — PROPOFOL 10 MG/ML
VIAL (ML) INTRAVENOUS CONTINUOUS PRN
Status: DISCONTINUED | OUTPATIENT
Start: 2020-11-04 | End: 2020-11-04

## 2020-11-04 RX ORDER — PROPOFOL 10 MG/ML
INJECTION, EMULSION INTRAVENOUS
Status: COMPLETED
Start: 2020-11-04 | End: 2020-11-04

## 2020-11-04 RX ORDER — LIDOCAINE HYDROCHLORIDE 20 MG/ML
INJECTION INTRAVENOUS
Status: COMPLETED
Start: 2020-11-04 | End: 2020-11-04

## 2020-11-04 RX ORDER — SODIUM CHLORIDE 0.9 % (FLUSH) 0.9 %
3 SYRINGE (ML) INJECTION
Status: DISCONTINUED | OUTPATIENT
Start: 2020-11-04 | End: 2020-11-04 | Stop reason: HOSPADM

## 2020-11-04 RX ORDER — LIDOCAINE HYDROCHLORIDE 20 MG/ML
INJECTION INTRAVENOUS
Status: DISCONTINUED | OUTPATIENT
Start: 2020-11-04 | End: 2020-11-04

## 2020-11-04 RX ORDER — SODIUM CHLORIDE 0.9 % (FLUSH) 0.9 %
10 SYRINGE (ML) INJECTION
Status: DISCONTINUED | OUTPATIENT
Start: 2020-11-04 | End: 2020-11-04 | Stop reason: HOSPADM

## 2020-11-04 RX ADMIN — LIDOCAINE HYDROCHLORIDE 50 MG: 20 INJECTION INTRAVENOUS at 09:11

## 2020-11-04 RX ADMIN — PROPOFOL 50 MG: 10 INJECTION, EMULSION INTRAVENOUS at 09:11

## 2020-11-04 RX ADMIN — SODIUM CHLORIDE: 0.9 INJECTION, SOLUTION INTRAVENOUS at 08:11

## 2020-11-04 RX ADMIN — PROPOFOL 40 MG: 10 INJECTION, EMULSION INTRAVENOUS at 09:11

## 2020-11-04 RX ADMIN — PROPOFOL 150 MCG/KG/MIN: 10 INJECTION, EMULSION INTRAVENOUS at 09:11

## 2020-11-04 NOTE — PLAN OF CARE
Relieved TRIXIE Marin Rn for lunch. Received report from TRIXIE Marin RN at this time. Patient's CBG=62 at 1100. 120 mL of apple juice given. Will continue to monitor patient.

## 2020-11-04 NOTE — H&P
History & Physical - Short Stay  Gastroenterology      SUBJECTIVE:     Procedure: Colonoscopy    Chief Complaint/Indication for Procedure: Colon polyp    History of Present Illness:  Patient is a 75 y.o. female presents with large transverse colon polyp S/P partial polypectomy here for possible EMR..     PTA Medications   Medication Sig    amLODIPine (NORVASC) 5 MG tablet Take 1 tablet (5 mg total) by mouth once daily.    aspirin 325 MG tablet Take 325 mg by mouth once daily.    atorvastatin (LIPITOR) 20 MG tablet Take 1 tablet (20 mg total) by mouth once daily.    carvedilol (COREG) 3.125 MG tablet TAKE 1 TABLET TWICE DAILY (Patient taking differently: Take 3.125 mg by mouth 2 (two) times daily. )    clopidogreL (PLAVIX) 75 mg tablet Take 1 tablet (75 mg total) by mouth once daily.    ezetimibe (ZETIA) 10 mg tablet Take 1 tablet (10 mg total) by mouth once daily.    fenofibrate 160 MG Tab TAKE 1 TABLET EVERY DAY    glipiZIDE (GLUCOTROL) 10 MG tablet TAKE 1 TABLET TWICE DAILY    Lactobacillus rhamnosus GG (CULTURELLE) 10 billion cell capsule Take 1 capsule by mouth once daily.    losartan (COZAAR) 50 MG tablet Take 1 tablet (50 mg total) by mouth once daily.    metFORMIN (GLUCOPHAGE) 1000 MG tablet TAKE 1 TABLET TWICE DAILY WITH MEALS (Patient taking differently: Take 1,000 mg by mouth 2 (two) times daily with meals. )    pantoprazole (PROTONIX) 40 MG tablet Take 1 tablet (40 mg total) by mouth once daily.    polyethylene glycol (GOLYTELY,NULYTELY) 236-22.74-6.74 -5.86 gram suspension Take 4,000 mLs (4 L total) by mouth once. for 1 dose    psyllium (METAMUCIL) powder Take 1 packet by mouth 2 (two) times daily.    ACCU-CHEK ISRAEL CONTROL SOLN Soln     ACCU-CHEK ISRAEL PLUS METER [facility] USE AS DIRECTED    ACCU-CHEK ISRAEL PLUS TEST STRP Strp TEST 2 TO 4 TIMES EVERY DAY     BD ALCOHOL SWABS PadM     lancets Misc To check BG 6 times daily, to use with insurance preferred meter    traMADoL (ULTRAM) 50 mg  tablet Take 1 tablet (50 mg total) by mouth every 12 (twelve) hours as needed for Pain. (Patient not taking: Reported on 10/13/2020)       Review of patient's allergies indicates:   Allergen Reactions    Nicotine      puritis from nicotine patch. Patient is a smoker.        Past Medical History:   Diagnosis Date    Acute coronary artery obstruction without MI     14 stents    Anemia 8/27/2017    Arthritis     Colon polyp     COPD (chronic obstructive pulmonary disease) 8/25/2017    DM (diabetes mellitus)     Essential hypertension     Fatty liver     Hepatomegaly     HTN (hypertension)     Hyperlipidemia     Mixed hyperlipidemia     NSTEMI (non-ST elevated myocardial infarction) 8/17/2017 11/17    Pacemaker 6/25/2012    left chest PACEMAKER [employer] SEDR01 Sensia   S/N:[id number] [place] Link 8/3/2010 - current   right atrium LEAD [employer] 5076 CapSure Fix Novus  S/N:[id number] [place] Link 8/3/2010 - current   right ventricle LEAD [employer] 5076 CapSure Fix Novus  S/N:[id number] [place]ion Link 8/3/2010 - current     PVD (peripheral vascular disease)     S/P CABG (coronary artery bypass graft) 9/14/2017 8/17 CABG x5 LIMA-LAD, VG-PDA, VG-D, YVG-OM-PLB  mammary artery to left anterior descending coronary artery and separate segments  of saphenous vein from the aorta to the posterior descending coronary artery  and from the aorta to the diagonal coronary artery and from the aorta to the  obtuse marginal coronary artery and to the posterolateral branch of the  circumflex in a sequential fashion using a combination of antegrade and  retrograde cardioplegia with mild systemic hypothermia and endoscopic vein  harvest.    S/P coronary artery stent placement     11/17 proximal circumflex using a 2.5 x 38, post-dilated to 3.0 with 0% residual stenosis.    Sinus node dysfunction     Stroke 7/4/14    Tobacco abuse 8/15/2017    Uncontrolled type 2 diabetes mellitus with hyperosmolarity without coma,  without long-term current use of insulin     Documented by Maury on 10/26/15.     Past Surgical History:   Procedure Laterality Date    ABDOMINAL SURGERY      APPENDECTOMY      CARDIAC PACEMAKER PLACEMENT      CHOLECYSTECTOMY      COLONOSCOPY  04/27/2015    Dr. Marinelli: 2 colon polyps removed (one not completely removed), hemorrhoids; repeat in 6 weeks; biopsy: ADENOMATOUS POLYPS    COLONOSCOPY N/A 8/5/2020    Procedure: COLONOSCOPY;  Surgeon: Alonzo Marinelli MD;  Location: [facility] ENDO;  Service: Endoscopy;  Laterality: N/A;    coranary stent      CORONARY ANGIOPLASTY      CORONARY ARTERY BYPASS GRAFT      ESOPHAGOGASTRODUODENOSCOPY N/A 3/9/2020    Procedure: ESOPHAGOGASTRODUODENOSCOPY (EGD);  Surgeon: Alonzo Marinelli MD;  Location: [facility] ENDO;  Service: Endoscopy;  Laterality: N/A; Mild Schatzki ring. Biopsied. Dilated. gastritis; biopsy: stomach- chemical/reactive gastropathy, esophagus- Squamocolumnar mucosa with focal intestinal metaplasia and low-grade dysplasia    ESOPHAGOGASTRODUODENOSCOPY N/A 8/5/2020    Procedure: EGD (ESOPHAGOGASTRODUODENOSCOPY);  Surgeon: Alonzo Marinelli MD;  Location: [facility] ENDO;  Service: Endoscopy;  Laterality: N/A;    ILIAC ARTERY STENT      POLYPECTOMY      TOTAL ABDOMINAL HYSTERECTOMY       Family History   Problem Relation Age of Onset    Kidney disease Mother     Hypertension Mother     Diabetes Mother     Melanoma Sister     Lung disease Sister     Hypertension Sister     Colon cancer Neg Hx     Crohn's disease Neg Hx     Ulcerative colitis Neg Hx     Stomach cancer Neg Hx     Esophageal cancer Neg Hx      Social History     Tobacco Use    Smoking status: Current Every Day Smoker     Packs/day: 1.50     Years: 60.00     Pack years: 90.00     Types: Cigarettes     Last attempt to quit: 8/15/2017     Years since quitting: 3.2    Smokeless tobacco: Never Used    Tobacco comment: Tobacco treatment specialist consulted.   Substance Use Topics     Alcohol use: No    Drug use: No       Review of Systems:         OBJECTIVE:     Vital Signs (Most Recent)  Temp: 97.5 °F (36.4 °C) (11/04/20 0852)  Pulse: 69 (11/04/20 0852)  Resp: 16 (11/04/20 0852)  BP: (!) 165/70 (11/04/20 0852)  SpO2: 99 % (11/04/20 0852)    Physical Exam:  General: well developed, well nourished  Lungs:  normal respiratory effort  Heart: regular rate, S1, S2 normal    Laboratory  CBC: No results for input(s): WBC, RBC, HGB, HCT, PLT, MCV, MCH, MCHC in the last 168 hours.  CMP: No results for input(s): GLU, CALCIUM, ALBUMIN, PROT, NA, K, CO2, CL, BUN, CREATININE, ALKPHOS, ALT, AST, BILITOT in the last 168 hours.  Coagulation: No results for input(s): LABPROT, INR, APTT in the last 168 hours.      Diagnostic Results:      ASSESSMENT/PLAN:     Transverse colon polyp    Plan: Colonoscopy and possible EMR    Anesthesia Plan: MAC    ASA Grade: ASA 3 - Patient with moderate systemic disease with functional limitations     The impression and plan was discussed in detail with the patient. All questions have been answered and the patient voices understanding of our plan at this point. The risk of the procedure was discussed in detail which includes but not limited to bleeding, infection, perforation in some cases requiring surgery with its spectrum of complications.

## 2020-11-04 NOTE — [employer] PATIENT INSTRUCTIONS
Discharge Summary/Instructions after an Endoscopic Procedure  Patient Name: Shirlene Ortiz  Patient MRN: [id number]  Patient YOB: 1945 Wednesday, November 04, 2020  Theodore Zimmerman MD  RESTRICTIONS:  During your procedure today, you received medications for sedation.  These   medications may affect your judgment, balance and coordination.  Therefore,   for 24 hours, you have the following restrictions:   - DO NOT drive a car, operate machinery, make legal/financial decisions,   sign important papers or drink alcohol.    ACTIVITY:  Today: no heavy lifting, straining or running due to procedural   sedation/anesthesia.  The following day: return to full activity including work.  DIET:  Eat and drink normally unless instructed otherwise.     TREATMENT FOR COMMON SIDE EFFECTS:  - Mild abdominal pain, nausea, belching, bloating or excessive gas:  rest,   eat lightly and use a heating pad.  - Sore Throat: treat with throat lozenges and/or gargle with warm salt   water.  - Because air was used during the procedure, expelling large amounts of air   from your rectum or belching is normal.  - If a bowel prep was taken, you may not have a bowel movement for 1-3 days.    This is normal.  SYMPTOMS TO WATCH FOR AND REPORT TO YOUR PHYSICIAN:  1. Abdominal pain or bloating, other than gas cramps.  2. Chest pain.  3. Back pain.  4. Signs of infection such as: chills or fever occurring within 24 hours   after the procedure.  5. Rectal bleeding, which would show as bright red, maroon, or black stools.   (A tablespoon of blood from the rectum is not serious, especially if   hemorrhoids are present.)  6. Vomiting.  7. Weakness or dizziness.  GO DIRECTLY TO THE NEAREST EMERGENCY ROOM IF YOU HAVE ANY OF THE FOLLOWING:      Difficulty breathing              Chills and/or fever over 101 F   Persistent vomiting and/or vomiting blood   Severe abdominal pain   Severe chest pain   Black, tarry stools   Bleeding- more than one  tablespoon   Any other symptom or condition that you feel may need urgent attention  Your doctor recommends these additional instructions:  If any biopsies were taken, your doctors clinic will contact you in 1 to 2   weeks with any results.  - Discharge patient to home (ambulatory).   - Await pathology results.   - Repeat colonoscopy in 6 months for surveillance after piecemeal   polypectomy, for surveillance based on pathology results and for   surveillance of multiple polyps.  For questions, problems or results please call your physician - Theodore Zimmerman MD at Work:  [phone].  [facility], EMERGENCY ROOM PHONE NUMBER: [phone]  IF A COMPLICATION OR EMERGENCY SITUATION ARISES AND YOU ARE UNABLE TO REACH   YOUR PHYSICIAN - GO DIRECTLY TO THE EMERGENCY ROOM.  Theodore Zimmerman MD  11/4/2020 11:03:17 AM  This report has been verified and signed electronically.  [employer]

## 2020-11-04 NOTE — PLAN OF CARE
Discharge instructions given and explained to patient and family with verbalization of understanding all instructions.Patients v/s stable, denies n/v and tolerating po, rates pain level tolerable, IV removed, and family at bedside for patient discharge home.

## 2020-11-04 NOTE — PROGRESS NOTES
Report given to TRIXIE Marin RN at 1243. Nurse notified that patient's CBG=63 after 120 mL of apple juice. Additional 240 mL of apple juice given and CBG due now. Care of patient transferred to TRIXIE Marin RN.

## 2020-11-04 NOTE — DISCHARGE INSTRUCTIONS
Colonoscopy     A camera attached to a flexible tube with a viewing lens is used to take video pictures.     Colonoscopy is a test to view the inside of your lower digestive tract (colon and rectum). Sometimes it can show the last part of the small intestine (ileum). During the test, small pieces of tissue may be removed for testing. This is called a biopsy. Small growths, such as polyps, may also be removed.   Why is colonoscopy done?  The test is done to help look for colon cancer. And it can help find the source of abdominal pain, bleeding, and changes in bowel habits. It may be needed once a year, depending on factors such as your:  · Age  · Health history  · Family health history  · Symptoms  · Results from any prior colonoscopy  Risks and possible complications  These include:  · Bleeding               · A puncture or tear in the colon   · Risks of anesthesia  · A cancer lesion not being seen  Getting ready   To prepare for the test:  · Talk with your healthcare provider about the risks of the test (see below). Also ask your healthcare provider about alternatives to the test.  · Tell your healthcare provider about any medicines you take. Also tell him or her about any health conditions you may have.  · Make sure your rectum and colon are empty for the test. Follow the diet and bowel prep instructions exactly. If you dont, the test may need to be rescheduled.  · Plan for a friend or family member to drive you home after the test.     Colonoscopy provides an inside view of the entire colon.     You may discuss the results with your doctor right away or at a future visit.  During the test   The test is usually done in the hospital on an outpatient basis. This means you go home the same day. The procedure takes about 30 minutes. During that time:  · You are given relaxing (sedating) medicine through an IV line. You may be drowsy, or fully asleep.  · The healthcare provider will first give you a physical exam to  check for anal and rectal problems.  · Then the anus is lubricated and the scope inserted.  · If you are awake, you may have a feeling similar to needing to have a bowel movement. You may also feel pressure as air is pumped into the colon. Its OK to pass gas during the procedure.  · Biopsy, polyp removal, or other treatments may be done during the test.  After the test   You may have gas right after the test. It can help to try to pass it to help prevent later bloating. Your healthcare provider may discuss the results with you right away. Or you may need to schedule a follow-up visit to talk about the results. After the test, you can go back to your normal eating and other activities. You may be tired from the sedation and need to rest for a few hours.  Date Last Reviewed: 11/1/2016 © 2000-2017 [employer]. [address] [place] [address]. All rights reserved. This information is not intended as a substitute for professional medical care. Always follow your healthcare professional's instructions.        Recovery After Procedural Sedation (Adult)   You have been given medicine by vein to make you sleep during your surgery. This may have included both a pain medicine and sleeping medicine. Most of the effects have worn off. But you may still have some drowsiness for the next 6 to 8 hours.  Home care  Follow these guidelines when you get home:  · For the next 8 hours, you should be watched by a responsible adult. This person should make sure your condition is not getting worse.  · Don't drink any alcohol for the next 24 hours.  · Don't drive, operate dangerous machinery, or make important business or personal decisions during the next 24 hours.  · To prevent injury or falls, use caution when standing and walking for at least 24 hours after your procedure.  Note: Your healthcare provider may tell you not to take any medicine by mouth for pain or sleep in the next 4 hours. These medicines may  react with the medicines you were given in the hospital. This could cause a much stronger response than usual.  Follow-up care  Follow up with your healthcare provider if you are not alert and back to your usual level of activity within 12 hours.  When to seek medical advice  Call your healthcare provider right away if any of these occur:  · Drowsiness gets worse  · Weakness or dizziness gets worse  · Repeated vomiting  · You can't be awakened  · Fever  · New rash  Lalo last reviewed this educational content on 9/1/2019 © 2000-2020 [employer]. [address] [place] [address]. All rights reserved. This information is not intended as a substitute for professional medical care. Always follow your healthcare professional's instructions.

## 2020-11-04 NOTE — ANESTHESIA PREPROCEDURE EVALUATION
11/04/2020  Shirlene Ortiz is a 75 y.o., female.    Pre-op Assessment    I have reviewed the Patient Summary Reports.     I have reviewed the Nursing Notes. I have reviewed the NPO Status.   I have reviewed the Medications.     Review of Systems  Anesthesia Hx:  Denies Family Hx of Anesthesia complications.   Denies Personal Hx of Anesthesia complications.   Social:  Smoker    Hematology/Oncology:         -- Anemia:   Cardiovascular:   Hypertension Past MI CAD  CABG/stent  PVD CABG 2017, coronary stents, LAE, off ASA/Plavix 1 day, iliac stents, pacemaker   Pulmonary:   COPD, moderate    Renal/:   Chronic Renal Disease, CRI    Hepatic/GI:   Bowel Prep. Liver Disease,    Musculoskeletal:   Arthritis     Neurological:   TIA, CVA, residual symptoms speech   Endocrine:   Diabetes, poorly controlled        Physical Exam  General:  Well nourished    Airway/Jaw/Neck:  Airway Findings: Mouth Opening: Normal Tongue: Normal  General Airway Assessment: Adult  Mallampati: III  Improves to II with phonation.      Dental:  Dental Findings: Edentulous   Chest/Lungs:  Chest/Lungs Findings: Clear to auscultation, Normal Respiratory Rate     Heart/Vascular:  Heart Findings: Rate: Normal  Rhythm: Regular Rhythm  Sounds: Normal        Mental Status:  Mental Status Findings:  Cooperative, Alert and Oriented         Anesthesia Plan  Type of Anesthesia, risks & benefits discussed:  Anesthesia Type:  general  Patient's Preference:   Intra-op Monitoring Plan: standard ASA monitors  Intra-op Monitoring Plan Comments:   Post Op Pain Control Plan:   Post Op Pain Control Plan Comments:   Induction:   IV  Beta Blocker:  Patient is on a Beta-Blocker and has received one dose within the past 24 hours (No further documentation required).       Informed Consent: Patient understands risks and agrees with Anesthesia plan.  Questions answered.  Anesthesia consent signed with patient.  ASA Score: 4     Day of Surgery Review of History & Physical:    H&P update referred to the provider.         Ready For Surgery From Anesthesia Perspective.

## 2020-11-04 NOTE — BRIEF OP NOTE
Discharge Summary/Instructions after an Endoscopic Procedure    Patient Name: Shirlene Ortiz  Patient MRN: [id number]  Patient YOB: 1945 Wednesday, November 04, 2020  Theodore Zimmerman MD    RESTRICTIONS:  During your procedure today, you received medications for sedation.  These medications may affect your judgment, balance and coordination.  Therefore, for 24 hours, you have the following restrictions:     - DO NOT drive a car, operate machinery, make legal/financial decisions, sign important papers or drink alcohol.      ACTIVITY:  Today: no heavy lifting, straining or running due to procedural sedation/anesthesia.  The following day: return to full activity including work.    DIET:  Eat and drink normally unless instructed otherwise.     TREATMENT FOR COMMON SIDE EFFECTS:  - Mild abdominal pain, nausea, belching, bloating or excessive gas:  rest, eat lightly and use a heating pad.  - Sore Throat: treat with throat lozenges and/or gargle with warm salt water.  - Because air was used during the procedure, expelling large amounts of air from your rectum or belching is normal.  - If a bowel prep was taken, you may not have a bowel movement for 1-3 days.  This is normal.      SYMPTOMS TO WATCH FOR AND REPORT TO YOUR PHYSICIAN:  1. Abdominal pain or bloating, other than gas cramps.  2. Chest pain.  3. Back pain.  4. Signs of infection such as: chills or fever occurring within 24 hours after the procedure.  5. Rectal bleeding, which would show as bright red, maroon, or black stools. (A tablespoon of blood from the rectum is not serious, especially if hemorrhoids are present.)  6. Vomiting.  7. Weakness or dizziness.      GO DIRECTLY TO THE NEAREST EMERGENCY ROOM IF YOU HAVE ANY OF THE FOLLOWING:     Difficulty breathing              Chills and/or fever over 101 F   Persistent vomiting and/or vomiting blood   Severe abdominal pain   Severe chest pain   Black, tarry stools   Bleeding- more than one  tablespoon   Any other symptom or condition that you feel may need urgent attention    Your doctor recommends these additional instructions:  If any biopsies were taken, your doctors clinic will contact you in 1 to 2 weeks with any results.    - Discharge patient to home (ambulatory).   - Await pathology results.   - Repeat colonoscopy in 6 months for surveillance after piecemeal polypectomy, for surveillance based on pathology results and for surveillance of multiple polyps.    For questions, problems or results please call your physician - Theodore Zimmerman MD at Work:  [phone].    [facility], EMERGENCY ROOM PHONE NUMBER: [phone]    IF A COMPLICATION OR EMERGENCY SITUATION ARISES AND YOU ARE UNABLE TO REACH YOUR PHYSICIAN - GO DIRECTLY TO THE EMERGENCY ROOM.

## 2020-11-04 NOTE — ANESTHESIA POSTPROCEDURE EVALUATION
Anesthesia Post Evaluation    Patient: Shirlene Ortiz    Procedure(s) Performed: Procedure(s) (LRB):  COLONOSCOPY (N/A)    Final Anesthesia Type: general    Patient location during evaluation: PACU  Patient participation: Yes- Able to Participate  Level of consciousness: awake and alert and oriented  Post-procedure vital signs: reviewed and stable  Pain management: adequate  Airway patency: patent    PONV status at discharge: No PONV  Anesthetic complications: no      Cardiovascular status: blood pressure returned to baseline  Respiratory status: unassisted, room air and spontaneous ventilation  Hydration status: euvolemic  Follow-up not needed.          Vitals Value Taken Time   /73 11/04/20 1102   Temp 36.3 °C (97.3 °F) 11/04/20 1100   Pulse 69 11/04/20 1106   Resp 19 11/04/20 1100   SpO2 99 % 11/04/20 1106   Vitals shown include unvalidated device data.      No case tracking events are documented in the log.      Pain/Quyen Score: Quyen Score: 9 (11/4/2020 11:00 AM)

## 2020-11-04 NOTE — TRANSFER OF CARE
"Anesthesia Transfer of Care Note    Patient: Shirlene Ortiz    Procedure(s) Performed: Procedure(s) (LRB):  COLONOSCOPY (N/A)    Patient location: PACU    Anesthesia Type: general    Transport from OR: Transported from OR on room air with adequate spontaneous ventilation    Post pain: adequate analgesia    Post assessment: no apparent anesthetic complications and tolerated procedure well    Post vital signs: stable    Level of consciousness: sedated    Nausea/Vomiting: no nausea/vomiting    Complications: none    Transfer of care protocol was followed      Last vitals:   Visit Vitals  BP (!) 165/70   Pulse 69   Temp 36.4 °C (97.5 °F)   Resp 16   Ht 5' 2" (1.575 m)   Wt 68.9 kg (152 lb)   SpO2 99%   Breastfeeding No   BMI 27.80 kg/m²     "

## 2020-11-05 ENCOUNTER — NURSE TRIAGE (OUTPATIENT)
Dept: ADMINISTRATIVE | Facility: CLINIC | Age: 75
End: 2020-11-05

## 2020-11-05 ENCOUNTER — TELEPHONE (OUTPATIENT)
Dept: GASTROENTEROLOGY | Facility: CLINIC | Age: 75
End: 2020-11-05

## 2020-11-05 NOTE — TELEPHONE ENCOUNTER
Spoke with the patient. She have some bright blood and clots this morning. No more BM. Advice to go to the ED given large polyp resection and risk of bleeding.

## 2020-11-05 NOTE — TELEPHONE ENCOUNTER
"Pt hada C Scope with Dr Zimmerman yesterday. Pt had a bowel movement, and she saw bright red blood in her pants. The toilet water had turned red from the bleeding. Advised to go to ER. Pt will follow dispo. Message sent to Dr Stanley. Advised pt to call back with any questions.    Reason for Disposition   [1] Colonoscopy AND [2] in past 72 hours    Additional Information   Negative: Shock suspected (e.g., cold/pale/clammy skin, too weak to stand, low BP, rapid pulse)   Negative: Difficult to awaken or acting confused (e.g., disoriented, slurred speech)   Negative: Passed out (i.e., lost consciousness, collapsed and was not responding)   Negative: [1] Vomiting AND [2] contains red blood or black ("coffee ground") material  (Exception: few red streaks in vomit that only happened once)   Negative: Sounds like a life-threatening emergency to the triager   Negative: SEVERE rectal bleeding (large blood clots; on and off, or constant bleeding)   Negative: SEVERE dizziness (e.g., unable to stand, requires support to walk, feels like passing out now)   Negative: [1] MODERATE rectal bleeding (small blood clots, passing blood without stool, or toilet water turns red) AND [2] more than once a day   Negative: Pale skin (pallor) of new onset or worsening   Negative: Tarry or jet black-colored stool (not dark green)   Negative: [1] Constant abdominal pain AND [2] present > 2 hours   Negative: Rectal foreign body (i.e., now or within past week;  inserted or swallowed)   Negative: High-risk adult (e.g., prior surgery on aorta, abdominal aortic aneurysm)   Negative: Known cirrhosis of the liver (or history of liver failure or ascites)   Negative: Taking Coumadin (warfarin) or other strong blood thinner, or known bleeding disorder (e.g., thrombocytopenia)    Protocols used: RECTAL BLEEDING-A-AH      "

## 2020-11-10 ENCOUNTER — HOSPITAL ENCOUNTER (OUTPATIENT)
Dept: RADIOLOGY | Facility: HOSPITAL | Age: 75
Discharge: HOME | End: 2020-11-10
Attending: INTERNAL MEDICINE
Payer: MEDICARE

## 2020-11-10 DIAGNOSIS — R91.8 LUNG NODULES: ICD-10-CM

## 2020-11-10 PROCEDURE — 71250 CT CHEST WITHOUT CONTRAST: ICD-10-PCS | Mod: 26,HCNC,, | Performed by: RADIOLOGY

## 2020-11-10 PROCEDURE — 71250 CT THORAX DX C-: CPT | Mod: 26,HCNC,, | Performed by: RADIOLOGY

## 2020-11-10 PROCEDURE — 71250 CT THORAX DX C-: CPT | Mod: TC,HCNC,PO

## 2020-11-12 LAB
COMMENT: NORMAL
FINAL PATHOLOGIC DIAGNOSIS: NORMAL
GROSS: NORMAL
Lab: NORMAL

## 2020-11-13 ENCOUNTER — TELEPHONE (OUTPATIENT)
Dept: ENDOSCOPY | Facility: HOSPITAL | Age: 75
End: 2020-11-13

## 2020-11-13 NOTE — TELEPHONE ENCOUNTER
----- Message from Theodore Zimmerman MD sent at 11/13/2020  2:46 PM CST -----  Please let the patient know the polyp was adenoma with small area of HGD. No cancer. Need colonoscopy in 6 months.  I was unable to contact her.

## 2020-12-01 ENCOUNTER — CLINICAL SUPPORT (OUTPATIENT)
Dept: CARDIOLOGY | Facility: CLINIC | Age: 75
End: 2020-12-01
Attending: INTERNAL MEDICINE
Payer: MEDICARE

## 2020-12-01 DIAGNOSIS — Z95.0 PACEMAKER: ICD-10-CM

## 2020-12-01 DIAGNOSIS — Z95.0 PACEMAKER: Primary | ICD-10-CM

## 2020-12-01 DIAGNOSIS — I49.5 SINUS NODE DYSFUNCTION: ICD-10-CM

## 2020-12-11 ENCOUNTER — TELEPHONE (OUTPATIENT)
Dept: GASTROENTEROLOGY | Facility: CLINIC | Age: 75
End: 2020-12-11

## 2020-12-11 ENCOUNTER — PATIENT MESSAGE (OUTPATIENT)
Dept: OTHER | Facility: OTHER | Age: 75
End: 2020-12-11

## 2020-12-11 DIAGNOSIS — Z01.812 PRE-PROCEDURE LAB EXAM: ICD-10-CM

## 2020-12-11 NOTE — TELEPHONE ENCOUNTER
Spoke with pt. Scheduled EGD & covid test. Pt verbalized understanding. Prep instructions & reminder letter placed in mail.

## 2020-12-11 NOTE — TELEPHONE ENCOUNTER
----- Message from Tyler Nova sent at 12/11/2020 11:04 AM CST -----  The patient needs a call back to schedule the EGD reminder you have in [email or web address]    Thank you

## 2020-12-31 RX ORDER — LOSARTAN POTASSIUM 50 MG/1
50 TABLET ORAL DAILY
Qty: 90 TABLET | Refills: 4 | Status: SHIPPED | OUTPATIENT
Start: 2020-12-31 | End: 2022-02-10 | Stop reason: ALTCHOICE

## 2020-12-31 RX ORDER — ATORVASTATIN CALCIUM 20 MG/1
20 TABLET, FILM COATED ORAL DAILY
Qty: 90 TABLET | Refills: 4 | Status: SHIPPED | OUTPATIENT
Start: 2020-12-31 | End: 2022-05-31

## 2021-01-04 ENCOUNTER — PATIENT MESSAGE (OUTPATIENT)
Dept: ADMINISTRATIVE | Facility: HOSPITAL | Age: 76
End: 2021-01-04

## 2021-01-08 ENCOUNTER — IMMUNIZATION (OUTPATIENT)
Dept: FAMILY MEDICINE | Facility: CLINIC | Age: 76
End: 2021-01-08
Payer: MEDICARE

## 2021-01-08 DIAGNOSIS — Z23 NEED FOR VACCINATION: ICD-10-CM

## 2021-01-08 PROCEDURE — 91300 COVID-19, MRNA, LNP-S, PF, 30 MCG/0.3 ML DOSE VACCINE: CPT | Mod: PBBFAC | Performed by: INTERNAL MEDICINE

## 2021-01-08 NOTE — TELEPHONE ENCOUNTER
The patient cant afford the januvia and needs a rx for piglipazone please if ok TY   Writer spoke with patient and relayed Juliette's message. Patient verbalized understanding and would like referral to ENT.    Order placed for referral to ENT.

## 2021-01-29 ENCOUNTER — IMMUNIZATION (OUTPATIENT)
Dept: FAMILY MEDICINE | Facility: CLINIC | Age: 76
End: 2021-01-29
Payer: MEDICARE

## 2021-01-29 DIAGNOSIS — Z23 NEED FOR VACCINATION: Primary | ICD-10-CM

## 2021-01-29 PROCEDURE — 0002A COVID-19, MRNA, LNP-S, PF, 30 MCG/0.3 ML DOSE VACCINE: CPT | Mod: PBBFAC | Performed by: FAMILY MEDICINE

## 2021-01-29 PROCEDURE — 91300 COVID-19, MRNA, LNP-S, PF, 30 MCG/0.3 ML DOSE VACCINE: CPT | Mod: PBBFAC | Performed by: FAMILY MEDICINE

## 2021-02-09 ENCOUNTER — TELEPHONE (OUTPATIENT)
Dept: GASTROENTEROLOGY | Facility: CLINIC | Age: 76
End: 2021-02-09

## 2021-02-19 ENCOUNTER — PATIENT OUTREACH (OUTPATIENT)
Dept: ADMINISTRATIVE | Facility: OTHER | Age: 76
End: 2021-02-19

## 2021-03-09 ENCOUNTER — OFFICE VISIT (OUTPATIENT)
Dept: CARDIOLOGY | Facility: CLINIC | Age: 76
End: 2021-03-09
Payer: MEDICARE

## 2021-03-09 VITALS
HEIGHT: 62 IN | DIASTOLIC BLOOD PRESSURE: 68 MMHG | WEIGHT: 164.69 LBS | BODY MASS INDEX: 30.31 KG/M2 | HEART RATE: 96 BPM | SYSTOLIC BLOOD PRESSURE: 129 MMHG

## 2021-03-09 DIAGNOSIS — I10 ESSENTIAL HYPERTENSION: ICD-10-CM

## 2021-03-09 DIAGNOSIS — M54.9 DORSALGIA, UNSPECIFIED: ICD-10-CM

## 2021-03-09 DIAGNOSIS — J44.9 CHRONIC OBSTRUCTIVE PULMONARY DISEASE, UNSPECIFIED COPD TYPE: ICD-10-CM

## 2021-03-09 DIAGNOSIS — I70.0 ATHEROSCLEROSIS OF AORTA: ICD-10-CM

## 2021-03-09 DIAGNOSIS — Z95.5 S/P CORONARY ARTERY STENT PLACEMENT: Primary | ICD-10-CM

## 2021-03-09 DIAGNOSIS — E11.00 UNCONTROLLED TYPE 2 DIABETES MELLITUS WITH HYPEROSMOLARITY WITHOUT COMA, WITHOUT LONG-TERM CURRENT USE OF INSULIN: ICD-10-CM

## 2021-03-09 DIAGNOSIS — G45.9 TIA (TRANSIENT ISCHEMIC ATTACK): ICD-10-CM

## 2021-03-09 DIAGNOSIS — Z95.1 S/P CABG (CORONARY ARTERY BYPASS GRAFT): ICD-10-CM

## 2021-03-09 DIAGNOSIS — I65.23 BILATERAL CAROTID ARTERY STENOSIS: ICD-10-CM

## 2021-03-09 DIAGNOSIS — Z95.0 PACEMAKER: ICD-10-CM

## 2021-03-09 DIAGNOSIS — E78.2 MIXED HYPERLIPIDEMIA: ICD-10-CM

## 2021-03-09 DIAGNOSIS — I25.10 CORONARY ARTERY DISEASE INVOLVING NATIVE CORONARY ARTERY OF NATIVE HEART WITHOUT ANGINA PECTORIS: ICD-10-CM

## 2021-03-09 DIAGNOSIS — I73.9 PVD (PERIPHERAL VASCULAR DISEASE): ICD-10-CM

## 2021-03-09 PROCEDURE — 99999 PR PBB SHADOW E&M-EST. PATIENT-LVL III: CPT | Mod: PBBFAC,,, | Performed by: INTERNAL MEDICINE

## 2021-03-09 PROCEDURE — 1126F PR PAIN SEVERITY QUANTIFIED, NO PAIN PRESENT: ICD-10-PCS | Mod: S$GLB,,, | Performed by: INTERNAL MEDICINE

## 2021-03-09 PROCEDURE — 3044F PR MOST RECENT HEMOGLOBIN A1C LEVEL <7.0%: ICD-10-PCS | Mod: CPTII,S$GLB,, | Performed by: INTERNAL MEDICINE

## 2021-03-09 PROCEDURE — 3074F SYST BP LT 130 MM HG: CPT | Mod: CPTII,S$GLB,, | Performed by: INTERNAL MEDICINE

## 2021-03-09 PROCEDURE — 1159F MED LIST DOCD IN RCRD: CPT | Mod: S$GLB,,, | Performed by: INTERNAL MEDICINE

## 2021-03-09 PROCEDURE — 3044F HG A1C LEVEL LT 7.0%: CPT | Mod: CPTII,S$GLB,, | Performed by: INTERNAL MEDICINE

## 2021-03-09 PROCEDURE — 99499 RISK ADDL DX/OHS AUDIT: ICD-10-PCS | Mod: S$GLB,,, | Performed by: INTERNAL MEDICINE

## 2021-03-09 PROCEDURE — 3078F PR MOST RECENT DIASTOLIC BLOOD PRESSURE < 80 MM HG: ICD-10-PCS | Mod: CPTII,S$GLB,, | Performed by: INTERNAL MEDICINE

## 2021-03-09 PROCEDURE — 99214 OFFICE O/P EST MOD 30 MIN: CPT | Mod: S$GLB,,, | Performed by: INTERNAL MEDICINE

## 2021-03-09 PROCEDURE — 1126F AMNT PAIN NOTED NONE PRSNT: CPT | Mod: S$GLB,,, | Performed by: INTERNAL MEDICINE

## 2021-03-09 PROCEDURE — 3078F DIAST BP <80 MM HG: CPT | Mod: CPTII,S$GLB,, | Performed by: INTERNAL MEDICINE

## 2021-03-09 PROCEDURE — 1101F PT FALLS ASSESS-DOCD LE1/YR: CPT | Mod: CPTII,S$GLB,, | Performed by: INTERNAL MEDICINE

## 2021-03-09 PROCEDURE — 3288F PR FALLS RISK ASSESSMENT DOCUMENTED: ICD-10-PCS | Mod: CPTII,S$GLB,, | Performed by: INTERNAL MEDICINE

## 2021-03-09 PROCEDURE — 99214 PR OFFICE/OUTPT VISIT, EST, LEVL IV, 30-39 MIN: ICD-10-PCS | Mod: S$GLB,,, | Performed by: INTERNAL MEDICINE

## 2021-03-09 PROCEDURE — 1159F PR MEDICATION LIST DOCUMENTED IN MEDICAL RECORD: ICD-10-PCS | Mod: S$GLB,,, | Performed by: INTERNAL MEDICINE

## 2021-03-09 PROCEDURE — 99499 UNLISTED E&M SERVICE: CPT | Mod: S$GLB,,, | Performed by: INTERNAL MEDICINE

## 2021-03-09 PROCEDURE — 1101F PR PT FALLS ASSESS DOC 0-1 FALLS W/OUT INJ PAST YR: ICD-10-PCS | Mod: CPTII,S$GLB,, | Performed by: INTERNAL MEDICINE

## 2021-03-09 PROCEDURE — 99999 PR PBB SHADOW E&M-EST. PATIENT-LVL III: ICD-10-PCS | Mod: PBBFAC,,, | Performed by: INTERNAL MEDICINE

## 2021-03-09 PROCEDURE — 3288F FALL RISK ASSESSMENT DOCD: CPT | Mod: CPTII,S$GLB,, | Performed by: INTERNAL MEDICINE

## 2021-03-09 PROCEDURE — 3074F PR MOST RECENT SYSTOLIC BLOOD PRESSURE < 130 MM HG: ICD-10-PCS | Mod: CPTII,S$GLB,, | Performed by: INTERNAL MEDICINE

## 2021-03-10 ENCOUNTER — HOSPITAL ENCOUNTER (OUTPATIENT)
Dept: RADIOLOGY | Facility: HOSPITAL | Age: 76
Discharge: HOME | End: 2021-03-10
Attending: INTERNAL MEDICINE
Payer: MEDICARE

## 2021-03-10 DIAGNOSIS — M54.9 DORSALGIA, UNSPECIFIED: ICD-10-CM

## 2021-03-10 PROCEDURE — 72131 CT LUMBAR SPINE W/O DYE: CPT | Mod: 26,,, | Performed by: RADIOLOGY

## 2021-03-10 PROCEDURE — 72131 CT LUMBAR SPINE WITHOUT CONTRAST: ICD-10-PCS | Mod: 26,,, | Performed by: RADIOLOGY

## 2021-03-10 PROCEDURE — 72131 CT LUMBAR SPINE W/O DYE: CPT | Mod: TC,PO

## 2021-03-11 ENCOUNTER — PES CALL (OUTPATIENT)
Dept: ADMINISTRATIVE | Facility: CLINIC | Age: 76
End: 2021-03-11

## 2021-03-19 ENCOUNTER — LAB VISIT (OUTPATIENT)
Dept: FAMILY MEDICINE | Facility: CLINIC | Age: 76
End: 2021-03-19
Payer: MEDICARE

## 2021-03-19 DIAGNOSIS — Z01.812 PRE-PROCEDURE LAB EXAM: ICD-10-CM

## 2021-03-19 PROCEDURE — U0003 INFECTIOUS AGENT DETECTION BY NUCLEIC ACID (DNA OR RNA); SEVERE ACUTE RESPIRATORY SYNDROME CORONAVIRUS 2 (SARS-COV-2) (CORONAVIRUS DISEASE [COVID-19]), AMPLIFIED PROBE TECHNIQUE, MAKING USE OF HIGH THROUGHPUT TECHNOLOGIES AS DESCRIBED BY CMS-2020-01-R: HCPCS | Performed by: INTERNAL MEDICINE

## 2021-03-19 PROCEDURE — U0005 INFEC AGEN DETEC AMPLI PROBE: HCPCS | Performed by: INTERNAL MEDICINE

## 2021-03-21 LAB — SARS-COV-2 RNA RESP QL NAA+PROBE: NOT DETECTED

## 2021-03-22 ENCOUNTER — ANESTHESIA EVENT (OUTPATIENT)
Dept: ENDOSCOPY | Facility: HOSPITAL | Age: 76
End: 2021-03-22
Payer: MEDICARE

## 2021-03-22 ENCOUNTER — ANESTHESIA (OUTPATIENT)
Dept: ENDOSCOPY | Facility: HOSPITAL | Age: 76
End: 2021-03-22
Payer: MEDICARE

## 2021-03-22 ENCOUNTER — HOSPITAL ENCOUNTER (OUTPATIENT)
Facility: HOSPITAL | Age: 76
Discharge: HOME | End: 2021-03-22
Attending: INTERNAL MEDICINE | Admitting: INTERNAL MEDICINE
Payer: MEDICARE

## 2021-03-22 DIAGNOSIS — K22.70 BARRETT'S ESOPHAGUS: ICD-10-CM

## 2021-03-22 LAB — GLUCOSE SERPL-MCNC: 227 MG/DL (ref 70–110)

## 2021-03-22 PROCEDURE — 88305 TISSUE EXAM BY PATHOLOGIST: ICD-10-PCS | Mod: 26,,, | Performed by: PATHOLOGY

## 2021-03-22 PROCEDURE — 82962 GLUCOSE BLOOD TEST: CPT | Mod: PO | Performed by: INTERNAL MEDICINE

## 2021-03-22 PROCEDURE — D9220A PRA ANESTHESIA: Mod: CRNA,,, | Performed by: NURSE ANESTHETIST, CERTIFIED REGISTERED

## 2021-03-22 PROCEDURE — 43239 EGD BIOPSY SINGLE/MULTIPLE: CPT | Mod: PO | Performed by: INTERNAL MEDICINE

## 2021-03-22 PROCEDURE — 43235 PR EGD, FLEX, DIAGNOSTIC: ICD-10-PCS | Mod: ,,, | Performed by: INTERNAL MEDICINE

## 2021-03-22 PROCEDURE — 88305 TISSUE EXAM BY PATHOLOGIST: CPT | Performed by: PATHOLOGY

## 2021-03-22 PROCEDURE — 27201012 HC FORCEPS, HOT/COLD, DISP: Mod: PO | Performed by: INTERNAL MEDICINE

## 2021-03-22 PROCEDURE — 63600175 PHARM REV CODE 636 W HCPCS: Mod: PO | Performed by: NURSE ANESTHETIST, CERTIFIED REGISTERED

## 2021-03-22 PROCEDURE — D9220A PRA ANESTHESIA: ICD-10-PCS | Mod: ANES,,, | Performed by: ANESTHESIOLOGY

## 2021-03-22 PROCEDURE — 88305 TISSUE EXAM BY PATHOLOGIST: CPT | Mod: 26,,, | Performed by: PATHOLOGY

## 2021-03-22 PROCEDURE — 37000008 HC ANESTHESIA 1ST 15 MINUTES: Mod: PO | Performed by: INTERNAL MEDICINE

## 2021-03-22 PROCEDURE — D9220A PRA ANESTHESIA: ICD-10-PCS | Mod: CRNA,,, | Performed by: NURSE ANESTHETIST, CERTIFIED REGISTERED

## 2021-03-22 PROCEDURE — 37000009 HC ANESTHESIA EA ADD 15 MINS: Mod: PO | Performed by: INTERNAL MEDICINE

## 2021-03-22 PROCEDURE — D9220A PRA ANESTHESIA: Mod: ANES,,, | Performed by: ANESTHESIOLOGY

## 2021-03-22 PROCEDURE — 63600175 PHARM REV CODE 636 W HCPCS: Mod: PO | Performed by: INTERNAL MEDICINE

## 2021-03-22 PROCEDURE — 25000003 PHARM REV CODE 250: Mod: PO | Performed by: NURSE ANESTHETIST, CERTIFIED REGISTERED

## 2021-03-22 PROCEDURE — 43235 EGD DIAGNOSTIC BRUSH WASH: CPT | Mod: ,,, | Performed by: INTERNAL MEDICINE

## 2021-03-22 RX ORDER — LIDOCAINE HCL/PF 100 MG/5ML
SYRINGE (ML) INTRAVENOUS
Status: DISCONTINUED | OUTPATIENT
Start: 2021-03-22 | End: 2021-03-22

## 2021-03-22 RX ORDER — SODIUM CHLORIDE 0.9 % (FLUSH) 0.9 %
10 SYRINGE (ML) INJECTION
Status: DISCONTINUED | OUTPATIENT
Start: 2021-03-22 | End: 2021-03-22 | Stop reason: HOSPADM

## 2021-03-22 RX ORDER — SODIUM CHLORIDE, SODIUM LACTATE, POTASSIUM CHLORIDE, CALCIUM CHLORIDE 600; 310; 30; 20 MG/100ML; MG/100ML; MG/100ML; MG/100ML
INJECTION, SOLUTION INTRAVENOUS CONTINUOUS
Status: DISCONTINUED | OUTPATIENT
Start: 2021-03-22 | End: 2021-03-22 | Stop reason: HOSPADM

## 2021-03-22 RX ORDER — PROPOFOL 10 MG/ML
VIAL (ML) INTRAVENOUS
Status: DISCONTINUED | OUTPATIENT
Start: 2021-03-22 | End: 2021-03-22

## 2021-03-22 RX ADMIN — PROPOFOL 25 MG: 10 INJECTION, EMULSION INTRAVENOUS at 10:03

## 2021-03-22 RX ADMIN — LIDOCAINE HYDROCHLORIDE 100 MG: 20 INJECTION, SOLUTION INTRAVENOUS at 10:03

## 2021-03-22 RX ADMIN — SODIUM CHLORIDE, SODIUM LACTATE, POTASSIUM CHLORIDE, AND CALCIUM CHLORIDE: .6; .31; .03; .02 INJECTION, SOLUTION INTRAVENOUS at 09:03

## 2021-03-22 RX ADMIN — PROPOFOL 50 MG: 10 INJECTION, EMULSION INTRAVENOUS at 10:03

## 2021-03-22 RX ADMIN — PROPOFOL 100 MG: 10 INJECTION, EMULSION INTRAVENOUS at 10:03

## 2021-03-23 VITALS
WEIGHT: 161 LBS | SYSTOLIC BLOOD PRESSURE: 157 MMHG | TEMPERATURE: 98 F | OXYGEN SATURATION: 97 % | DIASTOLIC BLOOD PRESSURE: 67 MMHG | HEART RATE: 73 BPM | RESPIRATION RATE: 20 BRPM | BODY MASS INDEX: 28.53 KG/M2 | HEIGHT: 63 IN

## 2021-03-26 LAB
COMMENT: NORMAL
FINAL PATHOLOGIC DIAGNOSIS: NORMAL
GROSS: NORMAL
Lab: NORMAL

## 2021-03-30 RX ORDER — CARVEDILOL 3.12 MG/1
TABLET ORAL
Qty: 180 TABLET | Refills: 4 | Status: SHIPPED | OUTPATIENT
Start: 2021-03-30 | End: 2022-05-19

## 2021-03-30 RX ORDER — AMLODIPINE BESYLATE 5 MG/1
TABLET ORAL
Qty: 90 TABLET | Refills: 4 | Status: SHIPPED | OUTPATIENT
Start: 2021-03-30 | End: 2022-02-22 | Stop reason: SDUPTHER

## 2021-04-05 ENCOUNTER — TELEPHONE (OUTPATIENT)
Dept: CARDIOLOGY | Facility: CLINIC | Age: 76
End: 2021-04-05

## 2021-04-06 ENCOUNTER — PATIENT OUTREACH (OUTPATIENT)
Dept: ADMINISTRATIVE | Facility: OTHER | Age: 76
End: 2021-04-06

## 2021-04-07 ENCOUNTER — OFFICE VISIT (OUTPATIENT)
Dept: PAIN MEDICINE | Facility: CLINIC | Age: 76
End: 2021-04-07
Payer: MEDICARE

## 2021-04-07 ENCOUNTER — TELEPHONE (OUTPATIENT)
Dept: PAIN MEDICINE | Facility: CLINIC | Age: 76
End: 2021-04-07

## 2021-04-07 VITALS
OXYGEN SATURATION: 97 % | DIASTOLIC BLOOD PRESSURE: 60 MMHG | HEART RATE: 83 BPM | SYSTOLIC BLOOD PRESSURE: 120 MMHG | BODY MASS INDEX: 30.36 KG/M2 | HEIGHT: 62 IN | WEIGHT: 165 LBS

## 2021-04-07 DIAGNOSIS — Z11.9 SCREENING EXAMINATION FOR INFECTIOUS DISEASE: ICD-10-CM

## 2021-04-07 DIAGNOSIS — M54.9 DORSALGIA, UNSPECIFIED: ICD-10-CM

## 2021-04-07 DIAGNOSIS — M54.16 LUMBAR RADICULOPATHY: Primary | ICD-10-CM

## 2021-04-07 PROCEDURE — 99214 OFFICE O/P EST MOD 30 MIN: CPT | Mod: S$GLB,,, | Performed by: ANESTHESIOLOGY

## 2021-04-07 PROCEDURE — 99999 PR PBB SHADOW E&M-EST. PATIENT-LVL III: CPT | Mod: PBBFAC,,, | Performed by: ANESTHESIOLOGY

## 2021-04-07 PROCEDURE — 3074F SYST BP LT 130 MM HG: CPT | Mod: CPTII,S$GLB,, | Performed by: ANESTHESIOLOGY

## 2021-04-07 PROCEDURE — 1159F PR MEDICATION LIST DOCUMENTED IN MEDICAL RECORD: ICD-10-PCS | Mod: S$GLB,,, | Performed by: ANESTHESIOLOGY

## 2021-04-07 PROCEDURE — 1101F PR PT FALLS ASSESS DOC 0-1 FALLS W/OUT INJ PAST YR: ICD-10-PCS | Mod: CPTII,S$GLB,, | Performed by: ANESTHESIOLOGY

## 2021-04-07 PROCEDURE — 99214 PR OFFICE/OUTPT VISIT, EST, LEVL IV, 30-39 MIN: ICD-10-PCS | Mod: S$GLB,,, | Performed by: ANESTHESIOLOGY

## 2021-04-07 PROCEDURE — 3078F DIAST BP <80 MM HG: CPT | Mod: CPTII,S$GLB,, | Performed by: ANESTHESIOLOGY

## 2021-04-07 PROCEDURE — 1126F PR PAIN SEVERITY QUANTIFIED, NO PAIN PRESENT: ICD-10-PCS | Mod: S$GLB,,, | Performed by: ANESTHESIOLOGY

## 2021-04-07 PROCEDURE — 3288F PR FALLS RISK ASSESSMENT DOCUMENTED: ICD-10-PCS | Mod: CPTII,S$GLB,, | Performed by: ANESTHESIOLOGY

## 2021-04-07 PROCEDURE — 1126F AMNT PAIN NOTED NONE PRSNT: CPT | Mod: S$GLB,,, | Performed by: ANESTHESIOLOGY

## 2021-04-07 PROCEDURE — 99999 PR PBB SHADOW E&M-EST. PATIENT-LVL III: ICD-10-PCS | Mod: PBBFAC,,, | Performed by: ANESTHESIOLOGY

## 2021-04-07 PROCEDURE — 3074F PR MOST RECENT SYSTOLIC BLOOD PRESSURE < 130 MM HG: ICD-10-PCS | Mod: CPTII,S$GLB,, | Performed by: ANESTHESIOLOGY

## 2021-04-07 PROCEDURE — 1101F PT FALLS ASSESS-DOCD LE1/YR: CPT | Mod: CPTII,S$GLB,, | Performed by: ANESTHESIOLOGY

## 2021-04-07 PROCEDURE — 3288F FALL RISK ASSESSMENT DOCD: CPT | Mod: CPTII,S$GLB,, | Performed by: ANESTHESIOLOGY

## 2021-04-07 PROCEDURE — 3078F PR MOST RECENT DIASTOLIC BLOOD PRESSURE < 80 MM HG: ICD-10-PCS | Mod: CPTII,S$GLB,, | Performed by: ANESTHESIOLOGY

## 2021-04-07 PROCEDURE — 1159F MED LIST DOCD IN RCRD: CPT | Mod: S$GLB,,, | Performed by: ANESTHESIOLOGY

## 2021-04-07 RX ORDER — ALPRAZOLAM 0.5 MG/1
1 TABLET, ORALLY DISINTEGRATING ORAL ONCE AS NEEDED
Status: CANCELLED | OUTPATIENT
Start: 2021-04-19 | End: 2032-09-14

## 2021-04-19 ENCOUNTER — HOSPITAL ENCOUNTER (OUTPATIENT)
Facility: HOSPITAL | Age: 76
Discharge: HOME | End: 2021-04-19
Attending: ANESTHESIOLOGY | Admitting: ANESTHESIOLOGY
Payer: MEDICARE

## 2021-04-19 ENCOUNTER — HOSPITAL ENCOUNTER (OUTPATIENT)
Dept: RADIOLOGY | Facility: HOSPITAL | Age: 76
Discharge: HOME | End: 2021-04-19
Attending: ANESTHESIOLOGY
Payer: MEDICARE

## 2021-04-19 DIAGNOSIS — M54.16 LUMBAR RADICULOPATHY: Primary | ICD-10-CM

## 2021-04-19 DIAGNOSIS — M54.16 LUMBAR RADICULOPATHY: ICD-10-CM

## 2021-04-19 LAB — GLUCOSE SERPL-MCNC: 155 MG/DL (ref 70–110)

## 2021-04-19 PROCEDURE — 25000003 PHARM REV CODE 250: Mod: PO | Performed by: ANESTHESIOLOGY

## 2021-04-19 PROCEDURE — 76000 FLUOROSCOPY <1 HR PHYS/QHP: CPT | Mod: TC,PO

## 2021-04-19 PROCEDURE — 62323 NJX INTERLAMINAR LMBR/SAC: CPT | Mod: ,,, | Performed by: ANESTHESIOLOGY

## 2021-04-19 PROCEDURE — 63600175 PHARM REV CODE 636 W HCPCS: Mod: PO | Performed by: ANESTHESIOLOGY

## 2021-04-19 PROCEDURE — 82962 GLUCOSE BLOOD TEST: CPT | Mod: PO | Performed by: ANESTHESIOLOGY

## 2021-04-19 PROCEDURE — 62323 PR INJ LUMBAR/SACRAL, W/IMAGING GUIDANCE: ICD-10-PCS | Mod: ,,, | Performed by: ANESTHESIOLOGY

## 2021-04-19 PROCEDURE — 25500020 PHARM REV CODE 255: Mod: PO | Performed by: ANESTHESIOLOGY

## 2021-04-19 PROCEDURE — 62323 NJX INTERLAMINAR LMBR/SAC: CPT | Mod: PO | Performed by: ANESTHESIOLOGY

## 2021-04-19 RX ORDER — METHYLPREDNISOLONE ACETATE 80 MG/ML
INJECTION, SUSPENSION INTRA-ARTICULAR; INTRALESIONAL; INTRAMUSCULAR; SOFT TISSUE
Status: DISCONTINUED | OUTPATIENT
Start: 2021-04-19 | End: 2021-04-19 | Stop reason: HOSPADM

## 2021-04-19 RX ORDER — ALPRAZOLAM 0.5 MG/1
1 TABLET, ORALLY DISINTEGRATING ORAL ONCE AS NEEDED
Status: COMPLETED | OUTPATIENT
Start: 2021-04-19 | End: 2021-04-19

## 2021-04-19 RX ORDER — LIDOCAINE HYDROCHLORIDE 10 MG/ML
INJECTION, SOLUTION EPIDURAL; INFILTRATION; INTRACAUDAL; PERINEURAL
Status: DISCONTINUED | OUTPATIENT
Start: 2021-04-19 | End: 2021-04-19 | Stop reason: HOSPADM

## 2021-04-19 RX ORDER — SODIUM CHLORIDE 0.9 G/100ML
IRRIGANT IRRIGATION
Status: DISCONTINUED | OUTPATIENT
Start: 2021-04-19 | End: 2021-04-19 | Stop reason: HOSPADM

## 2021-04-19 RX ADMIN — ALPRAZOLAM 1 MG: 0.5 TABLET, ORALLY DISINTEGRATING ORAL at 12:04

## 2021-04-20 VITALS
RESPIRATION RATE: 18 BRPM | HEART RATE: 79 BPM | HEIGHT: 62 IN | TEMPERATURE: 98 F | DIASTOLIC BLOOD PRESSURE: 63 MMHG | BODY MASS INDEX: 30.36 KG/M2 | WEIGHT: 165 LBS | SYSTOLIC BLOOD PRESSURE: 132 MMHG | OXYGEN SATURATION: 95 %

## 2021-04-26 DIAGNOSIS — K63.5 POLYP OF COLON, UNSPECIFIED PART OF COLON, UNSPECIFIED TYPE: Primary | ICD-10-CM

## 2021-05-24 ENCOUNTER — TELEPHONE (OUTPATIENT)
Dept: OTOLARYNGOLOGY | Facility: CLINIC | Age: 76
End: 2021-05-24

## 2021-05-30 ENCOUNTER — PATIENT MESSAGE (OUTPATIENT)
Dept: ADMINISTRATIVE | Facility: OTHER | Age: 76
End: 2021-05-30

## 2021-05-30 ENCOUNTER — PATIENT OUTREACH (OUTPATIENT)
Dept: ADMINISTRATIVE | Facility: OTHER | Age: 76
End: 2021-05-30

## 2021-05-30 DIAGNOSIS — Z12.31 ENCOUNTER FOR SCREENING MAMMOGRAM FOR MALIGNANT NEOPLASM OF BREAST: Primary | ICD-10-CM

## 2021-05-30 DIAGNOSIS — E11.649 UNCONTROLLED TYPE 2 DIABETES MELLITUS WITH HYPOGLYCEMIA WITHOUT COMA: ICD-10-CM

## 2021-06-01 ENCOUNTER — HOSPITAL ENCOUNTER (OUTPATIENT)
Dept: CARDIOLOGY | Facility: HOSPITAL | Age: 76
Discharge: HOME | End: 2021-06-01
Attending: INTERNAL MEDICINE
Payer: MEDICARE

## 2021-06-01 ENCOUNTER — OFFICE VISIT (OUTPATIENT)
Dept: PAIN MEDICINE | Facility: CLINIC | Age: 76
End: 2021-06-01
Payer: MEDICARE

## 2021-06-01 VITALS
DIASTOLIC BLOOD PRESSURE: 60 MMHG | SYSTOLIC BLOOD PRESSURE: 115 MMHG | HEART RATE: 75 BPM | TEMPERATURE: 97 F | OXYGEN SATURATION: 98 % | RESPIRATION RATE: 18 BRPM

## 2021-06-01 DIAGNOSIS — M47.816 LUMBAR SPONDYLOSIS: ICD-10-CM

## 2021-06-01 DIAGNOSIS — M54.16 LUMBAR RADICULOPATHY: Primary | ICD-10-CM

## 2021-06-01 DIAGNOSIS — Z95.0 PACEMAKER: ICD-10-CM

## 2021-06-01 DIAGNOSIS — I49.5 SINUS NODE DYSFUNCTION: ICD-10-CM

## 2021-06-01 PROCEDURE — 1101F PR PT FALLS ASSESS DOC 0-1 FALLS W/OUT INJ PAST YR: ICD-10-PCS | Mod: CPTII,S$GLB,, | Performed by: ANESTHESIOLOGY

## 2021-06-01 PROCEDURE — 99214 OFFICE O/P EST MOD 30 MIN: CPT | Mod: S$GLB,,, | Performed by: ANESTHESIOLOGY

## 2021-06-01 PROCEDURE — 1159F PR MEDICATION LIST DOCUMENTED IN MEDICAL RECORD: ICD-10-PCS | Mod: S$GLB,,, | Performed by: ANESTHESIOLOGY

## 2021-06-01 PROCEDURE — 3288F FALL RISK ASSESSMENT DOCD: CPT | Mod: CPTII,S$GLB,, | Performed by: ANESTHESIOLOGY

## 2021-06-01 PROCEDURE — 1125F AMNT PAIN NOTED PAIN PRSNT: CPT | Mod: S$GLB,,, | Performed by: ANESTHESIOLOGY

## 2021-06-01 PROCEDURE — 3288F PR FALLS RISK ASSESSMENT DOCUMENTED: ICD-10-PCS | Mod: CPTII,S$GLB,, | Performed by: ANESTHESIOLOGY

## 2021-06-01 PROCEDURE — 1159F MED LIST DOCD IN RCRD: CPT | Mod: S$GLB,,, | Performed by: ANESTHESIOLOGY

## 2021-06-01 PROCEDURE — 1101F PT FALLS ASSESS-DOCD LE1/YR: CPT | Mod: CPTII,S$GLB,, | Performed by: ANESTHESIOLOGY

## 2021-06-01 PROCEDURE — 99999 PR PBB SHADOW E&M-EST. PATIENT-LVL IV: CPT | Mod: PBBFAC,,, | Performed by: ANESTHESIOLOGY

## 2021-06-01 PROCEDURE — 1125F PR PAIN SEVERITY QUANTIFIED, PAIN PRESENT: ICD-10-PCS | Mod: S$GLB,,, | Performed by: ANESTHESIOLOGY

## 2021-06-01 PROCEDURE — 99214 PR OFFICE/OUTPT VISIT, EST, LEVL IV, 30-39 MIN: ICD-10-PCS | Mod: S$GLB,,, | Performed by: ANESTHESIOLOGY

## 2021-06-01 PROCEDURE — 99999 PR PBB SHADOW E&M-EST. PATIENT-LVL IV: ICD-10-PCS | Mod: PBBFAC,,, | Performed by: ANESTHESIOLOGY

## 2021-06-02 RX ORDER — EZETIMIBE 10 MG/1
10 TABLET ORAL DAILY
Qty: 90 TABLET | Refills: 4 | Status: SHIPPED | OUTPATIENT
Start: 2021-06-02 | End: 2022-06-15

## 2021-06-08 ENCOUNTER — TELEPHONE (OUTPATIENT)
Dept: OTOLARYNGOLOGY | Facility: CLINIC | Age: 76
End: 2021-06-08

## 2021-06-08 ENCOUNTER — OFFICE VISIT (OUTPATIENT)
Dept: OTOLARYNGOLOGY | Facility: CLINIC | Age: 76
End: 2021-06-08
Payer: MEDICARE

## 2021-06-08 VITALS — BODY MASS INDEX: 30.34 KG/M2 | WEIGHT: 164.88 LBS | HEIGHT: 62 IN

## 2021-06-08 DIAGNOSIS — H60.313 DIFFUSE OTITIS EXTERNA OF BOTH EARS, UNSPECIFIED CHRONICITY: Primary | ICD-10-CM

## 2021-06-08 DIAGNOSIS — H61.23 BILATERAL IMPACTED CERUMEN: ICD-10-CM

## 2021-06-08 PROCEDURE — 1159F PR MEDICATION LIST DOCUMENTED IN MEDICAL RECORD: ICD-10-PCS | Mod: S$GLB,,, | Performed by: NURSE PRACTITIONER

## 2021-06-08 PROCEDURE — 3288F FALL RISK ASSESSMENT DOCD: CPT | Mod: CPTII,S$GLB,, | Performed by: NURSE PRACTITIONER

## 2021-06-08 PROCEDURE — 1101F PT FALLS ASSESS-DOCD LE1/YR: CPT | Mod: CPTII,S$GLB,, | Performed by: NURSE PRACTITIONER

## 2021-06-08 PROCEDURE — 3288F PR FALLS RISK ASSESSMENT DOCUMENTED: ICD-10-PCS | Mod: CPTII,S$GLB,, | Performed by: NURSE PRACTITIONER

## 2021-06-08 PROCEDURE — 1126F PR PAIN SEVERITY QUANTIFIED, NO PAIN PRESENT: ICD-10-PCS | Mod: S$GLB,,, | Performed by: NURSE PRACTITIONER

## 2021-06-08 PROCEDURE — 99999 PR PBB SHADOW E&M-EST. PATIENT-LVL III: ICD-10-PCS | Mod: PBBFAC,,, | Performed by: NURSE PRACTITIONER

## 2021-06-08 PROCEDURE — 99203 PR OFFICE/OUTPT VISIT, NEW, LEVL III, 30-44 MIN: ICD-10-PCS | Mod: 25,S$GLB,, | Performed by: NURSE PRACTITIONER

## 2021-06-08 PROCEDURE — 99999 PR PBB SHADOW E&M-EST. PATIENT-LVL III: CPT | Mod: PBBFAC,,, | Performed by: NURSE PRACTITIONER

## 2021-06-08 PROCEDURE — 69210 PR REMOVAL IMPACTED CERUMEN REQUIRING INSTRUMENTATION, UNILATERAL: ICD-10-PCS | Mod: S$GLB,,, | Performed by: NURSE PRACTITIONER

## 2021-06-08 PROCEDURE — 69210 REMOVE IMPACTED EAR WAX UNI: CPT | Mod: S$GLB,,, | Performed by: NURSE PRACTITIONER

## 2021-06-08 PROCEDURE — 99203 OFFICE O/P NEW LOW 30 MIN: CPT | Mod: 25,S$GLB,, | Performed by: NURSE PRACTITIONER

## 2021-06-08 PROCEDURE — 1101F PR PT FALLS ASSESS DOC 0-1 FALLS W/OUT INJ PAST YR: ICD-10-PCS | Mod: CPTII,S$GLB,, | Performed by: NURSE PRACTITIONER

## 2021-06-08 PROCEDURE — 1126F AMNT PAIN NOTED NONE PRSNT: CPT | Mod: S$GLB,,, | Performed by: NURSE PRACTITIONER

## 2021-06-08 PROCEDURE — 1159F MED LIST DOCD IN RCRD: CPT | Mod: S$GLB,,, | Performed by: NURSE PRACTITIONER

## 2021-06-08 RX ORDER — CIPROFLOXACIN HYDROCHLORIDE 3 MG/ML
SOLUTION/ DROPS OPHTHALMIC
Qty: 10 ML | Refills: 0 | Status: SHIPPED | OUTPATIENT
Start: 2021-06-08 | End: 2021-06-29 | Stop reason: CLARIF

## 2021-06-25 ENCOUNTER — TELEPHONE (OUTPATIENT)
Dept: PAIN MEDICINE | Facility: CLINIC | Age: 76
End: 2021-06-25

## 2021-06-29 ENCOUNTER — TELEPHONE (OUTPATIENT)
Dept: FAMILY MEDICINE | Facility: CLINIC | Age: 76
End: 2021-06-29

## 2021-06-29 PROBLEM — I71.40 AAA (ABDOMINAL AORTIC ANEURYSM): Status: ACTIVE | Noted: 2021-06-29

## 2021-06-29 PROBLEM — Z71.89 ACP (ADVANCE CARE PLANNING): Status: ACTIVE | Noted: 2021-06-29

## 2021-06-29 PROBLEM — K92.1 MELENA: Status: ACTIVE | Noted: 2021-06-29

## 2021-06-29 PROBLEM — R91.8 PULMONARY NODULES: Status: ACTIVE | Noted: 2021-06-29

## 2021-06-29 PROBLEM — D50.9 IRON DEFICIENCY ANEMIA: Status: ACTIVE | Noted: 2017-08-27

## 2021-06-30 PROBLEM — K92.1 MELANOTIC STOOLS: Status: ACTIVE | Noted: 2021-06-30

## 2021-07-01 ENCOUNTER — TELEPHONE (OUTPATIENT)
Dept: GASTROENTEROLOGY | Facility: CLINIC | Age: 76
End: 2021-07-01

## 2021-07-01 ENCOUNTER — PATIENT MESSAGE (OUTPATIENT)
Dept: ADMINISTRATIVE | Facility: OTHER | Age: 76
End: 2021-07-01

## 2021-07-07 ENCOUNTER — PES CALL (OUTPATIENT)
Dept: ADMINISTRATIVE | Facility: CLINIC | Age: 76
End: 2021-07-07

## 2021-07-07 ENCOUNTER — PATIENT OUTREACH (OUTPATIENT)
Dept: ADMINISTRATIVE | Facility: OTHER | Age: 76
End: 2021-07-07

## 2021-07-07 ENCOUNTER — TELEPHONE (OUTPATIENT)
Dept: ENDOSCOPY | Facility: HOSPITAL | Age: 76
End: 2021-07-07

## 2021-07-08 ENCOUNTER — OFFICE VISIT (OUTPATIENT)
Dept: GASTROENTEROLOGY | Facility: CLINIC | Age: 76
End: 2021-07-08
Payer: MEDICARE

## 2021-07-08 ENCOUNTER — TELEPHONE (OUTPATIENT)
Dept: GASTROENTEROLOGY | Facility: CLINIC | Age: 76
End: 2021-07-08

## 2021-07-08 VITALS — WEIGHT: 163.56 LBS | BODY MASS INDEX: 30.1 KG/M2 | HEIGHT: 62 IN

## 2021-07-08 DIAGNOSIS — Z09 HOSPITAL DISCHARGE FOLLOW-UP: Primary | ICD-10-CM

## 2021-07-08 DIAGNOSIS — R14.0 BLOATING SYMPTOM: ICD-10-CM

## 2021-07-08 DIAGNOSIS — K59.09 CHRONIC CONSTIPATION: ICD-10-CM

## 2021-07-08 DIAGNOSIS — Z87.19 HISTORY OF BARRETT'S ESOPHAGUS: ICD-10-CM

## 2021-07-08 DIAGNOSIS — Z79.01 ANTICOAGULANT LONG-TERM USE: ICD-10-CM

## 2021-07-08 DIAGNOSIS — Z86.010 HISTORY OF COLON POLYPS: ICD-10-CM

## 2021-07-08 DIAGNOSIS — K11.7 EXCESSIVE SALIVATION: ICD-10-CM

## 2021-07-08 DIAGNOSIS — D50.9 IRON DEFICIENCY ANEMIA, UNSPECIFIED IRON DEFICIENCY ANEMIA TYPE: ICD-10-CM

## 2021-07-08 DIAGNOSIS — R10.84 GENERALIZED ABDOMINAL PAIN: ICD-10-CM

## 2021-07-08 PROCEDURE — 1101F PR PT FALLS ASSESS DOC 0-1 FALLS W/OUT INJ PAST YR: ICD-10-PCS | Mod: CPTII,S$GLB,, | Performed by: NURSE PRACTITIONER

## 2021-07-08 PROCEDURE — 1159F MED LIST DOCD IN RCRD: CPT | Mod: S$GLB,,, | Performed by: NURSE PRACTITIONER

## 2021-07-08 PROCEDURE — 1126F AMNT PAIN NOTED NONE PRSNT: CPT | Mod: S$GLB,,, | Performed by: NURSE PRACTITIONER

## 2021-07-08 PROCEDURE — 99499 RISK ADDL DX/OHS AUDIT: ICD-10-PCS | Mod: HCNC,S$GLB,, | Performed by: NURSE PRACTITIONER

## 2021-07-08 PROCEDURE — 99499 UNLISTED E&M SERVICE: CPT | Mod: HCNC,S$GLB,, | Performed by: NURSE PRACTITIONER

## 2021-07-08 PROCEDURE — 99999 PR PBB SHADOW E&M-EST. PATIENT-LVL V: ICD-10-PCS | Mod: PBBFAC,,, | Performed by: NURSE PRACTITIONER

## 2021-07-08 PROCEDURE — 1159F PR MEDICATION LIST DOCUMENTED IN MEDICAL RECORD: ICD-10-PCS | Mod: S$GLB,,, | Performed by: NURSE PRACTITIONER

## 2021-07-08 PROCEDURE — 99214 PR OFFICE/OUTPT VISIT, EST, LEVL IV, 30-39 MIN: ICD-10-PCS | Mod: S$GLB,,, | Performed by: NURSE PRACTITIONER

## 2021-07-08 PROCEDURE — 1126F PR PAIN SEVERITY QUANTIFIED, NO PAIN PRESENT: ICD-10-PCS | Mod: S$GLB,,, | Performed by: NURSE PRACTITIONER

## 2021-07-08 PROCEDURE — 3288F PR FALLS RISK ASSESSMENT DOCUMENTED: ICD-10-PCS | Mod: CPTII,S$GLB,, | Performed by: NURSE PRACTITIONER

## 2021-07-08 PROCEDURE — 3288F FALL RISK ASSESSMENT DOCD: CPT | Mod: CPTII,S$GLB,, | Performed by: NURSE PRACTITIONER

## 2021-07-08 PROCEDURE — 1101F PT FALLS ASSESS-DOCD LE1/YR: CPT | Mod: CPTII,S$GLB,, | Performed by: NURSE PRACTITIONER

## 2021-07-08 PROCEDURE — 99214 OFFICE O/P EST MOD 30 MIN: CPT | Mod: S$GLB,,, | Performed by: NURSE PRACTITIONER

## 2021-07-08 PROCEDURE — 99999 PR PBB SHADOW E&M-EST. PATIENT-LVL V: CPT | Mod: PBBFAC,,, | Performed by: NURSE PRACTITIONER

## 2021-07-14 ENCOUNTER — LAB VISIT (OUTPATIENT)
Dept: LAB | Facility: HOSPITAL | Age: 76
End: 2021-07-14
Payer: MEDICARE

## 2021-07-14 ENCOUNTER — PATIENT MESSAGE (OUTPATIENT)
Dept: HEMATOLOGY/ONCOLOGY | Facility: CLINIC | Age: 76
End: 2021-07-14

## 2021-07-14 ENCOUNTER — OFFICE VISIT (OUTPATIENT)
Dept: HEMATOLOGY/ONCOLOGY | Facility: CLINIC | Age: 76
End: 2021-07-14
Payer: MEDICARE

## 2021-07-14 VITALS
DIASTOLIC BLOOD PRESSURE: 55 MMHG | WEIGHT: 163.56 LBS | OXYGEN SATURATION: 98 % | TEMPERATURE: 96 F | BODY MASS INDEX: 30.1 KG/M2 | RESPIRATION RATE: 18 BRPM | SYSTOLIC BLOOD PRESSURE: 100 MMHG | HEART RATE: 72 BPM | HEIGHT: 62 IN

## 2021-07-14 DIAGNOSIS — D50.9 IRON DEFICIENCY ANEMIA, UNSPECIFIED IRON DEFICIENCY ANEMIA TYPE: ICD-10-CM

## 2021-07-14 LAB
ALBUMIN SERPL BCP-MCNC: 3.1 G/DL (ref 3.5–5.2)
ALP SERPL-CCNC: 58 U/L (ref 55–135)
ALT SERPL W/O P-5'-P-CCNC: 11 U/L (ref 10–44)
ANION GAP SERPL CALC-SCNC: 11 MMOL/L (ref 8–16)
AST SERPL-CCNC: 12 U/L (ref 10–40)
BASOPHILS # BLD AUTO: 0.05 K/UL (ref 0–0.2)
BASOPHILS NFR BLD: 0.8 % (ref 0–1.9)
BILIRUB SERPL-MCNC: 0.5 MG/DL (ref 0.1–1)
BUN SERPL-MCNC: 32 MG/DL (ref 8–23)
CALCIUM SERPL-MCNC: 9.7 MG/DL (ref 8.7–10.5)
CHLORIDE SERPL-SCNC: 106 MMOL/L (ref 95–110)
CO2 SERPL-SCNC: 21 MMOL/L (ref 23–29)
CREAT SERPL-MCNC: 2 MG/DL (ref 0.5–1.4)
DIFFERENTIAL METHOD: ABNORMAL
EOSINOPHIL # BLD AUTO: 0.2 K/UL (ref 0–0.5)
EOSINOPHIL NFR BLD: 2.8 % (ref 0–8)
ERYTHROCYTE [DISTWIDTH] IN BLOOD BY AUTOMATED COUNT: 21.1 % (ref 11.5–14.5)
EST. GFR  (AFRICAN AMERICAN): 27 ML/MIN/1.73 M^2
EST. GFR  (NON AFRICAN AMERICAN): 24 ML/MIN/1.73 M^2
GLUCOSE SERPL-MCNC: 163 MG/DL (ref 70–110)
HCT VFR BLD AUTO: 28 % (ref 37–48.5)
HGB BLD-MCNC: 8.2 G/DL (ref 12–16)
IMM GRANULOCYTES # BLD AUTO: 0.02 K/UL (ref 0–0.04)
IMM GRANULOCYTES NFR BLD AUTO: 0.3 % (ref 0–0.5)
LDH SERPL L TO P-CCNC: 156 U/L (ref 110–260)
LYMPHOCYTES # BLD AUTO: 1 K/UL (ref 1–4.8)
LYMPHOCYTES NFR BLD: 17 % (ref 18–48)
MCH RBC QN AUTO: 22.5 PG (ref 27–31)
MCHC RBC AUTO-ENTMCNC: 29.3 G/DL (ref 32–36)
MCV RBC AUTO: 77 FL (ref 82–98)
MONOCYTES # BLD AUTO: 0.5 K/UL (ref 0.3–1)
MONOCYTES NFR BLD: 8.5 % (ref 4–15)
NEUTROPHILS # BLD AUTO: 4.3 K/UL (ref 1.8–7.7)
NEUTROPHILS NFR BLD: 70.6 % (ref 38–73)
NRBC BLD-RTO: 0 /100 WBC
PLATELET # BLD AUTO: 274 K/UL (ref 150–450)
PMV BLD AUTO: 9.2 FL (ref 9.2–12.9)
POTASSIUM SERPL-SCNC: 5 MMOL/L (ref 3.5–5.1)
PROT SERPL-MCNC: 6.9 G/DL (ref 6–8.4)
RBC # BLD AUTO: 3.65 M/UL (ref 4–5.4)
SODIUM SERPL-SCNC: 138 MMOL/L (ref 136–145)
WBC # BLD AUTO: 6.12 K/UL (ref 3.9–12.7)

## 2021-07-14 PROCEDURE — 99999 PR PBB SHADOW E&M-EST. PATIENT-LVL V: ICD-10-PCS | Mod: PBBFAC,,, | Performed by: INTERNAL MEDICINE

## 2021-07-14 PROCEDURE — 1126F PR PAIN SEVERITY QUANTIFIED, NO PAIN PRESENT: ICD-10-PCS | Mod: S$GLB,,, | Performed by: INTERNAL MEDICINE

## 2021-07-14 PROCEDURE — 1159F MED LIST DOCD IN RCRD: CPT | Mod: S$GLB,,, | Performed by: INTERNAL MEDICINE

## 2021-07-14 PROCEDURE — 83615 LACTATE (LD) (LDH) ENZYME: CPT | Mod: PN | Performed by: INTERNAL MEDICINE

## 2021-07-14 PROCEDURE — 1126F AMNT PAIN NOTED NONE PRSNT: CPT | Mod: S$GLB,,, | Performed by: INTERNAL MEDICINE

## 2021-07-14 PROCEDURE — 36415 COLL VENOUS BLD VENIPUNCTURE: CPT | Mod: PN | Performed by: INTERNAL MEDICINE

## 2021-07-14 PROCEDURE — 85025 COMPLETE CBC W/AUTO DIFF WBC: CPT | Mod: PN | Performed by: INTERNAL MEDICINE

## 2021-07-14 PROCEDURE — 80053 COMPREHEN METABOLIC PANEL: CPT | Mod: PN | Performed by: INTERNAL MEDICINE

## 2021-07-14 PROCEDURE — 1159F PR MEDICATION LIST DOCUMENTED IN MEDICAL RECORD: ICD-10-PCS | Mod: S$GLB,,, | Performed by: INTERNAL MEDICINE

## 2021-07-14 PROCEDURE — 99204 OFFICE O/P NEW MOD 45 MIN: CPT | Mod: S$GLB,,, | Performed by: INTERNAL MEDICINE

## 2021-07-14 PROCEDURE — 99204 PR OFFICE/OUTPT VISIT, NEW, LEVL IV, 45-59 MIN: ICD-10-PCS | Mod: S$GLB,,, | Performed by: INTERNAL MEDICINE

## 2021-07-14 PROCEDURE — 1101F PT FALLS ASSESS-DOCD LE1/YR: CPT | Mod: CPTII,S$GLB,, | Performed by: INTERNAL MEDICINE

## 2021-07-14 PROCEDURE — 99999 PR PBB SHADOW E&M-EST. PATIENT-LVL V: CPT | Mod: PBBFAC,,, | Performed by: INTERNAL MEDICINE

## 2021-07-14 PROCEDURE — 1101F PR PT FALLS ASSESS DOC 0-1 FALLS W/OUT INJ PAST YR: ICD-10-PCS | Mod: CPTII,S$GLB,, | Performed by: INTERNAL MEDICINE

## 2021-07-14 PROCEDURE — 3288F FALL RISK ASSESSMENT DOCD: CPT | Mod: CPTII,S$GLB,, | Performed by: INTERNAL MEDICINE

## 2021-07-14 PROCEDURE — 3288F PR FALLS RISK ASSESSMENT DOCUMENTED: ICD-10-PCS | Mod: CPTII,S$GLB,, | Performed by: INTERNAL MEDICINE

## 2021-07-27 ENCOUNTER — TELEPHONE (OUTPATIENT)
Dept: FAMILY MEDICINE | Facility: CLINIC | Age: 76
End: 2021-07-27

## 2021-08-02 ENCOUNTER — TELEPHONE (OUTPATIENT)
Dept: HEMATOLOGY/ONCOLOGY | Facility: CLINIC | Age: 76
End: 2021-08-02

## 2021-08-12 ENCOUNTER — TELEPHONE (OUTPATIENT)
Dept: HEMATOLOGY/ONCOLOGY | Facility: CLINIC | Age: 76
End: 2021-08-12

## 2021-08-12 ENCOUNTER — PATIENT MESSAGE (OUTPATIENT)
Dept: HEMATOLOGY/ONCOLOGY | Facility: CLINIC | Age: 76
End: 2021-08-12

## 2021-08-15 ENCOUNTER — PATIENT OUTREACH (OUTPATIENT)
Dept: ADMINISTRATIVE | Facility: OTHER | Age: 76
End: 2021-08-15

## 2021-08-15 DIAGNOSIS — Z13.5 ENCOUNTER FOR SCREENING FOR DIABETIC RETINOPATHY: Primary | ICD-10-CM

## 2021-09-29 ENCOUNTER — IMMUNIZATION (OUTPATIENT)
Dept: FAMILY MEDICINE | Facility: CLINIC | Age: 76
End: 2021-09-29
Payer: MEDICARE

## 2021-09-29 DIAGNOSIS — Z23 NEED FOR VACCINATION: Primary | ICD-10-CM

## 2021-09-29 PROCEDURE — 91300 COVID-19, MRNA, LNP-S, PF, 30 MCG/0.3 ML DOSE VACCINE: CPT | Mod: HCNC,PBBFAC | Performed by: FAMILY MEDICINE

## 2021-09-29 PROCEDURE — 0003A COVID-19, MRNA, LNP-S, PF, 30 MCG/0.3 ML DOSE VACCINE: CPT | Mod: HCNC,PBBFAC | Performed by: FAMILY MEDICINE

## 2021-10-14 ENCOUNTER — TELEPHONE (OUTPATIENT)
Dept: ENDOSCOPY | Facility: HOSPITAL | Age: 76
End: 2021-10-14

## 2021-11-12 NOTE — ADDENDUM NOTE
Addended by: JEROME BRYAN on: 7/7/2020 01:19 PM     Modules accepted: Orders     [Fall prevention counseling provided] : Fall prevention counseling provided [Behavioral health counseling provided] : Behavioral health counseling provided [Potential consequences of obesity discussed] : Potential consequences of obesity discussed [Encouraged to maintain food diary] : Encouraged to maintain food diary [Encouraged to increase physical activity] : Encouraged to increase physical activity [Target Wt Loss Goal ___] : Weight Loss Goals: Target weight loss goal [unfilled] lbs [____ min/wk Activity] : [unfilled] min/wk activity [None] : None [Good understanding] : Patient has a good understanding of lifestyle changes and steps needed to achieve self management goal [FreeTextEntry4] : 15

## 2021-12-02 DIAGNOSIS — I73.9 PVD (PERIPHERAL VASCULAR DISEASE): ICD-10-CM

## 2021-12-02 DIAGNOSIS — Z95.0 PACEMAKER: Primary | ICD-10-CM

## 2021-12-03 ENCOUNTER — TELEPHONE (OUTPATIENT)
Dept: ENDOSCOPY | Facility: HOSPITAL | Age: 76
End: 2021-12-03
Payer: MEDICARE

## 2021-12-23 RX ORDER — GLIPIZIDE 10 MG/1
TABLET ORAL
Qty: 180 TABLET | Refills: 1 | Status: SHIPPED | OUTPATIENT
Start: 2021-12-23 | End: 2022-06-08

## 2022-01-27 ENCOUNTER — TELEPHONE (OUTPATIENT)
Dept: ENDOSCOPY | Facility: HOSPITAL | Age: 77
End: 2022-01-27
Payer: MEDICARE

## 2022-02-08 ENCOUNTER — NURSE TRIAGE (OUTPATIENT)
Dept: ADMINISTRATIVE | Facility: CLINIC | Age: 77
End: 2022-02-08
Payer: MEDICARE

## 2022-02-08 DIAGNOSIS — I10 ESSENTIAL HYPERTENSION: Primary | ICD-10-CM

## 2022-02-08 NOTE — TELEPHONE ENCOUNTER
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 This morning HH assistant came to check on person she is caring for & assessed pt's BP.     At noon 191/110. Denies new/worsening symptoms. Unable to recheck now due to no BP monitor. Took all am meds & only evening BP med is amlodipine which she took this morning already.     Dispo-be seen within 24 hrs. Pt declines setting up apt since unable to leave individual alone, requesting callback from clinics for possible med adjustment.     Reason for Disposition   Systolic BP  >= 180 OR Diastolic >= 110    Additional Information   Negative: Difficult to awaken or acting confused (e.g., disoriented, slurred speech)   Negative: SEVERE difficulty breathing (e.g., struggling for each breath, speaks in single words)   Negative: [1] Weakness of the face, arm or leg on one side of the body AND [2] new-onset   Negative: [1] Numbness (i.e., loss of sensation) of the face, arm or leg on one side of the body AND [2] new-onset   Negative: [1] Chest pain lasts > 5 minutes AND [2] history of heart disease  (i.e., heart attack, bypass surgery, angina, angioplasty, CHF)   Negative: [1] Chest pain AND [2] took nitrogylcerin AND [3] pain was not relieved   Negative: Sounds like a life-threatening emergency to the triager   Negative: [1] Systolic BP  >= 160 OR Diastolic >= 100 AND [2] cardiac or neurologic symptoms (e.g., chest pain, difficulty breathing, unsteady gait, blurred vision)   Negative: [1] Pregnant 20 or more weeks (or postpartum < 6 weeks) AND [2] new hand or face swelling   Negative: [1] Pregnant 20 or more weeks AND [2] Systolic BP  >= 140 OR Diastolic >= 90   Negative: [1] Systolic BP  >= 200 OR Diastolic >= 120  AND [2] having NO cardiac or neurologic symptoms   Negative: [1] Postpartum < 6 weeks AND [2] Systolic BP  >= 140 OR Diastolic >= 90   Negative: [1] Systolic BP  >= 180 OR  Diastolic >= 110 AND [2] missed most recent dose of blood pressure medication    Protocols used: BLOOD PRESSURE - HIGH-A-AH

## 2022-02-09 NOTE — TELEPHONE ENCOUNTER
SPOKE W/ PT / PHONE, APPOINTMENT SCHEDULED WITH DR. CHENG 2/22 @ 10 AM, HER BP WAS RUNNING...  0900 174/97  1100 182/75  1230 172/95  PT STATES SHE IS UNDER STRESS D/T HER JOB, WANTS TO KNOW IF YOU WOULD LIKE TO INCREASE HER AMLODIPINE UNTIL HER APPT

## 2022-02-09 NOTE — TELEPHONE ENCOUNTER
Complicated issue.  Needs to be evaluated.  Not seen by me in over 1 year.  What times work better for her?  Also, can try to get in with Patricia.

## 2022-02-16 RX ORDER — VALSARTAN 320 MG/1
320 TABLET ORAL DAILY
Qty: 90 TABLET | Refills: 3 | Status: SHIPPED | OUTPATIENT
Start: 2022-02-16 | End: 2022-07-26

## 2022-02-22 ENCOUNTER — CLINICAL SUPPORT (OUTPATIENT)
Dept: CARDIOLOGY | Facility: HOSPITAL | Age: 77
End: 2022-02-22
Attending: INTERNAL MEDICINE
Payer: MEDICARE

## 2022-02-22 ENCOUNTER — OFFICE VISIT (OUTPATIENT)
Dept: CARDIOLOGY | Facility: CLINIC | Age: 77
End: 2022-02-22
Payer: MEDICARE

## 2022-02-22 VITALS
HEIGHT: 62 IN | BODY MASS INDEX: 30.18 KG/M2 | SYSTOLIC BLOOD PRESSURE: 133 MMHG | HEART RATE: 79 BPM | WEIGHT: 164 LBS | DIASTOLIC BLOOD PRESSURE: 79 MMHG

## 2022-02-22 DIAGNOSIS — Z95.1 S/P CABG (CORONARY ARTERY BYPASS GRAFT): ICD-10-CM

## 2022-02-22 DIAGNOSIS — E78.2 MIXED HYPERLIPIDEMIA: ICD-10-CM

## 2022-02-22 DIAGNOSIS — Z72.0 TOBACCO ABUSE: ICD-10-CM

## 2022-02-22 DIAGNOSIS — I73.9 PVD (PERIPHERAL VASCULAR DISEASE): Primary | ICD-10-CM

## 2022-02-22 DIAGNOSIS — I73.9 PVD (PERIPHERAL VASCULAR DISEASE): ICD-10-CM

## 2022-02-22 DIAGNOSIS — I65.23 BILATERAL CAROTID ARTERY STENOSIS: ICD-10-CM

## 2022-02-22 DIAGNOSIS — E11.00 UNCONTROLLED TYPE 2 DIABETES MELLITUS WITH HYPEROSMOLARITY WITHOUT COMA, WITHOUT LONG-TERM CURRENT USE OF INSULIN: ICD-10-CM

## 2022-02-22 DIAGNOSIS — Z95.0 PACEMAKER: ICD-10-CM

## 2022-02-22 DIAGNOSIS — I25.10 CORONARY ARTERY DISEASE INVOLVING NATIVE CORONARY ARTERY OF NATIVE HEART WITHOUT ANGINA PECTORIS: ICD-10-CM

## 2022-02-22 DIAGNOSIS — G45.9 TIA (TRANSIENT ISCHEMIC ATTACK): ICD-10-CM

## 2022-02-22 DIAGNOSIS — Z95.5 S/P CORONARY ARTERY STENT PLACEMENT: ICD-10-CM

## 2022-02-22 DIAGNOSIS — I10 ESSENTIAL HYPERTENSION: ICD-10-CM

## 2022-02-22 PROCEDURE — 3078F DIAST BP <80 MM HG: CPT | Mod: HCNC,CPTII,S$GLB, | Performed by: INTERNAL MEDICINE

## 2022-02-22 PROCEDURE — 99499 UNLISTED E&M SERVICE: CPT | Mod: HCNC,S$GLB,, | Performed by: INTERNAL MEDICINE

## 2022-02-22 PROCEDURE — 99999 PR PBB SHADOW E&M-EST. PATIENT-LVL IV: ICD-10-PCS | Mod: PBBFAC,HCNC,, | Performed by: INTERNAL MEDICINE

## 2022-02-22 PROCEDURE — 3078F PR MOST RECENT DIASTOLIC BLOOD PRESSURE < 80 MM HG: ICD-10-PCS | Mod: HCNC,CPTII,S$GLB, | Performed by: INTERNAL MEDICINE

## 2022-02-22 PROCEDURE — 1126F PR PAIN SEVERITY QUANTIFIED, NO PAIN PRESENT: ICD-10-PCS | Mod: HCNC,CPTII,S$GLB, | Performed by: INTERNAL MEDICINE

## 2022-02-22 PROCEDURE — 1159F PR MEDICATION LIST DOCUMENTED IN MEDICAL RECORD: ICD-10-PCS | Mod: HCNC,CPTII,S$GLB, | Performed by: INTERNAL MEDICINE

## 2022-02-22 PROCEDURE — 1160F PR REVIEW ALL MEDS BY PRESCRIBER/CLIN PHARMACIST DOCUMENTED: ICD-10-PCS | Mod: HCNC,CPTII,S$GLB, | Performed by: INTERNAL MEDICINE

## 2022-02-22 PROCEDURE — 3075F PR MOST RECENT SYSTOLIC BLOOD PRESS GE 130-139MM HG: ICD-10-PCS | Mod: HCNC,CPTII,S$GLB, | Performed by: INTERNAL MEDICINE

## 2022-02-22 PROCEDURE — 99499 RISK ADDL DX/OHS AUDIT: ICD-10-PCS | Mod: HCNC,S$GLB,, | Performed by: INTERNAL MEDICINE

## 2022-02-22 PROCEDURE — 1159F MED LIST DOCD IN RCRD: CPT | Mod: HCNC,CPTII,S$GLB, | Performed by: INTERNAL MEDICINE

## 2022-02-22 PROCEDURE — 99215 OFFICE O/P EST HI 40 MIN: CPT | Mod: HCNC,S$GLB,, | Performed by: INTERNAL MEDICINE

## 2022-02-22 PROCEDURE — 99215 PR OFFICE/OUTPT VISIT, EST, LEVL V, 40-54 MIN: ICD-10-PCS | Mod: HCNC,S$GLB,, | Performed by: INTERNAL MEDICINE

## 2022-02-22 PROCEDURE — 3075F SYST BP GE 130 - 139MM HG: CPT | Mod: HCNC,CPTII,S$GLB, | Performed by: INTERNAL MEDICINE

## 2022-02-22 PROCEDURE — 93280 PM DEVICE PROGR EVAL DUAL: CPT | Mod: 26,,, | Performed by: INTERNAL MEDICINE

## 2022-02-22 PROCEDURE — 1126F AMNT PAIN NOTED NONE PRSNT: CPT | Mod: HCNC,CPTII,S$GLB, | Performed by: INTERNAL MEDICINE

## 2022-02-22 PROCEDURE — 1160F RVW MEDS BY RX/DR IN RCRD: CPT | Mod: HCNC,CPTII,S$GLB, | Performed by: INTERNAL MEDICINE

## 2022-02-22 PROCEDURE — 93280 CARDIAC DEVICE CHECK - IN CLINIC & HOSPITAL: ICD-10-PCS | Mod: 26,,, | Performed by: INTERNAL MEDICINE

## 2022-02-22 PROCEDURE — 99999 PR PBB SHADOW E&M-EST. PATIENT-LVL IV: CPT | Mod: PBBFAC,HCNC,, | Performed by: INTERNAL MEDICINE

## 2022-02-22 RX ORDER — AMLODIPINE BESYLATE 10 MG/1
10 TABLET ORAL NIGHTLY
Qty: 90 TABLET | Refills: 5 | Status: SHIPPED | OUTPATIENT
Start: 2022-02-22 | End: 2023-03-13

## 2022-02-22 NOTE — PROGRESS NOTES
Subjective:    Patient ID:  Shirlene Ortiz is a 76 y.o. female who presents for follow-up of No chief complaint on file.      HPI  Here for follow up of CABG-PCI (BELLE 11/17)/PPM/PAD. Remains active stable SILVA NYHA 3. Continues to smoke. No angina. Has had irregular HR      Review of Systems   Constitutional: Negative for malaise/fatigue.   Eyes: Negative for blurred vision.   Cardiovascular: Negative for chest pain, claudication, cyanosis, dyspnea on exertion, irregular heartbeat, leg swelling, near-syncope, orthopnea, palpitations, paroxysmal nocturnal dyspnea and syncope.   Respiratory: Negative for cough and shortness of breath.    Hematologic/Lymphatic: Does not bruise/bleed easily.   Musculoskeletal: Negative for back pain, falls, joint pain, muscle cramps, muscle weakness and myalgias.   Gastrointestinal: Negative for abdominal pain, change in bowel habit, nausea and vomiting.   Genitourinary: Negative for urgency.   Neurological: Negative for dizziness, focal weakness and light-headedness.       Past Medical History:   Diagnosis Date    Acute coronary artery obstruction without MI     14 stents    Anemia 8/27/2017    Arthritis     Colon polyp     COPD (chronic obstructive pulmonary disease) 8/25/2017    Diverticulosis of colon     DM (diabetes mellitus)     Essential hypertension     Fatty liver     Hepatomegaly     HTN (hypertension)     Hyperlipidemia     Mixed hyperlipidemia     NSTEMI (non-ST elevated myocardial infarction) 8/17/2017 11/17    Pacemaker 6/25/2012    left chest PACEMAKER [employer] SEDR01 Sensia   S/N:[id number] Deion Link 8/3/2010 - current   right atrium LEAD [employer] 5076 CapSure Fix Novus  S/N:[id number] Deion Link 8/3/2010 - current   right ventricle LEAD [employer] 5076 CapSure Fix Novus  S/N:[id number] Deion Link 8/3/2010 - current     PVD (peripheral vascular disease)     S/P CABG (coronary artery bypass graft) 9/14/2017 8/17 CABG x5 LIMA-LAD, VG-PDA, VG-D,  YVG-OM-PLB  mammary artery to left anterior descending coronary artery and separate segments  of saphenous vein from the aorta to the posterior descending coronary artery  and from the aorta to the diagonal coronary artery and from the aorta to the  obtuse marginal coronary artery and to the posterolateral branch of the  circumflex in a sequential fashion using a combination of antegrade and  retrograde cardioplegia with mild systemic hypothermia and endoscopic vein  harvest.    S/P coronary artery stent placement     11/17 proximal circumflex using a 2.5 x 38, post-dilated to 3.0 with 0% residual stenosis.    Sinus node dysfunction     Stroke 7/4/14    Tobacco abuse 8/15/2017    Uncontrolled type 2 diabetes mellitus with hyperosmolarity without coma, without long-term current use of insulin     Documented by Maury on 10/26/15.     There are no hospital problems to display for this patient.       Objective:     Vitals:    02/22/22 1005   BP: 133/79   Pulse: 79        Physical Exam  Vitals and nursing note reviewed.   Constitutional:       Appearance: She is well-developed.   HENT:      Head: Normocephalic.   Eyes:      Conjunctiva/sclera: Conjunctivae normal.   Neck:      Vascular: No JVD.   Cardiovascular:      Rate and Rhythm: Normal rate and regular rhythm.      Pulses: Intact distal pulses.           Carotid pulses are 2+ on the right side and 2+ on the left side.       Radial pulses are 2+ on the right side and 2+ on the left side.        Dorsalis pedis pulses are 2+ on the right side and 2+ on the left side.        Posterior tibial pulses are 2+ on the right side and 2+ on the left side.      Heart sounds: Normal heart sounds.      Comments: Well healed midline sternal incision.  Pacer site clean and dry, well healed.      Pulmonary:      Effort: Pulmonary effort is normal.      Breath sounds: Normal breath sounds.   Abdominal:      General: Bowel sounds are normal.      Palpations: Abdomen is soft.    Musculoskeletal:         General: No tenderness.      Cervical back: Normal range of motion and neck supple.   Skin:     General: Skin is warm and dry.      Nails: There is no clubbing.   Neurological:      Mental Status: She is alert and oriented to person, place, and time.      Gait: Gait normal.   Psychiatric:         Speech: Speech normal.         Behavior: Behavior normal.         Thought Content: Thought content normal.               ..    Chemistry        Component Value Date/Time     07/14/2021 1205    K 5.0 07/14/2021 1205     07/14/2021 1205    CO2 21 (L) 07/14/2021 1205    BUN 32 (H) 07/14/2021 1205    CREATININE 2.0 (H) 07/14/2021 1205     (H) 07/14/2021 1205        Component Value Date/Time    CALCIUM 9.7 07/14/2021 1205    ALKPHOS 58 07/14/2021 1205    AST 12 07/14/2021 1205    ALT 11 07/14/2021 1205    BILITOT 0.5 07/14/2021 1205    ESTGFRAFRICA 27 (A) 07/14/2021 1205    EGFRNONAA 24 (A) 07/14/2021 1205            ..  Lab Results   Component Value Date    CHOL 136 07/02/2020    CHOL 129 06/25/2020    CHOL 149 12/21/2019     Lab Results   Component Value Date    HDL 29 (L) 07/02/2020    HDL 27 (L) 06/25/2020    HDL 33 (L) 12/21/2019     Lab Results   Component Value Date    LDLCALC 47.0 (L) 07/02/2020    LDLCALC 57.2 (L) 06/25/2020    LDLCALC 86.8 12/21/2019     Lab Results   Component Value Date    TRIG 300 (H) 07/02/2020    TRIG 224 (H) 06/25/2020    TRIG 146 12/21/2019     Lab Results   Component Value Date    CHOLHDL 21.3 07/02/2020    CHOLHDL 20.9 06/25/2020    CHOLHDL 22.1 12/21/2019     ..  Lab Results   Component Value Date    WBC 6.12 07/14/2021    HGB 8.2 (L) 07/14/2021    HCT 28.0 (L) 07/14/2021    MCV 77 (L) 07/14/2021     07/14/2021       Test(s) Reviewed  I have reviewed the following in detail:  [] Stress test   [] Angiography   [x] Echocardiogram   [x] Labs   [] Other:       Assessment:         ICD-10-CM ICD-9-CM   1. PVD (peripheral vascular disease)  I73.9  443.9   2. Coronary artery disease involving native coronary artery of native heart without angina pectoris  I25.10 414.01   3. Mixed hyperlipidemia  E78.2 272.2   4. Essential hypertension  I10 401.9   5. TIA (transient ischemic attack)  G45.9 435.9   6. Bilateral carotid artery stenosis  I65.23 433.10     433.30   7. S/P coronary artery stent placement  Z95.5 V45.82   8. S/P CABG (coronary artery bypass graft)  Z95.1 V45.81   9. Uncontrolled type 2 diabetes mellitus with hyperosmolarity without coma, without long-term current use of insulin  E11.00 250.22   10. Tobacco abuse  Z72.0 305.1     Active Problem List with Overview Notes    Diagnosis Date Noted    Melanotic stools 06/30/2021    Melena 06/29/2021    ACP (advance care planning) 06/29/2021    Pulmonary nodules 06/29/2021    AAA (abdominal aortic aneurysm) 06/29/2021     Seen in CT - Stable 2.5 x 3.5 cm suprarenal abdominal aortic aneurysm.      Lumbar radiculopathy 04/19/2021    Colon polyp 11/04/2020    Jonas's esophagus 08/05/2020    TIA (transient ischemic attack) 07/02/2020    Chronic bilateral low back pain with bilateral sciatica 05/26/2020    Lumbar spondylosis 05/26/2020    Dysphagia 03/09/2020    Bilateral carotid artery stenosis 05/06/2019     caortid US 8/16/17      Atypical nevi 04/30/2019    Type 2 diabetes mellitus with stage 3 chronic kidney disease, without long-term current use of insulin 04/30/2019    Uncontrolled type 2 diabetes mellitus with hypoglycemia without coma 04/30/2019    Eczema of left external ear 04/30/2019    Atherosclerosis of aorta 03/23/2018    Tortuous aorta 03/23/2018    S/P coronary artery stent placement      11/17  proximal circumflex using a 2.5 x 38, post-dilated to 3.0 with 0% residual  stenosis.      S/P CABG (coronary artery bypass graft) 09/14/2017 8/17  CABG x5 LIMA-LAD, VG-PDA, VG-D, YVG-OM-PLB         Iron deficiency anemia 08/27/2017    COPD (chronic obstructive pulmonary  disease) 08/25/2017    Tobacco abuse 08/15/2017    Hx of colonic polyps 04/27/2015    Functional gait disorder 08/25/2014    Speech defect 08/25/2014    Uncontrolled type 2 diabetes mellitus with hyperosmolarity without coma, without long-term current use of insulin      Documented by Maury on 10/26/15.      Mixed hyperlipidemia     Essential hypertension     CAD (coronary artery disease) 07/30/2012 11/17  Circumflex: Native circumflex has a 70% stenosis proximal to the stent in  the mid portion and has a short discrete 95% in-stent restenosis just prior to  the obtuse marginal and another 80% right at the obtuse marginal and a 70% in  the distal portion of the stent. There was a stent in the obtuse marginal into  the stent of the circumflex with a 70% in-stent restenosis.  --  Graft to the mid LAD: The graft was a LIMA from the aorta.  --  Graft to the 1st obtuse marginal: Vein graft to the circumflex is occluded.        PVD (peripheral vascular disease) 07/30/2012     Significantly reduced MAURICIO on the left leg.  70-89% stenosis seen in the left popliteal artery.   SIGNED BY: Caesar Loo MD On: 08/12/2016 17:05      Sinus node dysfunction 06/25/2012    Pacemaker 06/25/2012     left chest PACEMAKER  [employer] SEDR01 Sensia   S/N:[id number]  Deion Link  8/3/2010 - current     right atrium LEAD  [employer] 5076 CapSure Fix Novus  S/N:[id number]  eDion Link  8/3/2010 - current     right ventricle LEAD  [employer]TR[employer] 5076 CapSure Fix Novus  S/N:[id number]  Deion Link  8/3/2010 - current           Plan:           Return to clinic 6 months   Low level/low impact aerobic exercise 5x's/wk. Heart healthy diet and risk factor modification.    See labs and med orders.  CFD/labs/stress test call results.   Needs pacer check!  Orders Placed This Encounter   Procedures    Lipid Panel    Comprehensive Metabolic Panel    CK    CBC Auto Differential    Hemoglobin A1C    TSH    Echo     F/U PCP      Portions of  this note may have been created with voice recognition software.  Grammatical, syntax and spelling errors may be inevitable.

## 2022-03-04 RX ORDER — LOSARTAN POTASSIUM 50 MG/1
TABLET ORAL
Qty: 90 TABLET | Refills: 4 | OUTPATIENT
Start: 2022-03-04

## 2022-03-10 ENCOUNTER — TELEPHONE (OUTPATIENT)
Dept: ENDOSCOPY | Facility: HOSPITAL | Age: 77
End: 2022-03-10
Payer: MEDICARE

## 2022-03-10 NOTE — TELEPHONE ENCOUNTER
----- Message from Migdalia Ordoñez sent at 3/10/2022  2:53 PM CST -----  Contact: [phone]  Pt returning a call from Mally. Pt would like a call back.    [phone]

## 2022-03-11 ENCOUNTER — TELEPHONE (OUTPATIENT)
Dept: GASTROENTEROLOGY | Facility: CLINIC | Age: 77
End: 2022-03-11
Payer: MEDICARE

## 2022-03-11 NOTE — TELEPHONE ENCOUNTER
----- Message from Mally Anthony MA sent at 3/11/2022  4:32 PM CST -----  Contact: [phone]  She wants do to colonoscopy with you.  ----- Message -----  From: Cassidy Nelson  Sent: 3/11/2022   2:52 PM CST  To: aMlly Anthony MA, Erasmo Jaimes V Staff    Pt returning a call from Mally. Pt would like a call back.    [phone]

## 2022-03-14 ENCOUNTER — TELEPHONE (OUTPATIENT)
Dept: GASTROENTEROLOGY | Facility: CLINIC | Age: 77
End: 2022-03-14
Payer: MEDICARE

## 2022-03-14 ENCOUNTER — TELEPHONE (OUTPATIENT)
Dept: CARDIOLOGY | Facility: CLINIC | Age: 77
End: 2022-03-14
Payer: MEDICARE

## 2022-03-14 NOTE — TELEPHONE ENCOUNTER
Called and spoke with the patient, patient states that she needs her 1 year colonoscopy that was previously done by Dr. Marinelli.  Colonoscopy scheduled with the patient.  Patient states that she will come get her prep instructions at the time of her visit with .

## 2022-03-14 NOTE — TELEPHONE ENCOUNTER
----- Message from Sayra Carrasco LPN sent at 3/14/2022 11:09 AM CDT -----  Regarding: FW: Returning Call    ----- Message -----  From: Gordon Blakely  Sent: 3/14/2022  11:03 AM CDT  To: Isis ROBLES Staff  Subject: Returning Call                                   Who Called: pt          Who Left Message for Patient: Minna Ramirez LPN          Does the patient know what this is regarding: f/u appointemnt          Best Call Back Number: [phone]

## 2022-03-14 NOTE — TELEPHONE ENCOUNTER
----- Message from Minna Ramirez LPN sent at 3/14/2022 11:42 AM CDT -----  Regarding: Plavix and aspirin  Good Morning,  We have just scheduled this patient for a colonoscopy that will requi her to be of her plavix and aspirin for 5 days prior to the procedure.  Please let us know if this is acceptable for the patient.  Kindly,  SHELL Buitrago

## 2022-03-14 NOTE — TELEPHONE ENCOUNTER
----- Message from Sayra Carrasco LPN sent at 3/14/2022 11:09 AM CDT -----  Regarding: FW: Returning Call    ----- Message -----  From: Gordon Blakely  Sent: 3/14/2022  11:03 AM CDT  To: Isis ROBLES Staff  Subject: Returning Call                                   Who Called: pt          Who Left Message for Patient: Minna Ramirez LPN          Does the patient know what this is regarding: f/u appointemnt          Best Call Back Number: [phone]

## 2022-03-21 ENCOUNTER — TELEPHONE (OUTPATIENT)
Dept: PAIN MEDICINE | Facility: CLINIC | Age: 77
End: 2022-03-21
Payer: MEDICARE

## 2022-03-21 NOTE — TELEPHONE ENCOUNTER
Spoke with pt - states that her back pain is returning. She is having left hip pain when putting pressure on foot and is having difficulty walking. Offered appt today, but pt declined and requested to schedule appt with Dr. Lugo on Thursday. appt scheduled.

## 2022-03-21 NOTE — TELEPHONE ENCOUNTER
----- Message from Stacey M Lefort sent at 3/21/2022  8:56 AM CDT -----  Pt left a vm for callback at [phone].  Thank you.

## 2022-03-24 ENCOUNTER — PATIENT OUTREACH (OUTPATIENT)
Dept: ADMINISTRATIVE | Facility: OTHER | Age: 77
End: 2022-03-24
Payer: MEDICARE

## 2022-03-24 ENCOUNTER — HOSPITAL ENCOUNTER (OUTPATIENT)
Dept: RADIOLOGY | Facility: HOSPITAL | Age: 77
Discharge: HOME | End: 2022-03-24
Attending: ANESTHESIOLOGY
Payer: MEDICARE

## 2022-03-24 ENCOUNTER — OFFICE VISIT (OUTPATIENT)
Dept: PAIN MEDICINE | Facility: CLINIC | Age: 77
End: 2022-03-24
Payer: MEDICARE

## 2022-03-24 ENCOUNTER — TELEPHONE (OUTPATIENT)
Dept: FAMILY MEDICINE | Facility: CLINIC | Age: 77
End: 2022-03-24

## 2022-03-24 ENCOUNTER — TELEPHONE (OUTPATIENT)
Dept: PAIN MEDICINE | Facility: CLINIC | Age: 77
End: 2022-03-24

## 2022-03-24 ENCOUNTER — OFFICE VISIT (OUTPATIENT)
Dept: FAMILY MEDICINE | Facility: CLINIC | Age: 77
End: 2022-03-24
Payer: MEDICARE

## 2022-03-24 VITALS
HEIGHT: 62 IN | HEART RATE: 82 BPM | WEIGHT: 164.44 LBS | HEIGHT: 62 IN | DIASTOLIC BLOOD PRESSURE: 82 MMHG | BODY MASS INDEX: 30.26 KG/M2 | SYSTOLIC BLOOD PRESSURE: 132 MMHG | SYSTOLIC BLOOD PRESSURE: 144 MMHG | DIASTOLIC BLOOD PRESSURE: 67 MMHG | OXYGEN SATURATION: 97 % | WEIGHT: 164.56 LBS | BODY MASS INDEX: 30.28 KG/M2 | TEMPERATURE: 98 F | HEART RATE: 83 BPM

## 2022-03-24 DIAGNOSIS — I10 ESSENTIAL HYPERTENSION: ICD-10-CM

## 2022-03-24 DIAGNOSIS — M54.9 DORSALGIA, UNSPECIFIED: ICD-10-CM

## 2022-03-24 DIAGNOSIS — Z78.0 POST-MENOPAUSAL: ICD-10-CM

## 2022-03-24 DIAGNOSIS — Z00.00 ROUTINE PHYSICAL EXAMINATION: Primary | ICD-10-CM

## 2022-03-24 DIAGNOSIS — M54.16 LUMBAR RADICULOPATHY: Primary | ICD-10-CM

## 2022-03-24 DIAGNOSIS — E78.5 DYSLIPIDEMIA: ICD-10-CM

## 2022-03-24 DIAGNOSIS — R91.8 LUNG NODULES: ICD-10-CM

## 2022-03-24 DIAGNOSIS — E11.9 CONTROLLED TYPE 2 DIABETES MELLITUS WITHOUT COMPLICATION, WITHOUT LONG-TERM CURRENT USE OF INSULIN: ICD-10-CM

## 2022-03-24 DIAGNOSIS — E11.65 UNCONTROLLED TYPE 2 DIABETES MELLITUS WITH HYPERGLYCEMIA: Primary | ICD-10-CM

## 2022-03-24 PROCEDURE — 1101F PR PT FALLS ASSESS DOC 0-1 FALLS W/OUT INJ PAST YR: ICD-10-PCS | Mod: CPTII,S$GLB,, | Performed by: ANESTHESIOLOGY

## 2022-03-24 PROCEDURE — 1160F PR REVIEW ALL MEDS BY PRESCRIBER/CLIN PHARMACIST DOCUMENTED: ICD-10-PCS | Mod: CPTII,S$GLB,, | Performed by: INTERNAL MEDICINE

## 2022-03-24 PROCEDURE — 99999 PR PBB SHADOW E&M-EST. PATIENT-LVL V: ICD-10-PCS | Mod: PBBFAC,,, | Performed by: INTERNAL MEDICINE

## 2022-03-24 PROCEDURE — 3288F FALL RISK ASSESSMENT DOCD: CPT | Mod: CPTII,S$GLB,, | Performed by: ANESTHESIOLOGY

## 2022-03-24 PROCEDURE — 3288F FALL RISK ASSESSMENT DOCD: CPT | Mod: CPTII,S$GLB,, | Performed by: INTERNAL MEDICINE

## 2022-03-24 PROCEDURE — 1160F RVW MEDS BY RX/DR IN RCRD: CPT | Mod: CPTII,S$GLB,, | Performed by: INTERNAL MEDICINE

## 2022-03-24 PROCEDURE — 3078F PR MOST RECENT DIASTOLIC BLOOD PRESSURE < 80 MM HG: ICD-10-PCS | Mod: CPTII,S$GLB,, | Performed by: ANESTHESIOLOGY

## 2022-03-24 PROCEDURE — 99214 PR OFFICE/OUTPT VISIT, EST, LEVL IV, 30-39 MIN: ICD-10-PCS | Mod: S$GLB,,, | Performed by: ANESTHESIOLOGY

## 2022-03-24 PROCEDURE — 72114 XR LUMBAR SPINE 5 VIEW WITH FLEX AND EXT: ICD-10-PCS | Mod: 26,,, | Performed by: RADIOLOGY

## 2022-03-24 PROCEDURE — 99499 RISK ADDL DX/OHS AUDIT: ICD-10-PCS | Mod: S$GLB,,, | Performed by: INTERNAL MEDICINE

## 2022-03-24 PROCEDURE — 1101F PR PT FALLS ASSESS DOC 0-1 FALLS W/OUT INJ PAST YR: ICD-10-PCS | Mod: CPTII,S$GLB,, | Performed by: INTERNAL MEDICINE

## 2022-03-24 PROCEDURE — 1125F AMNT PAIN NOTED PAIN PRSNT: CPT | Mod: CPTII,S$GLB,, | Performed by: ANESTHESIOLOGY

## 2022-03-24 PROCEDURE — 1159F PR MEDICATION LIST DOCUMENTED IN MEDICAL RECORD: ICD-10-PCS | Mod: CPTII,S$GLB,, | Performed by: INTERNAL MEDICINE

## 2022-03-24 PROCEDURE — 3075F SYST BP GE 130 - 139MM HG: CPT | Mod: CPTII,S$GLB,, | Performed by: INTERNAL MEDICINE

## 2022-03-24 PROCEDURE — 1101F PT FALLS ASSESS-DOCD LE1/YR: CPT | Mod: CPTII,S$GLB,, | Performed by: ANESTHESIOLOGY

## 2022-03-24 PROCEDURE — 3079F DIAST BP 80-89 MM HG: CPT | Mod: CPTII,S$GLB,, | Performed by: INTERNAL MEDICINE

## 2022-03-24 PROCEDURE — 3288F PR FALLS RISK ASSESSMENT DOCUMENTED: ICD-10-PCS | Mod: CPTII,S$GLB,, | Performed by: INTERNAL MEDICINE

## 2022-03-24 PROCEDURE — 99999 PR PBB SHADOW E&M-EST. PATIENT-LVL V: ICD-10-PCS | Mod: PBBFAC,,, | Performed by: ANESTHESIOLOGY

## 2022-03-24 PROCEDURE — 99397 PR PREVENTIVE VISIT,EST,65 & OVER: ICD-10-PCS | Mod: S$GLB,,, | Performed by: INTERNAL MEDICINE

## 2022-03-24 PROCEDURE — 99999 PR PBB SHADOW E&M-EST. PATIENT-LVL V: CPT | Mod: PBBFAC,,, | Performed by: INTERNAL MEDICINE

## 2022-03-24 PROCEDURE — 1101F PT FALLS ASSESS-DOCD LE1/YR: CPT | Mod: CPTII,S$GLB,, | Performed by: INTERNAL MEDICINE

## 2022-03-24 PROCEDURE — 3078F DIAST BP <80 MM HG: CPT | Mod: CPTII,S$GLB,, | Performed by: ANESTHESIOLOGY

## 2022-03-24 PROCEDURE — 99214 OFFICE O/P EST MOD 30 MIN: CPT | Mod: S$GLB,,, | Performed by: ANESTHESIOLOGY

## 2022-03-24 PROCEDURE — 1126F PR PAIN SEVERITY QUANTIFIED, NO PAIN PRESENT: ICD-10-PCS | Mod: CPTII,S$GLB,, | Performed by: INTERNAL MEDICINE

## 2022-03-24 PROCEDURE — 3075F PR MOST RECENT SYSTOLIC BLOOD PRESS GE 130-139MM HG: ICD-10-PCS | Mod: CPTII,S$GLB,, | Performed by: INTERNAL MEDICINE

## 2022-03-24 PROCEDURE — 99397 PER PM REEVAL EST PAT 65+ YR: CPT | Mod: S$GLB,,, | Performed by: INTERNAL MEDICINE

## 2022-03-24 PROCEDURE — 1159F MED LIST DOCD IN RCRD: CPT | Mod: CPTII,S$GLB,, | Performed by: INTERNAL MEDICINE

## 2022-03-24 PROCEDURE — 72114 X-RAY EXAM L-S SPINE BENDING: CPT | Mod: TC,FY,PO

## 2022-03-24 PROCEDURE — 3288F PR FALLS RISK ASSESSMENT DOCUMENTED: ICD-10-PCS | Mod: CPTII,S$GLB,, | Performed by: ANESTHESIOLOGY

## 2022-03-24 PROCEDURE — 3077F PR MOST RECENT SYSTOLIC BLOOD PRESSURE >= 140 MM HG: ICD-10-PCS | Mod: CPTII,S$GLB,, | Performed by: ANESTHESIOLOGY

## 2022-03-24 PROCEDURE — 99499 UNLISTED E&M SERVICE: CPT | Mod: S$GLB,,, | Performed by: INTERNAL MEDICINE

## 2022-03-24 PROCEDURE — 99999 PR PBB SHADOW E&M-EST. PATIENT-LVL V: CPT | Mod: PBBFAC,,, | Performed by: ANESTHESIOLOGY

## 2022-03-24 PROCEDURE — 1126F AMNT PAIN NOTED NONE PRSNT: CPT | Mod: CPTII,S$GLB,, | Performed by: INTERNAL MEDICINE

## 2022-03-24 PROCEDURE — 1125F PR PAIN SEVERITY QUANTIFIED, PAIN PRESENT: ICD-10-PCS | Mod: CPTII,S$GLB,, | Performed by: ANESTHESIOLOGY

## 2022-03-24 PROCEDURE — 3079F PR MOST RECENT DIASTOLIC BLOOD PRESSURE 80-89 MM HG: ICD-10-PCS | Mod: CPTII,S$GLB,, | Performed by: INTERNAL MEDICINE

## 2022-03-24 PROCEDURE — 3077F SYST BP >= 140 MM HG: CPT | Mod: CPTII,S$GLB,, | Performed by: ANESTHESIOLOGY

## 2022-03-24 PROCEDURE — 72114 X-RAY EXAM L-S SPINE BENDING: CPT | Mod: 26,,, | Performed by: RADIOLOGY

## 2022-03-24 RX ORDER — ALPRAZOLAM 0.5 MG/1
1 TABLET, ORALLY DISINTEGRATING ORAL ONCE AS NEEDED
Status: CANCELLED | OUTPATIENT
Start: 2022-04-05 | End: 2033-08-31

## 2022-03-24 NOTE — H&P (VIEW-ONLY)
[facility] Follow Up Evaluation    Referred by: Dr. Mendiola  Reason for referral: back pain    CC:   Chief Complaint   Patient presents with    Low-back Pain    Leg Pain     left      Last 3 PDI Scores 6/1/2021 5/26/2020   Pain Disability Index (PDI) 12 12       Interval HPI 3/24/22: Ms. Ortiz returns to the office for follow up.  Today she reports return of lower back pain, constant, sharp, shooting radiating down the left leg.  Denies any new numbness or weakness    Pain intervention history:   - s/p L5/S1 RITA on 4/19/21 with initially close to 100% relief of her pain for 4-5 days and now with about 50% relief    HPI:   Shirlene Ortiz is a 76 y.o. 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  Progression: since onset, pain is gradually worsening  Current Pain Score: 7/10  Timing: constant  Quality: sharp, aching, full  Radiation: yes, down back of both legs to the calves and also anterior thighs  Associated numbness or weakness: yes numbness, weakness  Exacerbated by: standing, walking  Allievated by: leaning forward, rest  Is Pain Level Acceptable?: No    Previous Therapies:  PT/OT: no  HEP:   Interventions:   Surgery:  Medications:   - NSAIDS:   - MSK Relaxants:   - TCAs:   - SNRIs:   - Topicals:   - Anticonvulsants:  - Opioids:     History:    Current Outpatient Medications:     ACCU-CHEK ISRAEL CONTROL ANGELLA Keene, , Disp: , Rfl:     ACCU-CHEK ISRAEL PLUS METER Misc, USE AS DIRECTED, Disp: 1 each, Rfl: 0    ACCU-CHEK ISRAEL PLUS TEST STRP Strp, TEST 2 TO 4 TIMES EVERY DAY , Disp: 400 strip, Rfl: 3    amLODIPine (NORVASC) 10 MG tablet, Take 1 tablet (10 mg total) by mouth every evening., Disp: 90 tablet, Rfl: 5    aspirin (ECOTRIN) 81 MG EC tablet, Take 1 tablet (81 mg total) by mouth once daily., Disp: 30 tablet, Rfl: 1    atorvastatin (LIPITOR) 20 MG tablet, TAKE 1 TABLET (20 MG TOTAL) BY MOUTH ONCE DAILY. (Patient taking  "differently: Take 20 mg by mouth once daily. For cholesterol), Disp: 90 tablet, Rfl: 4    BD ALCOHOL SWABS PadM, , Disp: , Rfl:     carvediloL (COREG) 3.125 MG tablet, TAKE 1 TABLET TWICE DAILY (Patient taking differently: Take 3.125 mg by mouth 2 (two) times daily. For heart), Disp: 180 tablet, Rfl: 4    clopidogreL (PLAVIX) 75 mg tablet, TAKE 1 TABLET EVERY DAY, Disp: 90 tablet, Rfl: 3    ezetimibe (ZETIA) 10 mg tablet, TAKE 1 TABLET (10 MG TOTAL) BY MOUTH ONCE DAILY. (Patient taking differently: Take 10 mg by mouth once daily. For cholesterol), Disp: 90 tablet, Rfl: 4    fenofibrate 160 MG Tab, TAKE 1 TABLET EVERY DAY (Patient not taking: Reported on 3/24/2022), Disp: 90 tablet, Rfl: 4    glipiZIDE (GLUCOTROL) 10 MG tablet, TAKE 1 TABLET TWICE DAILY, Disp: 180 tablet, Rfl: 1    insulin glargine, TOUJEO, (TOUJEO SOLOSTAR U-300 INSULIN) 300 unit/mL (1.5 mL) InPn pen, Inject 10 Units into the skin once daily. (Patient not taking: Reported on 3/24/2022), Disp: 5 pen, Rfl: 1    lancets Misc, To check BG 6 times daily, to use with insurance preferred meter, Disp: 200 each, Rfl: 11    linaCLOtide (LINZESS) 145 mcg Cap capsule, Take 1 capsule (145 mcg total) by mouth once daily. Take >30 minutes before 1st meal of the day. (Patient not taking: Reported on 3/24/2022), Disp: 30 capsule, Rfl: 2    pantoprazole (PROTONIX) 40 MG tablet, TAKE 1 TABLET (40 MG TOTAL) BY MOUTH ONCE DAILY., Disp: 90 tablet, Rfl: 3    pen needle, diabetic (BD DEAN 2ND GEN PEN NEEDLE) 32 gauge x 5/32" Ndle, Use as directed with insulin pen. Diagnosis Code E11.65, Disp: 100 each, Rfl: 1    psyllium (METAMUCIL) powder, Take 1 packet by mouth 2 (two) times daily as needed. For constipation, Disp: , Rfl:     traMADoL (ULTRAM) 50 mg tablet, Take 1 tablet (50 mg total) by mouth every 12 (twelve) hours as needed for Pain., Disp: 60 tablet, Rfl: 5    valsartan (DIOVAN) 320 MG tablet, Take 1 tablet (320 mg total) by mouth once daily., Disp: 90 " tablet, Rfl: 3    Past Medical History:   Diagnosis Date    Acute coronary artery obstruction without MI     14 stents    Anemia 8/27/2017    Arthritis     Colon polyp     COPD (chronic obstructive pulmonary disease) 8/25/2017    Diverticulosis of colon     DM (diabetes mellitus)     Essential hypertension     Fatty liver     Hepatomegaly     HTN (hypertension)     Hyperlipidemia     Mixed hyperlipidemia     NSTEMI (non-ST elevated myocardial infarction) 8/17/2017 11/17    Pacemaker 6/25/2012    left chest PACEMAKER MEDTR[employer]IC SEDR01 Sensia   S/N:[id number] [place] Link 8/3/2010 - current   right atrium LEAD MEDTRONIC 5076 CapSure Fix Novus  S/N:[id number] [place] Link 8/3/2010 - current   right ventricle LEAD MEDTRONIC 5076 CapSure Fix Novus  S/N:[id number] [place] Link 8/3/2010 - current     PVD (peripheral vascular disease)     S/P CABG (coronary artery bypass graft) 9/14/2017 8/17 CABG x5 LIMA-LAD, VG-PDA, VG-D, YVG-OM-PLB  mammary artery to left anterior descending coronary artery and separate segments  of saphenous vein from the aorta to the posterior descending coronary artery  and from the aorta to the diagonal coronary artery and from the aorta to the  obtuse marginal coronary artery and to the posterolateral branch of the  circumflex in a sequential fashion using a combination of antegrade and  retrograde cardioplegia with mild systemic hypothermia and endoscopic vein  harvest.    S/P coronary artery stent placement     11/17 proximal circumflex using a 2.5 x 38, post-dilated to 3.0 with 0% residual stenosis.    Sinus node dysfunction     Stroke 7/4/14    Tobacco abuse 8/15/2017    Uncontrolled type 2 diabetes mellitus with hyperosmolarity without coma, without long-term current use of insulin     Documented by [facility] on 10/26/15.       Past Surgical History:   Procedure Laterality Date    ABDOMINAL SURGERY      APPENDECTOMY      CARDIAC PACEMAKER PLACEMENT      CHOLECYSTECTOMY       COLONOSCOPY  04/27/2015    Dr. Marinelli: 2 colon polyps removed (one not completely removed), hemorrhoids; repeat in 6 weeks; biopsy: ADENOMATOUS POLYPS    COLONOSCOPY N/A 8/5/2020    Procedure: COLONOSCOPY;  Surgeon: Alonzo Marinelli MD;  Location: [facility];  Service: Endoscopy;  Laterality: N/A;    COLONOSCOPY N/A 11/4/2020    Procedure: COLONOSCOPY;  Surgeon: Theodore Zimmerman MD;  Location: [facility] (2ND FLR); multiple colon polyps removed, 1 large polyp removed via piecemeal, incomplete resection. Repeat colonoscopy in 6 months for surveillance after piecemeal polypectomy; biopsy: Tubular adenoma (low grade dysplasia)    coranary stent      CORONARY ANGIOPLASTY      CORONARY ARTERY BYPASS GRAFT      EPIDURAL STEROID INJECTION INTO LUMBAR SPINE N/A 4/19/2021    Procedure: Injection-steroid-epidural-lumbar L5/S1;  Surgeon: Genaro Lugo MD;  Location: [facility];  Service: Pain Management;  Laterality: N/A;    ESOPHAGOGASTRODUODENOSCOPY N/A 3/9/2020    Procedure: ESOPHAGOGASTRODUODENOSCOPY (EGD);  Surgeon: Alonzo Marinelli MD;  Location: [facility];  Service: Endoscopy;  Laterality: N/A; Mild Schatzki ring. Biopsied. Dilated. gastritis; biopsy: stomach- chemical/reactive gastropathy, esophagus- Squamocolumnar mucosa with focal intestinal metaplasia and low-grade dysplasia    ESOPHAGOGASTRODUODENOSCOPY N/A 8/5/2020    Procedure: EGD (ESOPHAGOGASTRODUODENOSCOPY);  Surgeon: Alonzo Marinelli MD;  Location: [facility];  Service: Endoscopy;  Laterality: N/A;    ESOPHAGOGASTRODUODENOSCOPY N/A 3/22/2021    Procedure: EGD (ESOPHAGOGASTRODUODENOSCOPY);  Surgeon: Alonzo Marinelli MD;  Location: [facility];  Service: Endoscopy;  Laterality: N/A; negative for phililps's    ESOPHAGOGASTRODUODENOSCOPY N/A 6/30/2021    Procedure: EGD (ESOPHAGOGASTRODUODENOSCOPY);  Surgeon: Alonzo Marinelli MD;  Location: [facility] ENDO; Esophageal mucosal changes suspicious for short-segment Phillips's esophagus; gastritis  "   ILIAC ARTERY STENT      POLYPECTOMY      TOTAL ABDOMINAL HYSTERECTOMY         Family History   Problem Relation Age of Onset    Kidney disease Mother     Hypertension Mother     Diabetes Mother     Melanoma Sister     Lung disease Sister     Hypertension Sister     Colon cancer Neg Hx     Crohn's disease Neg Hx     Ulcerative colitis Neg Hx     Stomach cancer Neg Hx     Esophageal cancer Neg Hx        Social History     Socioeconomic History    Marital status: Single   Tobacco Use    Smoking status: Current Every Day Smoker     Packs/day: 1.50     Years: 60.00     Pack years: 90.00     Types: Cigarettes     Last attempt to quit: 8/15/2017     Years since quittin.6    Smokeless tobacco: Never Used    Tobacco comment: Tobacco treatment specialist consulted.   Substance and Sexual Activity    Alcohol use: No    Drug use: No       Review of patient's allergies indicates:   Allergen Reactions    Nicotine      puritis from nicotine patch. Patient is a smoker.       Review of Systems:  General ROS: negative for - fever  Psychological ROS: negative for - hostility  Hematological and Lymphatic ROS: positive for - bruising  Endocrine ROS: negative for - unexpected weight changes  Respiratory ROS: no cough, shortness of breath, or wheezing  Cardiovascular ROS: no chest pain or dyspnea on exertion  Gastrointestinal ROS: no abdominal pain, change in bowel habits, or black or bloody stools  Musculoskeletal ROS: positive for - muscular weakness  Neurological ROS: positive for - bowel and bladder control changes  negative for - numbness/tingling  Dermatological ROS: negative for rash    Physical Exam:  Vitals:    22 1259   BP: (!) 144/67   Pulse: 83   Weight: 74.6 kg (164 lb 9.2 oz)   Height: 5' 2" (1.575 m)   PainSc:   4   PainLoc: Back     Body mass index is 30.1 kg/m².     Gen: NAD  Gait: gait intact  Psych:  Mood appropriate for given condition  HEENT: eyes anicteric   GI: Abd soft  CV: " RRR  Lungs: breathing unlabored   ROM: limited AROM of the L spine in all planes, full ROM at ankles, knees and hips  Lumbar flexion 90 degrees, extension 50 degrees, side bending 30 degrees.    Sensation: intact to light touch in all dermatomes tested from L2-S1 bilaterally  Reflexes: 2+ b/l patella and 0/0 right achilles and 1+ left achilles  Palpation: -TTP over the b/l greater trochanters and bilateral SI joint  Tone: normal in the b/l knees and hips   Skin: intact  Extremities: No edema in b/l ankles or hands  Provacative tests: + b/l axial facet loading       Right Left   L2/3 Iliacus Hip flexion  5  5   L3/4 Qudratus Femoris Knee Extension  5  5   L4/5 Tib Anterior Ankle Dorsiflexion   5  5   L5/S1 Extensor Hallicus Longus Great toe extension  5  5                 S1/S2 Gastroc/Soleus Plantar Flexion  5  5       Imaging:  Xray lumbar spine 5/22/20  FINDINGS:  The bones are mildly osteopenic.  The lumbar vertebral bodies maintain normal height and alignment.  There is moderate disc space narrowing at the L5-S1 level without significant disc space narrowing at the remainder of the lumbar levels.  There is also facet joint arthropathy at L5-S1.    There is marked atherosclerosis.  There is a left iliac stent.  The patient has a dual lead pacemaker.    CT lumbar spine 3/10/21  FINDINGS:  Minimal grade 1 anterolisthesis of L4 on L5, measuring 2 mm.  Stable chronic mild superior endplate compression deformity of the T12 vertebral body with approximately 25% vertebral body height loss, similar to prior chest CT on 11/10/2020.  No retropulsion.  Lumbar vertebral body heights are maintained.  No acute fractures are identified in the lumbar spine.  Multilevel degenerative disc disease is noted, most pronounced at L5-S1 with moderate to severe disc space narrowing, vacuum disc phenomenon, endplate change and disc bulge.    Marked calcific atherosclerosis of the abdominal aorta and branch vessels with stable fusiform  dilatation of the distal thoracic and proximal abdominal aorta, measuring up to 4.0 cm.  Diverticulosis coli without evidence of diverticulitis.    T11-12: Mild diffuse disc bulge with posterior osteophytic ridging.  Mild bilateral facet arthropathy.  No neural foraminal or spinal canal stenosis.  T12-L1: Small left central disc protrusion.  No neural foraminal or spinal canal stenosis.  L1-2: Minimal right asymmetric diffuse disc bulge.  There is no facet arthropathy.  There is no neural foraminal stenosis.  There is no spinal canal stenosis.  L2-3: Mild diffuse disc bulge with posterior osteophytic ridging.  Mild bilateral facet arthropathy.  Ligamentum flavum infolding.  Mild left neural foraminal stenosis.  There is no spinal canal stenosis.  L3-4: Mild diffuse disc bulge with posterior osteophytic ridging.  Mild bilateral facet arthropathy and ligamentum flavum infolding.  Mild left neural foraminal stenosis.  There is no spinal canal stenosis.  L4-5: Mild grade 1 anterolisthesis and uncovering of the disc.  Diffuse disc bulge.  Moderate bilateral facet arthropathy.  Ligamentum flavum infolding.  Mild bilateral neural foraminal stenosis.  Mild spinal canal stenosis.  L5-S1: Left asymmetric diffuse disc bulge with posterior osteophytic ridging and superimposed right central disc extrusion, which may impinge upon the descending right S1 nerve root.  Moderate bilateral facet arthropathy.  Ligamentum flavum infolding.  Moderate bilateral neural foraminal stenosis.  There is no spinal canal stenosis.    Labs:  BMP  Lab Results   Component Value Date     07/14/2021    K 5.0 07/14/2021     07/14/2021    CO2 21 (L) 07/14/2021    BUN 32 (H) 07/14/2021    CREATININE 2.0 (H) 07/14/2021    CALCIUM 9.7 07/14/2021    ANIONGAP 11 07/14/2021    ESTGFRAFRICA 27 (A) 07/14/2021    EGFRNONAA 24 (A) 07/14/2021     Lab Results   Component Value Date    ALT 11 07/14/2021    AST 12 07/14/2021    ALKPHOS 58 07/14/2021     THERESAITOT 0.5 07/14/2021       Assessment:   Problem List Items Addressed This Visit        Neuro    Lumbar radiculopathy - Primary    Relevant Orders    Case Request Operating Room: Injection-steroid-epidural-lumbar L5/S1 to the left (Completed)      Other Visit Diagnoses     Dorsalgia, unspecified        Relevant Orders    X-Ray Lumbar Complete Including Flex And Ext (Completed)          76 y.o. year old female with PMH COPD HTN, CAD s/p stent placement, DM II presents to the office with back pain.  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      3/24/21 - Ms. Ortiz returns to the office for follow up.  Today she reports return of lower back pain, constant, sharp, shooting radiating down the left leg.  Denies any new numbness or weakness    - on exam she has full strength, intact sensation to light touch  - I independently reviewed her lumbar CT and it is c/w left asymmetric diffuse disc bulge with posterior osteophytic ridging and superimposed right central disc extrusion, which may impinge upon the descending right S1 nerve root, moderate bilateral neural foraminal stenosis.  - she has some tramadol from her PCP but she doesn't take it often  - s/p L5/S1 RITA on 4/19/21 with initially close to 100% relief of her pain for 4-5 days and now with about 50% relief lasting for over 6 months  - her pain is limiting her mobility and interfering with her ADLs  - will schedule for repeat L5-S1 RITA.  The risks and benefits of this intervention, and alternative therapies were discussed with the patient.  The discussion of risks included infection, bleeding, need for additional procedures or surgery, nerve damage.  Questions regarding the procedure, risks, expected outcome, and possible side effects were solicited and answered to the patient's satisfaction.  Shirlene Ortiz wishes to proceed with the injection or procedure.   Written consent was obtained.  - follow-up 2 weeks post injection.  She is scheduled to get her pacemaker exchanged relatively soon.  If she fails a relief in her pain new pacemaker is MRI compatible we will get an updated MRI if needed    : Reviewed    Genaro Lugo M.D.  Interventional Pain Medicine / Anesthesiology

## 2022-03-24 NOTE — PROGRESS NOTES
[facility] Follow Up Evaluation    Referred by: Dr. Mendiola  Reason for referral: back pain    CC:   Chief Complaint   Patient presents with    Low-back Pain    Leg Pain     left      Last 3 PDI Scores 6/1/2021 5/26/2020   Pain Disability Index (PDI) 12 12       Interval HPI 3/24/22: Ms. Ortiz returns to the office for follow up.  Today she reports return of lower back pain, constant, sharp, shooting radiating down the left leg.  Denies any new numbness or weakness    Pain intervention history:   - s/p L5/S1 RITA on 4/19/21 with initially close to 100% relief of her pain for 4-5 days and now with about 50% relief    HPI:   Shirlene Ortiz is a 76 y.o. 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  Progression: since onset, pain is gradually worsening  Current Pain Score: 7/10  Timing: constant  Quality: sharp, aching, full  Radiation: yes, down back of both legs to the calves and also anterior thighs  Associated numbness or weakness: yes numbness, weakness  Exacerbated by: standing, walking  Allievated by: leaning forward, rest  Is Pain Level Acceptable?: No    Previous Therapies:  PT/OT: no  HEP:   Interventions:   Surgery:  Medications:   - NSAIDS:   - MSK Relaxants:   - TCAs:   - SNRIs:   - Topicals:   - Anticonvulsants:  - Opioids:     History:    Current Outpatient Medications:     ACCU-CHEK ISRAEL CONTROL ANGELLA [place], , Disp: , Rfl:     ACCU-CHEK ISRAEL PLUS METER Misc, USE AS DIRECTED, Disp: 1 each, Rfl: 0    ACCU-CHEK ISRAEL PLUS TEST STRP Strp, TEST 2 TO 4 TIMES EVERY DAY , Disp: 400 strip, Rfl: 3    amLODIPine (NORVASC) 10 MG tablet, Take 1 tablet (10 mg total) by mouth every evening., Disp: 90 tablet, Rfl: 5    aspirin (ECOTRIN) 81 MG EC tablet, Take 1 tablet (81 mg total) by mouth once daily., Disp: 30 tablet, Rfl: 1    atorvastatin (LIPITOR) 20 MG tablet, TAKE 1 TABLET (20 MG TOTAL) BY MOUTH ONCE DAILY. (Patient taking  "differently: Take 20 mg by mouth once daily. For cholesterol), Disp: 90 tablet, Rfl: 4    BD ALCOHOL SWABS PadM, , Disp: , Rfl:     carvediloL (COREG) 3.125 MG tablet, TAKE 1 TABLET TWICE DAILY (Patient taking differently: Take 3.125 mg by mouth 2 (two) times daily. For heart), Disp: 180 tablet, Rfl: 4    clopidogreL (PLAVIX) 75 mg tablet, TAKE 1 TABLET EVERY DAY, Disp: 90 tablet, Rfl: 3    ezetimibe (ZETIA) 10 mg tablet, TAKE 1 TABLET (10 MG TOTAL) BY MOUTH ONCE DAILY. (Patient taking differently: Take 10 mg by mouth once daily. For cholesterol), Disp: 90 tablet, Rfl: 4    fenofibrate 160 MG Tab, TAKE 1 TABLET EVERY DAY (Patient not taking: Reported on 3/24/2022), Disp: 90 tablet, Rfl: 4    glipiZIDE (GLUCOTROL) 10 MG tablet, TAKE 1 TABLET TWICE DAILY, Disp: 180 tablet, Rfl: 1    insulin glargine, TOUJEO, (TOUJEO SOLOSTAR U-300 INSULIN) 300 unit/mL (1.5 mL) InPn pen, Inject 10 Units into the skin once daily. (Patient not taking: Reported on 3/24/2022), Disp: 5 pen, Rfl: 1    lancets Misc, To check BG 6 times daily, to use with insurance preferred meter, Disp: 200 each, Rfl: 11    linaCLOtide (LINZESS) 145 mcg Cap capsule, Take 1 capsule (145 mcg total) by mouth once daily. Take >30 minutes before 1st meal of the day. (Patient not taking: Reported on 3/24/2022), Disp: 30 capsule, Rfl: 2    pantoprazole (PROTONIX) 40 MG tablet, TAKE 1 TABLET (40 MG TOTAL) BY MOUTH ONCE DAILY., Disp: 90 tablet, Rfl: 3    pen needle, diabetic (BD DEAN 2ND GEN PEN NEEDLE) 32 gauge x 5/32" Ndle, Use as directed with insulin pen. Diagnosis Code E11.65, Disp: 100 each, Rfl: 1    psyllium (METAMUCIL) powder, Take 1 packet by mouth 2 (two) times daily as needed. For constipation, Disp: , Rfl:     traMADoL (ULTRAM) 50 mg tablet, Take 1 tablet (50 mg total) by mouth every 12 (twelve) hours as needed for Pain., Disp: 60 tablet, Rfl: 5    valsartan (DIOVAN) 320 MG tablet, Take 1 tablet (320 mg total) by mouth once daily., Disp: 90 " tablet, Rfl: 3    Past Medical History:   Diagnosis Date    Acute coronary artery obstruction without MI     14 stents    Anemia 8/27/2017    Arthritis     Colon polyp     COPD (chronic obstructive pulmonary disease) 8/25/2017    Diverticulosis of colon     DM (diabetes mellitus)     Essential hypertension     Fatty liver     Hepatomegaly     HTN (hypertension)     Hyperlipidemia     Mixed hyperlipidemia     NSTEMI (non-ST elevated myocardial infarction) 8/17/2017 11/17    Pacemaker 6/25/2012    left chest PACEMAKER MEDTR[employer]IC SEDR01 Sensia   S/N:[id number] De[place] Link 8/3/2010 - current   right atrium LEAD MEDTRONIC 5076 CapSure Fix Novus  S/N:[id number] Deion Link 8/3/2010 - current   right ventricle LEAD MEDTRONIC 5076 CapSure Fix Novus  S/N:[id number] Deion Link 8/3/2010 - current     PVD (peripheral vascular disease)     S/P CABG (coronary artery bypass graft) 9/14/2017 8/17 CABG x5 LIMA-LAD, VG-PDA, VG-D, YVG-OM-PLB  mammary artery to left anterior descending coronary artery and separate segments  of saphenous vein from the aorta to the posterior descending coronary artery  and from the aorta to the diagonal coronary artery and from the aorta to the  obtuse marginal coronary artery and to the posterolateral branch of the  circumflex in a sequential fashion using a combination of antegrade and  retrograde cardioplegia with mild systemic hypothermia and endoscopic vein  harvest.    S/P coronary artery stent placement     11/17 proximal circumflex using a 2.5 x 38, post-dilated to 3.0 with 0% residual stenosis.    Sinus node dysfunction     Stroke 7/4/14    Tobacco abuse 8/15/2017    Uncontrolled type 2 diabetes mellitus with hyperosmolarity without coma, without long-term current use of insulin     Documented by Maury on 10/26/15.       Past Surgical History:   Procedure Laterality Date    ABDOMINAL SURGERY      APPENDECTOMY      CARDIAC PACEMAKER PLACEMENT      CHOLECYSTECTOMY       COLONOSCOPY  04/27/2015    Dr. Marinelli: 2 colon polyps removed (one not completely removed), hemorrhoids; repeat in 6 weeks; biopsy: ADENOMATOUS POLYPS    COLONOSCOPY N/A 8/5/2020    Procedure: COLONOSCOPY;  Surgeon: Alonzo Marinelli MD;  Location: [facility];  Service: Endoscopy;  Laterality: N/A;    COLONOSCOPY N/A 11/4/2020    Procedure: COLONOSCOPY;  Surgeon: Theodore Zimmerman MD;  Location: [facility] (2ND FLR); multiple colon polyps removed, 1 large polyp removed via piecemeal, incomplete resection. Repeat colonoscopy in 6 months for surveillance after piecemeal polypectomy; biopsy: Tubular adenoma (low grade dysplasia)    coranary stent      CORONARY ANGIOPLASTY      CORONARY ARTERY BYPASS GRAFT      EPIDURAL STEROID INJECTION INTO LUMBAR SPINE N/A 4/19/2021    Procedure: Injection-steroid-epidural-lumbar L5/S1;  Surgeon: Genaro Lugo MD;  Location: [facility];  Service: Pain Management;  Laterality: N/A;    ESOPHAGOGASTRODUODENOSCOPY N/A 3/9/2020    Procedure: ESOPHAGOGASTRODUODENOSCOPY (EGD);  Surgeon: Alonzo Marinelli MD;  Location: [facility];  Service: Endoscopy;  Laterality: N/A; Mild Schatzki ring. Biopsied. Dilated. gastritis; biopsy: stomach- chemical/reactive gastropathy, esophagus- Squamocolumnar mucosa with focal intestinal metaplasia and low-grade dysplasia    ESOPHAGOGASTRODUODENOSCOPY N/A 8/5/2020    Procedure: EGD (ESOPHAGOGASTRODUODENOSCOPY);  Surgeon: Alonzo Marinelli MD;  Location: [facility];  Service: Endoscopy;  Laterality: N/A;    ESOPHAGOGASTRODUODENOSCOPY N/A 3/22/2021    Procedure: EGD (ESOPHAGOGASTRODUODENOSCOPY);  Surgeon: Alonzo Marinelli MD;  Location: [facility];  Service: Endoscopy;  Laterality: N/A; negative for phillips's    ESOPHAGOGASTRODUODENOSCOPY N/A 6/30/2021    Procedure: EGD (ESOPHAGOGASTRODUODENOSCOPY);  Surgeon: Alonzo Marinelli MD;  Location: [facility] ENDO; Esophageal mucosal changes suspicious for short-segment Phillips's esophagus; gastritis  "   ILIAC ARTERY STENT      POLYPECTOMY      TOTAL ABDOMINAL HYSTERECTOMY         Family History   Problem Relation Age of Onset    Kidney disease Mother     Hypertension Mother     Diabetes Mother     Melanoma Sister     Lung disease Sister     Hypertension Sister     Colon cancer Neg Hx     Crohn's disease Neg Hx     Ulcerative colitis Neg Hx     Stomach cancer Neg Hx     Esophageal cancer Neg Hx        Social History     Socioeconomic History    Marital status: Single   Tobacco Use    Smoking status: Current Every Day Smoker     Packs/day: 1.50     Years: 60.00     Pack years: 90.00     Types: Cigarettes     Last attempt to quit: 8/15/2017     Years since quittin.6    Smokeless tobacco: Never Used    Tobacco comment: Tobacco treatment specialist consulted.   Substance and Sexual Activity    Alcohol use: No    Drug use: No       Review of patient's allergies indicates:   Allergen Reactions    Nicotine      puritis from nicotine patch. Patient is a smoker.       Review of Systems:  General ROS: negative for - fever  Psychological ROS: negative for - hostility  Hematological and Lymphatic ROS: positive for - bruising  Endocrine ROS: negative for - unexpected weight changes  Respiratory ROS: no cough, shortness of breath, or wheezing  Cardiovascular ROS: no chest pain or dyspnea on exertion  Gastrointestinal ROS: no abdominal pain, change in bowel habits, or black or bloody stools  Musculoskeletal ROS: positive for - muscular weakness  Neurological ROS: positive for - bowel and bladder control changes  negative for - numbness/tingling  Dermatological ROS: negative for rash    Physical Exam:  Vitals:    22 1259   BP: (!) 144/67   Pulse: 83   Weight: 74.6 kg (164 lb 9.2 oz)   Height: 5' 2" (1.575 m)   PainSc:   4   PainLoc: Back     Body mass index is 30.1 kg/m².     Gen: NAD  Gait: gait intact  Psych:  Mood appropriate for given condition  HEENT: eyes anicteric   GI: Abd soft  CV: " RRR  Lungs: breathing unlabored   ROM: limited AROM of the L spine in all planes, full ROM at ankles, knees and hips  Lumbar flexion 90 degrees, extension 50 degrees, side bending 30 degrees.    Sensation: intact to light touch in all dermatomes tested from L2-S1 bilaterally  Reflexes: 2+ b/l patella and 0/0 right achilles and 1+ left achilles  Palpation: -TTP over the b/l greater trochanters and bilateral SI joint  Tone: normal in the b/l knees and hips   Skin: intact  Extremities: No edema in b/l ankles or hands  Provacative tests: + b/l axial facet loading       Right Left   L2/3 Iliacus Hip flexion  5  5   L3/4 Qudratus Femoris Knee Extension  5  5   L4/5 Tib Anterior Ankle Dorsiflexion   5  5   L5/S1 Extensor Hallicus Longus Great toe extension  5  5                 S1/S2 Gastroc/Soleus Plantar Flexion  5  5       Imaging:  Xray lumbar spine 5/22/20  FINDINGS:  The bones are mildly osteopenic.  The lumbar vertebral bodies maintain normal height and alignment.  There is moderate disc space narrowing at the L5-S1 level without significant disc space narrowing at the remainder of the lumbar levels.  There is also facet joint arthropathy at L5-S1.    There is marked atherosclerosis.  There is a left iliac stent.  The patient has a dual lead pacemaker.    CT lumbar spine 3/10/21  FINDINGS:  Minimal grade 1 anterolisthesis of L4 on L5, measuring 2 mm.  Stable chronic mild superior endplate compression deformity of the T12 vertebral body with approximately 25% vertebral body height loss, similar to prior chest CT on 11/10/2020.  No retropulsion.  Lumbar vertebral body heights are maintained.  No acute fractures are identified in the lumbar spine.  Multilevel degenerative disc disease is noted, most pronounced at L5-S1 with moderate to severe disc space narrowing, vacuum disc phenomenon, endplate change and disc bulge.    Marked calcific atherosclerosis of the abdominal aorta and branch vessels with stable fusiform  dilatation of the distal thoracic and proximal abdominal aorta, measuring up to 4.0 cm.  Diverticulosis coli without evidence of diverticulitis.    T11-12: Mild diffuse disc bulge with posterior osteophytic ridging.  Mild bilateral facet arthropathy.  No neural foraminal or spinal canal stenosis.  T12-L1: Small left central disc protrusion.  No neural foraminal or spinal canal stenosis.  L1-2: Minimal right asymmetric diffuse disc bulge.  There is no facet arthropathy.  There is no neural foraminal stenosis.  There is no spinal canal stenosis.  L2-3: Mild diffuse disc bulge with posterior osteophytic ridging.  Mild bilateral facet arthropathy.  Ligamentum flavum infolding.  Mild left neural foraminal stenosis.  There is no spinal canal stenosis.  L3-4: Mild diffuse disc bulge with posterior osteophytic ridging.  Mild bilateral facet arthropathy and ligamentum flavum infolding.  Mild left neural foraminal stenosis.  There is no spinal canal stenosis.  L4-5: Mild grade 1 anterolisthesis and uncovering of the disc.  Diffuse disc bulge.  Moderate bilateral facet arthropathy.  Ligamentum flavum infolding.  Mild bilateral neural foraminal stenosis.  Mild spinal canal stenosis.  L5-S1: Left asymmetric diffuse disc bulge with posterior osteophytic ridging and superimposed right central disc extrusion, which may impinge upon the descending right S1 nerve root.  Moderate bilateral facet arthropathy.  Ligamentum flavum infolding.  Moderate bilateral neural foraminal stenosis.  There is no spinal canal stenosis.    Labs:  BMP  Lab Results   Component Value Date     07/14/2021    K 5.0 07/14/2021     07/14/2021    CO2 21 (L) 07/14/2021    BUN 32 (H) 07/14/2021    CREATININE 2.0 (H) 07/14/2021    CALCIUM 9.7 07/14/2021    ANIONGAP 11 07/14/2021    ESTGFRAFRICA 27 (A) 07/14/2021    EGFRNONAA 24 (A) 07/14/2021     Lab Results   Component Value Date    ALT 11 07/14/2021    AST 12 07/14/2021    ALKPHOS 58 07/14/2021     THERESAITOT 0.5 07/14/2021       Assessment:   Problem List Items Addressed This Visit        Neuro    Lumbar radiculopathy - Primary    Relevant Orders    Case Request Operating Room: Injection-steroid-epidural-lumbar L5/S1 to the left (Completed)      Other Visit Diagnoses     Dorsalgia, unspecified        Relevant Orders    X-Ray Lumbar Complete Including Flex And Ext (Completed)          76 y.o. year old female with PMH COPD HTN, CAD s/p stent placement, DM II presents to the office with back pain.  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      3/24/21 - Ms. Ortiz returns to the office for follow up.  Today she reports return of lower back pain, constant, sharp, shooting radiating down the left leg.  Denies any new numbness or weakness    - on exam she has full strength, intact sensation to light touch  - I independently reviewed her lumbar CT and it is c/w left asymmetric diffuse disc bulge with posterior osteophytic ridging and superimposed right central disc extrusion, which may impinge upon the descending right S1 nerve root, moderate bilateral neural foraminal stenosis.  - she has some tramadol from her PCP but she doesn't take it often  - s/p L5/S1 RITA on 4/19/21 with initially close to 100% relief of her pain for 4-5 days and now with about 50% relief lasting for over 6 months  - her pain is limiting her mobility and interfering with her ADLs  - will schedule for repeat L5-S1 RITA.  The risks and benefits of this intervention, and alternative therapies were discussed with the patient.  The discussion of risks included infection, bleeding, need for additional procedures or surgery, nerve damage.  Questions regarding the procedure, risks, expected outcome, and possible side effects were solicited and answered to the patient's satisfaction.  Shirlene Ortiz wishes to proceed with the injection or procedure.   Written consent was obtained.  - follow-up 2 weeks post injection.  She is scheduled to get her pacemaker exchanged relatively soon.  If she fails a relief in her pain new pacemaker is MRI compatible we will get an updated MRI if needed    : Reviewed    Genaro Lugo M.D.  Interventional Pain Medicine / Anesthesiology

## 2022-03-24 NOTE — TELEPHONE ENCOUNTER
Patient is scheduled for a lumbar RITA with Dr. Lugo on 4/5. She will need to hold aspirin and plavix 7 days before. Please advise if this is okay.

## 2022-03-24 NOTE — PROGRESS NOTES
Subjective:       Patient ID: Shirlene Ortiz is a 76 y.o. female.    Chief Complaint: Annual Exam    Patient not seen by me in 1.5 yr  Here for routine health maintenance.      Dr Loo ordered all labs I need, but patient missed apt for that and her echo 2nd to severe sciatic pain.  Will get nurse to reschedule.     CT in April dx multiple pulmonary nodules.  Repeat at 4.5 mo showed no change.  Needs another at 18 mo carmelina = November 2020.     Incomplete polyp removal.  [place] [facility]   Cardiology ok hold Plavix but not ASA.  Jonas's esophagus.  EGD 2020   Normocytic anemia - likely ACD.  FOBT x3 neg.  Stable Hb at 10.      DM - controlled   HTN - controlled; takes 1/2 10 mg amlodipine daily; if BP low, will take 1/4 of a 10 mg per Dr Sandoval's instructions.   HLD - controlled ldl goal < 70; high triglycerides;   Smoke abuse - advised, but does not want to quit  CKD III 3b - stable in 30s. Seeing renal.  COPD - stable    Likely pseudoclaudication from spinal pathology.  Saw cardiology who feels the same.    Saw Dr Lugo.  Scared to go to PT.  Needs imaging.  Pacemaker not compatible with MRI, so he wanted a lumbar CT but has not happened yet.    Recommended Tylenol and if nw, tramadol.  She has no relief from Tylenol and is now open to trying tramadol.   Recall:  Complains of b/l low back pain radiating to her b/l posterior thighs and calves.  Occurs with standing and walking.  She frequently has to stop walking to get relief.  Sitting makes it better.  Leaning forward (shopping cart) improves it.    CT of abdomin showed severe stenosis in LE.  Has appointment with Dr Loo 9/1.    CAD s/p CABG 8/17 after an NSTEMI;  Lexiscan 2020 good.   PVD - 3 new stents in left leg 12/2016; Mild/moderate PAD by ultrasound per cardiology.    13 stents in total              Review of Systems   Constitutional: Negative for appetite change and fever.   HENT: Negative for nosebleeds and trouble swallowing.    Eyes:  Negative for discharge and visual disturbance.   Respiratory: Negative for choking and shortness of breath.    Cardiovascular: Negative for chest pain and palpitations.   Gastrointestinal: Negative for abdominal pain, nausea and vomiting.   Musculoskeletal: Negative for arthralgias and joint swelling.   Skin: Negative for rash and wound.   Neurological: Negative for dizziness and syncope.   Psychiatric/Behavioral: Negative for confusion and dysphoric mood.       Objective:      Vitals:    03/24/22 1357   BP: 132/82   Pulse: 82   Temp: 98.4 °F (36.9 °C)     Physical Exam  Vitals reviewed.   Eyes:      Conjunctiva/sclera: Conjunctivae normal.   Neck:      Thyroid: No thyromegaly.      Trachea: Trachea normal.   Cardiovascular:      Heart sounds: Normal heart sounds.      Comments: Edema negative  Pulmonary:      Effort: Pulmonary effort is normal.      Breath sounds: Normal breath sounds.   Abdominal:      Palpations: Abdomen is soft. There is no hepatomegaly.   Musculoskeletal:      Cervical back: Normal range of motion.   Skin:     General: Skin is warm and dry.   Neurological:      Cranial Nerves: No cranial nerve deficit.      Comments: DTR decreased bilateral   Psychiatric:      Comments: Alert and Oriented            Assessment:       1. Routine physical examination    2. Controlled type 2 diabetes mellitus without complication, without long-term current use of insulin    3. Essential hypertension    4. Dyslipidemia    5. Post-menopausal    6. Lung nodules        Plan:       Routine physical examination    Controlled type 2 diabetes mellitus without complication, without long-term current use of insulin  -     Microalbumin/Creatinine Ratio, Urine; Future; Expected date: 03/24/2022  -     Diabetic Eye Screening Photo; Future  -     Hemoglobin A1C; Future; Expected date: 09/20/2022    Essential hypertension  -     Comprehensive Metabolic Panel; Future; Expected date: 09/20/2022    Dyslipidemia  -     Comprehensive  "Metabolic Panel; Future; Expected date: 09/20/2022    Post-menopausal  -     DXA Bone Density Spine And Hip; Future; Expected date: 03/24/2022    Lung nodules  -     CT Chest Without Contrast; Future; Expected date: 03/24/2022            Medication List with Changes/Refills   Current Medications    ACCU-CHEK ISRAEL CONTROL SOLN SOLN        ACCU-CHEK ISRAEL PLUS METER MISC    USE AS DIRECTED    ACCU-CHEK ISRAEL PLUS TEST STRP STRP    TEST 2 TO 4 TIMES EVERY DAY     AMLODIPINE (NORVASC) 10 MG TABLET    Take 1 tablet (10 mg total) by mouth every evening.    ASPIRIN (ECOTRIN) 81 MG EC TABLET    Take 1 tablet (81 mg total) by mouth once daily.    ATORVASTATIN (LIPITOR) 20 MG TABLET    TAKE 1 TABLET (20 MG TOTAL) BY MOUTH ONCE DAILY.    BD ALCOHOL SWABS PADM        CARVEDILOL (COREG) 3.125 MG TABLET    TAKE 1 TABLET TWICE DAILY    CLOPIDOGREL (PLAVIX) 75 MG TABLET    TAKE 1 TABLET EVERY DAY    EZETIMIBE (ZETIA) 10 MG TABLET    TAKE 1 TABLET (10 MG TOTAL) BY MOUTH ONCE DAILY.    FENOFIBRATE 160 MG TAB    TAKE 1 TABLET EVERY DAY    GLIPIZIDE (GLUCOTROL) 10 MG TABLET    TAKE 1 TABLET TWICE DAILY    INSULIN GLARGINE, TOUJEO, (TOUJEO SOLOSTAR U-300 INSULIN) 300 UNIT/ML (1.5 ML) INPN PEN    Inject 10 Units into the skin once daily.    LANCETS MISC    To check BG 6 times daily, to use with insurance preferred meter    LINACLOTIDE (LINZESS) 145 MCG CAP CAPSULE    Take 1 capsule (145 mcg total) by mouth once daily. Take >30 minutes before 1st meal of the day.    PANTOPRAZOLE (PROTONIX) 40 MG TABLET    TAKE 1 TABLET (40 MG TOTAL) BY MOUTH ONCE DAILY.    PEN NEEDLE, DIABETIC (BD DEAN 2ND GEN PEN NEEDLE) 32 GAUGE X 5/32" NDLE    Use as directed with insulin pen. Diagnosis Code E11.65    PSYLLIUM (METAMUCIL) POWDER    Take 1 packet by mouth 2 (two) times daily as needed. For constipation    TRAMADOL (ULTRAM) 50 MG TABLET    Take 1 tablet (50 mg total) by mouth every 12 (twelve) hours as needed for Pain.    VALSARTAN (DIOVAN) 320 MG " TABLET    Take 1 tablet (320 mg total) by mouth once daily.     Wellness reviewed   Continue current management and monitor.    Counseled on regular exercise, maintenance of a healthy weight, balanced diet rich in fruits/vegetables and lean protein, and avoidance of unhealthy habits like smoking and excessive alcohol intake.   Also, counseled on importance of being compliant with medication, health appointments, diet and exercise.     Follow up in about 6 months (around 9/24/2022).  Check Dr Loo's labs - A1c

## 2022-03-25 ENCOUNTER — PATIENT MESSAGE (OUTPATIENT)
Dept: FAMILY MEDICINE | Facility: CLINIC | Age: 77
End: 2022-03-25
Payer: MEDICARE

## 2022-03-25 NOTE — PROGRESS NOTES
Health Maintenance Due   Topic Date Due    Shingles Vaccine (1 of 2) Never done    DEXA Scan  03/22/2020    Mammogram  05/14/2020    Eye Exam  05/30/2020    Foot Exam  05/22/2021    Lipid Panel  07/02/2021    Influenza Vaccine (1) 09/01/2021    Diabetes Urine Screening  10/02/2021    LDCT Lung Screen  11/10/2021    TETANUS VACCINE  01/01/2022    COVID-19 Vaccine (4 - Booster for Pfizer series) 02/28/2022     Updates were requested from care everywhere.  Chart was reviewed for overdue Proactive [facility] Encounters ([facility]) topics (CRS, Breast Cancer Screening, Eye exam)  Health Maintenance has been updated.  [place] immunization registry triggered.  Immunizations were reconciled.

## 2022-03-25 NOTE — TELEPHONE ENCOUNTER
I think you are waiting on the response from Dr. Loo     His recommendation is with this message. Thank you

## 2022-03-25 NOTE — PROGRESS NOTES
Health Maintenance Due   Topic Date Due    Shingles Vaccine (1 of 2) Never done    DEXA Scan  03/22/2020    Mammogram  05/14/2020    Eye Exam  05/30/2020    Foot Exam  05/22/2021    Lipid Panel  07/02/2021    Influenza Vaccine (1) 09/01/2021    Hemoglobin A1c  09/29/2021    Diabetes Urine Screening  10/02/2021    LDCT Lung Screen  11/10/2021    TETANUS VACCINE  01/01/2022    COVID-19 Vaccine (4 - Booster for Pfizer series) 02/28/2022     Updates were requested from care everywhere.  Chart was reviewed for overdue Proactive [facility] Encounters ([facility]) topics (CRS, Breast Cancer Screening, Eye exam)  Health Maintenance has been updated.  LINKS immunization registry triggered.  Immunizations were reconciled.

## 2022-03-28 ENCOUNTER — TELEPHONE (OUTPATIENT)
Dept: FAMILY MEDICINE | Facility: CLINIC | Age: 77
End: 2022-03-28
Payer: MEDICARE

## 2022-03-29 ENCOUNTER — LAB VISIT (OUTPATIENT)
Dept: LAB | Facility: HOSPITAL | Age: 77
End: 2022-03-29
Attending: INTERNAL MEDICINE
Payer: MEDICARE

## 2022-03-29 DIAGNOSIS — Z95.1 S/P CABG (CORONARY ARTERY BYPASS GRAFT): ICD-10-CM

## 2022-03-29 DIAGNOSIS — Z95.0 PACEMAKER: ICD-10-CM

## 2022-03-29 DIAGNOSIS — G45.9 TIA (TRANSIENT ISCHEMIC ATTACK): ICD-10-CM

## 2022-03-29 DIAGNOSIS — I10 ESSENTIAL HYPERTENSION: ICD-10-CM

## 2022-03-29 DIAGNOSIS — J44.9 CHRONIC OBSTRUCTIVE PULMONARY DISEASE, UNSPECIFIED COPD TYPE: ICD-10-CM

## 2022-03-29 DIAGNOSIS — Z95.5 S/P CORONARY ARTERY STENT PLACEMENT: ICD-10-CM

## 2022-03-29 DIAGNOSIS — E11.00 UNCONTROLLED TYPE 2 DIABETES MELLITUS WITH HYPEROSMOLARITY WITHOUT COMA, WITHOUT LONG-TERM CURRENT USE OF INSULIN: ICD-10-CM

## 2022-03-29 DIAGNOSIS — I70.0 ATHEROSCLEROSIS OF AORTA: ICD-10-CM

## 2022-03-29 DIAGNOSIS — E78.2 MIXED HYPERLIPIDEMIA: ICD-10-CM

## 2022-03-29 DIAGNOSIS — M54.9 DORSALGIA, UNSPECIFIED: ICD-10-CM

## 2022-03-29 DIAGNOSIS — I73.9 PVD (PERIPHERAL VASCULAR DISEASE): ICD-10-CM

## 2022-03-29 DIAGNOSIS — I65.23 BILATERAL CAROTID ARTERY STENOSIS: ICD-10-CM

## 2022-03-29 DIAGNOSIS — I25.10 CORONARY ARTERY DISEASE INVOLVING NATIVE CORONARY ARTERY OF NATIVE HEART WITHOUT ANGINA PECTORIS: ICD-10-CM

## 2022-03-29 LAB
ALBUMIN SERPL BCP-MCNC: 3.1 G/DL (ref 3.5–5.2)
ALP SERPL-CCNC: 107 U/L (ref 55–135)
ALT SERPL W/O P-5'-P-CCNC: 10 U/L (ref 10–44)
ANION GAP SERPL CALC-SCNC: 11 MMOL/L (ref 8–16)
AST SERPL-CCNC: 11 U/L (ref 10–40)
BASOPHILS # BLD AUTO: 0.04 K/UL (ref 0–0.2)
BASOPHILS # BLD AUTO: 0.04 K/UL (ref 0–0.2)
BASOPHILS NFR BLD: 0.5 % (ref 0–1.9)
BASOPHILS NFR BLD: 0.5 % (ref 0–1.9)
BILIRUB SERPL-MCNC: 0.4 MG/DL (ref 0.1–1)
BUN SERPL-MCNC: 19 MG/DL (ref 8–23)
CALCIUM SERPL-MCNC: 9.8 MG/DL (ref 8.7–10.5)
CHLORIDE SERPL-SCNC: 106 MMOL/L (ref 95–110)
CHOLEST SERPL-MCNC: 130 MG/DL (ref 120–199)
CHOLEST/HDLC SERPL: 4.5 {RATIO} (ref 2–5)
CK SERPL-CCNC: 22 U/L (ref 20–180)
CO2 SERPL-SCNC: 23 MMOL/L (ref 23–29)
CREAT SERPL-MCNC: 1.2 MG/DL (ref 0.5–1.4)
DIFFERENTIAL METHOD: ABNORMAL
DIFFERENTIAL METHOD: ABNORMAL
EOSINOPHIL # BLD AUTO: 0.2 K/UL (ref 0–0.5)
EOSINOPHIL # BLD AUTO: 0.2 K/UL (ref 0–0.5)
EOSINOPHIL NFR BLD: 2.8 % (ref 0–8)
EOSINOPHIL NFR BLD: 2.8 % (ref 0–8)
ERYTHROCYTE [DISTWIDTH] IN BLOOD BY AUTOMATED COUNT: 16.8 % (ref 11.5–14.5)
ERYTHROCYTE [DISTWIDTH] IN BLOOD BY AUTOMATED COUNT: 16.8 % (ref 11.5–14.5)
EST. GFR  (AFRICAN AMERICAN): 50.7 ML/MIN/1.73 M^2
EST. GFR  (NON AFRICAN AMERICAN): 44 ML/MIN/1.73 M^2
ESTIMATED AVG GLUCOSE: 249 MG/DL (ref 68–131)
GLUCOSE SERPL-MCNC: 210 MG/DL (ref 70–110)
HBA1C MFR BLD: 10.3 % (ref 4–5.6)
HCT VFR BLD AUTO: 31.9 % (ref 37–48.5)
HCT VFR BLD AUTO: 31.9 % (ref 37–48.5)
HDLC SERPL-MCNC: 29 MG/DL (ref 40–75)
HDLC SERPL: 22.3 % (ref 20–50)
HGB BLD-MCNC: 9.7 G/DL (ref 12–16)
HGB BLD-MCNC: 9.7 G/DL (ref 12–16)
IMM GRANULOCYTES # BLD AUTO: 0.02 K/UL (ref 0–0.04)
IMM GRANULOCYTES # BLD AUTO: 0.02 K/UL (ref 0–0.04)
IMM GRANULOCYTES NFR BLD AUTO: 0.3 % (ref 0–0.5)
IMM GRANULOCYTES NFR BLD AUTO: 0.3 % (ref 0–0.5)
LDLC SERPL CALC-MCNC: 65.4 MG/DL (ref 63–159)
LYMPHOCYTES # BLD AUTO: 1.3 K/UL (ref 1–4.8)
LYMPHOCYTES # BLD AUTO: 1.3 K/UL (ref 1–4.8)
LYMPHOCYTES NFR BLD: 17.3 % (ref 18–48)
LYMPHOCYTES NFR BLD: 17.3 % (ref 18–48)
MCH RBC QN AUTO: 23.1 PG (ref 27–31)
MCH RBC QN AUTO: 23.1 PG (ref 27–31)
MCHC RBC AUTO-ENTMCNC: 30.4 G/DL (ref 32–36)
MCHC RBC AUTO-ENTMCNC: 30.4 G/DL (ref 32–36)
MCV RBC AUTO: 76 FL (ref 82–98)
MCV RBC AUTO: 76 FL (ref 82–98)
MONOCYTES # BLD AUTO: 0.6 K/UL (ref 0.3–1)
MONOCYTES # BLD AUTO: 0.6 K/UL (ref 0.3–1)
MONOCYTES NFR BLD: 8.4 % (ref 4–15)
MONOCYTES NFR BLD: 8.4 % (ref 4–15)
NEUTROPHILS # BLD AUTO: 5.3 K/UL (ref 1.8–7.7)
NEUTROPHILS # BLD AUTO: 5.3 K/UL (ref 1.8–7.7)
NEUTROPHILS NFR BLD: 70.7 % (ref 38–73)
NEUTROPHILS NFR BLD: 70.7 % (ref 38–73)
NONHDLC SERPL-MCNC: 101 MG/DL
NRBC BLD-RTO: 0 /100 WBC
NRBC BLD-RTO: 0 /100 WBC
PLATELET # BLD AUTO: 281 K/UL (ref 150–450)
PLATELET # BLD AUTO: 281 K/UL (ref 150–450)
PMV BLD AUTO: 10.1 FL (ref 9.2–12.9)
PMV BLD AUTO: 10.1 FL (ref 9.2–12.9)
POTASSIUM SERPL-SCNC: 4.6 MMOL/L (ref 3.5–5.1)
PROT SERPL-MCNC: 6.9 G/DL (ref 6–8.4)
RBC # BLD AUTO: 4.2 M/UL (ref 4–5.4)
RBC # BLD AUTO: 4.2 M/UL (ref 4–5.4)
SODIUM SERPL-SCNC: 140 MMOL/L (ref 136–145)
TRIGL SERPL-MCNC: 178 MG/DL (ref 30–150)
TSH SERPL DL<=0.005 MIU/L-ACNC: 2.27 UIU/ML (ref 0.4–4)
WBC # BLD AUTO: 7.51 K/UL (ref 3.9–12.7)
WBC # BLD AUTO: 7.51 K/UL (ref 3.9–12.7)

## 2022-03-29 PROCEDURE — 82550 ASSAY OF CK (CPK): CPT | Performed by: INTERNAL MEDICINE

## 2022-03-29 PROCEDURE — 36415 COLL VENOUS BLD VENIPUNCTURE: CPT | Mod: PO | Performed by: INTERNAL MEDICINE

## 2022-03-29 PROCEDURE — 83036 HEMOGLOBIN GLYCOSYLATED A1C: CPT | Performed by: INTERNAL MEDICINE

## 2022-03-29 PROCEDURE — 80061 LIPID PANEL: CPT | Performed by: INTERNAL MEDICINE

## 2022-03-29 PROCEDURE — 85025 COMPLETE CBC W/AUTO DIFF WBC: CPT | Performed by: INTERNAL MEDICINE

## 2022-03-29 PROCEDURE — 84443 ASSAY THYROID STIM HORMONE: CPT | Performed by: INTERNAL MEDICINE

## 2022-03-29 PROCEDURE — 80053 COMPREHEN METABOLIC PANEL: CPT | Performed by: INTERNAL MEDICINE

## 2022-03-30 NOTE — PROGRESS NOTES
Patient, Shirlene Ortiz (MRN #[id number]), presented with a recent Estimated Glumerular Filtration Rate (EGFR) between 15 and 29 consistent with the definition of chronic kidney disease stage 4 (ICD10 - N18.4).    eGFR if non    Date Value Ref Range Status   03/29/2022 44.0 (A) >60 mL/min/1.73 m^2 Final     Comment:     Calculation used to obtain the estimated glomerular filtration  rate (eGFR) is the CKD-EPI equation.          The patient's chronic kidney disease stage 4 was monitored, evaluated, addressed and/or treated. This addendum to the medical record is made on 03/30/2022.

## 2022-04-01 ENCOUNTER — TELEPHONE (OUTPATIENT)
Dept: SURGERY | Facility: HOSPITAL | Age: 77
End: 2022-04-01
Payer: MEDICARE

## 2022-04-01 RX ORDER — ASPIRIN 81 MG/1
325 TABLET ORAL DAILY
COMMUNITY

## 2022-04-01 NOTE — TELEPHONE ENCOUNTER
Patient is scheduled for a LESI on 4/5/2022. Her last dose of 325 mg asprin and 75 mg of Plavix was 3/31/2022, which will make her off five days by the day of the procedure.  If this will interfere with her procedure, please contact the patient.

## 2022-04-01 NOTE — TELEPHONE ENCOUNTER
"See below message. Dr. Loo's response "Unless patient has had a coronary PCI within the last 6 months that is not known to me/available in epic it is ok  to hold plavix/ASA for 5 days preprocedure, resume ASAP post procedure. " please advise if ok for pt to continue with procedure.  "

## 2022-04-04 ENCOUNTER — TELEPHONE (OUTPATIENT)
Dept: GASTROENTEROLOGY | Facility: CLINIC | Age: 77
End: 2022-04-04
Payer: MEDICARE

## 2022-04-04 NOTE — TELEPHONE ENCOUNTER
----- Message from Lucy Kramer sent at 4/4/2022 11:47 AM CDT -----  Regarding: advice  Contact: self  Type: Needs Medical Advice  Who Called:  self  Symptoms (please be specific):    How long has patient had these symptoms:    Pharmacy name and phone #:    Best Call Back Number: [phone]  Additional Information: Patient want to speak with the nurse regard picking up medicine for the procedure.

## 2022-04-04 NOTE — TELEPHONE ENCOUNTER
Spoke with pt. Informed ASC will call her today or tomorrow with her arrival time. Also informed that her prep is purchased OTC. Pt verbalized understanding.

## 2022-04-05 ENCOUNTER — HOSPITAL ENCOUNTER (OUTPATIENT)
Facility: HOSPITAL | Age: 77
Discharge: HOME | End: 2022-04-05
Attending: ANESTHESIOLOGY | Admitting: ANESTHESIOLOGY
Payer: MEDICARE

## 2022-04-05 ENCOUNTER — HOSPITAL ENCOUNTER (OUTPATIENT)
Dept: RADIOLOGY | Facility: HOSPITAL | Age: 77
Discharge: HOME | End: 2022-04-05
Attending: ANESTHESIOLOGY
Payer: MEDICARE

## 2022-04-05 DIAGNOSIS — M54.50 LOWER BACK PAIN: ICD-10-CM

## 2022-04-05 DIAGNOSIS — M54.16 LUMBAR RADICULOPATHY: Primary | ICD-10-CM

## 2022-04-05 LAB — GLUCOSE SERPL-MCNC: 169 MG/DL (ref 70–110)

## 2022-04-05 PROCEDURE — 76000 FLUOROSCOPY <1 HR PHYS/QHP: CPT | Mod: TC,PO

## 2022-04-05 PROCEDURE — 62323 PR INJ LUMBAR/SACRAL, W/IMAGING GUIDANCE: ICD-10-PCS | Mod: ,,, | Performed by: ANESTHESIOLOGY

## 2022-04-05 PROCEDURE — 62323 NJX INTERLAMINAR LMBR/SAC: CPT | Mod: PO | Performed by: ANESTHESIOLOGY

## 2022-04-05 PROCEDURE — 62323 NJX INTERLAMINAR LMBR/SAC: CPT | Mod: ,,, | Performed by: ANESTHESIOLOGY

## 2022-04-05 PROCEDURE — 25500020 PHARM REV CODE 255: Mod: PO | Performed by: ANESTHESIOLOGY

## 2022-04-05 PROCEDURE — 63600175 PHARM REV CODE 636 W HCPCS: Mod: PO | Performed by: ANESTHESIOLOGY

## 2022-04-05 PROCEDURE — 25000003 PHARM REV CODE 250: Mod: PO | Performed by: ANESTHESIOLOGY

## 2022-04-05 RX ORDER — ALPRAZOLAM 0.5 MG/1
1 TABLET, ORALLY DISINTEGRATING ORAL ONCE AS NEEDED
Status: COMPLETED | OUTPATIENT
Start: 2022-04-05 | End: 2022-04-05

## 2022-04-05 RX ORDER — METHYLPREDNISOLONE ACETATE 80 MG/ML
INJECTION, SUSPENSION INTRA-ARTICULAR; INTRALESIONAL; INTRAMUSCULAR; SOFT TISSUE
Status: DISCONTINUED | OUTPATIENT
Start: 2022-04-05 | End: 2022-04-05 | Stop reason: HOSPADM

## 2022-04-05 RX ORDER — LIDOCAINE HYDROCHLORIDE 10 MG/ML
INJECTION, SOLUTION EPIDURAL; INFILTRATION; INTRACAUDAL; PERINEURAL
Status: DISCONTINUED | OUTPATIENT
Start: 2022-04-05 | End: 2022-04-05 | Stop reason: HOSPADM

## 2022-04-05 RX ADMIN — ALPRAZOLAM 1 MG: 0.5 TABLET, ORALLY DISINTEGRATING ORAL at 02:04

## 2022-04-05 NOTE — DISCHARGE SUMMARY
[facility]  Discharge Note  Short Stay    Admit Date: 4/5/2022    Discharge Date: 4/5/2022    Attending Physician: Genaro Lugo     Discharge Provider: Genaro Lugo    Diagnoses:  There are no hospital problems to display for this patient.      Discharged Condition: Good    Final Diagnoses: Lumbar radiculopathy [M54.16]    Disposition: Home or Self Care    Hospital Course: No complications, uneventful    Outcome of Hospitalization, Treatment, Procedure, or Surgery:  Patient was admitted for outpatient interventional pain management procedure. The patient tolerated the procedure well with no complications.    Follow up/Patient Instructions:  Follow up as scheduled in Pain Management office in 2-3 weeks.  Patient has received instructions and follow up date and time.    Medications:  Continue previous medications, except restart aspirin and plavix in 24 hours    Discharge Procedure Orders   Notify your health care provider if you experience any of the following:  temperature >100.4     Notify your health care provider if you experience any of the following:  persistent nausea and vomiting or diarrhea     Notify your health care provider if you experience any of the following:  severe uncontrolled pain     Notify your health care provider if you experience any of the following:  redness, tenderness, or signs of infection (pain, swelling, redness, odor or green/yellow discharge around incision site)     Notify your health care provider if you experience any of the following:  difficulty breathing or increased cough     Notify your health care provider if you experience any of the following:  severe persistent headache     Notify your health care provider if you experience any of the following:  worsening rash     Notify your health care provider if you experience any of the following:  persistent dizziness, light-headedness, or visual disturbances     Notify your health care provider if you experience any  of the following:  increased confusion or weakness     Activity as tolerated

## 2022-04-05 NOTE — OP NOTE
"Procedure Note    Procedure Date: 4/5/2022    Procedure Performed:  L5/S1 lumbar interlaminar epidural steroid injection under fluoroscopy.    Indications: Patient has failed conservative therapy.      Pre-op diagnosis: Lumbar Radiculopathy    Post-op diagnosis: same    Physician: Genaro Lugo MD    IV anxiolysis medications: none    Medications injected: depomedrol 80mg, 1% Lidocaine 1ml, 2 mL sterile, preservative-free normal saline.    Local anesthetic used: 1% Lidocaine, 1 ml, 8.4% sodium bicarbonate 0.25ml    Estimated Blood Loss: none    Complications:  none    Technique:  The patient was interviewed in the holding area and Risks/Benefits were discussed, including, but not limited to, the possibility of new or different pain, bleeding or infection.   All questions were answered.  The patient understood and accepted risks.  Consent was verfied.  A time-out was taken to identify patient and procedure prior to starting the procedure. The patient was placed in the prone position on the fluoroscopy table. The area of the lumbar spine was prepped with Chloraprep and draped in a sterile manner. The L5/S1 interspace was identified and marked under AP fluoroscopy. The skin and subcutaneous tissues overlying the targeted interspace were anesthetized with 3-5 mL of 1% lidocaine using a 25G, 1.5" needle.  A 20G, 3.5" Tuohy epidural needle was directed toward the interspace under fluoroscopic guidance until the ligamentum flavum was engaged. From this point, a loss of resistance technique with a glass syringe and saline was used to identify entrance of the needle into the epidural space. Once loss of resistance was observed 1 mL of contrast solution was injected. An appropriate epidurogram was noted.  A 4 mL mixture consisting of saline, 1 mL 1% Lidocaine and 80 mg of depomedrol was injected slowly and without resistance.  The needle was flushed with normal saline and removed. The contrast was seen to be displaced after " injection. Patient was awake/responsive during all injections.  The patient tolerated the procedure well and was transferred to the [facility] in stable condition.  The patient was monitored after the procedure and was given post-procedure and discharge instructions to follow at home. The patient was discharged in a stable condition.

## 2022-04-06 ENCOUNTER — HOSPITAL ENCOUNTER (OUTPATIENT)
Facility: HOSPITAL | Age: 77
Discharge: HOME | End: 2022-04-06
Attending: INTERNAL MEDICINE | Admitting: INTERNAL MEDICINE
Payer: MEDICARE

## 2022-04-06 ENCOUNTER — ANESTHESIA EVENT (OUTPATIENT)
Dept: ENDOSCOPY | Facility: HOSPITAL | Age: 77
End: 2022-04-06
Payer: MEDICARE

## 2022-04-06 ENCOUNTER — ANESTHESIA (OUTPATIENT)
Dept: ENDOSCOPY | Facility: HOSPITAL | Age: 77
End: 2022-04-06
Payer: MEDICARE

## 2022-04-06 VITALS
RESPIRATION RATE: 15 BRPM | TEMPERATURE: 98 F | DIASTOLIC BLOOD PRESSURE: 72 MMHG | OXYGEN SATURATION: 98 % | SYSTOLIC BLOOD PRESSURE: 157 MMHG | HEART RATE: 72 BPM

## 2022-04-06 DIAGNOSIS — Z86.010 HX OF COLONIC POLYPS: ICD-10-CM

## 2022-04-06 LAB — GLUCOSE SERPL-MCNC: 209 MG/DL (ref 70–110)

## 2022-04-06 PROCEDURE — 37000009 HC ANESTHESIA EA ADD 15 MINS: Mod: PO | Performed by: INTERNAL MEDICINE

## 2022-04-06 PROCEDURE — 82962 GLUCOSE BLOOD TEST: CPT | Mod: PO | Performed by: INTERNAL MEDICINE

## 2022-04-06 PROCEDURE — 45385 PR COLONOSCOPY,REMV LESN,SNARE: ICD-10-PCS | Mod: PT,,, | Performed by: INTERNAL MEDICINE

## 2022-04-06 PROCEDURE — D9220A PRA ANESTHESIA: ICD-10-PCS | Mod: PT,ANES,, | Performed by: ANESTHESIOLOGY

## 2022-04-06 PROCEDURE — 88305 TISSUE EXAM BY PATHOLOGIST: ICD-10-PCS | Mod: 26,,, | Performed by: PATHOLOGY

## 2022-04-06 PROCEDURE — D9220A PRA ANESTHESIA: Mod: PT,CRNA,, | Performed by: NURSE ANESTHETIST, CERTIFIED REGISTERED

## 2022-04-06 PROCEDURE — 25000003 PHARM REV CODE 250: Mod: PO | Performed by: NURSE ANESTHETIST, CERTIFIED REGISTERED

## 2022-04-06 PROCEDURE — 45385 COLONOSCOPY W/LESION REMOVAL: CPT | Mod: PT,,, | Performed by: INTERNAL MEDICINE

## 2022-04-06 PROCEDURE — D9220A PRA ANESTHESIA: Mod: PT,ANES,, | Performed by: ANESTHESIOLOGY

## 2022-04-06 PROCEDURE — 88305 TISSUE EXAM BY PATHOLOGIST: CPT | Mod: 59 | Performed by: PATHOLOGY

## 2022-04-06 PROCEDURE — 63600175 PHARM REV CODE 636 W HCPCS: Mod: PO | Performed by: INTERNAL MEDICINE

## 2022-04-06 PROCEDURE — 37000008 HC ANESTHESIA 1ST 15 MINUTES: Mod: PO | Performed by: INTERNAL MEDICINE

## 2022-04-06 PROCEDURE — 27201089 HC SNARE, DISP (ANY): Mod: PO | Performed by: INTERNAL MEDICINE

## 2022-04-06 PROCEDURE — 45385 COLONOSCOPY W/LESION REMOVAL: CPT | Mod: PT,PO | Performed by: INTERNAL MEDICINE

## 2022-04-06 PROCEDURE — 63600175 PHARM REV CODE 636 W HCPCS: Mod: PO | Performed by: NURSE ANESTHETIST, CERTIFIED REGISTERED

## 2022-04-06 PROCEDURE — D9220A PRA ANESTHESIA: ICD-10-PCS | Mod: PT,CRNA,, | Performed by: NURSE ANESTHETIST, CERTIFIED REGISTERED

## 2022-04-06 PROCEDURE — 88305 TISSUE EXAM BY PATHOLOGIST: CPT | Mod: 26,,, | Performed by: PATHOLOGY

## 2022-04-06 RX ORDER — SODIUM CHLORIDE 0.9 % (FLUSH) 0.9 %
10 SYRINGE (ML) INJECTION
Status: DISCONTINUED | OUTPATIENT
Start: 2022-04-06 | End: 2022-04-06 | Stop reason: HOSPADM

## 2022-04-06 RX ORDER — LIDOCAINE HCL/PF 100 MG/5ML
SYRINGE (ML) INTRAVENOUS
Status: DISCONTINUED | OUTPATIENT
Start: 2022-04-06 | End: 2022-04-06

## 2022-04-06 RX ORDER — SODIUM CHLORIDE, SODIUM LACTATE, POTASSIUM CHLORIDE, CALCIUM CHLORIDE 600; 310; 30; 20 MG/100ML; MG/100ML; MG/100ML; MG/100ML
INJECTION, SOLUTION INTRAVENOUS CONTINUOUS
Status: DISCONTINUED | OUTPATIENT
Start: 2022-04-06 | End: 2022-04-06 | Stop reason: HOSPADM

## 2022-04-06 RX ORDER — PROPOFOL 10 MG/ML
VIAL (ML) INTRAVENOUS
Status: DISCONTINUED | OUTPATIENT
Start: 2022-04-06 | End: 2022-04-06

## 2022-04-06 RX ADMIN — PROPOFOL 30 MG: 10 INJECTION, EMULSION INTRAVENOUS at 12:04

## 2022-04-06 RX ADMIN — SODIUM CHLORIDE, SODIUM LACTATE, POTASSIUM CHLORIDE, AND CALCIUM CHLORIDE: .6; .31; .03; .02 INJECTION, SOLUTION INTRAVENOUS at 11:04

## 2022-04-06 RX ADMIN — LIDOCAINE HYDROCHLORIDE 100 MG: 20 INJECTION, SOLUTION INTRAVENOUS at 12:04

## 2022-04-06 NOTE — DISCHARGE SUMMARY
[facility]  Discharge Note  Short Stay    Discharge Note  Short Stay      SUMMARY     Admit Date: 4/6/2022    Attending Physician: Alonzo Marinelli MD     Discharge Physician: Alonzo Marinelli MD    Discharge Date: 4/6/2022 12:34 PM    Final Diagnosis: Polyp of colon, unspecified part of colon, unspecified type [K63.5]    Disposition: HOME OR SELF CARE    Patient Instructions:   Current Discharge Medication List      CONTINUE these medications which have NOT CHANGED    Details   amLODIPine (NORVASC) 10 MG tablet Take 1 tablet (10 mg total) by mouth every evening.  Qty: 90 tablet, Refills: 5    Comments: .      atorvastatin (LIPITOR) 20 MG tablet TAKE 1 TABLET (20 MG TOTAL) BY MOUTH ONCE DAILY.  Qty: 90 tablet, Refills: 4      carvediloL (COREG) 3.125 MG tablet TAKE 1 TABLET TWICE DAILY  Qty: 180 tablet, Refills: 4      clopidogreL (PLAVIX) 75 mg tablet TAKE 1 TABLET EVERY DAY  Qty: 90 tablet, Refills: 3      ezetimibe (ZETIA) 10 mg tablet TAKE 1 TABLET (10 MG TOTAL) BY MOUTH ONCE DAILY.  Qty: 90 tablet, Refills: 4      glipiZIDE (GLUCOTROL) 10 MG tablet TAKE 1 TABLET TWICE DAILY  Qty: 180 tablet, Refills: 1      pantoprazole (PROTONIX) 40 MG tablet TAKE 1 TABLET (40 MG TOTAL) BY MOUTH ONCE DAILY.  Qty: 90 tablet, Refills: 3    Associated Diagnoses: Jonas's esophagus with low grade dysplasia      traMADoL (ULTRAM) 50 mg tablet Take 1 tablet (50 mg total) by mouth every 12 (twelve) hours as needed for Pain.  Qty: 60 tablet, Refills: 5    Comments: Quantity prescribed more than 7 day supply? Yes, quantity medically necessary  Associated Diagnoses: Chronic bilateral low back pain with bilateral sciatica      valsartan (DIOVAN) 320 MG tablet Take 1 tablet (320 mg total) by mouth once daily.  Qty: 90 tablet, Refills: 3    Comments: .  Associated Diagnoses: Essential hypertension      ACCU-CHEK ISRAEL CONTROL SOLN Soln       ACCU-CHEK [place]RA[place] PLUS METER Misc USE AS DIRECTED  Qty: 1 each, Refills: 0     "  ACCU-CHEK [place]RAEL PLUS TEST STRP Strp TEST 2 TO 4 TIMES EVERY DAY   Qty: 400 strip, Refills: 3    Associated Diagnoses: Type 2 diabetes mellitus with complication, without long-term current use of insulin      aspirin 325 MG tablet Take 325 mg by mouth once daily.      BD ALCOHOL SWABS PadM       fenofibrate 160 MG Tab TAKE 1 TABLET EVERY DAY  Qty: 90 tablet, Refills: 4      insulin glargine, TOUJEO, (TOUJEO SOLOSTAR U-300 INSULIN) 300 unit/mL (1.5 mL) InPn pen Inject 10 Units into the skin once daily.  Qty: 5 pen, Refills: 1      lancets Misc To check BG 6 times daily, to use with insurance preferred meter  Qty: 200 each, Refills: 11      linaCLOtide (LINZESS) 145 mcg Cap capsule Take 1 capsule (145 mcg total) by mouth once daily. Take >30 minutes before 1st meal of the day.  Qty: 30 capsule, Refills: 2    Associated Diagnoses: Chronic constipation      pen needle, diabetic (BD DEAN 2ND GEN PEN NEEDLE) 32 gauge x 5/32" Ndle Use as directed with insulin pen. Diagnosis Code E11.65  Qty: 100 each, Refills: 1    Comments: Pt was placed on long acting insulin daily.      psyllium (METAMUCIL) powder Take 1 packet by mouth 2 (two) times daily as needed. For constipation             Discharge Procedure Orders (must include Diet, Follow-up, Activity)    Follow Up:  Follow up with PCP as previously scheduled  Resume routine diet.  Activity as tolerated.    No driving day of procedure.    "

## 2022-04-06 NOTE — PROVATION PATIENT INSTRUCTIONS
Discharge Summary/Instructions after an Endoscopic Procedure  Patient Name: Shirlene Ortiz  Patient MRN: [id number]  Patient YOB: 1945 Wednesday, April 6, 2022  Alonzo Marinelli MD  Dear patient,  As a result of recent federal legislation (The Federal Cures Act), you may   receive lab or pathology results from your procedure in your [facility]   account before your physician is able to contact you. Your physician or   their representative will relay the results to you with their   recommendations at their soonest availability.  Thank you,  RESTRICTIONS:  During your procedure today, you received medications for sedation.  These   medications may affect your judgment, balance and coordination.  Therefore,   for 24 hours, you have the following restrictions:   - DO NOT drive a car, operate machinery, make legal/financial decisions,   sign important papers or drink alcohol.    ACTIVITY:  Today: no heavy lifting, straining or running due to procedural   sedation/anesthesia.  The following day: return to full activity including work.  DIET:  Eat and drink normally unless instructed otherwise.     TREATMENT FOR COMMON SIDE EFFECTS:  - Mild abdominal pain, nausea, belching, bloating or excessive gas:  rest,   eat lightly and use a heating pad.  - Sore Throat: treat with throat lozenges and/or gargle with warm salt   water.  - Because air was used during the procedure, expelling large amounts of air   from your rectum or belching is normal.  - If a bowel prep was taken, you may not have a bowel movement for 1-3 days.    This is normal.  SYMPTOMS TO WATCH FOR AND REPORT TO YOUR PHYSICIAN:  1. Abdominal pain or bloating, other than gas cramps.  2. Chest pain.  3. Back pain.  4. Signs of infection such as: chills or fever occurring within 24 hours   after the procedure.  5. Rectal bleeding, which would show as bright red, maroon, or black stools.   (A tablespoon of blood from the rectum is not serious, especially  if   hemorrhoids are present.)  6. Vomiting.  7. Weakness or dizziness.  GO DIRECTLY TO THE NEAREST EMERGENCY ROOM IF YOU HAVE ANY OF THE FOLLOWING:      Difficulty breathing              Chills and/or fever over 101 F   Persistent vomiting and/or vomiting blood   Severe abdominal pain   Severe chest pain   Black, tarry stools   Bleeding- more than one tablespoon   Any other symptom or condition that you feel may need urgent attention  Your doctor recommends these additional instructions:  If any biopsies were taken, your doctors clinic will contact you in 1 to 2   weeks with any results.  We are waiting for your pathology results.   Your physician has recommended a repeat colonoscopy in three years for   surveillance based on pathology results.   You are being discharged to home.  For questions, problems or results please call your physician - Alonzo Marinelli MD at Work:  [phone].  EMERGENCY PHONE NUMBER: [phone], LAB RESULTS: [phone]  IF A COMPLICATION OR EMERGENCY SITUATION ARISES AND YOU ARE UNABLE TO REACH   YOUR PHYSICIAN - GO DIRECTLY TO THE EMERGENCY ROOM.  ___________________________________________  Nurse Signature  ___________________________________________  Patient/Designated Responsible Party Signature  Alonzo Marinelli MD  4/6/2022 12:33:16 PM  This report has been verified and signed electronically.  Dear patient,  As a result of recent federal legislation (The Federal Cures Act), you may   receive lab or pathology results from your procedure in your [facility]   account before your physician is able to contact you. Your physician or   their representative will relay the results to you with their   recommendations at their soonest availability.  Thank you.  PROVATION

## 2022-04-06 NOTE — ANESTHESIA PREPROCEDURE EVALUATION
04/06/2022  Shirlene Ortiz is a 76 y.o., female.    Planned Procedure:  Procedure(s) (LRB):  COLONOSCOPY (N/A)    Diagnosis:  No diagnosis found.    There were no vitals taken for this visit.      Past Medical History:   Diagnosis Date    Acute coronary artery obstruction without MI     14 stents    Anemia 8/27/2017    Arthritis     Colon polyp     COPD (chronic obstructive pulmonary disease) 8/25/2017    Diverticulosis of colon     DM (diabetes mellitus)     Essential hypertension     Fatty liver     Hepatomegaly     HTN (hypertension)     Hyperlipidemia     Mixed hyperlipidemia     NSTEMI (non-ST elevated myocardial infarction) 8/17/2017 11/17    Pacemaker 6/25/2012    left chest PACEMAKER [employer] SEDR01 Sensia   S/N:[id number] Deion Link 8/3/2010 - current   right atrium LEAD [employer] 5076 CapSure Fix Novus  S/N:[id number] Deion Link 8/3/2010 - current   right ventricle LEAD [employer]TR[employer] 5076 CapSure Fix Novus  S/N:[id number] Deion Link 8/3/2010 - current     PVD (peripheral vascular disease)     S/P CABG (coronary artery bypass graft) 9/14/2017 8/17 CABG x5 LIMA-LAD, VG-PDA, VG-D, YVG-OM-PLB  mammary artery to left anterior descending coronary artery and separate segments  of saphenous vein from the aorta to the posterior descending coronary artery  and from the aorta to the diagonal coronary artery and from the aorta to the  obtuse marginal coronary artery and to the posterolateral branch of the  circumflex in a sequential fashion using a combination of antegrade and  retrograde cardioplegia with mild systemic hypothermia and endoscopic vein  harvest.    S/P coronary artery stent placement     11/17 proximal circumflex using a 2.5 x 38, post-dilated to 3.0 with 0% residual stenosis.    Sinus node dysfunction     Stroke 7/4/14    Tobacco abuse 8/15/2017    Uncontrolled type  2 diabetes mellitus with hyperosmolarity without coma, without long-term current use of insulin     Documented by [facility]ury on 10/26/15.     Past Surgical History:   Procedure Laterality Date    ABDOMINAL SURGERY      APPENDECTOMY      CARDIAC PACEMAKER PLACEMENT      CHOLECYSTECTOMY      COLONOSCOPY  04/27/2015    Dr. Marinelli: 2 colon polyps removed (one not completely removed), hemorrhoids; repeat in 6 weeks; biopsy: ADENOMATOUS POLYPS    COLONOSCOPY N/A 8/5/2020    Procedure: COLONOSCOPY;  Surgeon: Alonzo Marinelli MD;  Location: [facility];  Service: Endoscopy;  Laterality: N/A;    COLONOSCOPY N/A 11/4/2020    Procedure: COLONOSCOPY;  Surgeon: Theodore Zimmerman MD;  Location: [facility] (2ND FLR); multiple colon polyps removed, 1 large polyp removed via piecemeal, incomplete resection. Repeat colonoscopy in 6 months for surveillance after piecemeal polypectomy; biopsy: Tubular adenoma (low grade dysplasia)    coranary stent      CORONARY ANGIOPLASTY      CORONARY ARTERY BYPASS GRAFT      EPIDURAL STEROID INJECTION INTO LUMBAR SPINE N/A 4/19/2021    Procedure: Injection-steroid-epidural-lumbar L5/S1;  Surgeon: Genaro Lugo MD;  Location: [facility];  Service: Pain Management;  Laterality: N/A;    EPIDURAL STEROID INJECTION INTO LUMBAR SPINE N/A 4/5/2022    Procedure: Injection-steroid-epidural-lumbar L5/S1 to the left;  Surgeon: Genaro Lugo MD;  Location: [facility] OR;  Service: Pain Management;  Laterality: N/A;    ESOPHAGOGASTRODUODENOSCOPY N/A 3/9/2020    Procedure: ESOPHAGOGASTRODUODENOSCOPY (EGD);  Surgeon: Alonzo Marinelli MD;  Location: [facility];  Service: Endoscopy;  Laterality: N/A; Mild Schatzki ring. Biopsied. Dilated. gastritis; biopsy: stomach- chemical/reactive gastropathy, esophagus- Squamocolumnar mucosa with focal intestinal metaplasia and low-grade dysplasia    ESOPHAGOGASTRODUODENOSCOPY N/A 8/5/2020    Procedure: EGD (ESOPHAGOGASTRODUODENOSCOPY);  Surgeon: Alonzo Marinelli,  MD;  Location: [facility];  Service: Endoscopy;  Laterality: N/A;    ESOPHAGOGASTRODUODENOSCOPY N/A 3/22/2021    Procedure: EGD (ESOPHAGOGASTRODUODENOSCOPY);  Surgeon: Alonzo Marinelli MD;  Location: [facility];  Service: Endoscopy;  Laterality: N/A; negative for phillips's    ESOPHAGOGASTRODUODENOSCOPY N/A 6/30/2021    Procedure: EGD (ESOPHAGOGASTRODUODENOSCOPY);  Surgeon: Alonzo Marinelli MD;  Location: [facility]; Esophageal mucosal changes suspicious for short-segment Phillips's esophagus; gastritis    ILIAC ARTERY STENT      POLYPECTOMY      TOTAL ABDOMINAL HYSTERECTOMY         Labs:      Recent Labs   Lab 03/29/22  0731   WBC 7.51  7.51   HGB 9.7*  9.7*   HCT 31.9*  31.9*     281        Recent Labs   Lab 03/29/22  0731      K 4.6      CO2 23   BUN 19   CREATININE 1.2   *   CALCIUM 9.8        No results for input(s): PT, INR, PTT in the last 720 hours.       Pre-op Assessment    I have reviewed the Patient Summary Reports.      I have reviewed the Medications.     Review of Systems  Anesthesia Hx:  No problems with previous Anesthesia  Denies Family Hx of Anesthesia complications.    Social:  Smoker    Hematology/Oncology:         -- Anemia: Hematology Comments: Plavix on hold x 3 days   Cardiovascular:   Pacemaker Hypertension Past MI CAD (CABG 2017, coronary stents x 14)  CABG/stent  PVD  LAE, iliac stents, pacemaker   Pulmonary:   COPD, moderate    Renal/:   Chronic Renal Disease, CRI    Hepatic/GI:   Bowel Prep. Liver Disease,    Musculoskeletal:   Arthritis     Neurological:   TIA, CVA, residual symptoms speech   Endocrine:   Diabetes, poorly controlled, type 2        Physical Exam  General:  Well nourished      Airway/Jaw/Neck:  Airway Findings: Mouth Opening: Normal   Tongue: Normal   General Airway Assessment: Adult Mallampati: III  Improves to II with phonation.  Jaw/Neck Findings:  Neck ROM: Normal ROM       Dental:  Dental Findings: Edentulous      Chest/Lungs:  Chest/Lungs Clear    Heart/Vascular:  Heart Findings: Normal       Mental Status:  Mental Status Findings:  Cooperative, Alert and Oriented         Anesthesia Plan  Type of Anesthesia, risks & benefits discussed:  Anesthesia Type:  Gen Natural Airway    Patient's Preference:   Plan Factors:          Intra-op Monitoring Plan: standard ASA monitors  Intra-op Monitoring Plan Comments:   Post Op Pain Control Plan: per primary service following discharge from PACU  Post Op Pain Control Plan Comments: Routine    Induction:   IV  Beta Blocker:  Patient is on a Beta-Blocker and has received one dose within the past 24 hours (No further documentation required).       Informed Consent: Informed consent signed with the Patient and all parties understand the risks and agree with anesthesia plan.  All questions answered.  Anesthesia consent signed with patient.  ASA Score: 4     Day of Surgery Review of History & Physical: I have interviewed and examined the patient. I have reviewed the patient's H&P dated:        Anesthesia Plan Notes:   ERIBERTO Risk Level:   low        Ready For Surgery From Anesthesia Perspective.           Physical Exam  General: Well nourished    Airway:  Mallampati: III / II  Mouth Opening: Normal  Tongue: Normal  Neck ROM: Normal ROM    Dental:  Edentulous          Anesthesia Plan  Type of Anesthesia, risks & benefits discussed:    Anesthesia Type: Gen Natural Airway  Intra-op Monitoring Plan: standard ASA monitors  Post Op Pain Control Plan[facility]  Induction:  IV  Informed Consent: Informed consent signed with the Patient and all parties understand the risks and agree with anesthesia plan.  All questions answered.   ASA Score: 4  Day of Surgery Review of History & Physical: I have interviewed and examined the patient. I have reviewed the patient's H&P dated:   Anesthesia Plan Notes:   ERIBERTO Risk Level:   low    Ready For Surgery From Anesthesia  Perspective.       .

## 2022-04-06 NOTE — ANESTHESIA POSTPROCEDURE EVALUATION
Anesthesia Post Evaluation    Patient: Shirlene Ortiz    Procedure(s) Performed: Procedure(s) (LRB):  COLONOSCOPY (N/A)    Final Anesthesia Type: general      Patient location during evaluation: PACU  Patient participation: Yes- Able to Participate  Level of consciousness: awake and alert and oriented  Post-procedure vital signs: reviewed and stable  Pain management: adequate  Airway patency: patent    PONV status at discharge: No PONV  Anesthetic complications: no      Cardiovascular status: blood pressure returned to baseline  Respiratory status: unassisted, spontaneous ventilation and room air  Hydration status: euvolemic  Follow-up not needed.          Vitals Value Taken Time   /67 04/06/22 1245   Temp  04/06/22 1253   Pulse 73 04/06/22 1245   Resp 17 04/06/22 1245   SpO2 97 % 04/06/22 1245         No case tracking events are documented in the log.      Pain/Quyen Score: Quyen Score: 10 (4/6/2022 12:50 PM)

## 2022-04-06 NOTE — TRANSFER OF CARE
"Anesthesia Transfer of Care Note    Patient: Shirlene Ortiz    Procedure(s) Performed: Procedure(s) (LRB):  COLONOSCOPY (N/A)    Patient location: PACU    Anesthesia Type: general    Transport from OR: Transported from OR on room air with adequate spontaneous ventilation    Post pain: adequate analgesia    Post assessment: no apparent anesthetic complications and tolerated procedure well    Post vital signs: stable    Level of consciousness: awake and alert    Nausea/Vomiting: no nausea/vomiting    Complications: none    Transfer of care protocol was followed      Last vitals:   Visit Vitals  BP (!) 182/79 (BP Location: Right arm, Patient Position: Lying)   Pulse 69   Temp 36.5 °C (97.7 °F) (Skin)   Resp 17   Ht 5' 2" (1.575 m)   Wt 74.4 kg (164 lb)   SpO2 (!) 93%   Breastfeeding No   BMI 30.00 kg/m²     "

## 2022-04-06 NOTE — H&P
History & Physical - Short Stay  Gastroenterology      SUBJECTIVE:     Procedure: Colonoscopy    Chief Complaint/Indication for Procedure: Previous Polyps    PTA Medications   Medication Sig    amLODIPine (NORVASC) 10 MG tablet Take 1 tablet (10 mg total) by mouth every evening.    atorvastatin (LIPITOR) 20 MG tablet TAKE 1 TABLET (20 MG TOTAL) BY MOUTH ONCE DAILY. (Patient taking differently: Take 20 mg by mouth once daily. For cholesterol)    carvediloL (COREG) 3.125 MG tablet TAKE 1 TABLET TWICE DAILY (Patient taking differently: Take 3.125 mg by mouth 2 (two) times daily. For heart)    clopidogreL (PLAVIX) 75 mg tablet TAKE 1 TABLET EVERY DAY    ezetimibe (ZETIA) 10 mg tablet TAKE 1 TABLET (10 MG TOTAL) BY MOUTH ONCE DAILY. (Patient taking differently: Take 10 mg by mouth once daily. For cholesterol)    glipiZIDE (GLUCOTROL) 10 MG tablet TAKE 1 TABLET TWICE DAILY    pantoprazole (PROTONIX) 40 MG tablet TAKE 1 TABLET (40 MG TOTAL) BY MOUTH ONCE DAILY.    traMADoL (ULTRAM) 50 mg tablet Take 1 tablet (50 mg total) by mouth every 12 (twelve) hours as needed for Pain.    valsartan (DIOVAN) 320 MG tablet Take 1 tablet (320 mg total) by mouth once daily.    ACCU-CHEK ISRAEL CONTROL SOLN Soln     ACCU-CHEK ISRAEL PLUS METER Misc USE AS DIRECTED    ACCU-CHEK ISRAEL PLUS TEST STRP Strp TEST 2 TO 4 TIMES EVERY DAY     aspirin 325 MG tablet Take 325 mg by mouth once daily.    BD ALCOHOL SWABS PadM     fenofibrate 160 MG Tab TAKE 1 TABLET EVERY DAY (Patient not taking: No sig reported)    insulin glargine, TOUJEO, (TOUJEO SOLOSTAR U-300 INSULIN) 300 unit/mL (1.5 mL) InPn pen Inject 10 Units into the skin once daily. (Patient not taking: No sig reported)    lancets Misc To check BG 6 times daily, to use with insurance preferred meter    linaCLOtide (LINZESS) 145 mcg Cap capsule Take 1 capsule (145 mcg total) by mouth once daily. Take >30 minutes before 1st meal of the day. (Patient not taking: No sig reported)  "   pen needle, diabetic (BD DEAN 2ND GEN PEN NEEDLE) 32 gauge x 5/32" Ndle Use as directed with insulin pen. Diagnosis Code E11.65    psyllium (METAMUCIL) powder Take 1 packet by mouth 2 (two) times daily as needed. For constipation       Review of patient's allergies indicates:   Allergen Reactions    Nicotine      puritis from nicotine patch. Patient is a smoker.        Past Medical History:   Diagnosis Date    Acute coronary artery obstruction without MI     14 stents    Anemia 8/27/2017    Arthritis     Colon polyp     COPD (chronic obstructive pulmonary disease) 8/25/2017    Diverticulosis of colon     DM (diabetes mellitus)     Essential hypertension     Fatty liver     Hepatomegaly     HTN (hypertension)     Hyperlipidemia     Mixed hyperlipidemia     NSTEMI (non-ST elevated myocardial infarction) 8/17/2017 11/17    Pacemaker 6/25/2012    left chest PACEMAKER MEDTRONIC SEDR01 Sensia   S/N:[id number] Deion Link 8/3/2010 - current   right atrium LEAD MEDTRONIC 5076 CapSure Fix Novus  S/N:[id number] Deion Link 8/3/2010 - current   right ventricle LEAD MEDTRONIC 5076 CapSure Fix Novus  S/N:[id number] Deion Link 8/3/2010 - current     PVD (peripheral vascular disease)     S/P CABG (coronary artery bypass graft) 9/14/2017 8/17 CABG x5 LIMA-LAD, VG-PDA, VG-D, YVG-OM-PLB  mammary artery to left anterior descending coronary artery and separate segments  of saphenous vein from the aorta to the posterior descending coronary artery  and from the aorta to the diagonal coronary artery and from the aorta to the  obtuse marginal coronary artery and to the posterolateral branch of the  circumflex in a sequential fashion using a combination of antegrade and  retrograde cardioplegia with mild systemic hypothermia and endoscopic vein  harvest.    S/P coronary artery stent placement     11/17 proximal circumflex using a 2.5 x 38, post-dilated to 3.0 with 0% residual stenosis.    Sinus node " dysfunction     Stroke 7/4/14    Tobacco abuse 8/15/2017    Uncontrolled type 2 diabetes mellitus with hyperosmolarity without coma, without long-term current use of insulin     Documented by Maury on 10/26/15.     Past Surgical History:   Procedure Laterality Date    ABDOMINAL SURGERY      APPENDECTOMY      CARDIAC PACEMAKER PLACEMENT      CHOLECYSTECTOMY      COLONOSCOPY  04/27/2015    Dr. Marinelli: 2 colon polyps removed (one not completely removed), hemorrhoids; repeat in 6 weeks; biopsy: ADENOMATOUS POLYPS    COLONOSCOPY N/A 8/5/2020    Procedure: COLONOSCOPY;  Surgeon: Alonzo Marinelli MD;  Location: [facility];  Service: Endoscopy;  Laterality: N/A;    COLONOSCOPY N/A 11/4/2020    Procedure: COLONOSCOPY;  Surgeon: Theodore Zimmerman MD;  [facility] multiple colon polyps removed, 1 large polyp removed via piecemeal, incomplete resection. Repeat colonoscopy in 6 months for surveillance after piecemeal polypectomy; biopsy: Tubular adenoma (low grade dysplasia)    coranary stent      CORONARY ANGIOPLASTY      CORONARY ARTERY BYPASS GRAFT      EPIDURAL STEROID INJECTION INTO LUMBAR SPINE N/A 4/19/2021    Procedure: Injection-steroid-epidural-lumbar L5/S1;  Surgeon: Genaro Lugo MD;  Location: [facility] OR;  Service: Pain Management;  Laterality: N/A;    EPIDURAL STEROID INJECTION INTO LUMBAR SPINE N/A 4/5/2022    Procedure: Injection-steroid-epidural-lumbar L5/S1 to the left;  Surgeon: Genaro Lugo MD;  Location: [facility] OR;  Service: Pain Management;  Laterality: N/A;    ESOPHAGOGASTRODUODENOSCOPY N/A 3/9/2020    Procedure: ESOPHAGOGASTRODUODENOSCOPY (EGD);  Surgeon: Alonzo Marinelli MD;  Location: [facility];  Service: Endoscopy;  Laterality: N/A; Mild Schatzki ring. Biopsied. Dilated. gastritis; biopsy: stomach- chemical/reactive gastropathy, esophagus- Squamocolumnar mucosa with focal intestinal metaplasia and low-grade dysplasia    ESOPHAGOGASTRODUODENOSCOPY N/A 8/5/2020     Procedure: EGD (ESOPHAGOGASTRODUODENOSCOPY);  Surgeon: Alonzo Mairnelli MD;  Location: [facility] ENDO;  Service: Endoscopy;  Laterality: N/A;    ESOPHAGOGASTRODUODENOSCOPY N/A 3/22/2021    Procedure: EGD (ESOPHAGOGASTRODUODENOSCOPY);  Surgeon: Alonzo Marinelli MD;  Location: [facility];  Service: Endoscopy;  Laterality: N/A; negative for phillips's    ESOPHAGOGASTRODUODENOSCOPY N/A 2021    Procedure: EGD (ESOPHAGOGASTRODUODENOSCOPY);  Surgeon: Alonzo Marinelli MD;  Location: [facility]; Esophageal mucosal changes suspicious for short-segment Phillips's esophagus; gastritis    ILIAC ARTERY STENT      POLYPECTOMY      TOTAL ABDOMINAL HYSTERECTOMY       Family History   Problem Relation Age of Onset    Kidney disease Mother     Hypertension Mother     Diabetes Mother     Melanoma Sister     Lung disease Sister     Hypertension Sister     Colon cancer Neg Hx     Crohn's disease Neg Hx     Ulcerative colitis Neg Hx     Stomach cancer Neg Hx     Esophageal cancer Neg Hx      Social History     Tobacco Use    Smoking status: Current Every Day Smoker     Packs/day: 1.50     Years: 60.00     Pack years: 90.00     Types: Cigarettes     Last attempt to quit: 8/15/2017     Years since quittin.6    Smokeless tobacco: Never Used    Tobacco comment: Tobacco treatment specialist consulted.   Substance Use Topics    Alcohol use: No    Drug use: No         OBJECTIVE:     Vital Signs (Most Recent)       Physical Exam:                                                       GENERAL:  Comfortable, in no acute distress.                                 HEENT EXAM:  Nonicteric.  No adenopathy.  Oropharynx is clear.               NECK:  Supple.                                                               LUNGS:  Clear.                                                               CARDIAC:  Regular rate and rhythm.  S1, S2.  No murmur.                      ABDOMEN:  Soft, positive bowel sounds, nontender.  No  hepatosplenomegaly or masses.  No rebound or guarding.                                             EXTREMITIES:  No edema.     MENTAL STATUS:  Normal, alert and oriented.      ASSESSMENT/PLAN:     Assessment: Previous Polyps    Plan: Colonoscopy    Anesthesia Plan: General    ASA Grade: ASA 2 - Patient with mild systemic disease with no functional limitations    MALLAMPATI SCORE:  I (soft palate, uvula, fauces, and tonsillar pillars visible)

## 2022-04-07 VITALS
RESPIRATION RATE: 17 BRPM | SYSTOLIC BLOOD PRESSURE: 143 MMHG | TEMPERATURE: 98 F | WEIGHT: 164 LBS | DIASTOLIC BLOOD PRESSURE: 67 MMHG | HEIGHT: 62 IN | OXYGEN SATURATION: 97 % | HEART RATE: 73 BPM | BODY MASS INDEX: 30.18 KG/M2

## 2022-04-13 LAB
FINAL PATHOLOGIC DIAGNOSIS: NORMAL
Lab: NORMAL

## 2022-04-21 ENCOUNTER — TELEPHONE (OUTPATIENT)
Dept: CARDIOLOGY | Facility: HOSPITAL | Age: 77
End: 2022-04-21
Payer: MEDICARE

## 2022-04-21 NOTE — TELEPHONE ENCOUNTER
Patient rescheduled ECHO appt to 5/26, no show for device check scheduled today  Called patient to reschedule device check to same day as ECHO  Left VM

## 2022-04-25 ENCOUNTER — PATIENT OUTREACH (OUTPATIENT)
Dept: ADMINISTRATIVE | Facility: OTHER | Age: 77
End: 2022-04-25
Payer: MEDICARE

## 2022-04-25 ENCOUNTER — TELEPHONE (OUTPATIENT)
Dept: PAIN MEDICINE | Facility: CLINIC | Age: 77
End: 2022-04-25
Payer: MEDICARE

## 2022-04-25 NOTE — PROGRESS NOTES
Health Maintenance Due   Topic Date Due    Shingles Vaccine (1 of 2) Never done    DEXA Scan  03/22/2020    Mammogram  05/14/2020    Eye Exam  05/30/2020    Foot Exam  05/22/2021    Influenza Vaccine (1) 09/01/2021    LDCT Lung Screen  11/10/2021    COVID-19 Vaccine (4 - Booster for Pfizer series) 12/29/2021    TETANUS VACCINE  01/01/2022     Updates were requested from care everywhere.  Chart was reviewed for overdue Proactive [facility] Encounters ([facility]) topics (CRS, Breast Cancer Screening, Eye exam)  Health Maintenance has been updated.  LINKS immunization registry triggered.  Immunizations were reconciled.

## 2022-04-25 NOTE — TELEPHONE ENCOUNTER
----- Message from Oneyda Mreritt sent at 4/25/2022  2:52 PM CDT -----  Contact: pt  Type: Needs Medical Advice    Who Called: pt  Best Call Back Number: [phone]    Inquiry/Question: pt is returning call regarding appt   Thank you~

## 2022-04-25 NOTE — TELEPHONE ENCOUNTER
----- Message from Stacey M Lefort sent at 4/25/2022  8:10 AM CDT -----  Pt would like a callback at [phone].  Thank you.

## 2022-04-25 NOTE — TELEPHONE ENCOUNTER
----- Message from Jeannette Kirby sent at 4/25/2022 11:33 AM CDT -----  Type:  Patient Returning Call    Who Called:  Shirlene  Who Left Message for Patient:  ?  Does the patient know what this is regarding?:  appt  Best Call Back Number:  [phone]  Additional Information:  thank you!

## 2022-05-09 ENCOUNTER — PATIENT MESSAGE (OUTPATIENT)
Dept: SMOKING CESSATION | Facility: CLINIC | Age: 77
End: 2022-05-09
Payer: MEDICARE

## 2022-05-19 RX ORDER — CARVEDILOL 3.12 MG/1
TABLET ORAL
Qty: 180 TABLET | Refills: 4 | Status: SHIPPED | OUTPATIENT
Start: 2022-05-19 | End: 2023-03-23

## 2022-05-20 DIAGNOSIS — I49.5 SINUS NODE DYSFUNCTION: ICD-10-CM

## 2022-05-20 DIAGNOSIS — Z95.0 PACEMAKER: Primary | ICD-10-CM

## 2022-05-31 ENCOUNTER — TELEPHONE (OUTPATIENT)
Dept: CARDIOLOGY | Facility: HOSPITAL | Age: 77
End: 2022-05-31
Payer: MEDICARE

## 2022-05-31 RX ORDER — ATORVASTATIN CALCIUM 20 MG/1
TABLET, FILM COATED ORAL
Qty: 90 TABLET | Refills: 4 | Status: SHIPPED | OUTPATIENT
Start: 2022-05-31 | End: 2023-08-08

## 2022-05-31 NOTE — TELEPHONE ENCOUNTER
Pt called to cancel her device check.  She said she had a lot of things going on right now and will call back in a few weeks to reschedule

## 2022-06-01 ENCOUNTER — TELEPHONE (OUTPATIENT)
Dept: GASTROENTEROLOGY | Facility: CLINIC | Age: 77
End: 2022-06-01
Payer: MEDICARE

## 2022-06-01 NOTE — TELEPHONE ENCOUNTER
----- Message from Gordon Blakely sent at 6/1/2022 10:36 AM CDT -----  Regarding: Call Back  Who Called: pt          What is the reason for the call: pt is calling in regards to some question about her stomach.. states been swollen and in pain for 3 weeks. Has a lot of saliva in her mouth .. would azael to speak with nurse.. please contact and advise          Can patient be contacted on [employer]: No          Call back number: [phone]

## 2022-06-07 NOTE — TELEPHONE ENCOUNTER
No new care gaps identified.  [facility] Embedded Care Gaps. Reference number: [id number]. 6/07/2022   1:36:17 PM CDT

## 2022-06-07 NOTE — TELEPHONE ENCOUNTER
Refill Routing Note   Medication(s) are not appropriate for processing by [facility] for the following reason(s):      - Patient has been seen in the ED/Hospital since the last PCP visit    [facility] action(s):  Defer          Medication reconciliation completed: No     Appointments  past 12m or future 3m with PCP    Date Provider   Last Visit   3/24/2022 Hiram Mendiola MD   Next Visit   9/27/2022 Hiram Mendiola MD   ED visits in past 90 days: 0        Note composed:6:18 PM 06/07/2022

## 2022-06-08 RX ORDER — GLIPIZIDE 10 MG/1
TABLET ORAL
Qty: 180 TABLET | Refills: 1 | Status: SHIPPED | OUTPATIENT
Start: 2022-06-08 | End: 2023-01-24

## 2022-06-15 ENCOUNTER — TELEPHONE (OUTPATIENT)
Dept: FAMILY MEDICINE | Facility: CLINIC | Age: 77
End: 2022-06-15
Payer: MEDICARE

## 2022-06-15 ENCOUNTER — PATIENT MESSAGE (OUTPATIENT)
Dept: CARDIOLOGY | Facility: CLINIC | Age: 77
End: 2022-06-15
Payer: MEDICARE

## 2022-06-15 RX ORDER — EZETIMIBE 10 MG/1
TABLET ORAL
Qty: 90 TABLET | Refills: 4 | Status: SHIPPED | OUTPATIENT
Start: 2022-06-15 | End: 2023-06-27

## 2022-06-15 NOTE — TELEPHONE ENCOUNTER
----- Message from Uriel Brewer sent at 6/15/2022 12:37 PM CDT -----  Type: Needs Medical Advice  Who Called: Patient  Symptoms (please be specific): 101.4 fever, headache, cough  How long has patient had these symptoms:  4 days    Pharmacy name and phone #:    [facility]--Please add to file  [id number] [place] [address]  [phone]        Best Call Back Number: [phone]  Additional Information: Please call to advise

## 2022-07-05 ENCOUNTER — TELEPHONE (OUTPATIENT)
Dept: FAMILY MEDICINE | Facility: CLINIC | Age: 77
End: 2022-07-05
Payer: MEDICARE

## 2022-07-05 NOTE — TELEPHONE ENCOUNTER
----- Message from Lakesha Muñoz sent at 7/5/2022  8:56 AM CDT -----  Type:  Sooner Apoointment Request    Caller is requesting a sooner appointment.  Caller declined first available appointment listed below.  Caller will not accept being placed on the waitlist and is requesting a message be sent to doctor.  Name of Caller:Shirlene Ortiz     When is the first available appointment?no available appointment     Symptoms:bad fall and bad Cough     Would the patient rather a call back or a response via [facility]? Call back     Best Call Back Number:[phone] (mobile)     Additional Information:

## 2022-07-05 NOTE — TELEPHONE ENCOUNTER
Scheduled patient for a same day apt with Dr Sorto.    Did advise to go to urgent care get checked out for her fall and hit forehead on coffee table. States she is good with the fall just needs to have an apt for the cough and sinus issues.

## 2022-07-13 ENCOUNTER — NURSE TRIAGE (OUTPATIENT)
Dept: ADMINISTRATIVE | Facility: CLINIC | Age: 77
End: 2022-07-13
Payer: MEDICARE

## 2022-07-13 NOTE — TELEPHONE ENCOUNTER
"OOC NT incoming call -  Pt reports weakness when "getting up" reports extensive heart hx with multiple stents, pacemaker and bypass surgery. Reports   QG=313/60 HR=81 - Heart rate  protocol followed and pt advised to go to ED if  no call back by cardiologist with in one hour. Pt declines stating she has things to do tomorrow. Reviewed importance of evaluation and possible fatal outcomes. Pt declines ED at this time.  Encounter routed to  Dr. Loo and pcp.    Reason for Disposition   New or worsened shortness of breath with activity (dyspnea on exertion)    Additional Information   Negative: Passed out (i.e., lost consciousness, collapsed and was not responding)   Negative: Shock suspected (e.g., cold/pale/clammy skin, too weak to stand, low BP, rapid pulse)   Negative: Difficult to awaken or acting confused (e.g., disoriented, slurred speech)   Negative: Visible sweat on face or sweat dripping down face   Negative: Unable to walk, or can only walk with assistance (e.g., requires support)   Negative: Received SHOCK from implantable cardiac defibrillator and has persisting symptoms (i.e., palpitations, lightheadedness)   Negative: Dizziness, lightheadedness, or weakness and heart beating very rapidly (e.g., > 140 / minute)   Negative: Dizziness, lightheadedness, or weakness and heart beating very slowly (e.g., < 50 / minute)   Negative: Sounds like a life-threatening emergency to the triager   Negative: Chest pain   Negative: Difficulty breathing   Negative: Dizziness, lightheadedness, or weakness   Negative: Heart beating very rapidly (e.g., > 140 / minute) and present now (Exception: during exercise)   Negative: Heart beating very slowly (e.g., < 50 / minute) (Exception: athlete and heart rate normal for caller)    Protocols used: HEART RATE AND HEARTBEAT [id number]      "

## 2022-07-14 ENCOUNTER — TELEPHONE (OUTPATIENT)
Dept: CARDIOLOGY | Facility: HOSPITAL | Age: 77
End: 2022-07-14
Payer: MEDICARE

## 2022-07-14 NOTE — TELEPHONE ENCOUNTER
Pt's daughter called and stated her mom is in the hospital and needed to find out what type of device she has.  Informed her MDT PPM

## 2022-07-22 ENCOUNTER — CLINICAL SUPPORT (OUTPATIENT)
Dept: CARDIOLOGY | Facility: HOSPITAL | Age: 77
End: 2022-07-22
Attending: INTERNAL MEDICINE
Payer: MEDICARE

## 2022-07-22 VITALS — HEART RATE: 72 BPM | BODY MASS INDEX: 30.18 KG/M2 | HEIGHT: 62 IN | WEIGHT: 164 LBS

## 2022-07-22 PROCEDURE — 93306 TTE W/DOPPLER COMPLETE: CPT | Mod: PO

## 2022-07-22 PROCEDURE — 93306 ECHO (CUPID ONLY): ICD-10-PCS | Mod: 26,,, | Performed by: INTERNAL MEDICINE

## 2022-07-22 PROCEDURE — 93306 TTE W/DOPPLER COMPLETE: CPT | Mod: 26,,, | Performed by: INTERNAL MEDICINE

## 2022-07-25 LAB
ASCENDING AORTA: 2.93 CM
AV INDEX (PROSTH): 0.66
AV MEAN GRADIENT: 6 MMHG
AV PEAK GRADIENT: 11 MMHG
AV VALVE AREA: 2 CM2
AV VELOCITY RATIO: 0.56
BSA FOR ECHO PROCEDURE: 1.8 M2
CV ECHO LV RWT: 0.78 CM
DOP CALC AO PEAK VEL: 1.64 M/S
DOP CALC AO VTI: 32.08 CM
DOP CALC LVOT AREA: 3 CM2
DOP CALC LVOT DIAMETER: 1.97 CM
DOP CALC LVOT PEAK VEL: 0.92 M/S
DOP CALC LVOT STROKE VOLUME: 64.31 CM3
DOP CALCLVOT PEAK VEL VTI: 21.11 CM
E WAVE DECELERATION TIME: 249.19 MSEC
E/A RATIO: 0.7
E/E' RATIO: 14.22 M/S
ECHO LV POSTERIOR WALL: 1.37 CM (ref 0.6–1.1)
EJECTION FRACTION: 65 %
FRACTIONAL SHORTENING: 31 % (ref 28–44)
INTERVENTRICULAR SEPTUM: 1.4 CM (ref 0.6–1.1)
IVRT: 131.3 MSEC
LA MAJOR: 4.72 CM
LA MINOR: 4.87 CM
LA WIDTH: 3.93 CM
LEFT ATRIUM SIZE: 4.18 CM
LEFT ATRIUM VOLUME INDEX: 38 ML/M2
LEFT ATRIUM VOLUME: 66.94 CM3
LEFT INTERNAL DIMENSION IN SYSTOLE: 2.44 CM (ref 2.1–4)
LEFT VENTRICLE DIASTOLIC VOLUME INDEX: 29.31 ML/M2
LEFT VENTRICLE DIASTOLIC VOLUME: 51.58 ML
LEFT VENTRICLE MASS INDEX: 97 G/M2
LEFT VENTRICLE SYSTOLIC VOLUME INDEX: 11.9 ML/M2
LEFT VENTRICLE SYSTOLIC VOLUME: 20.94 ML
LEFT VENTRICULAR INTERNAL DIMENSION IN DIASTOLE: 3.52 CM (ref 3.5–6)
LEFT VENTRICULAR MASS: 171.36 G
LV LATERAL E/E' RATIO: 16 M/S
LV SEPTAL E/E' RATIO: 12.8 M/S
MV PEAK A VEL: 0.91 M/S
MV PEAK E VEL: 0.64 M/S
MV STENOSIS PRESSURE HALF TIME: 72.27 MS
MV VALVE AREA P 1/2 METHOD: 3.04 CM2
PISA TR MAX VEL: 2.33 M/S
RA MAJOR: 5.18 CM
RA PRESSURE: 8 MMHG
RA WIDTH: 3.74 CM
RIGHT VENTRICULAR END-DIASTOLIC DIMENSION: 3.37 CM
RV TISSUE DOPPLER FREE WALL SYSTOLIC VELOCITY 1 (APICAL 4 CHAMBER VIEW): 10.22 CM/S
SINUS: 2.93 CM
STJ: 2.54 CM
TDI LATERAL: 0.04 M/S
TDI SEPTAL: 0.05 M/S
TDI: 0.05 M/S
TR MAX PG: 22 MMHG
TRICUSPID ANNULAR PLANE SYSTOLIC EXCURSION: 1.59 CM
TV REST PULMONARY ARTERY PRESSURE: 30 MMHG

## 2022-09-01 ENCOUNTER — PATIENT MESSAGE (OUTPATIENT)
Dept: HEMATOLOGY/ONCOLOGY | Facility: CLINIC | Age: 77
End: 2022-09-01
Payer: MEDICARE

## 2022-09-06 ENCOUNTER — OFFICE VISIT (OUTPATIENT)
Dept: CARDIOLOGY | Facility: CLINIC | Age: 77
End: 2022-09-06
Payer: MEDICARE

## 2022-09-06 ENCOUNTER — HOSPITAL ENCOUNTER (OUTPATIENT)
Dept: RADIOLOGY | Facility: HOSPITAL | Age: 77
Discharge: HOME | End: 2022-09-06
Attending: INTERNAL MEDICINE
Payer: MEDICARE

## 2022-09-06 ENCOUNTER — CLINICAL SUPPORT (OUTPATIENT)
Dept: CARDIOLOGY | Facility: HOSPITAL | Age: 77
End: 2022-09-06
Attending: INTERNAL MEDICINE
Payer: MEDICARE

## 2022-09-06 VITALS
DIASTOLIC BLOOD PRESSURE: 59 MMHG | HEIGHT: 62 IN | SYSTOLIC BLOOD PRESSURE: 94 MMHG | HEART RATE: 87 BPM | BODY MASS INDEX: 28.76 KG/M2 | WEIGHT: 156.31 LBS

## 2022-09-06 DIAGNOSIS — I49.5 SINUS NODE DYSFUNCTION: ICD-10-CM

## 2022-09-06 DIAGNOSIS — E78.2 MIXED HYPERLIPIDEMIA: ICD-10-CM

## 2022-09-06 DIAGNOSIS — Z78.0 POST-MENOPAUSAL: ICD-10-CM

## 2022-09-06 DIAGNOSIS — Z95.5 S/P CORONARY ARTERY STENT PLACEMENT: ICD-10-CM

## 2022-09-06 DIAGNOSIS — I10 ESSENTIAL HYPERTENSION: ICD-10-CM

## 2022-09-06 DIAGNOSIS — Z95.1 S/P CABG (CORONARY ARTERY BYPASS GRAFT): Primary | ICD-10-CM

## 2022-09-06 DIAGNOSIS — R91.8 LUNG NODULES: ICD-10-CM

## 2022-09-06 DIAGNOSIS — I73.9 PVD (PERIPHERAL VASCULAR DISEASE): ICD-10-CM

## 2022-09-06 DIAGNOSIS — Z95.0 PACEMAKER: ICD-10-CM

## 2022-09-06 DIAGNOSIS — I25.10 CORONARY ARTERY DISEASE INVOLVING NATIVE CORONARY ARTERY OF NATIVE HEART WITHOUT ANGINA PECTORIS: ICD-10-CM

## 2022-09-06 PROCEDURE — 99214 PR OFFICE/OUTPT VISIT, EST, LEVL IV, 30-39 MIN: ICD-10-PCS | Mod: S$GLB,,, | Performed by: INTERNAL MEDICINE

## 2022-09-06 PROCEDURE — 1160F RVW MEDS BY RX/DR IN RCRD: CPT | Mod: CPTII,S$GLB,, | Performed by: INTERNAL MEDICINE

## 2022-09-06 PROCEDURE — 99999 PR PBB SHADOW E&M-EST. PATIENT-LVL III: ICD-10-PCS | Mod: PBBFAC,,, | Performed by: INTERNAL MEDICINE

## 2022-09-06 PROCEDURE — 1126F AMNT PAIN NOTED NONE PRSNT: CPT | Mod: CPTII,S$GLB,, | Performed by: INTERNAL MEDICINE

## 2022-09-06 PROCEDURE — 71250 CT THORAX DX C-: CPT | Mod: TC,PO

## 2022-09-06 PROCEDURE — 1159F PR MEDICATION LIST DOCUMENTED IN MEDICAL RECORD: ICD-10-PCS | Mod: CPTII,S$GLB,, | Performed by: INTERNAL MEDICINE

## 2022-09-06 PROCEDURE — 71250 CT THORAX DX C-: CPT | Mod: 26,,, | Performed by: RADIOLOGY

## 2022-09-06 PROCEDURE — 3078F PR MOST RECENT DIASTOLIC BLOOD PRESSURE < 80 MM HG: ICD-10-PCS | Mod: CPTII,S$GLB,, | Performed by: INTERNAL MEDICINE

## 2022-09-06 PROCEDURE — 3078F DIAST BP <80 MM HG: CPT | Mod: CPTII,S$GLB,, | Performed by: INTERNAL MEDICINE

## 2022-09-06 PROCEDURE — 93280 CARDIAC DEVICE CHECK - IN CLINIC & HOSPITAL: ICD-10-PCS | Mod: 26,,, | Performed by: INTERNAL MEDICINE

## 2022-09-06 PROCEDURE — 1126F PR PAIN SEVERITY QUANTIFIED, NO PAIN PRESENT: ICD-10-PCS | Mod: CPTII,S$GLB,, | Performed by: INTERNAL MEDICINE

## 2022-09-06 PROCEDURE — 1159F MED LIST DOCD IN RCRD: CPT | Mod: CPTII,S$GLB,, | Performed by: INTERNAL MEDICINE

## 2022-09-06 PROCEDURE — 77080 DXA BONE DENSITY AXIAL: CPT | Mod: TC,PO

## 2022-09-06 PROCEDURE — 71250 CT CHEST WITHOUT CONTRAST: ICD-10-PCS | Mod: 26,,, | Performed by: RADIOLOGY

## 2022-09-06 PROCEDURE — 3074F PR MOST RECENT SYSTOLIC BLOOD PRESSURE < 130 MM HG: ICD-10-PCS | Mod: CPTII,S$GLB,, | Performed by: INTERNAL MEDICINE

## 2022-09-06 PROCEDURE — 1160F PR REVIEW ALL MEDS BY PRESCRIBER/CLIN PHARMACIST DOCUMENTED: ICD-10-PCS | Mod: CPTII,S$GLB,, | Performed by: INTERNAL MEDICINE

## 2022-09-06 PROCEDURE — 99214 OFFICE O/P EST MOD 30 MIN: CPT | Mod: S$GLB,,, | Performed by: INTERNAL MEDICINE

## 2022-09-06 PROCEDURE — 3074F SYST BP LT 130 MM HG: CPT | Mod: CPTII,S$GLB,, | Performed by: INTERNAL MEDICINE

## 2022-09-06 PROCEDURE — 93280 PM DEVICE PROGR EVAL DUAL: CPT | Mod: 26,,, | Performed by: INTERNAL MEDICINE

## 2022-09-06 PROCEDURE — 77080 DEXA BONE DENSITY SPINE HIP: ICD-10-PCS | Mod: 26,,, | Performed by: RADIOLOGY

## 2022-09-06 PROCEDURE — 99999 PR PBB SHADOW E&M-EST. PATIENT-LVL III: CPT | Mod: PBBFAC,,, | Performed by: INTERNAL MEDICINE

## 2022-09-06 PROCEDURE — 77080 DXA BONE DENSITY AXIAL: CPT | Mod: 26,,, | Performed by: RADIOLOGY

## 2022-09-06 NOTE — PROGRESS NOTES
Subjective:    Patient ID:  Shirlene Ortiz is a 77 y.o. female who presents for follow-up of Peripheral Vascular Disease      HPI  She comes with no complaints, no chest pain, no shortness of breath  Still smoking  PPM close to KEREN    Review of Systems   Constitutional: Negative for decreased appetite, malaise/fatigue, weight gain and weight loss.   Cardiovascular:  Negative for chest pain, dyspnea on exertion, leg swelling, palpitations and syncope.   Respiratory:  Negative for cough and shortness of breath.    Gastrointestinal: Negative.    Neurological:  Negative for weakness.   All other systems reviewed and are negative.     Objective:      Physical Exam  Vitals and nursing note reviewed.   Constitutional:       Appearance: Normal appearance. She is well-developed.   HENT:      Head: Normocephalic.   Eyes:      Pupils: Pupils are equal, round, and reactive to light.   Neck:      Thyroid: No thyromegaly.      Vascular: No carotid bruit or JVD.   Cardiovascular:      Rate and Rhythm: Normal rate and regular rhythm.      Chest Wall: PMI is not displaced.      Pulses: Normal pulses and intact distal pulses.      Heart sounds: Normal heart sounds. No murmur heard.    No gallop.   Pulmonary:      Effort: Pulmonary effort is normal.      Breath sounds: Normal breath sounds.   Abdominal:      Palpations: Abdomen is soft. There is no mass.      Tenderness: There is no abdominal tenderness.   Musculoskeletal:         General: Normal range of motion.      Cervical back: Normal range of motion and neck supple.   Skin:     General: Skin is warm.   Neurological:      Mental Status: She is alert and oriented to person, place, and time.      Sensory: No sensory deficit.      Deep Tendon Reflexes: Reflexes are normal and symmetric.       Assessment:       1. S/P CABG (coronary artery bypass graft)    2. S/P coronary artery stent placement    3. Sinus node dysfunction    4. PVD (peripheral vascular disease)    5. Essential  hypertension    6. Mixed hyperlipidemia    7. Coronary artery disease involving native coronary artery of native heart without angina pectoris         Plan:     Continue all cardiac medications  Regular exercise program  PPM change out soon  6 m f/u with nadia tubbs

## 2022-09-27 ENCOUNTER — TELEPHONE (OUTPATIENT)
Dept: FAMILY MEDICINE | Facility: CLINIC | Age: 77
End: 2022-09-27
Payer: MEDICARE

## 2022-09-27 NOTE — TELEPHONE ENCOUNTER
LOV 03/24/2022    Pt is wanting to come in for 6 month follow up, your note on the office visit stated around 9/24/22    Please advise where I need to put her, thank you.

## 2022-09-27 NOTE — TELEPHONE ENCOUNTER
----- Message from Ana Curry sent at 9/27/2022 10:26 AM CDT -----  Who Called: Patient    What is the reqeust in detail: Requesting call back to reschedule her 6 month check up. Patient has declined all first available appointments for Dr. Mendiola in January and is asking to be seen sooner or by another provider. Patient is also asking for review of her results of imaging and lab. Please advise.     Can the clinic reply by [facility]? No    Best Call Back Number: [phone]    Additional Information:

## 2022-09-28 NOTE — TELEPHONE ENCOUNTER
Patient had an appointment yesterday and on 9/26 - states she canceled both - why.  Next available if no urgent issues.

## 2022-10-11 ENCOUNTER — TELEPHONE (OUTPATIENT)
Dept: FAMILY MEDICINE | Facility: CLINIC | Age: 77
End: 2022-10-11
Payer: MEDICARE

## 2022-10-12 NOTE — TELEPHONE ENCOUNTER
Labs acceptable. Kidney function low, but stable.  + protein in urine.    CT and CXR ordered by other Dr look overall okay.  Your result shows osteopenia, which is less dense than normal bone density, but not classified as osteoporosis.     I would suggest you take calcium 1200mg and vitamin D 2,000 IU daily in addition to regular walking to help strengthen your bones.  We will need to repeat the bone density test in 2 years.    She will need to wait for an appointment to discuss further - either apt coming up with me or sooner with Patricia.

## 2022-10-18 ENCOUNTER — CLINICAL SUPPORT (OUTPATIENT)
Dept: CARDIOLOGY | Facility: HOSPITAL | Age: 77
End: 2022-10-18
Attending: INTERNAL MEDICINE
Payer: MEDICARE

## 2022-10-18 ENCOUNTER — TELEPHONE (OUTPATIENT)
Dept: CARDIOLOGY | Facility: HOSPITAL | Age: 77
End: 2022-10-18
Payer: MEDICARE

## 2022-10-18 DIAGNOSIS — Z95.0 PACEMAKER: ICD-10-CM

## 2022-10-18 DIAGNOSIS — I49.5 SINUS NODE DYSFUNCTION: ICD-10-CM

## 2022-10-18 PROCEDURE — 93280 CARDIAC DEVICE CHECK - IN CLINIC & HOSPITAL: ICD-10-PCS | Mod: 26,,, | Performed by: INTERNAL MEDICINE

## 2022-10-18 PROCEDURE — 93280 PM DEVICE PROGR EVAL DUAL: CPT | Mod: 26,,, | Performed by: INTERNAL MEDICINE

## 2022-10-18 NOTE — TELEPHONE ENCOUNTER
The patients' CIED has reached ELECTIVE REPLACEMENT. MDT dual PPM    Current Device  and Model:      Date of KEREN, if known:9/8/2022    Is this replacement indicator early or unexpected due to an advisory:No    Battery Voltage (if available): 2.60 V    Pacemaker Dependent: no    Anticoagulation Status: none    Last EF: 65% on 7/22/22      Leads MRI compatible:  Yes       Device VVI 65 due to RRT    Pt. Requesting MRI conditional PPM, she reports she has sciatica and needs MRI and was told when she had generator change she could have MRI conditional device

## 2022-10-25 ENCOUNTER — PATIENT MESSAGE (OUTPATIENT)
Dept: CARDIOLOGY | Facility: CLINIC | Age: 77
End: 2022-10-25
Payer: MEDICARE

## 2022-10-25 DIAGNOSIS — I49.5 SINUS NODE DYSFUNCTION: ICD-10-CM

## 2022-10-25 DIAGNOSIS — Z45.010 PACEMAKER GENERATOR END OF LIFE: ICD-10-CM

## 2022-10-25 DIAGNOSIS — Z95.0 PACEMAKER: Primary | ICD-10-CM

## 2022-10-25 RX ORDER — SODIUM CHLORIDE 0.9 % (FLUSH) 0.9 %
10 SYRINGE (ML) INJECTION
Status: SHIPPED | OUTPATIENT
Start: 2022-10-25

## 2022-10-25 RX ORDER — SODIUM CHLORIDE 9 MG/ML
INJECTION, SOLUTION INTRAVENOUS ONCE
Status: CANCELLED | OUTPATIENT
Start: 2022-10-25 | End: 2022-10-25

## 2022-11-03 ENCOUNTER — DOCUMENTATION ONLY (OUTPATIENT)
Dept: CARDIOLOGY | Facility: HOSPITAL | Age: 77
End: 2022-11-03
Payer: MEDICARE

## 2022-11-03 DIAGNOSIS — Z95.0 PACEMAKER: Primary | ICD-10-CM

## 2022-11-03 DIAGNOSIS — I49.5 SINUS NODE DYSFUNCTION: ICD-10-CM

## 2022-11-03 PROBLEM — Z45.010 PACEMAKER AT END OF BATTERY LIFE: Status: ACTIVE | Noted: 2022-11-03

## 2022-11-14 ENCOUNTER — CLINICAL SUPPORT (OUTPATIENT)
Dept: CARDIOLOGY | Facility: HOSPITAL | Age: 77
End: 2022-11-14
Attending: INTERNAL MEDICINE
Payer: MEDICARE

## 2022-11-14 DIAGNOSIS — Z95.0 PACEMAKER: ICD-10-CM

## 2022-11-14 DIAGNOSIS — I49.5 SINUS NODE DYSFUNCTION: ICD-10-CM

## 2022-11-14 PROCEDURE — 93280 CARDIAC DEVICE CHECK - IN CLINIC & HOSPITAL: ICD-10-PCS | Mod: 26,,, | Performed by: INTERNAL MEDICINE

## 2022-11-14 PROCEDURE — 93280 PM DEVICE PROGR EVAL DUAL: CPT | Mod: 26,,, | Performed by: INTERNAL MEDICINE

## 2023-02-07 DIAGNOSIS — Z00.00 ENCOUNTER FOR MEDICARE ANNUAL WELLNESS EXAM: ICD-10-CM

## 2023-02-09 DIAGNOSIS — Z00.00 ENCOUNTER FOR MEDICARE ANNUAL WELLNESS EXAM: ICD-10-CM

## 2023-02-14 ENCOUNTER — CLINICAL SUPPORT (OUTPATIENT)
Dept: CARDIOLOGY | Facility: HOSPITAL | Age: 78
End: 2023-02-14
Payer: MEDICARE

## 2023-02-14 DIAGNOSIS — Z95.0 PRESENCE OF CARDIAC PACEMAKER: ICD-10-CM

## 2023-02-14 PROCEDURE — 93294 REM INTERROG EVL PM/LDLS PM: CPT | Mod: HCNC,,, | Performed by: INTERNAL MEDICINE

## 2023-02-14 PROCEDURE — 93294 CARDIAC DEVICE CHECK - REMOTE: ICD-10-PCS | Mod: HCNC,,, | Performed by: INTERNAL MEDICINE

## 2023-02-14 PROCEDURE — 93296 REM INTERROG EVL PM/IDS: CPT | Mod: HCNC,PO | Performed by: INTERNAL MEDICINE

## 2023-03-10 ENCOUNTER — OFFICE VISIT (OUTPATIENT)
Dept: CARDIOLOGY | Facility: CLINIC | Age: 78
End: 2023-03-10
Payer: MEDICARE

## 2023-03-10 VITALS
BODY MASS INDEX: 29.81 KG/M2 | SYSTOLIC BLOOD PRESSURE: 149 MMHG | WEIGHT: 162 LBS | HEART RATE: 71 BPM | HEIGHT: 62 IN | DIASTOLIC BLOOD PRESSURE: 81 MMHG

## 2023-03-10 DIAGNOSIS — Z95.5 S/P CORONARY ARTERY STENT PLACEMENT: ICD-10-CM

## 2023-03-10 DIAGNOSIS — I49.5 SINUS NODE DYSFUNCTION: ICD-10-CM

## 2023-03-10 DIAGNOSIS — E78.2 MIXED HYPERLIPIDEMIA: ICD-10-CM

## 2023-03-10 DIAGNOSIS — I10 ESSENTIAL HYPERTENSION: ICD-10-CM

## 2023-03-10 DIAGNOSIS — I73.9 PVD (PERIPHERAL VASCULAR DISEASE): ICD-10-CM

## 2023-03-10 DIAGNOSIS — Z95.0 PACEMAKER: ICD-10-CM

## 2023-03-10 DIAGNOSIS — Z95.1 S/P CABG (CORONARY ARTERY BYPASS GRAFT): Primary | ICD-10-CM

## 2023-03-10 PROCEDURE — 3077F PR MOST RECENT SYSTOLIC BLOOD PRESSURE >= 140 MM HG: ICD-10-PCS | Mod: HCNC,CPTII,S$GLB, | Performed by: INTERNAL MEDICINE

## 2023-03-10 PROCEDURE — 1126F PR PAIN SEVERITY QUANTIFIED, NO PAIN PRESENT: ICD-10-PCS | Mod: HCNC,CPTII,S$GLB, | Performed by: INTERNAL MEDICINE

## 2023-03-10 PROCEDURE — 1159F MED LIST DOCD IN RCRD: CPT | Mod: HCNC,CPTII,S$GLB, | Performed by: INTERNAL MEDICINE

## 2023-03-10 PROCEDURE — 99999 PR PBB SHADOW E&M-EST. PATIENT-LVL II: CPT | Mod: PBBFAC,HCNC,, | Performed by: INTERNAL MEDICINE

## 2023-03-10 PROCEDURE — 99213 PR OFFICE/OUTPT VISIT, EST, LEVL III, 20-29 MIN: ICD-10-PCS | Mod: HCNC,S$GLB,, | Performed by: INTERNAL MEDICINE

## 2023-03-10 PROCEDURE — 3079F PR MOST RECENT DIASTOLIC BLOOD PRESSURE 80-89 MM HG: ICD-10-PCS | Mod: HCNC,CPTII,S$GLB, | Performed by: INTERNAL MEDICINE

## 2023-03-10 PROCEDURE — 1126F AMNT PAIN NOTED NONE PRSNT: CPT | Mod: HCNC,CPTII,S$GLB, | Performed by: INTERNAL MEDICINE

## 2023-03-10 PROCEDURE — 3079F DIAST BP 80-89 MM HG: CPT | Mod: HCNC,CPTII,S$GLB, | Performed by: INTERNAL MEDICINE

## 2023-03-10 PROCEDURE — 3077F SYST BP >= 140 MM HG: CPT | Mod: HCNC,CPTII,S$GLB, | Performed by: INTERNAL MEDICINE

## 2023-03-10 PROCEDURE — 1159F PR MEDICATION LIST DOCUMENTED IN MEDICAL RECORD: ICD-10-PCS | Mod: HCNC,CPTII,S$GLB, | Performed by: INTERNAL MEDICINE

## 2023-03-10 PROCEDURE — 99999 PR PBB SHADOW E&M-EST. PATIENT-LVL II: ICD-10-PCS | Mod: PBBFAC,HCNC,, | Performed by: INTERNAL MEDICINE

## 2023-03-10 PROCEDURE — 99213 OFFICE O/P EST LOW 20 MIN: CPT | Mod: HCNC,S$GLB,, | Performed by: INTERNAL MEDICINE

## 2023-03-10 NOTE — PROGRESS NOTES
Subjective:    Patient ID:  Shirlene Ortiz is a 77 y.o. female who presents for follow-up of cad    HPI  She comes with no complaints, no chest pain, no shortness of breath  Still smoking    Review of Systems   Constitutional: Negative for decreased appetite, malaise/fatigue, weight gain and weight loss.   Cardiovascular:  Negative for chest pain, dyspnea on exertion, leg swelling, palpitations and syncope.   Respiratory:  Negative for cough and shortness of breath.    Gastrointestinal: Negative.    Neurological:  Negative for weakness.   All other systems reviewed and are negative.     Objective:      Physical Exam  Vitals and nursing note reviewed.   Constitutional:       Appearance: Normal appearance. She is well-developed.   HENT:      Head: Normocephalic.   Eyes:      Pupils: Pupils are equal, round, and reactive to light.   Neck:      Thyroid: No thyromegaly.      Vascular: No carotid bruit or JVD.   Cardiovascular:      Rate and Rhythm: Normal rate and regular rhythm.      Chest Wall: PMI is not displaced.      Pulses: Normal pulses and intact distal pulses.      Heart sounds: Normal heart sounds. No murmur heard.    No gallop.   Pulmonary:      Effort: Pulmonary effort is normal.      Breath sounds: Normal breath sounds.   Abdominal:      Palpations: Abdomen is soft. There is no mass.      Tenderness: There is no abdominal tenderness.   Musculoskeletal:         General: Normal range of motion.      Cervical back: Normal range of motion and neck supple.   Skin:     General: Skin is warm.   Neurological:      Mental Status: She is alert and oriented to person, place, and time.      Sensory: No sensory deficit.      Deep Tendon Reflexes: Reflexes are normal and symmetric.       Assessment:       1. S/P CABG (coronary artery bypass graft)    2. S/P coronary artery stent placement    3. Sinus node dysfunction    4. PVD (peripheral vascular disease)    5. Pacemaker    6. Mixed hyperlipidemia    7. Essential  hypertension         Plan:     Continue all cardiac medications  Regular exercise program  1 yr f/u with [facility]

## 2023-03-14 NOTE — TELEPHONE ENCOUNTER
INR at goal. Medications and chart reviewed. No changes noted to necessitate adjustment of warfarin or follow-up plan. See calendar.    Continue on new dose     Spoke with pt. Rescheduled procedure & covid test. Pt stated she still had her prep instructions & did not need this. Pt verbalized understanding to all.

## 2023-03-22 ENCOUNTER — TELEPHONE (OUTPATIENT)
Dept: CARDIOLOGY | Facility: HOSPITAL | Age: 78
End: 2023-03-22
Payer: MEDICARE

## 2023-03-22 NOTE — TELEPHONE ENCOUNTER
MDT PPM     Ap-32.5% -11.7%      SVT x 5- since 12/19/22 EGM's illustrate SVT,  max device defined duration 2 mins. 16 secs on 2/25/2023. EGM illustrates SVT indeterminate true duration, termination not seen. Avg. V rate 156 bpm          Symptoms- Pt. Denies any palpitations, chest pain or dizziness. She reports she is short of breath on exertion, but states she has COPD. She also said she has sciatica,so she doesn't do much    Most recent BP- 157/85    Meds:  Aspirin 81 mg  Coreg 3.125 mg bid  Diovan 320 mg daily  Plavix 75 mg       Message sent to Dr. Espinoza for review of SVT

## 2023-03-23 DIAGNOSIS — R94.39 ABNORMAL RESULT OF OTHER CARDIOVASCULAR FUNCTION STUDY: ICD-10-CM

## 2023-03-23 DIAGNOSIS — I73.9 PVD (PERIPHERAL VASCULAR DISEASE): ICD-10-CM

## 2023-03-23 DIAGNOSIS — R94.31 ABNORMAL ELECTROCARDIOGRAM (ECG) (EKG): ICD-10-CM

## 2023-03-23 DIAGNOSIS — I25.10 CORONARY ARTERY DISEASE INVOLVING NATIVE CORONARY ARTERY OF NATIVE HEART WITHOUT ANGINA PECTORIS: Primary | ICD-10-CM

## 2023-03-23 RX ORDER — CARVEDILOL 3.12 MG/1
6.25 TABLET ORAL 2 TIMES DAILY
Qty: 180 TABLET | Refills: 4
Start: 2023-03-23 | End: 2023-03-30

## 2023-03-23 NOTE — TELEPHONE ENCOUNTER
Returned pt's call and notified her of Dr. Espinoza's orders to increase Coreg to 6.25 mg bid. Pt. Verbalized understanding of medication change and states she does not need a new prescription at this time.Pt. Reports her BP has been elevated 180-190's for approx. 3 days. Instructed to monitor BP after increase in Coreg and if no improvement to notify MD or if she develops symptoms. Pt. Reports she feels fine. Pt. Reports the day after she saw Dr. Espinoza she had chest pain under left breast that lasted approx. An hour or two, then subsided. She reports she also had BBQ shrimp that day,so she doesn't know if she had indigestion. Reviewed with Dr. Espinoza per Dr. Espinoza order nuclear stress test. Left message notifying pt. Of plan of care and provided call back info if she has any questions

## 2023-03-23 NOTE — TELEPHONE ENCOUNTER
Spoke w/ pt / phone, pt scheduled nuc stress test 4/11, explained instructions, no food x 4 hr, no caffeine x 12 hr, may drink water, juice and/or sprite, pt vu

## 2023-03-30 ENCOUNTER — TELEPHONE (OUTPATIENT)
Dept: FAMILY MEDICINE | Facility: CLINIC | Age: 78
End: 2023-03-30

## 2023-03-30 ENCOUNTER — LAB VISIT (OUTPATIENT)
Dept: LAB | Facility: HOSPITAL | Age: 78
End: 2023-03-30
Attending: INTERNAL MEDICINE
Payer: MEDICARE

## 2023-03-30 ENCOUNTER — OFFICE VISIT (OUTPATIENT)
Dept: FAMILY MEDICINE | Facility: CLINIC | Age: 78
End: 2023-03-30
Payer: MEDICARE

## 2023-03-30 VITALS
BODY MASS INDEX: 29.86 KG/M2 | WEIGHT: 162.25 LBS | HEIGHT: 62 IN | TEMPERATURE: 98 F | OXYGEN SATURATION: 99 % | HEART RATE: 87 BPM | SYSTOLIC BLOOD PRESSURE: 154 MMHG | DIASTOLIC BLOOD PRESSURE: 70 MMHG

## 2023-03-30 DIAGNOSIS — E11.649 UNCONTROLLED TYPE 2 DIABETES MELLITUS WITH HYPOGLYCEMIA WITHOUT COMA: ICD-10-CM

## 2023-03-30 DIAGNOSIS — E21.3 HYPERPARATHYROIDISM: ICD-10-CM

## 2023-03-30 DIAGNOSIS — Z12.31 ENCOUNTER FOR SCREENING MAMMOGRAM FOR BREAST CANCER: ICD-10-CM

## 2023-03-30 DIAGNOSIS — R91.8 LUNG NODULES: ICD-10-CM

## 2023-03-30 DIAGNOSIS — E78.5 DYSLIPIDEMIA: ICD-10-CM

## 2023-03-30 DIAGNOSIS — M54.16 LUMBAR RADICULOPATHY: ICD-10-CM

## 2023-03-30 DIAGNOSIS — Z00.00 ROUTINE PHYSICAL EXAMINATION: Primary | ICD-10-CM

## 2023-03-30 DIAGNOSIS — I25.119 ATHEROSCLEROSIS OF NATIVE CORONARY ARTERY OF NATIVE HEART WITH ANGINA PECTORIS: ICD-10-CM

## 2023-03-30 DIAGNOSIS — N18.32 STAGE 3B CHRONIC KIDNEY DISEASE: ICD-10-CM

## 2023-03-30 DIAGNOSIS — J43.1 PANLOBULAR EMPHYSEMA: ICD-10-CM

## 2023-03-30 DIAGNOSIS — I10 ESSENTIAL HYPERTENSION: ICD-10-CM

## 2023-03-30 PROBLEM — N18.4 CHRONIC KIDNEY DISEASE, STAGE 4 (SEVERE): Status: RESOLVED | Noted: 2023-03-30 | Resolved: 2023-03-30

## 2023-03-30 PROBLEM — N18.4 CHRONIC KIDNEY DISEASE, STAGE 4 (SEVERE): Status: ACTIVE | Noted: 2023-03-30

## 2023-03-30 LAB
CHOLEST SERPL-MCNC: 145 MG/DL (ref 120–199)
CHOLEST/HDLC SERPL: 4.5 {RATIO} (ref 2–5)
ESTIMATED AVG GLUCOSE: 229 MG/DL (ref 68–131)
HBA1C MFR BLD: 9.6 % (ref 4–5.6)
HDLC SERPL-MCNC: 32 MG/DL (ref 40–75)
HDLC SERPL: 22.1 % (ref 20–50)
LDLC SERPL CALC-MCNC: 67 MG/DL (ref 63–159)
NONHDLC SERPL-MCNC: 113 MG/DL
TRIGL SERPL-MCNC: 230 MG/DL (ref 30–150)

## 2023-03-30 PROCEDURE — 99397 PER PM REEVAL EST PAT 65+ YR: CPT | Mod: HCNC,S$GLB,, | Performed by: INTERNAL MEDICINE

## 2023-03-30 PROCEDURE — 3288F PR FALLS RISK ASSESSMENT DOCUMENTED: ICD-10-PCS | Mod: HCNC,CPTII,S$GLB, | Performed by: INTERNAL MEDICINE

## 2023-03-30 PROCEDURE — 1126F AMNT PAIN NOTED NONE PRSNT: CPT | Mod: HCNC,CPTII,S$GLB, | Performed by: INTERNAL MEDICINE

## 2023-03-30 PROCEDURE — 99214 PR OFFICE/OUTPT VISIT, EST, LEVL IV, 30-39 MIN: ICD-10-PCS | Mod: HCNC,25,S$GLB, | Performed by: INTERNAL MEDICINE

## 2023-03-30 PROCEDURE — 1159F PR MEDICATION LIST DOCUMENTED IN MEDICAL RECORD: ICD-10-PCS | Mod: HCNC,CPTII,S$GLB, | Performed by: INTERNAL MEDICINE

## 2023-03-30 PROCEDURE — 99999 PR PBB SHADOW E&M-EST. PATIENT-LVL V: ICD-10-PCS | Mod: PBBFAC,HCNC,, | Performed by: INTERNAL MEDICINE

## 2023-03-30 PROCEDURE — 99999 PR PBB SHADOW E&M-EST. PATIENT-LVL V: CPT | Mod: PBBFAC,HCNC,, | Performed by: INTERNAL MEDICINE

## 2023-03-30 PROCEDURE — 80061 LIPID PANEL: CPT | Mod: HCNC | Performed by: INTERNAL MEDICINE

## 2023-03-30 PROCEDURE — 99499 UNLISTED E&M SERVICE: CPT | Mod: S$GLB,,, | Performed by: INTERNAL MEDICINE

## 2023-03-30 PROCEDURE — 1101F PT FALLS ASSESS-DOCD LE1/YR: CPT | Mod: HCNC,CPTII,S$GLB, | Performed by: INTERNAL MEDICINE

## 2023-03-30 PROCEDURE — 3077F PR MOST RECENT SYSTOLIC BLOOD PRESSURE >= 140 MM HG: ICD-10-PCS | Mod: HCNC,CPTII,S$GLB, | Performed by: INTERNAL MEDICINE

## 2023-03-30 PROCEDURE — 1126F PR PAIN SEVERITY QUANTIFIED, NO PAIN PRESENT: ICD-10-PCS | Mod: HCNC,CPTII,S$GLB, | Performed by: INTERNAL MEDICINE

## 2023-03-30 PROCEDURE — 1160F PR REVIEW ALL MEDS BY PRESCRIBER/CLIN PHARMACIST DOCUMENTED: ICD-10-PCS | Mod: HCNC,CPTII,S$GLB, | Performed by: INTERNAL MEDICINE

## 2023-03-30 PROCEDURE — 99214 OFFICE O/P EST MOD 30 MIN: CPT | Mod: HCNC,25,S$GLB, | Performed by: INTERNAL MEDICINE

## 2023-03-30 PROCEDURE — 83036 HEMOGLOBIN GLYCOSYLATED A1C: CPT | Mod: HCNC | Performed by: INTERNAL MEDICINE

## 2023-03-30 PROCEDURE — 1160F RVW MEDS BY RX/DR IN RCRD: CPT | Mod: HCNC,CPTII,S$GLB, | Performed by: INTERNAL MEDICINE

## 2023-03-30 PROCEDURE — 36415 COLL VENOUS BLD VENIPUNCTURE: CPT | Mod: HCNC,PO | Performed by: INTERNAL MEDICINE

## 2023-03-30 PROCEDURE — 1101F PR PT FALLS ASSESS DOC 0-1 FALLS W/OUT INJ PAST YR: ICD-10-PCS | Mod: HCNC,CPTII,S$GLB, | Performed by: INTERNAL MEDICINE

## 2023-03-30 PROCEDURE — 3288F FALL RISK ASSESSMENT DOCD: CPT | Mod: HCNC,CPTII,S$GLB, | Performed by: INTERNAL MEDICINE

## 2023-03-30 PROCEDURE — 99397 PR PREVENTIVE VISIT,EST,65 & OVER: ICD-10-PCS | Mod: HCNC,S$GLB,, | Performed by: INTERNAL MEDICINE

## 2023-03-30 PROCEDURE — 1159F MED LIST DOCD IN RCRD: CPT | Mod: HCNC,CPTII,S$GLB, | Performed by: INTERNAL MEDICINE

## 2023-03-30 PROCEDURE — 3077F SYST BP >= 140 MM HG: CPT | Mod: HCNC,CPTII,S$GLB, | Performed by: INTERNAL MEDICINE

## 2023-03-30 PROCEDURE — 99499 RISK ADDL DX/OHS AUDIT: ICD-10-PCS | Mod: S$GLB,,, | Performed by: INTERNAL MEDICINE

## 2023-03-30 PROCEDURE — 3078F DIAST BP <80 MM HG: CPT | Mod: HCNC,CPTII,S$GLB, | Performed by: INTERNAL MEDICINE

## 2023-03-30 PROCEDURE — 3078F PR MOST RECENT DIASTOLIC BLOOD PRESSURE < 80 MM HG: ICD-10-PCS | Mod: HCNC,CPTII,S$GLB, | Performed by: INTERNAL MEDICINE

## 2023-03-30 RX ORDER — CARVEDILOL 12.5 MG/1
12.5 TABLET ORAL 2 TIMES DAILY
Qty: 180 TABLET | Refills: 4
Start: 2023-03-30

## 2023-03-30 RX ORDER — TRAMADOL HYDROCHLORIDE 50 MG/1
50 TABLET ORAL 2 TIMES DAILY PRN
Qty: 60 TABLET | Refills: 5 | Status: SHIPPED | OUTPATIENT
Start: 2023-03-30 | End: 2023-10-02

## 2023-03-30 NOTE — TELEPHONE ENCOUNTER
----- Message from Yessi Avalos sent at 3/30/2023 12:46 PM CDT -----  Regarding: Needs script sent  Type: Needs Medical Advice  Who Called:  Shirlene    Damon Call Back Number: [phone]    Additional Information: Pt needs her pain medication sent to pharm she said her pharm only received 1 script, please call [facility].

## 2023-03-30 NOTE — PROGRESS NOTES
Subjective:       Patient ID: Shirlene Ortiz is a 77 y.o. female.  Chief Complaint: Hypertension     HPI    Patient not seen by me in 1 yr presents with many issues that need follow up.  Poorly complaint with visits with me.     Here for routine health maintenance.             Lung nodules.  Repeat 9/2022 showed multiple with a 9 mm nodule.      Low back pain with sciatica - uncontrolled s/p 2 steroid injections.    Incomplete polyp removal.  Scheduled for removal at [facility].   Cardiology ok hold Plavix but not ASA.  Jonas's esophagus.  EGD 2020   Normocytic anemia - likely ACD.  FOBT x3 neg.  Stable Hb at 10.       DM - uncontrolled   HTN - uncontrolled; takes 1/2 10 mg amlodipine daily; if BP low, will take 1/4 of a 10 mg per Dr Sandoval's instructions.   HLD - controlled ldl goal < 70; high triglycerides;   Smoke abuse - advised, but does not want to quit  CKD III 3b - stable in 30s. Seeing renal.  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  Saw cardiology who feels the same.    Saw Dr Lugo.  Scared to go to PT.  Needs imaging.  Pacemaker not compatible with MRI, so he wanted a lumbar CT but has not happened yet.    Recommended Tylenol and if nw, tramadol.  She has no relief from Tylenol and is now open to trying tramadol.   Recall:  Complains of b/l low back pain radiating to her b/l posterior thighs and calves.  Occurs with standing and walking.  She frequently has to stop walking to get relief.  Sitting makes it better.  Leaning forward (shopping cart) improves it.     CT of abdomin showed severe stenosis in LE.  Has appointment with Dr Loo 9/1.    CAD s/p CABG 8/17 after an NSTEMI;  Lexiscan 2020 good.   PVD - 3 new stents in left leg 12/2016; Mild/moderate PAD by ultrasound per cardiology.    13 stents in total    Assessment:       1. Routine physical examination    2. Essential hypertension    3. Hyperparathyroidism    4. Uncontrolled type 2 diabetes mellitus with hypoglycemia  without coma    5. Panlobular emphysema    6. Atherosclerosis of native coronary artery of native heart with angina pectoris    7. Lung nodules    8. Encounter for screening mammogram for breast cancer    9. Stage 3b chronic kidney disease    10. Dyslipidemia    11. Lumbar radiculopathy          Plan:       Routine physical examination    Essential hypertension  -     carvediloL (COREG) 12.5 MG tablet; Take 1 tablet (12.5 mg total) by mouth 2 (two) times daily.  Dispense: 180 tablet; Refill: 4    Hyperparathyroidism    Uncontrolled type 2 diabetes mellitus with hypoglycemia without coma  -     Ambulatory referral/consult to Optometry; Future; Expected date: 04/06/2023  -     Ambulatory referral/consult to Endocrinology; Future; Expected date: 04/06/2023  -     Hemoglobin A1C; Future; Expected date: 03/30/2023    Panlobular emphysema    Atherosclerosis of native coronary artery of native heart with angina pectoris    Lung nodules  -     CT Chest Without Contrast; Future; Expected date: 03/30/2023    Encounter for screening mammogram for breast cancer  -     Mammo Digital Screening Bilat; Future; Expected date: 03/30/2023  -     Mammo Digital Screening Bilat w/ Valentin; Future; Expected date: 03/30/2023    Stage 3b chronic kidney disease    Dyslipidemia  -     Lipid Panel; Future; Expected date: 03/30/2023    Lumbar radiculopathy  -     traMADoL (ULTRAM) 50 mg tablet; Take 1 tablet (50 mg total) by mouth 2 (two) times daily as needed for Pain.  Dispense: 60 tablet; Refill: 5            Wellness reviewed         Continue current management and monitor.  Other diagnoses were reviewed and found stable and will continue to monitor.  Counseled on regular exercise, maintenance of a healthy weight, balanced diet rich in fruits/vegetables and lean protein, and avoidance of unhealthy habits like smoking and excessive alcohol intake.   Also, counseled on importance of being compliant with medication, health appointments, diet and  "exercise.     Follow up in about 6 months (around 9/30/2023).      Medication List with Changes/Refills   New Medications    TRAMADOL (ULTRAM) 50 MG TABLET    Take 1 tablet (50 mg total) by mouth 2 (two) times daily as needed for Pain.   Current Medications    ACCU-CHEK ISRAEL CONTROL SOLN SOLN        ACCU-CHEK ISRAEL PLUS METER MISC    USE AS DIRECTED    ACCU-CHEK ISRAEL PLUS TEST STRP STRP    TEST 2 TO 4 TIMES EVERY DAY     AMLODIPINE (NORVASC) 10 MG TABLET    TAKE 1 TABLET EVERY EVENING    ASPIRIN (ECOTRIN) 81 MG EC TABLET    Take 325 mg by mouth once daily.    ATORVASTATIN (LIPITOR) 20 MG TABLET    TAKE 1 TABLET ONCE DAILY.    BD ALCOHOL SWABS PADM        CLOPIDOGREL (PLAVIX) 75 MG TABLET    TAKE 1 TABLET EVERY DAY    EZETIMIBE (ZETIA) 10 MG TABLET    TAKE 1 TABLET EVERY DAY    GLIPIZIDE (GLUCOTROL) 10 MG TABLET    TAKE 1 TABLET TWICE DAILY    LANCETS MISC    To check BG 6 times daily, to use with insurance preferred meter    PANTOPRAZOLE (PROTONIX) 40 MG TABLET    TAKE 1 TABLET (40 MG TOTAL) BY MOUTH ONCE DAILY.    PEN NEEDLE, DIABETIC (BD DEAN 2ND GEN PEN NEEDLE) 32 GAUGE X 5/32" NDLE    Use as directed with insulin pen. Diagnosis Code E11.65    VALSARTAN (DIOVAN) 320 MG TABLET    Take 1 tablet (320 mg total) by mouth once daily.   Changed and/or Refilled Medications    Modified Medication Previous Medication    CARVEDILOL (COREG) 12.5 MG TABLET carvediloL (COREG) 3.125 MG tablet       Take 1 tablet (12.5 mg total) by mouth 2 (two) times daily.    Take 2 tablets (6.25 mg total) by mouth 2 (two) times daily.       BP Readings from Last 3 Encounters:   03/30/23 (!) 154/70   03/10/23 (!) 149/81   11/03/22 (!) 164/62     Hemoglobin A1C   Date Value Ref Range Status   03/29/2022 10.3 (H) 4.0 - 5.6 % Final     Comment:     ADA Screening Guidelines:  5.7-6.4%  Consistent with prediabetes  >or=6.5%  Consistent with diabetes    High levels of fetal hemoglobin interfere with the HbA1C  assay. Heterozygous hemoglobin " variants (HbS, HgC, etc)do  not significantly interfere with this assay.   However, presence of multiple variants may affect accuracy.     06/29/2021 10.7 (H) 0.0 - 5.6 % Final     Comment:     Reference Interval:  5.0 - 5.6 Normal   5.7 - 6.4 High Risk   > 6.5 Diabetic      Hgb A1c results are standardized based on the (NGSP) National   Glycohemoglobin Standardization Program.      Hemoglobin A1C levels are related to mean serum/plasma glucose   during the preceding 2-3 months.        10/02/2020 6.8 (H) 4.0 - 5.6 % Final     Comment:     ADA Screening Guidelines:  5.7-6.4%  Consistent with prediabetes  >or=6.5%  Consistent with diabetes  High levels of fetal hemoglobin interfere with the HbA1C  assay. Heterozygous hemoglobin variants (HbS, HgC, etc)do  not significantly interfere with this assay.   However, presence of multiple variants may affect accuracy.       Lab Results   Component Value Date    TSH 2.268 03/29/2022     Lab Results   Component Value Date    LDLCALC 65.4 03/29/2022    LDLCALC 47.0 (L) 07/02/2020    LDLCALC 57.2 (L) 06/25/2020     Lab Results   Component Value Date    TRIG 178 (H) 03/29/2022    TRIG 300 (H) 07/02/2020    TRIG 224 (H) 06/25/2020     Wt Readings from Last 3 Encounters:   03/30/23 73.6 kg (162 lb 4.1 oz)   03/10/23 73.5 kg (162 lb)   11/03/22 73.5 kg (162 lb)     Lab Results   Component Value Date    HGB 9.5 (L) 11/03/2022    HCT 30.6 (L) 11/03/2022    WBC 7.28 11/03/2022    ALT 13 11/03/2022    AST 18 11/03/2022     11/03/2022    K 4.1 11/03/2022    CREATININE 1.28 11/03/2022           Review of Systems   Constitutional:  Negative for diaphoresis and fever.   HENT:  Negative for drooling and nosebleeds.    Eyes:  Negative for discharge and redness.   Respiratory:  Positive for shortness of breath. Negative for apnea and choking.    Cardiovascular:  Negative for chest pain and palpitations.   Gastrointestinal:  Negative for abdominal pain and nausea.   Skin:  Negative for  color change.   Neurological:  Negative for seizures and syncope.   Psychiatric/Behavioral:  Negative for behavioral problems.          Objective:      Vitals:    03/30/23 1050   BP: (!) 154/70   Pulse: 87   Temp: 98 °F (36.7 °C)     Physical Exam  Vitals reviewed.   Constitutional:       Appearance: Normal appearance.   Eyes:      Conjunctiva/sclera: Conjunctivae normal.   Cardiovascular:      Rate and Rhythm: Normal rate.   Pulmonary:      Effort: Pulmonary effort is normal.      Breath sounds: Wheezing present.   Musculoskeletal:      Cervical back: Normal range of motion.      Comments: Normal ROM bilateral    Skin:     General: Skin is warm and dry.   Neurological:      Mental Status: She is alert.      Cranial Nerves: Cranial nerve deficit: grossly intact.   Psychiatric:      Comments: Alert and orientated

## 2023-04-03 ENCOUNTER — TELEPHONE (OUTPATIENT)
Dept: FAMILY MEDICINE | Facility: CLINIC | Age: 78
End: 2023-04-03
Payer: MEDICARE

## 2023-04-03 NOTE — TELEPHONE ENCOUNTER
----- Message from Ad Houston sent at 4/3/2023  9:23 AM CDT -----  Contact: pt  Type: Needs Medical Advice  Who Called:  pt    Best Call Back Number: [phone]    Additional Information: pt would like her blood work results. Please advise.

## 2023-04-10 ENCOUNTER — PATIENT MESSAGE (OUTPATIENT)
Dept: CARDIOLOGY | Facility: HOSPITAL | Age: 78
End: 2023-04-10
Payer: MEDICARE

## 2023-04-11 ENCOUNTER — CLINICAL SUPPORT (OUTPATIENT)
Dept: CARDIOLOGY | Facility: HOSPITAL | Age: 78
End: 2023-04-11
Attending: INTERNAL MEDICINE
Payer: MEDICARE

## 2023-04-11 ENCOUNTER — HOSPITAL ENCOUNTER (OUTPATIENT)
Dept: RADIOLOGY | Facility: HOSPITAL | Age: 78
Discharge: HOME | End: 2023-04-11
Attending: INTERNAL MEDICINE
Payer: MEDICARE

## 2023-04-11 VITALS — WEIGHT: 162 LBS | BODY MASS INDEX: 29.81 KG/M2 | HEIGHT: 62 IN

## 2023-04-11 DIAGNOSIS — I25.10 CORONARY ARTERY DISEASE INVOLVING NATIVE CORONARY ARTERY OF NATIVE HEART WITHOUT ANGINA PECTORIS: ICD-10-CM

## 2023-04-11 DIAGNOSIS — I73.9 PVD (PERIPHERAL VASCULAR DISEASE): ICD-10-CM

## 2023-04-11 DIAGNOSIS — R94.39 ABNORMAL RESULT OF OTHER CARDIOVASCULAR FUNCTION STUDY: ICD-10-CM

## 2023-04-11 DIAGNOSIS — R94.31 ABNORMAL ELECTROCARDIOGRAM (ECG) (EKG): ICD-10-CM

## 2023-04-11 LAB
CV PHARM DOSE: 0.4 MG
CV STRESS BASE HR: 64 BPM
DIASTOLIC BLOOD PRESSURE: 68 MMHG
NUC REST EJECTION FRACTION: 70
OHS CV CPX 1 MINUTE RECOVERY HEART RATE: 77 BPM
OHS CV CPX 85 PERCENT MAX PREDICTED HEART RATE MALE: 118
OHS CV CPX MAX PREDICTED HEART RATE: 138
OHS CV CPX PATIENT IS FEMALE: 1
OHS CV CPX PATIENT IS MALE: 0
OHS CV CPX PEAK DIASTOLIC BLOOD PRESSURE: 68 MMHG
OHS CV CPX PEAK HEAR RATE: 90 BPM
OHS CV CPX PEAK RATE PRESSURE PRODUCT: NORMAL
OHS CV CPX PEAK SYSTOLIC BLOOD PRESSURE: 152 MMHG
OHS CV CPX PERCENT MAX PREDICTED HEART RATE ACHIEVED: 65
OHS CV CPX RATE PRESSURE PRODUCT PRESENTING: 9728
OHS CV PHARM TIME: 1401 MIN
SYSTOLIC BLOOD PRESSURE: 152 MMHG

## 2023-04-11 PROCEDURE — 93018 CV STRESS TEST I&R ONLY: CPT | Mod: HCNC,,, | Performed by: INTERNAL MEDICINE

## 2023-04-11 PROCEDURE — 93017 CV STRESS TEST TRACING ONLY: CPT | Mod: HCNC,PO

## 2023-04-11 PROCEDURE — 93018 PR CARDIAC STRESS TST,INTERP/REPT ONLY: ICD-10-PCS | Mod: HCNC,,, | Performed by: INTERNAL MEDICINE

## 2023-04-11 PROCEDURE — 78452 HT MUSCLE IMAGE SPECT MULT: CPT | Mod: HCNC,PO

## 2023-04-11 PROCEDURE — 93016 NUCLEAR STRESS - CARDIOLOGY INTERPRETED (CUPID ONLY): ICD-10-PCS | Mod: HCNC,,, | Performed by: INTERNAL MEDICINE

## 2023-04-11 PROCEDURE — 78452 HT MUSCLE IMAGE SPECT MULT: CPT | Mod: 26,HCNC,, | Performed by: INTERNAL MEDICINE

## 2023-04-11 PROCEDURE — 63600175 PHARM REV CODE 636 W HCPCS: Mod: HCNC,PO | Performed by: INTERNAL MEDICINE

## 2023-04-11 PROCEDURE — A9502 TC99M TETROFOSMIN: HCPCS | Mod: HCNC,PO

## 2023-04-11 PROCEDURE — 93016 CV STRESS TEST SUPVJ ONLY: CPT | Mod: HCNC,,, | Performed by: INTERNAL MEDICINE

## 2023-04-11 PROCEDURE — 78452 NUCLEAR STRESS - CARDIOLOGY INTERPRETED (CUPID ONLY): ICD-10-PCS | Mod: 26,HCNC,, | Performed by: INTERNAL MEDICINE

## 2023-04-11 RX ORDER — REGADENOSON 0.08 MG/ML
0.4 INJECTION, SOLUTION INTRAVENOUS
Status: COMPLETED | OUTPATIENT
Start: 2023-04-11 | End: 2023-04-11

## 2023-04-11 RX ADMIN — REGADENOSON 0.4 MG: 0.08 INJECTION, SOLUTION INTRAVENOUS at 02:04

## 2023-04-13 ENCOUNTER — TELEPHONE (OUTPATIENT)
Dept: CARDIOLOGY | Facility: CLINIC | Age: 78
End: 2023-04-13
Payer: MEDICARE

## 2023-04-13 ENCOUNTER — TELEPHONE (OUTPATIENT)
Dept: FAMILY MEDICINE | Facility: CLINIC | Age: 78
End: 2023-04-13
Payer: MEDICARE

## 2023-04-13 NOTE — TELEPHONE ENCOUNTER
----- Message from Ivone Dumas sent at 4/13/2023  8:51 AM CDT -----  Who Called: Patient     What is the request in detail: Requesting call back to discuss getting b/p appt    Can the clinic reply by [facility]? No    Best Call Back Number: [phone]      Additional Information: pt would like appt to be on 04/24/2023 with other appts. Pls advise.

## 2023-04-13 NOTE — TELEPHONE ENCOUNTER
Patient had nurse visit scheduled and had to cancel/    She was calling back to make sure she still needed the visit. Due to readings charted, hypertension and doctors notes, I told her it would be for the best that she did.     Patient wanted to be scheduled on 4/24 before or after her other appointments. I scheduled her before. Held the spot and notified someone in super user chat to over ride to be scheduled.

## 2023-04-13 NOTE — TELEPHONE ENCOUNTER
----- Message from Ivone Dumas sent at 4/13/2023  8:54 AM CDT -----   Name of Caller: pt     Date:04/11/2023      Was performed at ? [facility]     Would the patient rather a call back or a response via [facility]? call back    Best Call Back Number: [phone]      Additional Information: pt wants to know did her results come back. Pls advise.

## 2023-04-13 NOTE — TELEPHONE ENCOUNTER
SPOKE W/ PT / PHONE,    Baldemar Espinoza MD   4/12/2023  1:02 PM CDT       Stress test normal   PT VU

## 2023-04-24 ENCOUNTER — HOSPITAL ENCOUNTER (OUTPATIENT)
Dept: RADIOLOGY | Facility: HOSPITAL | Age: 78
Discharge: HOME | End: 2023-04-24
Attending: INTERNAL MEDICINE
Payer: MEDICARE

## 2023-04-24 ENCOUNTER — CLINICAL SUPPORT (OUTPATIENT)
Dept: FAMILY MEDICINE | Facility: CLINIC | Age: 78
End: 2023-04-24
Payer: MEDICARE

## 2023-04-24 VITALS — HEART RATE: 73 BPM | DIASTOLIC BLOOD PRESSURE: 64 MMHG | SYSTOLIC BLOOD PRESSURE: 120 MMHG

## 2023-04-24 DIAGNOSIS — I10 ESSENTIAL HYPERTENSION: Primary | ICD-10-CM

## 2023-04-24 DIAGNOSIS — R91.8 LUNG NODULES: ICD-10-CM

## 2023-04-24 DIAGNOSIS — Z12.31 ENCOUNTER FOR SCREENING MAMMOGRAM FOR BREAST CANCER: ICD-10-CM

## 2023-04-24 PROCEDURE — 77063 BREAST TOMOSYNTHESIS BI: CPT | Mod: 26,HCNC,, | Performed by: RADIOLOGY

## 2023-04-24 PROCEDURE — 77063 MAMMO DIGITAL SCREENING BILAT WITH TOMO: ICD-10-PCS | Mod: 26,HCNC,, | Performed by: RADIOLOGY

## 2023-04-24 PROCEDURE — 99499 NO LOS: ICD-10-PCS | Mod: HCNC,S$GLB,, | Performed by: INTERNAL MEDICINE

## 2023-04-24 PROCEDURE — 77067 MAMMO DIGITAL SCREENING BILAT WITH TOMO: ICD-10-PCS | Mod: 26,HCNC,, | Performed by: RADIOLOGY

## 2023-04-24 PROCEDURE — 99499 UNLISTED E&M SERVICE: CPT | Mod: HCNC,S$GLB,, | Performed by: INTERNAL MEDICINE

## 2023-04-24 PROCEDURE — 71250 CT THORAX DX C-: CPT | Mod: 26,HCNC,, | Performed by: RADIOLOGY

## 2023-04-24 PROCEDURE — 77067 SCR MAMMO BI INCL CAD: CPT | Mod: TC,HCNC,PO

## 2023-04-24 PROCEDURE — 77067 SCR MAMMO BI INCL CAD: CPT | Mod: 26,HCNC,, | Performed by: RADIOLOGY

## 2023-04-24 PROCEDURE — 71250 CT THORAX DX C-: CPT | Mod: TC,HCNC,PO

## 2023-04-24 PROCEDURE — 99999 PR PBB SHADOW E&M-EST. PATIENT-LVL I: ICD-10-PCS | Mod: PBBFAC,HCNC,,

## 2023-04-24 PROCEDURE — 99999 PR PBB SHADOW E&M-EST. PATIENT-LVL I: CPT | Mod: PBBFAC,HCNC,,

## 2023-04-24 PROCEDURE — 71250 CT CHEST WITHOUT CONTRAST: ICD-10-PCS | Mod: 26,HCNC,, | Performed by: RADIOLOGY

## 2023-04-24 NOTE — PROGRESS NOTES
"Shirlene Ortiz 77 y.o. female is here today for Blood Pressure check.   History of HTN yes.    Review of patient's allergies indicates:   Allergen Reactions    Nicotine      puritis from nicotine patch. Patient is a smoker.     Creatinine   Date Value Ref Range Status   11/03/2022 1.28 0.50 - 1.40 mg/dL Final     Sodium   Date Value Ref Range Status   11/03/2022 142 136 - 145 mmol/L Final     Potassium   Date Value Ref Range Status   11/03/2022 4.1 3.5 - 5.1 mmol/L Final   ]  Patient verifies taking blood pressure medications on a regular basis at the same time of the day.     Current Outpatient Medications:     ACCU-CHEK ISRAEL CONTROL ANGELLA [place], , Disp: , Rfl:     ACCU-CHEK ISRAEL PLUS METER Misc, USE AS DIRECTED, Disp: 1 each, Rfl: 0    ACCU-CHEK ISRAEL PLUS TEST STRP Strp, TEST 2 TO 4 TIMES EVERY DAY , Disp: 400 strip, Rfl: 3    amLODIPine (NORVASC) 10 MG tablet, TAKE 1 TABLET EVERY EVENING, Disp: 90 tablet, Rfl: 5    aspirin (ECOTRIN) 81 MG EC tablet, Take 325 mg by mouth once daily., Disp: , Rfl:     atorvastatin (LIPITOR) 20 MG tablet, TAKE 1 TABLET ONCE DAILY., Disp: 90 tablet, Rfl: 4    BD ALCOHOL SWABS PadM, , Disp: , Rfl:     carvediloL (COREG) 12.5 MG tablet, Take 1 tablet (12.5 mg total) by mouth 2 (two) times daily., Disp: 180 tablet, Rfl: 4    clopidogreL (PLAVIX) 75 mg tablet, TAKE 1 TABLET EVERY DAY, Disp: 90 tablet, Rfl: 3    ezetimibe (ZETIA) 10 mg tablet, TAKE 1 TABLET EVERY DAY, Disp: 90 tablet, Rfl: 4    glipiZIDE (GLUCOTROL) 10 MG tablet, TAKE 1 TABLET TWICE DAILY, Disp: 180 tablet, Rfl: 1    lancets Misc, To check BG 6 times daily, to use with insurance preferred meter, Disp: 200 each, Rfl: 11    pantoprazole (PROTONIX) 40 MG tablet, TAKE 1 TABLET (40 MG TOTAL) BY MOUTH ONCE DAILY., Disp: 90 tablet, Rfl: 0    pen needle, diabetic (BD DEAN 2ND GEN PEN NEEDLE) 32 gauge x 5/32" Ndle, Use as directed with insulin pen. Diagnosis Code E11.65, Disp: 100 each, Rfl: 1    traMADoL (ULTRAM) 50 mg tablet, " Take 1 tablet (50 mg total) by mouth 2 (two) times daily as needed for Pain., Disp: 60 tablet, Rfl: 5    valsartan (DIOVAN) 320 MG tablet, Take 1 tablet (320 mg total) by mouth once daily., Disp: 90 tablet, Rfl: 3    Current Facility-Administered Medications:     sodium chloride 0.9% flush 10 mL, 10 mL, Intravenous, PRN, Baldemar Espinoza MD  Does patient have record of home blood pressure readings yes. Readings have been averaging 120/80.   Last dose of blood pressure medication was taken at 4/24/23.  Patient is asymptomatic.   Complains of nothing.    BP: 120/64 , Pulse: 73 .    Blood pressure reading after 15 minutes was n/a/n/aPulse n/a.  Dr. sutton notified.

## 2023-04-27 ENCOUNTER — TELEPHONE (OUTPATIENT)
Dept: FAMILY MEDICINE | Facility: CLINIC | Age: 78
End: 2023-04-27

## 2023-04-27 ENCOUNTER — TELEPHONE (OUTPATIENT)
Dept: FAMILY MEDICINE | Facility: CLINIC | Age: 78
End: 2023-04-27
Payer: MEDICARE

## 2023-04-27 NOTE — TELEPHONE ENCOUNTER
----- Message from Mirella Hess sent at 4/27/2023 10:41 AM CDT -----  Type:  Test Result         Who Called: MIKHAIL PERKINS [[id number]]           Name of Test: Xray and mammo               Would the patient rather a call back or a response via [facility]? Call back                Best Call Back Number: [phone]

## 2023-04-27 NOTE — TELEPHONE ENCOUNTER
----- Message from Favio Dan sent at 4/27/2023 11:26 AM CDT -----  Contact: self  Type: Needs Medical Advice  Who Called: patient   Best Call Back Number: [phone]  Additional Information: Plz call pt back so she can let you know why she needs x rays as well and to get scheduled for a mammo. Thanks

## 2023-04-27 NOTE — TELEPHONE ENCOUNTER
LVM for patient to call and schedule mammo as order is in place.     Also to get more information on why she needs an xray

## 2023-04-28 NOTE — TELEPHONE ENCOUNTER
Please, call and inform patient:   Mammogram normal.  CT chest: no change in her lung nodules.  She has an aortic aneurysm (dilation) which has not changed.  Repeat in 1 year.

## 2023-05-15 ENCOUNTER — CLINICAL SUPPORT (OUTPATIENT)
Dept: CARDIOLOGY | Facility: HOSPITAL | Age: 78
End: 2023-05-15
Payer: MEDICARE

## 2023-05-15 DIAGNOSIS — Z95.0 PRESENCE OF CARDIAC PACEMAKER: ICD-10-CM

## 2023-05-15 PROCEDURE — 93296 REM INTERROG EVL PM/IDS: CPT | Mod: PO | Performed by: INTERNAL MEDICINE

## 2023-06-26 RX ORDER — CLOPIDOGREL BISULFATE 75 MG/1
TABLET ORAL
Qty: 90 TABLET | Refills: 3 | Status: SHIPPED | OUTPATIENT
Start: 2023-06-26

## 2023-06-27 RX ORDER — EZETIMIBE 10 MG/1
TABLET ORAL
Qty: 90 TABLET | Refills: 4 | Status: SHIPPED | OUTPATIENT
Start: 2023-06-27

## 2023-08-08 RX ORDER — ATORVASTATIN CALCIUM 20 MG/1
20 TABLET, FILM COATED ORAL
Qty: 90 TABLET | Refills: 4 | Status: SHIPPED | OUTPATIENT
Start: 2023-08-08

## 2023-08-13 ENCOUNTER — CLINICAL SUPPORT (OUTPATIENT)
Dept: CARDIOLOGY | Facility: HOSPITAL | Age: 78
End: 2023-08-13
Payer: MEDICARE

## 2023-08-13 DIAGNOSIS — Z95.0 PRESENCE OF CARDIAC PACEMAKER: ICD-10-CM

## 2023-08-13 PROCEDURE — 93296 REM INTERROG EVL PM/IDS: CPT | Mod: HCNC,PO | Performed by: INTERNAL MEDICINE

## 2023-08-13 PROCEDURE — 93294 REM INTERROG EVL PM/LDLS PM: CPT | Mod: HCNC,,, | Performed by: INTERNAL MEDICINE

## 2023-08-13 PROCEDURE — 93294 CARDIAC DEVICE CHECK - REMOTE: ICD-10-PCS | Mod: HCNC,,, | Performed by: INTERNAL MEDICINE

## 2023-08-14 ENCOUNTER — TELEPHONE (OUTPATIENT)
Dept: FAMILY MEDICINE | Facility: CLINIC | Age: 78
End: 2023-08-14
Payer: MEDICARE

## 2023-08-14 NOTE — TELEPHONE ENCOUNTER
----- Message from Cuauhtemoc Huff sent at 8/14/2023  8:18 AM CDT -----  Regarding: Return Call  Contact: patient  Type:  Patient Returning Call    Who Called:patient  Who Left Message for Patient:office nurse  Does the patient know what this is regarding?:need lab orders  Would the patient rather a call back or a response via [facility]? call  Best Call Back Number:[phone].  Additional Information: please call to advise.

## 2023-08-16 ENCOUNTER — TELEPHONE (OUTPATIENT)
Dept: FAMILY MEDICINE | Facility: CLINIC | Age: 78
End: 2023-08-16
Payer: MEDICARE

## 2023-08-16 NOTE — TELEPHONE ENCOUNTER
----- Message from Kera Joyner sent at 8/16/2023  9:29 AM CDT -----  Regarding: Questions  Type:  Needs Medical Advice    Who Called: pt    Symptoms (please be specific): cyst on bottom side about the size of fingernail     How long has patient had these symptoms:  been there about a month, comes and goes    Would the patient rather a call back or a response via [facility]? Call back    Best Call Back Number: [phone]      Additional Information:  Please call Back to advise. Thanks!

## 2023-08-29 ENCOUNTER — TELEPHONE (OUTPATIENT)
Dept: FAMILY MEDICINE | Facility: CLINIC | Age: 78
End: 2023-08-29
Payer: MEDICARE

## 2023-08-29 NOTE — TELEPHONE ENCOUNTER
----- Message from Lynda Roberson sent at 8/29/2023 11:50 AM CDT -----  Regarding: sooner apt  Contact: Patient  Type:  Sooner Appointment Request    Caller is requesting a sooner appointment.  Caller declined first available appointment listed below.  Caller will not accept being placed on the waitlist and is requesting a message be sent to doctor.    Name of Caller:  Patient  When is the first available appointment?    Symptoms:  cyst on bottom    Best Call Back Number:  [phone]    Additional Information:  Needs to know what dept she should seek please call to advise and schedule thanks!

## 2023-08-29 NOTE — TELEPHONE ENCOUNTER
Patient states she is in pain near her rectum    50 cent size lump near the opening of her rectum. She will need to have it drained and evaluated.    Advised to go to the Er or urgent care due to no openings here to get patient in. PVU

## 2023-09-18 NOTE — PROGRESS NOTES
Subjective:       Patient ID: Shirlene Ortiz is a 71 y.o. female.    Chief Complaint: Cough and Nasal Congestion    HPI Comments: Complains of a moderate sore throat for more than 3 days.  It is associated with posterior nasal drainage and a cough - yellow    Also has nausea, vomiting and 1 episode of diarrhea for 3 days.  Better today.    No fever or body aches; + fatigue    Cough   Associated symptoms include postnasal drip and a sore throat. Pertinent negatives include no chest pain, fever, rash or shortness of breath. There is no history of environmental allergies.     Review of Systems   Constitutional: Negative for appetite change and fever.   HENT: Positive for postnasal drip, sinus pressure and sore throat. Negative for nosebleeds and trouble swallowing.    Eyes: Negative for discharge and visual disturbance.   Respiratory: Positive for cough. Negative for choking and shortness of breath.    Cardiovascular: Negative for chest pain and palpitations.   Gastrointestinal: Positive for diarrhea, nausea and vomiting. Negative for abdominal pain.   Endocrine: Negative for cold intolerance and polyuria.   Skin: Negative for rash and wound.   Allergic/Immunologic: Negative for environmental allergies, food allergies and immunocompromised state.   Psychiatric/Behavioral: Negative for agitation, dysphoric mood and sleep disturbance.       Objective:      Vitals:    01/17/17 1416   BP: 114/60   Pulse: 87   Temp: 97.6 °F (36.4 °C)     Physical Exam   Constitutional: She appears well-nourished.   HENT:   Right Ear: Tympanic membrane normal.   Left Ear: Tympanic membrane normal.   Mouth/Throat: Posterior oropharyngeal erythema present.   Eyes: Conjunctivae and EOM are normal.   Neck: Trachea normal and normal range of motion. No thyromegaly present.   Cardiovascular: Normal heart sounds.    Edema negative   Pulmonary/Chest: Effort normal and breath sounds normal.   Abdominal: Soft. Bowel sounds are normal. There is no  "hepatomegaly. There is no tenderness.   Lymphadenopathy:        Head (right side): No tonsillar adenopathy present.        Head (left side): No tonsillar adenopathy present.        Right: No supraclavicular adenopathy present.        Left: No supraclavicular adenopathy present.   Skin: Skin is warm, dry and intact.   Psychiatric: She has a normal mood and affect.   Alert and Oriented    Vitals reviewed.        Assessment:       1. Gastroenteritis    2. Upper respiratory tract infection, unspecified type        Plan:       Gastroenteritis  -     ondansetron (ZOFRAN-ODT) 4 MG TbDL; Take 1 tablet (4 mg total) by mouth every 4 (four) hours as needed (nausea, vomiting).  Dispense: 15 tablet; Refill: 0  -     pantoprazole (PROTONIX) 40 MG tablet; Take 1 tablet (40 mg total) by mouth once daily.  Dispense: 30 tablet; Refill: 0  -     ranitidine (ZANTAC) 300 MG tablet; Take 1 tablet (300 mg total) by mouth every evening.  Dispense: 14 tablet; Refill: 0    Upper respiratory tract infection, unspecified type  -     cefUROXime (CEFTIN) 500 MG tablet; Take 1 tablet (500 mg total) by mouth 2 (two) times daily.  Dispense: 20 tablet; Refill: 0    likely viral GI            Counseled on regular exercise, maintenance of a healthy weight, balanced diet rich in fruits/vegetables and lean protein, and avoidance of unhealthy habits like smoking and excessive alcohol intake.   Also, counseled on importance of being compliant with medication, health appointments, diet and exercise.     No Follow-up on file. has apt    "This note will not be shared with the patient."  " 0 (no pain/absence of nonverbal indicators of pain)

## 2023-10-02 ENCOUNTER — OFFICE VISIT (OUTPATIENT)
Dept: FAMILY MEDICINE | Facility: CLINIC | Age: 78
End: 2023-10-02
Payer: MEDICARE

## 2023-10-02 ENCOUNTER — LAB VISIT (OUTPATIENT)
Dept: LAB | Facility: HOSPITAL | Age: 78
End: 2023-10-02
Attending: INTERNAL MEDICINE
Payer: MEDICARE

## 2023-10-02 VITALS
TEMPERATURE: 98 F | BODY MASS INDEX: 31.08 KG/M2 | HEART RATE: 70 BPM | DIASTOLIC BLOOD PRESSURE: 74 MMHG | WEIGHT: 168.88 LBS | SYSTOLIC BLOOD PRESSURE: 136 MMHG | HEIGHT: 62 IN | OXYGEN SATURATION: 96 %

## 2023-10-02 DIAGNOSIS — E11.65 UNCONTROLLED TYPE 2 DIABETES MELLITUS WITH HYPERGLYCEMIA: ICD-10-CM

## 2023-10-02 DIAGNOSIS — E11.65 UNCONTROLLED TYPE 2 DIABETES MELLITUS WITH HYPERGLYCEMIA: Primary | ICD-10-CM

## 2023-10-02 DIAGNOSIS — E78.5 DYSLIPIDEMIA: ICD-10-CM

## 2023-10-02 DIAGNOSIS — I10 ESSENTIAL HYPERTENSION: ICD-10-CM

## 2023-10-02 DIAGNOSIS — R91.8 LUNG NODULES: ICD-10-CM

## 2023-10-02 LAB
ESTIMATED AVG GLUCOSE: 217 MG/DL (ref 68–131)
HBA1C MFR BLD: 9.2 % (ref 4–5.6)

## 2023-10-02 PROCEDURE — 1160F RVW MEDS BY RX/DR IN RCRD: CPT | Mod: CPTII,S$GLB,, | Performed by: INTERNAL MEDICINE

## 2023-10-02 PROCEDURE — 1160F PR REVIEW ALL MEDS BY PRESCRIBER/CLIN PHARMACIST DOCUMENTED: ICD-10-PCS | Mod: CPTII,S$GLB,, | Performed by: INTERNAL MEDICINE

## 2023-10-02 PROCEDURE — 36415 COLL VENOUS BLD VENIPUNCTURE: CPT | Mod: PO | Performed by: INTERNAL MEDICINE

## 2023-10-02 PROCEDURE — G0008 ADMIN INFLUENZA VIRUS VAC: HCPCS | Mod: S$GLB,,, | Performed by: INTERNAL MEDICINE

## 2023-10-02 PROCEDURE — 99214 PR OFFICE/OUTPT VISIT, EST, LEVL IV, 30-39 MIN: ICD-10-PCS | Mod: 25,S$GLB,, | Performed by: INTERNAL MEDICINE

## 2023-10-02 PROCEDURE — 3078F PR MOST RECENT DIASTOLIC BLOOD PRESSURE < 80 MM HG: ICD-10-PCS | Mod: CPTII,S$GLB,, | Performed by: INTERNAL MEDICINE

## 2023-10-02 PROCEDURE — 1159F PR MEDICATION LIST DOCUMENTED IN MEDICAL RECORD: ICD-10-PCS | Mod: CPTII,S$GLB,, | Performed by: INTERNAL MEDICINE

## 2023-10-02 PROCEDURE — 90694 FLU VACCINE - QUADRIVALENT - ADJUVANTED: ICD-10-PCS | Mod: S$GLB,,, | Performed by: INTERNAL MEDICINE

## 2023-10-02 PROCEDURE — 99214 OFFICE O/P EST MOD 30 MIN: CPT | Mod: 25,S$GLB,, | Performed by: INTERNAL MEDICINE

## 2023-10-02 PROCEDURE — 1159F MED LIST DOCD IN RCRD: CPT | Mod: CPTII,S$GLB,, | Performed by: INTERNAL MEDICINE

## 2023-10-02 PROCEDURE — 3078F DIAST BP <80 MM HG: CPT | Mod: CPTII,S$GLB,, | Performed by: INTERNAL MEDICINE

## 2023-10-02 PROCEDURE — 99999 PR PBB SHADOW E&M-EST. PATIENT-LVL V: ICD-10-PCS | Mod: PBBFAC,,, | Performed by: INTERNAL MEDICINE

## 2023-10-02 PROCEDURE — 1126F PR PAIN SEVERITY QUANTIFIED, NO PAIN PRESENT: ICD-10-PCS | Mod: CPTII,S$GLB,, | Performed by: INTERNAL MEDICINE

## 2023-10-02 PROCEDURE — 3288F FALL RISK ASSESSMENT DOCD: CPT | Mod: CPTII,S$GLB,, | Performed by: INTERNAL MEDICINE

## 2023-10-02 PROCEDURE — 1126F AMNT PAIN NOTED NONE PRSNT: CPT | Mod: CPTII,S$GLB,, | Performed by: INTERNAL MEDICINE

## 2023-10-02 PROCEDURE — G0008 FLU VACCINE - QUADRIVALENT - ADJUVANTED: ICD-10-PCS | Mod: S$GLB,,, | Performed by: INTERNAL MEDICINE

## 2023-10-02 PROCEDURE — 3075F SYST BP GE 130 - 139MM HG: CPT | Mod: CPTII,S$GLB,, | Performed by: INTERNAL MEDICINE

## 2023-10-02 PROCEDURE — 83036 HEMOGLOBIN GLYCOSYLATED A1C: CPT | Performed by: INTERNAL MEDICINE

## 2023-10-02 PROCEDURE — 99999 PR PBB SHADOW E&M-EST. PATIENT-LVL V: CPT | Mod: PBBFAC,,, | Performed by: INTERNAL MEDICINE

## 2023-10-02 PROCEDURE — 1101F PR PT FALLS ASSESS DOC 0-1 FALLS W/OUT INJ PAST YR: ICD-10-PCS | Mod: CPTII,S$GLB,, | Performed by: INTERNAL MEDICINE

## 2023-10-02 PROCEDURE — 1101F PT FALLS ASSESS-DOCD LE1/YR: CPT | Mod: CPTII,S$GLB,, | Performed by: INTERNAL MEDICINE

## 2023-10-02 PROCEDURE — 3075F PR MOST RECENT SYSTOLIC BLOOD PRESS GE 130-139MM HG: ICD-10-PCS | Mod: CPTII,S$GLB,, | Performed by: INTERNAL MEDICINE

## 2023-10-02 PROCEDURE — 90694 VACC AIIV4 NO PRSRV 0.5ML IM: CPT | Mod: S$GLB,,, | Performed by: INTERNAL MEDICINE

## 2023-10-02 PROCEDURE — 3288F PR FALLS RISK ASSESSMENT DOCUMENTED: ICD-10-PCS | Mod: CPTII,S$GLB,, | Performed by: INTERNAL MEDICINE

## 2023-10-02 NOTE — PROGRESS NOTES
Subjective:       Patient ID: Shirlene Ortiz is a 78 y.o. female.  Chief Complaint: Hypertension     HPI         Lung nodules.  repeat 4/23 = no change.  Next due 4/24   Previous 9/2022 showed multiple with a 9 mm nodule.      Low back pain with sciatica - uncontrolled s/p 2 steroid injections.     Incomplete polyp removal.  Scheduled for removal at [facility].   Cardiology ok hold Plavix but not ASA.  Jonas's esophagus.  EGD 2020   Normocytic anemia - likely ACD.  FOBT x3 neg.  Stable Hb at 10.       DM - uncontrolled   HTN - uncontrolled; takes 1/2 10 mg amlodipine daily; if BP low, will take 1/4 of a 10 mg per Dr Sandoval's instructions.   HLD - controlled ldl goal < 70; high triglycerides;   Smoke abuse - advised, but does not want to quit  CKD III 3b - stable in 30s. Seeing renal.  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  Saw cardiology who feels the same.    Saw Dr Lugo.  Scared to go to PT.  Needs imaging.  Pacemaker not compatible with MRI, so he wanted a lumbar CT but has not happened yet.    Recommended Tylenol and if nw, tramadol.  She has no relief from Tylenol and is now open to trying tramadol.   Recall:  Complains of b/l low back pain radiating to her b/l posterior thighs and calves.  Occurs with standing and walking.  She frequently has to stop walking to get relief.  Sitting makes it better.  Leaning forward (shopping cart) improves it.     CT of abdomin showed severe stenosis in LE.  Has appointment with Dr Loo 9/1.    CAD s/p CABG 8/17 after an NSTEMI;  Lexiscan 2020 good.   PVD - 3 new stents in left leg 12/2016; Mild/moderate PAD by ultrasound per cardiology.    13 stents in total    Assessment:       1. Uncontrolled type 2 diabetes mellitus with hyperglycemia    2. Essential hypertension    3. Dyslipidemia    4. Lung nodules        Plan:       Uncontrolled type 2 diabetes mellitus with hyperglycemia  -     Hemoglobin A1C; Future; Expected date: 10/02/2023  -      "Hemoglobin A1C; Future; Expected date: 10/02/2023  -     Hemoglobin A1C; Future; Expected date: 03/30/2024  -     Ambulatory referral/consult to Optometry; Future; Expected date: 10/09/2023    Essential hypertension  -     Comprehensive Metabolic Panel; Future; Expected date: 03/30/2024    Dyslipidemia  -     Lipid Panel; Future; Expected date: 03/30/2024    Lung nodules  -     CT Chest Without Contrast; Future; Expected date: 10/02/2023            Continue current management and monitor.  Other diagnoses were reviewed and found stable and will continue to monitor.  Counseled on regular exercise, maintenance of a healthy weight, balanced diet rich in fruits/vegetables and lean protein, and avoidance of unhealthy habits like smoking and excessive alcohol intake.   Also, counseled on importance of being compliant with medication, health appointments, diet and exercise.     Follow up in about 6 months (around 4/2/2024).      Medication List with Changes/Refills   Current Medications    ACCU-CHEK ISRAEL CONTROL SOLN SOLN        ACCU-CHEK ISRAEL PLUS METER MISC    USE AS DIRECTED    ACCU-CHEK ISRAEL PLUS TEST STRP STRP    TEST 2 TO 4 TIMES EVERY DAY     AMLODIPINE (NORVASC) 10 MG TABLET    TAKE 1 TABLET EVERY EVENING    ASPIRIN (ECOTRIN) 81 MG EC TABLET    Take 325 mg by mouth once daily.    ATORVASTATIN (LIPITOR) 20 MG TABLET    TAKE 1 TABLET EVERY DAY    BD ALCOHOL SWABS PADM        CARVEDILOL (COREG) 12.5 MG TABLET    Take 1 tablet (12.5 mg total) by mouth 2 (two) times daily.    CLOPIDOGREL (PLAVIX) 75 MG TABLET    TAKE 1 TABLET EVERY DAY    EZETIMIBE (ZETIA) 10 MG TABLET    TAKE 1 TABLET EVERY DAY    GLIPIZIDE (GLUCOTROL) 10 MG TABLET    TAKE 1 TABLET TWICE DAILY    LANCETS MISC    To check BG 6 times daily, to use with insurance preferred meter    PANTOPRAZOLE (PROTONIX) 40 MG TABLET    TAKE 1 TABLET ONE TIME DAILY    PEN NEEDLE, DIABETIC (BD DEAN 2ND GEN PEN NEEDLE) 32 GAUGE X 5/32" NDLE    Use as directed with " insulin pen. Diagnosis Code E11.65    VALSARTAN (DIOVAN) 320 MG TABLET    TAKE 1 TABLET BY MOUTH ONCE DAILY.   Discontinued Medications    TRAMADOL (ULTRAM) 50 MG TABLET    Take 1 tablet (50 mg total) by mouth 2 (two) times daily as needed for Pain.       BP Readings from Last 3 Encounters:   10/02/23 136/74   04/24/23 120/64   03/30/23 (!) 154/70     Hemoglobin A1C   Date Value Ref Range Status   03/30/2023 9.6 (H) 4.0 - 5.6 % Final     Comment:     ADA Screening Guidelines:  5.7-6.4%  Consistent with prediabetes  >or=6.5%  Consistent with diabetes    High levels of fetal hemoglobin interfere with the HbA1C  assay. Heterozygous hemoglobin variants (HbS, HgC, etc)do  not significantly interfere with this assay.   However, presence of multiple variants may affect accuracy.     03/29/2022 10.3 (H) 4.0 - 5.6 % Final     Comment:     ADA Screening Guidelines:  5.7-6.4%  Consistent with prediabetes  >or=6.5%  Consistent with diabetes    High levels of fetal hemoglobin interfere with the HbA1C  assay. Heterozygous hemoglobin variants (HbS, HgC, etc)do  not significantly interfere with this assay.   However, presence of multiple variants may affect accuracy.     06/29/2021 10.7 (H) 0.0 - 5.6 % Final     Comment:     Reference Interval:  5.0 - 5.6 Normal   5.7 - 6.4 High Risk   > 6.5 Diabetic      Hgb A1c results are standardized based on the (NGSP) National   Glycohemoglobin Standardization Program.      Hemoglobin A1C levels are related to mean serum/plasma glucose   during the preceding 2-3 months.          Lab Results   Component Value Date    TSH 2.268 03/29/2022     Lab Results   Component Value Date    LDLCALC 67.0 03/30/2023    LDLCALC 65.4 03/29/2022    LDLCALC 47.0 (L) 07/02/2020     Lab Results   Component Value Date    TRIG 230 (H) 03/30/2023    TRIG 178 (H) 03/29/2022    TRIG 300 (H) 07/02/2020     Wt Readings from Last 3 Encounters:   10/02/23 76.6 kg (168 lb 14 oz)   04/11/23 73.5 kg (162 lb)   03/30/23 73.6  kg (162 lb 4.1 oz)     Lab Results   Component Value Date    HGB 9.5 (L) 11/03/2022    HCT 30.6 (L) 11/03/2022    WBC 7.28 11/03/2022    ALT 13 11/03/2022    AST 18 11/03/2022     11/03/2022    K 4.1 11/03/2022    CREATININE 1.28 11/03/2022           Review of Systems        Objective:      Vitals:    10/02/23 1310   BP: 136/74   Pulse: 70   Temp: 98.1 °F (36.7 °C)     Physical Exam  Vitals reviewed.   Constitutional:       Appearance: Normal appearance.   Eyes:      Conjunctiva/sclera: Conjunctivae normal.   Cardiovascular:      Rate and Rhythm: Normal rate.   Pulmonary:      Effort: Pulmonary effort is normal.      Breath sounds: Normal breath sounds.   Musculoskeletal:      Cervical back: Normal range of motion.      Comments: Normal ROM bilateral    Skin:     General: Skin is warm and dry.   Neurological:      Mental Status: She is alert.      Cranial Nerves: Cranial nerve deficit: grossly intact.   Psychiatric:      Comments: Alert and orientated

## 2023-10-04 ENCOUNTER — TELEPHONE (OUTPATIENT)
Dept: FAMILY MEDICINE | Facility: CLINIC | Age: 78
End: 2023-10-04
Payer: MEDICARE

## 2023-10-04 NOTE — TELEPHONE ENCOUNTER
----- Message from Patrizia Smart sent at 10/4/2023  9:56 AM CDT -----  Contact: pt [phone]  Type:  Test Results    Who Called:  Pt   Name of Test (Lab/Mammo/Etc):  Labs   Date of Test:  10/02  Ordering Provider:  Maury   Where the test was performed:  [facility]   Best Call Back Number:  [phone]    Additional Information:  Pls call back and advise

## 2023-10-04 NOTE — TELEPHONE ENCOUNTER
Left message for pt to return call to office or send message through portal so that we can better assist

## 2023-10-06 ENCOUNTER — TELEPHONE (OUTPATIENT)
Dept: FAMILY MEDICINE | Facility: CLINIC | Age: 78
End: 2023-10-06
Payer: MEDICARE

## 2023-10-06 DIAGNOSIS — E11.65 UNCONTROLLED TYPE 2 DIABETES MELLITUS WITH HYPERGLYCEMIA: Primary | ICD-10-CM

## 2023-10-06 NOTE — TELEPHONE ENCOUNTER
----- Message from Lissa Headley sent at 10/5/2023  1:34 PM CDT -----  Contact: patient  Type:  Patient Returning Call    Who Called:  patient  Who Left Message for Patient:  elisabeth  Does the patient know what this is regarding?:    Best Call Back Number:  [phone] (home)   Additional Information:

## 2023-11-11 ENCOUNTER — CLINICAL SUPPORT (OUTPATIENT)
Dept: CARDIOLOGY | Facility: HOSPITAL | Age: 78
End: 2023-11-11

## 2023-11-11 DIAGNOSIS — Z95.0 PRESENCE OF CARDIAC PACEMAKER: ICD-10-CM

## 2023-12-16 ENCOUNTER — HOSPITAL ENCOUNTER (OUTPATIENT)
Dept: CARDIOLOGY | Facility: HOSPITAL | Age: 78
Discharge: HOME | End: 2023-12-16
Attending: INTERNAL MEDICINE
Payer: MEDICARE

## 2023-12-16 PROCEDURE — 93294 REM INTERROG EVL PM/LDLS PM: CPT | Mod: ,,, | Performed by: INTERNAL MEDICINE

## 2023-12-16 PROCEDURE — 93296 REM INTERROG EVL PM/IDS: CPT | Mod: PO | Performed by: INTERNAL MEDICINE

## 2024-01-24 LAB
OHS CV AF BURDEN PERCENT: < 1
OHS CV DC REMOTE DEVICE TYPE: NORMAL
OHS CV RV PACING PERCENT: 30.08 %

## 2024-01-27 ENCOUNTER — HOSPITAL ENCOUNTER (EMERGENCY)
Facility: HOSPITAL | Age: 79
Discharge: HOME | End: 2024-01-27
Attending: EMERGENCY MEDICINE
Payer: MEDICARE

## 2024-01-27 VITALS
HEART RATE: 78 BPM | TEMPERATURE: 98 F | OXYGEN SATURATION: 98 % | RESPIRATION RATE: 16 BRPM | SYSTOLIC BLOOD PRESSURE: 139 MMHG | DIASTOLIC BLOOD PRESSURE: 75 MMHG

## 2024-01-27 DIAGNOSIS — N17.9 AKI (ACUTE KIDNEY INJURY): ICD-10-CM

## 2024-01-27 DIAGNOSIS — R55 NEAR SYNCOPE: Primary | ICD-10-CM

## 2024-01-27 LAB
ALBUMIN SERPL BCP-MCNC: 3.2 G/DL (ref 3.5–5.2)
ALP SERPL-CCNC: 108 U/L (ref 55–135)
ALT SERPL W/O P-5'-P-CCNC: 10 U/L (ref 10–44)
ANION GAP SERPL CALC-SCNC: 16 MMOL/L (ref 8–16)
AST SERPL-CCNC: 15 U/L (ref 10–40)
BASOPHILS # BLD AUTO: 0.03 K/UL (ref 0–0.2)
BASOPHILS NFR BLD: 0.3 % (ref 0–1.9)
BILIRUB SERPL-MCNC: 0.3 MG/DL (ref 0.1–1)
BUN SERPL-MCNC: 36 MG/DL (ref 8–23)
CALCIUM SERPL-MCNC: 9 MG/DL (ref 8.7–10.5)
CHLORIDE SERPL-SCNC: 108 MMOL/L (ref 95–110)
CO2 SERPL-SCNC: 12 MMOL/L (ref 23–29)
CREAT SERPL-MCNC: 2.5 MG/DL (ref 0.5–1.4)
DIFFERENTIAL METHOD BLD: ABNORMAL
EOSINOPHIL # BLD AUTO: 0 K/UL (ref 0–0.5)
EOSINOPHIL NFR BLD: 0.3 % (ref 0–8)
ERYTHROCYTE [DISTWIDTH] IN BLOOD BY AUTOMATED COUNT: 15.9 % (ref 11.5–14.5)
EST. GFR  (NO RACE VARIABLE): 19.2 ML/MIN/1.73 M^2
GLUCOSE SERPL-MCNC: 169 MG/DL (ref 70–110)
HCT VFR BLD AUTO: 31.5 % (ref 37–48.5)
HGB BLD-MCNC: 10.2 G/DL (ref 12–16)
IMM GRANULOCYTES # BLD AUTO: 0.04 K/UL (ref 0–0.04)
IMM GRANULOCYTES NFR BLD AUTO: 0.4 % (ref 0–0.5)
LYMPHOCYTES # BLD AUTO: 0.9 K/UL (ref 1–4.8)
LYMPHOCYTES NFR BLD: 9.1 % (ref 18–48)
MCH RBC QN AUTO: 24.9 PG (ref 27–31)
MCHC RBC AUTO-ENTMCNC: 32.4 G/DL (ref 32–36)
MCV RBC AUTO: 77 FL (ref 82–98)
MONOCYTES # BLD AUTO: 0.5 K/UL (ref 0.3–1)
MONOCYTES NFR BLD: 5.6 % (ref 4–15)
NEUTROPHILS # BLD AUTO: 8 K/UL (ref 1.8–7.7)
NEUTROPHILS NFR BLD: 84.3 % (ref 38–73)
NRBC BLD-RTO: 0 /100 WBC
PLATELET # BLD AUTO: 231 K/UL (ref 150–450)
PMV BLD AUTO: 9.1 FL (ref 9.2–12.9)
POTASSIUM SERPL-SCNC: 4.4 MMOL/L (ref 3.5–5.1)
PROT SERPL-MCNC: 7.2 G/DL (ref 6–8.4)
RBC # BLD AUTO: 4.09 M/UL (ref 4–5.4)
SODIUM SERPL-SCNC: 136 MMOL/L (ref 136–145)
TROPONIN I SERPL DL<=0.01 NG/ML-MCNC: 0.01 NG/ML (ref 0–0.03)
WBC # BLD AUTO: 9.51 K/UL (ref 3.9–12.7)

## 2024-01-27 PROCEDURE — 84484 ASSAY OF TROPONIN QUANT: CPT | Performed by: EMERGENCY MEDICINE

## 2024-01-27 PROCEDURE — 99284 EMERGENCY DEPT VISIT MOD MDM: CPT

## 2024-01-27 PROCEDURE — 80053 COMPREHEN METABOLIC PANEL: CPT | Performed by: EMERGENCY MEDICINE

## 2024-01-27 PROCEDURE — 93005 ELECTROCARDIOGRAM TRACING: CPT

## 2024-01-27 PROCEDURE — 85025 COMPLETE CBC W/AUTO DIFF WBC: CPT | Performed by: EMERGENCY MEDICINE

## 2024-01-27 PROCEDURE — 93010 ELECTROCARDIOGRAM REPORT: CPT | Mod: ,,, | Performed by: INTERNAL MEDICINE

## 2024-01-27 PROCEDURE — 25000003 PHARM REV CODE 250: Performed by: EMERGENCY MEDICINE

## 2024-01-27 RX ADMIN — SODIUM CHLORIDE 500 ML: 9 INJECTION, SOLUTION INTRAVENOUS at 09:01

## 2024-01-28 NOTE — ED PROVIDER NOTES
Encounter Date: 1/27/2024       History     Chief Complaint   Patient presents with    presyncope      Became dizzy when the sat down at dinner. Concerned because of extensive cardiac history      Pleasant 70-year-old female with a history of COPD presents with fatigue and near-syncope.  Patient states she lives a relatively sedentary life and does not get up and walk large distances.  Does not use oxygen or breathing treatments at home does not normally use a walker.  She states she is here visiting at the [employer] and walked more than a football fields distance from where she has been gambling to where the restaurant is located.  She stated after that walk during which she was using a walker because she knew she might need it given that she is unsteady at baseline which she does not normally use she felt slightly short of breath and had to sit down.  She states for about 20 minutes or so afterwards she felt a little shaky hand not her normal self so her friends encouraged her to come to the emergency department for evaluation.  Patient states she also had not eaten very much today.  She states that her upper arms and shoulders feel tight which she thinks is related to using the walker which she does not normally use.  She states that she feels much better now after resting for about an hour waiting to be seen in the emergency department just endorses still a little bit of neck stiffness.  States she never had any chest pain.  She does not feel short of breath at this time.  She does not feel lightheaded or dizzy at this time.    The history is provided by the patient and the EMS personnel. No  was used.     Review of patient's allergies indicates:   Allergen Reactions    Nicotine      puritis from nicotine patch. Patient is a smoker.     Past Medical History:   Diagnosis Date    Acute coronary artery obstruction without MI     14 stents    Anemia 08/27/2017    Arthritis     Colon  polyp     COPD (chronic obstructive pulmonary disease) 08/25/2017    Diverticulosis of colon     DM (diabetes mellitus)     Essential hypertension     Fatty liver     Hepatomegaly     HTN (hypertension)     Hyperlipidemia     Mixed hyperlipidemia     NSTEMI (non-ST elevated myocardial infarction) 08/17/2017 11/17    Pacemaker 06/25/2012    left chest PACEMAKER [employer] SEDR01 Sensia   S/N:[id number] Deion Link 8/3/2010 - current   right atrium LEAD [employer] 5076 CapSure Fix Novus  S/N:[id number] Deion Link 8/3/2010 - current   right ventricle LEAD [employer]TR[employer] 5076 CapSure Fix Novus  S/N:[id number] Deion Link 8/3/2010 - current     PVD (peripheral vascular disease)     S/P CABG (coronary artery bypass graft) 09/14/2017 8/17 CABG x5 LIMA-LAD, VG-PDA, VG-D, YVG-OM-PLB  mammary artery to left anterior descending coronary artery and separate segments  of saphenous vein from the aorta to the posterior descending coronary artery  and from the aorta to the diagonal coronary artery and from the aorta to the  obtuse marginal coronary artery and to the posterolateral branch of the  circumflex in a sequential fashion using a combination of antegrade and  retrograde cardioplegia with mild systemic hypothermia and endoscopic vein  harvest.    S/P coronary artery stent placement     11/17 proximal circumflex using a 2.5 x 38, post-dilated to 3.0 with 0% residual stenosis.    Sinus node dysfunction     Stroke 07/04/2014    Tobacco abuse 08/15/2017    Uncontrolled type 2 diabetes mellitus with hyperosmolarity without coma, without long-term current use of insulin     Documented by Maury on 10/26/15.     Past Surgical History:   Procedure Laterality Date    ABDOMINAL SURGERY      APPENDECTOMY      CARDIAC PACEMAKER PLACEMENT      CHOLECYSTECTOMY      COLONOSCOPY  04/27/2015    Dr. Marinelli: 2 colon polyps removed (one not completely removed), hemorrhoids; repeat in 6 weeks; biopsy: ADENOMATOUS POLYPS    COLONOSCOPY N/A  8/5/2020    Procedure: COLONOSCOPY;  Surgeon: Alonzo Marinelli MD;  Location: [facility] ENDO;  Service: Endoscopy;  Laterality: N/A;    COLONOSCOPY N/A 11/4/2020    Procedure: COLONOSCOPY;  Surgeon: Theodore Zimmerman MD;  Location: [facility] (2ND FLR); multiple colon polyps removed, 1 large polyp removed via piecemeal, incomplete resection. Repeat colonoscopy in 6 months for surveillance after piecemeal polypectomy; biopsy: Tubular adenoma (low grade dysplasia)    COLONOSCOPY N/A 4/6/2022    Procedure: COLONOSCOPY;  Surgeon: Alonzo Marinelli MD;  Location: [facility];  Service: Endoscopy;  Laterality: N/A;    coranary stent      CORONARY ANGIOPLASTY      CORONARY ARTERY BYPASS GRAFT      EPIDURAL STEROID INJECTION INTO LUMBAR SPINE N/A 4/19/2021    Procedure: Injection-steroid-epidural-lumbar L5/S1;  Surgeon: Genaro Lugo MD;  Location: [facility] OR;  Service: Pain Management;  Laterality: N/A;    EPIDURAL STEROID INJECTION INTO LUMBAR SPINE N/A 4/5/2022    Procedure: Injection-steroid-epidural-lumbar L5/S1 to the left;  Surgeon: Genaro Lugo MD;  Location: [facility] OR;  Service: Pain Management;  Laterality: N/A;    ESOPHAGOGASTRODUODENOSCOPY N/A 3/9/2020    Procedure: ESOPHAGOGASTRODUODENOSCOPY (EGD);  Surgeon: Alonzo Marinelli MD;  Location: [facility];  Service: Endoscopy;  Laterality: N/A; Mild Schatzki ring. Biopsied. Dilated. gastritis; biopsy: stomach- chemical/reactive gastropathy, esophagus- Squamocolumnar mucosa with focal intestinal metaplasia and low-grade dysplasia    ESOPHAGOGASTRODUODENOSCOPY N/A 8/5/2020    Procedure: EGD (ESOPHAGOGASTRODUODENOSCOPY);  Surgeon: Alonzo Marinelli MD;  Location: [facility] ENDO;  Service: Endoscopy;  Laterality: N/A;    ESOPHAGOGASTRODUODENOSCOPY N/A 3/22/2021    Procedure: EGD (ESOPHAGOGASTRODUODENOSCOPY);  Surgeon: Alonzo Marinelli MD;  Location: [facility] ENDO;  Service: Endoscopy;  Laterality: N/A; negative for phillips's    ESOPHAGOGASTRODUODENOSCOPY N/A 6/30/2021     Procedure: EGD (ESOPHAGOGASTRODUODENOSCOPY);  Surgeon: Alonzo Marinelli MD;  Location: [facility] ENDO; Esophageal mucosal changes suspicious for short-segment Jonas's esophagus; gastritis    ILIAC ARTERY STENT      POLYPECTOMY      REPLACEMENT OF DUAL CHAMBER PACEMAKER GENERATOR N/A 11/3/2022    Procedure: REPLACEMENT, PULSE GENERATOR OF DUAL CHAMBER PACEMAKER - [employer] - WATSON NOTIFIED;  Surgeon: Baldemar Espinoza MD;  Location: [facility] CATH;  Service: Cardiology;  Laterality: N/A;    TOTAL ABDOMINAL HYSTERECTOMY       Family History   Problem Relation Age of Onset    Kidney disease Mother     Hypertension Mother     Diabetes Mother     Melanoma Sister     Lung disease Sister     Hypertension Sister     Colon cancer Neg Hx     Crohn's disease Neg Hx     Ulcerative colitis Neg Hx     Stomach cancer Neg Hx     Esophageal cancer Neg Hx      Social History     Tobacco Use    Smoking status: Every Day     Current packs/day: 1.50     Average packs/day: 1.5 packs/day for 60.0 years (90.0 ttl pk-yrs)     Types: Cigarettes    Smokeless tobacco: Never    Tobacco comments:     Tobacco treatment specialist consulted.   Substance Use Topics    Alcohol use: No    Drug use: No     Review of Systems   Constitutional:  Positive for fatigue. Negative for fever.   HENT:  Negative for sore throat.    Respiratory:  Positive for shortness of breath.    Cardiovascular:  Negative for chest pain.   Gastrointestinal:  Negative for nausea.   Genitourinary:  Negative for dysuria.   Musculoskeletal:  Negative for back pain.   Skin:  Negative for rash.   Neurological:  Positive for weakness.   Hematological:  Does not bruise/bleed easily.   All other systems reviewed and are negative.      Physical Exam     Initial Vitals [01/27/24 2011]   BP Pulse Resp Temp SpO2   139/75 78 16 97.8 °F (36.6 °C) 98 %      MAP       --         Physical Exam    Nursing note and vitals reviewed.  Constitutional: She appears well-developed and  well-nourished.   Smells of cigarette smoke   HENT:   Head: Normocephalic and atraumatic.   Eyes: EOM are normal. Pupils are equal, round, and reactive to light.   Neck:   Normal range of motion.  Cardiovascular:  Normal rate and regular rhythm.           Pulmonary/Chest: Breath sounds normal. No respiratory distress. She has no wheezes. She has no rhonchi. She has no rales. She exhibits no tenderness.   Abdominal: Abdomen is soft. She exhibits no distension. There is no abdominal tenderness.   Musculoskeletal:         General: Normal range of motion.      Cervical back: Normal range of motion.     Neurological: She is alert and oriented to person, place, and time.   Skin: Skin is warm.         ED Course   Procedures  Labs Reviewed   CBC W/ AUTO DIFFERENTIAL - Abnormal; Notable for the following components:       Result Value    Hemoglobin 10.2 (*)     Hematocrit 31.5 (*)     MCV 77 (*)     MCH 24.9 (*)     RDW 15.9 (*)     MPV 9.1 (*)     Gran # (ANC) 8.0 (*)     Lymph # 0.9 (*)     Gran % 84.3 (*)     Lymph % 9.1 (*)     All other components within normal limits   COMPREHENSIVE METABOLIC PANEL - Abnormal; Notable for the following components:    CO2 12 (*)     Glucose 169 (*)     BUN 36 (*)     Creatinine 2.5 (*)     Albumin 3.2 (*)     eGFR 19.2 (*)     All other components within normal limits   TROPONIN I     EKG Readings: (Independently Interpreted)   Initial Reading: No STEMI.   EKG done at 8:24 p.m. on 01/27/2024 shows a rate of 66, WI interval 184, QRS duration 152, .  My interpretation of the EKG is this is a paced rhythm without any findings concerning for acute ischemia or electrical abnormality at this time.       Imaging Results    None          Medications   sodium chloride 0.9% bolus 500 mL 500 mL (500 mLs Intravenous New Bag 1/27/24 7313)     Medical Decision Making  Differential diagnosis for this patient includes hypoxia, COPD exacerbation, ACS, heart failure, dehydration, hypoglycemia,  deconditioning.       Patient informed of her elevated creatinine.  Patient states she knows she has chronic kidney disease stage 3 but is unsure of what her baseline creatinine is at this point.  She was offered the option of a potential short observation stay for IV hydration and continue to monitor troponin levels.  Patient states she feels well [place] and that she will drank water.  I got her to agree to at least give him be enough time to let her have 500 cc of normal saline bolus and then she will be discharged recommend follow up with primary care for recheck.    Amount and/or Complexity of Data Reviewed  Labs: ordered. Decision-making details documented in ED Course.  ECG/medicine tests:  Decision-making details documented in ED Course.                                      Clinical Impression:  Final diagnoses:  [R55] Near syncope (Primary)  [N17.9] BEN (acute kidney injury)          ED Disposition Condition    Discharge Stable          ED Prescriptions    None       Follow-up Information    None          Angela Rivera MD  01/28/24 [id number]

## 2024-02-01 ENCOUNTER — NURSE TRIAGE (OUTPATIENT)
Dept: ADMINISTRATIVE | Facility: CLINIC | Age: 79
End: 2024-02-01
Payer: MEDICARE

## 2024-02-01 NOTE — TELEPHONE ENCOUNTER
Pat states she does not need a follow up apt at this time.     She went to the ER due to dehydration. They advised to drink plenty liquids. Ran test all was fine. She is scheduled to do another EKG in march with cardiologist. She will call here if she gets any symptoms.    Patient denied apt at this time for ER follow up

## 2024-02-01 NOTE — TELEPHONE ENCOUNTER
Pt states that she was seen in ED on 1/27/24 with dehydration. Pt asking if f/u appt is needed. AVS reviewed; f/u appt not seen. Pt states that she will f/u on next visit. Advised to receive callback from provider to confirm that f/u isn't needed. Pt denies symptoms at this time. Encounter routed to provider for f/u.     Reason for Disposition   Health information question, no triage required and triager able to answer question    Protocols used: Information Only Call - No Triage-A-[place]

## 2024-02-06 ENCOUNTER — TELEPHONE (OUTPATIENT)
Dept: FAMILY MEDICINE | Facility: CLINIC | Age: 79
End: 2024-02-06
Payer: MEDICARE

## 2024-02-06 ENCOUNTER — NURSE TRIAGE (OUTPATIENT)
Dept: ADMINISTRATIVE | Facility: CLINIC | Age: 79
End: 2024-02-06
Payer: MEDICARE

## 2024-02-06 NOTE — TELEPHONE ENCOUNTER
----- Message from Lucia Fajardo sent at 2/6/2024 10:44 AM CST -----  .Type:  Patient Call Back    Who Called: PT MOM       Does the patient know what this is regarding?: PT CALLED STATING ON 1/3/2024 SHE STOP URINATING FOR 20 HOURS. SHE ALSO WAS IN THE ER FOR DEHYDRATION. SHE STATED AT TIME OF OUR CALL SHE HAS BEEN URINATING FINE    Would the patient rather a call back YES     Best Call Back Number:  [phone]    Additional Information: Thank You

## 2024-02-06 NOTE — TELEPHONE ENCOUNTER
----- Message from Ana Paula Rossi sent at 2/6/2024  2:39 PM CST -----  Contact: pt  Type: Needs Medical Advice  Who Called:  pt  Best Call Back Number: [phone]    Additional Information: Pt is calling the office returning call missed.please call back and advise.

## 2024-02-06 NOTE — TELEPHONE ENCOUNTER
Pt went to the Er a week and half ago for dehydration. Two days ago she went 20 hours without peeing and her ankles where swollen. Pt stated she has been peeing but her ankles and feet are still swollen. Pt stated she has copd and she gets sob with walking. No difference now. Pt stated she is not currently having difficulty breathing. Both feet are swollen but the right is more than the left. Care advice recommend pt come into office now. Pt stated she will go to [facility] because she has to pick her grand daughter up from school.   Reason for Disposition   Thigh, calf, or ankle swelling and bilateral and 1 side is more swollen    Additional Information   Negative: Difficulty breathing   Negative: Entire foot is cool or blue in comparison to other side   Negative: Ankle pain and fever   Negative: Ankle redness and fever   Negative: Patient sounds very sick or weak to the triager   Negative: SEVERE pain (e.g., excruciating, unable to walk) and not improved after 2 hours of pain medicine   Negative: Redness and painful when touched and no fever   Negative: Red area or streak > 2 inches (or 5 cm)   Negative: Thigh or calf pain and only 1 side and present > 1 hour   Negative: Thigh, calf, or ankle swelling and only 1 side    Protocols used: Ankle Swelling-A-OH

## 2024-02-06 NOTE — TELEPHONE ENCOUNTER
----- Message from Maria De Jesus Dumas sent at 2/6/2024 11:59 AM CST -----  Contact: patient  Type:  Patient Returning Call    Who Called:patient     Who Left Message for Patient: Shannan     Does the patient know what this is regarding?: yes     Would the patient rather a call back or a response via [facility]? Call     Best Call Back Number:[phone] (home)      Additional Information:

## 2024-02-06 NOTE — PROGRESS NOTES
Radiology Test Results - Kellie from Radiology Calling with report update    Pt under care of: Dr. Sheikh     Imaging Completed: US kidney and bladder     Significant Findings - Please review     Subjective:       Patient ID: Shirlene Ortiz is a 74 y.o. White female who presents for return patient evaluation for chronic renal failure.    The patient location is:  Patient Home   The chief complaint leading to consultation is: ckd  Visit type: Virtual visit with synchronous audio  Total time spent with patient: 11 minutes  Each patient to whom he or she provides medical services by telemedicine is:  (1) informed of the relationship between the physician and patient and the respective role of any other health care provider with respect to management of the patient; and (2) notified that he or she may decline to receive medical services by telemedicine and may withdraw from such care at any time.     She has no uremic or urinary symptoms and is in her usual state of health.  There have been no recent illnesses, hospitalizations or procedures.        Review of Systems   Constitutional: Negative for appetite change, chills and fever.   HENT: Negative for congestion.    Eyes: Negative for visual disturbance.   Respiratory: Positive for shortness of breath (occasional - active smoker). Negative for cough.    Cardiovascular: Negative for chest pain and leg swelling.   Gastrointestinal: Positive for abdominal distention. Negative for diarrhea, nausea and vomiting.   Genitourinary: Positive for frequency (nocturia q 2 hours). Negative for dysuria and hematuria.   Musculoskeletal: Positive for back pain and gait problem (occasional sciatica). Negative for myalgias.   Skin: Negative for rash.   Neurological: Negative for headaches.   Hematological: Bruises/bleeds easily.   Psychiatric/Behavioral: Negative for confusion and sleep disturbance.       The past medical, family and social histories were reviewed for this encounter.     There were no vitals taken for this visit.    Objective:      Physical Exam   Vitals reviewed.     Assessment:       1. Chronic kidney disease, stage III (moderate)    2. Essential hypertension     3. Diabetic nephropathy associated with type 2 diabetes mellitus        Plan:   Return to clinic in 4 months.  Labs for next visit include rp, upc, pth.  Baseline creatinine is 1.0-1.4 since 2012.  UPC is 0.27.  PTH is 147 with a calcium of 9.2.  Please have patient follow-up with your PCP or Endocrinology regarding the control and management of diabetes.  Continue current medications as prescribed and reviewed.   Blood pressure is controlled on the current regimen.  Renal US shows R 10.6 cm, L 11.2 cm.

## 2024-02-09 ENCOUNTER — TELEPHONE (OUTPATIENT)
Dept: NEPHROLOGY | Facility: CLINIC | Age: 79
End: 2024-02-09
Payer: MEDICARE

## 2024-02-09 NOTE — TELEPHONE ENCOUNTER
----- Message from Patrizia Smart sent at 2/9/2024  9:51 AM CST -----  Contact: pt [phone]  Type: Needs Medical Advice  Who Called:  Pt     Best Call Back Number: [phone]    Additional Information: Pt stated she went to the er two weeks ago because she did not urinate for 24 hrs. Pt asking if she can schedule an appt to f/u. Pt last seen in this dept in 2020. Pls call back and advise

## 2024-02-09 NOTE — TELEPHONE ENCOUNTER
Patient scheduled to see  . Valentina is speaking to the patient regarding symptoms her needing to go the urgent care.

## 2024-02-09 NOTE — TELEPHONE ENCOUNTER
Per patient:  Hospital episode about 2 weeks ago.      A week later She didn't urinate for 20 hours and her feet and ankles became swollen.  She was informed to go to urgent care by PCP but didn't have a ride.    She begane to urine and made a lot of urine and the swelling went down.     The swelling has since resolved and she is making urine well.      She denies shortness of breath at rest, extreme fatigue, N&V, and agrees to go to ER with any concerning s/s  prior to appointment.     Confirmed location.

## 2024-02-12 NOTE — TELEPHONE ENCOUNTER
Care Due:                  Date            Visit Type   Department     Provider  --------------------------------------------------------------------------------                                EP -                              [facility]  Last Visit: 10-      CARE ([facility])   DINA LUCERO                               -                              [facility]  Next Visit: 04-      CARE ([facility])   DINA LUCERO                                                            Last  Test          Frequency    Reason                     Performed    Due Date  --------------------------------------------------------------------------------    HBA1C.......  6 months...  glipiZIDE................  10-   03-    [facility] Embedded Care Due Messages. Reference number: [id number].   2/12/2024 10:20:34 AM CST

## 2024-02-14 RX ORDER — GLIPIZIDE 10 MG/1
TABLET ORAL
Qty: 180 TABLET | Refills: 0 | Status: SHIPPED | OUTPATIENT
Start: 2024-02-14 | End: 2024-04-05 | Stop reason: SDUPTHER

## 2024-02-14 NOTE — TELEPHONE ENCOUNTER
Refill Routing Note   Medication(s) are not appropriate for processing by [facility] for the following reason(s):      Patient seen in ED/Hospital since LOV with provider  Required labs abnormal    [facility] action(s):  Defer Care Due:  Labs due          Pharmacist review requested: Yes     Appointments  past 12m or future 3m with PCP    Date Provider   Last Visit   10/2/2023 Hiram Mendiola MD   Next Visit   4/3/2024 Hiram Mendiola MD   ED visits in past 90 days: 1        Note composed:8:41 AM 02/14/2024

## 2024-02-14 NOTE — TELEPHONE ENCOUNTER
Refill Routing Note   Medication(s) are not appropriate for processing by [facility] for the following reason(s):        ED/Hospital Visit since last OV with provider  Required labs abnormal    [facility] action(s):  Defer     Requires labs : Yes           Pharmacist review requested: Yes   Extended chart review required: Yes     Appointments  past 12m or future 3m with PCP    Date Provider   Last Visit   10/2/2023 Hiram Mendiola MD   Next Visit   4/3/2024 Hiram Mendiola MD   ED visits in past 90 days: 1        Note composed:10:10 AM 02/14/2024

## 2024-02-21 ENCOUNTER — OFFICE VISIT (OUTPATIENT)
Dept: NEPHROLOGY | Facility: CLINIC | Age: 79
End: 2024-02-21
Payer: MEDICARE

## 2024-02-21 ENCOUNTER — TELEPHONE (OUTPATIENT)
Dept: NEPHROLOGY | Facility: CLINIC | Age: 79
End: 2024-02-21

## 2024-02-21 VITALS — HEIGHT: 62 IN | BODY MASS INDEX: 30.89 KG/M2 | DIASTOLIC BLOOD PRESSURE: 60 MMHG | SYSTOLIC BLOOD PRESSURE: 118 MMHG

## 2024-02-21 DIAGNOSIS — E21.3 HYPERPARATHYROIDISM: ICD-10-CM

## 2024-02-21 DIAGNOSIS — E11.00 UNCONTROLLED TYPE 2 DIABETES MELLITUS WITH HYPEROSMOLARITY WITHOUT COMA, WITHOUT LONG-TERM CURRENT USE OF INSULIN: ICD-10-CM

## 2024-02-21 DIAGNOSIS — N17.9 AKI (ACUTE KIDNEY INJURY): ICD-10-CM

## 2024-02-21 DIAGNOSIS — N18.32 STAGE 3B CHRONIC KIDNEY DISEASE: Primary | ICD-10-CM

## 2024-02-21 DIAGNOSIS — E87.20 METABOLIC ACIDOSIS: ICD-10-CM

## 2024-02-21 DIAGNOSIS — I10 ESSENTIAL HYPERTENSION: ICD-10-CM

## 2024-02-21 PROCEDURE — 99999 PR PBB SHADOW E&M-EST. PATIENT-LVL III: CPT | Mod: PBBFAC,,, | Performed by: INTERNAL MEDICINE

## 2024-02-21 PROCEDURE — 3288F FALL RISK ASSESSMENT DOCD: CPT | Mod: CPTII,S$GLB,, | Performed by: INTERNAL MEDICINE

## 2024-02-21 PROCEDURE — 99204 OFFICE O/P NEW MOD 45 MIN: CPT | Mod: S$GLB,,, | Performed by: INTERNAL MEDICINE

## 2024-02-21 PROCEDURE — 3074F SYST BP LT 130 MM HG: CPT | Mod: CPTII,S$GLB,, | Performed by: INTERNAL MEDICINE

## 2024-02-21 PROCEDURE — 3078F DIAST BP <80 MM HG: CPT | Mod: CPTII,S$GLB,, | Performed by: INTERNAL MEDICINE

## 2024-02-21 PROCEDURE — 1101F PT FALLS ASSESS-DOCD LE1/YR: CPT | Mod: CPTII,S$GLB,, | Performed by: INTERNAL MEDICINE

## 2024-02-21 PROCEDURE — 1160F RVW MEDS BY RX/DR IN RCRD: CPT | Mod: CPTII,S$GLB,, | Performed by: INTERNAL MEDICINE

## 2024-02-21 PROCEDURE — 1159F MED LIST DOCD IN RCRD: CPT | Mod: CPTII,S$GLB,, | Performed by: INTERNAL MEDICINE

## 2024-02-21 NOTE — TELEPHONE ENCOUNTER
----- Message from Bailey Pichardo sent at 2/21/2024  7:50 AM CST -----  Contact: self  Pt is running about 10 mins late for her 8 AM appt with all the fog this morning, and they left early.  Call back at [phone] and thanks

## 2024-02-21 NOTE — PROGRESS NOTES
Subjective:    Patient ID: Shirlene Ortiz is a 78 y.o. White female who presents for new patient evaluation for chronic renal failure.    Shirlene Ortiz is referred by Hiram Mendiola MD to be evaluated for chronic renal failure.      Patient was previously seen here 3 years ago for CKD stage IIIB but has since been lost follow-up.  She reports of multiple medical issues related to her chronic smoking as well as dietary noncompliance.      In January she went to a casino in [place] and had presyncopal symptoms prompting her to go to the ER.  There she was noted to have BEN as well as metabolic acidosis.  She was given a 500 cc fluid bolus and elected to be discharged home.  Since that time she went back to the [employer] and reports of being up for 20 hours watching her friend play the slot machines.  During that time she did not urinate and had swelling in her hands and feet.  She reports of good p.o. intake, especially fluids, and was able to urinate after the 20 hours.  She has had no further episodes and has not been back to the [employer] since that time.    She denies obstructive symptoms or NSAID use.    In regards to her diabetes, it is poorly controlled.  She denies any episodes of hypoglycemia while taking glipizide.      Review of Systems   Constitutional:  Negative for chills and fever.   HENT:  Negative for congestion.    Respiratory:  Positive for cough. Negative for shortness of breath.    Cardiovascular:  Negative for chest pain and leg swelling.   Gastrointestinal:  Negative for abdominal pain, diarrhea, nausea and vomiting.   Genitourinary:  Negative for difficulty urinating, dysuria and hematuria.   Musculoskeletal:  Negative for myalgias.   Neurological:  Negative for headaches.   Hematological:  Does not bruise/bleed easily.       The past medical, family and social histories were reviewed for this encounter.     Past Medical History:   Diagnosis Date    Acute coronary artery obstruction without  MI     14 stents    Anemia 08/27/2017    Arthritis     Colon polyp     COPD (chronic obstructive pulmonary disease) 08/25/2017    Diverticulosis of colon     DM (diabetes mellitus)     Essential hypertension     Fatty liver     Hepatomegaly     HTN (hypertension)     Hyperlipidemia     Mixed hyperlipidemia     NSTEMI (non-ST elevated myocardial infarction) 08/17/2017 11/17    Pacemaker 06/25/2012    left chest PACEMAKER [employer] SEDR01 Sensia   S/N:[id number] [employer] 8/3/2010 - current   right atrium LEAD [employer] 5076 CapSure Fix Novus  S/N:[id number] [employer] Link 8/3/2010 - current   right ventricle LEAD [employer] 5076 CapSure Fix Novus  S/N:[id number] [employer] Link 8/3/2010 - current     PVD (peripheral vascular disease)     S/P CABG (coronary artery bypass graft) 09/14/2017 8/17 CABG x5 LIMA-LAD, VG-PDA, VG-D, YVG-OM-PLB  mammary artery to left anterior descending coronary artery and separate segments  of saphenous vein from the aorta to the posterior descending coronary artery  and from the aorta to the diagonal coronary artery and from the aorta to the  obtuse marginal coronary artery and to the posterolateral branch of the  circumflex in a sequential fashion using a combination of antegrade and  retrograde cardioplegia with mild systemic hypothermia and endoscopic vein  harvest.    S/P coronary artery stent placement     11/17 proximal circumflex using a 2.5 x 38, post-dilated to 3.0 with 0% residual stenosis.    Sinus node dysfunction     Stroke 07/04/2014    Tobacco abuse 08/15/2017    Uncontrolled type 2 diabetes mellitus with hyperosmolarity without coma, without long-term current use of insulin     Documented by Maury on 10/26/15.     Past Surgical History:   Procedure Laterality Date    ABDOMINAL SURGERY      APPENDECTOMY      CARDIAC PACEMAKER PLACEMENT      CHOLECYSTECTOMY      COLONOSCOPY  04/27/2015    Dr. Marinelli: 2 colon polyps removed (one not completely removed), hemorrhoids; repeat in  6 weeks; biopsy: ADENOMATOUS POLYPS    COLONOSCOPY N/A 8/5/2020    Procedure: COLONOSCOPY;  Surgeon: Alonzo Marinelli MD;  Location: [facility];  Service: Endoscopy;  Laterality: N/A;    COLONOSCOPY N/A 11/4/2020    Procedure: COLONOSCOPY;  Surgeon: Theodore Zimmerman MD;  Location: [facility] (2ND FLR); multiple colon polyps removed, 1 large polyp removed via piecemeal, incomplete resection. Repeat colonoscopy in 6 months for surveillance after piecemeal polypectomy; biopsy: Tubular adenoma (low grade dysplasia)    COLONOSCOPY N/A 4/6/2022    Procedure: COLONOSCOPY;  Surgeon: Alonzo Marinelli MD;  Location: [facility];  Service: Endoscopy;  Laterality: N/A;    coranary stent      CORONARY ANGIOPLASTY      CORONARY ARTERY BYPASS GRAFT      EPIDURAL STEROID INJECTION INTO LUMBAR SPINE N/A 4/19/2021    Procedure: Injection-steroid-epidural-lumbar L5/S1;  Surgeon: Genaro Lugo MD;  Location: [facility] OR;  Service: Pain Management;  Laterality: N/A;    EPIDURAL STEROID INJECTION INTO LUMBAR SPINE N/A 4/5/2022    Procedure: Injection-steroid-epidural-lumbar L5/S1 to the left;  Surgeon: Genaro Lugo MD;  Location: [facility] OR;  Service: Pain Management;  Laterality: N/A;    ESOPHAGOGASTRODUODENOSCOPY N/A 3/9/2020    Procedure: ESOPHAGOGASTRODUODENOSCOPY (EGD);  Surgeon: Alonzo Marinelli MD;  Location: [facility];  Service: Endoscopy;  Laterality: N/A; Mild Schatzki ring. Biopsied. Dilated. gastritis; biopsy: stomach- chemical/reactive gastropathy, esophagus- Squamocolumnar mucosa with focal intestinal metaplasia and low-grade dysplasia    ESOPHAGOGASTRODUODENOSCOPY N/A 8/5/2020    Procedure: EGD (ESOPHAGOGASTRODUODENOSCOPY);  Surgeon: Alonzo Marinelli MD;  Location: [facility];  Service: Endoscopy;  Laterality: N/A;    ESOPHAGOGASTRODUODENOSCOPY N/A 3/22/2021    Procedure: EGD (ESOPHAGOGASTRODUODENOSCOPY);  Surgeon: Alonzo Marinelli MD;  Location: [facility];  Service: Endoscopy;  Laterality: N/A; negative for  "phillips's    ESOPHAGOGASTRODUODENOSCOPY N/A 6/30/2021    Procedure: EGD (ESOPHAGOGASTRODUODENOSCOPY);  Surgeon: Alonzo Marinelli MD;  Location: [facility] ENDO; Esophageal mucosal changes suspicious for short-segment Phillips's esophagus; gastritis    ILIAC ARTERY STENT      POLYPECTOMY      REPLACEMENT OF DUAL CHAMBER PACEMAKER GENERATOR N/A 11/3/2022    Procedure: REPLACEMENT, PULSE GENERATOR OF DUAL CHAMBER PACEMAKER - [employer] - WATSON NOTIFIED;  Surgeon: Baldemar Espinoza MD;  Location: [facility] CATH;  Service: Cardiology;  Laterality: N/A;    TOTAL ABDOMINAL HYSTERECTOMY       Social History     Socioeconomic History    Marital status: Single   Tobacco Use    Smoking status: Every Day     Current packs/day: 1.50     Average packs/day: 1.5 packs/day for 60.0 years (90.0 ttl pk-yrs)     Types: Cigarettes    Smokeless tobacco: Never    Tobacco comments:     Tobacco treatment specialist consulted.   Substance and Sexual Activity    Alcohol use: No    Drug use: No     Current Outpatient Medications   Medication Sig    ACCU-CHEK ISRA[place] CONTROL SOLN Soln     ACCU-CHEK [place] PLUS METER Misc USE AS DIRECTED    ACCU-CHEK [place] PLUS TEST STRP Strp TEST 2 TO 4 TIMES EVERY DAY     amLODIPine (NORVASC) 10 MG tablet TAKE 1 TABLET EVERY EVENING    aspirin (ECOTRIN) 81 MG EC tablet Take 325 mg by mouth once daily.    atorvastatin (LIPITOR) 20 MG tablet TAKE 1 TABLET EVERY DAY    BD ALCOHOL SWABS PadM     carvediloL (COREG) 12.5 MG tablet Take 1 tablet (12.5 mg total) by mouth 2 (two) times daily.    clopidogreL (PLAVIX) 75 mg tablet TAKE 1 TABLET EVERY DAY    ezetimibe (ZETIA) 10 mg tablet TAKE 1 TABLET EVERY DAY    glipiZIDE (GLUCOTROL) 10 MG tablet TAKE 1 TABLET TWICE DAILY    lancets Misc To check BG 6 times daily, to use with insurance preferred meter    pantoprazole (PROTONIX) 40 MG tablet TAKE 1 TABLET ONE TIME DAILY    pen needle, diabetic (BD DEAN 2ND GEN PEN NEEDLE) 32 gauge x 5/32" Ndle Use as directed with insulin " "pen. Diagnosis Code E11.65    valsartan (DIOVAN) 320 MG tablet TAKE 1 TABLET EVERY DAY     Current Facility-Administered Medications   Medication    sodium chloride 0.9% flush 10 mL     /60 (BP Location: Left arm, Patient Position: Sitting, BP Method: Large (Manual))   Ht 5' 2" (1.575 m)   BMI 30.89 kg/m²     Objective:      Physical Exam  Vitals reviewed.   Constitutional:       General: She is not in acute distress.     Appearance: She is well-developed.   HENT:      Head: Normocephalic and atraumatic.   Eyes:      General: No scleral icterus.     Conjunctiva/sclera: Conjunctivae normal.   Neck:      Vascular: No JVD.   Cardiovascular:      Rate and Rhythm: Normal rate and regular rhythm.      Heart sounds: Normal heart sounds. No murmur heard.     No friction rub. No gallop.   Pulmonary:      Effort: Pulmonary effort is normal. No respiratory distress.      Breath sounds: Normal breath sounds. No wheezing or rales.   Abdominal:      General: Bowel sounds are normal. There is no distension.      Palpations: Abdomen is soft.      Tenderness: There is no abdominal tenderness.   Musculoskeletal:      Right lower leg: No edema.      Left lower leg: No edema.   Skin:     General: Skin is warm and dry.      Findings: No rash.   Neurological:      Mental Status: She is alert and oriented to person, place, and time.         Assessment:       1. Stage 3b chronic kidney disease    2. BEN (acute kidney injury)    3. Metabolic acidosis    4. Uncontrolled type 2 diabetes mellitus with hyperosmolarity without coma, without long-term current use of insulin    5. Hyperparathyroidism    6. Essential hypertension        Lab Results   Component Value Date    CREATININE 2.5 (H) 01/27/2024    BUN 36 (H) 01/27/2024     01/27/2024    K 4.4 01/27/2024     01/27/2024    CO2 12 (L) 01/27/2024     Lab Results   Component Value Date    .0 (H) 09/21/2020    CALCIUM 9.0 01/27/2024    CAION 1.25 08/22/2017    PHOS 4.3 " 09/21/2020     Lab Results   Component Value Date    HCT 31.5 (L) 01/27/2024     Prot/Creat Ratio, Urine   Date Value Ref Range Status   09/21/2020 0.30 (H) 0.00 - 0.20 Final   11/06/2019 0.27 (H) 0.00 - 0.20 Final       Plan:   Return to clinic in one month to review lab work and ensure BEN and metabolic acidosis have resolved.  Labs for next visit include rfp, upc, pth.  Baseline creatinine is 1.2-1.4 since 2018.  Renal US shows R 10.6 cm, L 11.2 cm.   Please have patient follow-up with your PCP or Endocrinology regarding the control and management of diabetes.  Continue current medications as prescribed and reviewed.   Blood pressure is controlled on the current regimen.

## 2024-03-06 ENCOUNTER — HOSPITAL ENCOUNTER (OUTPATIENT)
Dept: CARDIOLOGY | Facility: HOSPITAL | Age: 79
Discharge: HOME | End: 2024-03-06
Attending: INTERNAL MEDICINE
Payer: MEDICARE

## 2024-03-06 VITALS — HEIGHT: 62 IN | BODY MASS INDEX: 30.91 KG/M2 | WEIGHT: 168 LBS

## 2024-03-06 DIAGNOSIS — E78.2 MIXED HYPERLIPIDEMIA: ICD-10-CM

## 2024-03-06 DIAGNOSIS — I73.9 PVD (PERIPHERAL VASCULAR DISEASE): ICD-10-CM

## 2024-03-06 DIAGNOSIS — I10 ESSENTIAL HYPERTENSION: ICD-10-CM

## 2024-03-06 DIAGNOSIS — I49.5 SINUS NODE DYSFUNCTION: ICD-10-CM

## 2024-03-06 DIAGNOSIS — Z95.0 PACEMAKER: ICD-10-CM

## 2024-03-06 DIAGNOSIS — Z95.5 S/P CORONARY ARTERY STENT PLACEMENT: ICD-10-CM

## 2024-03-06 DIAGNOSIS — Z95.1 S/P CABG (CORONARY ARTERY BYPASS GRAFT): ICD-10-CM

## 2024-03-06 PROCEDURE — 93306 TTE W/DOPPLER COMPLETE: CPT | Mod: PO

## 2024-03-06 PROCEDURE — 93306 TTE W/DOPPLER COMPLETE: CPT | Mod: 26,,, | Performed by: INTERNAL MEDICINE

## 2024-03-07 LAB
ASCENDING AORTA: 3.14 CM
AV INDEX (PROSTH): 0.6
AV MEAN GRADIENT: 6 MMHG
AV PEAK GRADIENT: 12 MMHG
AV VALVE AREA BY VELOCITY RATIO: 1.85 CM²
AV VALVE AREA: 1.89 CM²
AV VELOCITY RATIO: 0.59
BSA FOR ECHO PROCEDURE: 1.83 M2
CV ECHO LV RWT: 0.4 CM
DOP CALC AO PEAK VEL: 1.71 M/S
DOP CALC AO VTI: 39.5 CM
DOP CALC LVOT AREA: 3.1 CM2
DOP CALC LVOT DIAMETER: 2 CM
DOP CALC LVOT PEAK VEL: 1.01 M/S
DOP CALC LVOT STROKE VOLUME: 74.73 CM3
DOP CALCLVOT PEAK VEL VTI: 23.8 CM
E WAVE DECELERATION TIME: 229.98 MSEC
E/A RATIO: 0.92
E/E' RATIO: 19.64 M/S
ECHO LV POSTERIOR WALL: 1.09 CM (ref 0.6–1.1)
FRACTIONAL SHORTENING: 30 % (ref 28–44)
INTERVENTRICULAR SEPTUM: 1.15 CM (ref 0.6–1.1)
LEFT ATRIUM SIZE: 4.43 CM
LEFT ATRIUM VOLUME INDEX MOD: 35.5 ML/M2
LEFT ATRIUM VOLUME MOD: 62.84 CM3
LEFT INTERNAL DIMENSION IN SYSTOLE: 3.88 CM (ref 2.1–4)
LEFT VENTRICLE DIASTOLIC VOLUME INDEX: 83.48 ML/M2
LEFT VENTRICLE DIASTOLIC VOLUME: 147.76 ML
LEFT VENTRICLE MASS INDEX: 141 G/M2
LEFT VENTRICLE SYSTOLIC VOLUME INDEX: 36.7 ML/M2
LEFT VENTRICLE SYSTOLIC VOLUME: 65 ML
LEFT VENTRICULAR INTERNAL DIMENSION IN DIASTOLE: 5.51 CM (ref 3.5–6)
LEFT VENTRICULAR MASS: 248.7 G
LV LATERAL E/E' RATIO: 21.6 M/S
LV SEPTAL E/E' RATIO: 18 M/S
LVOT MG: 1.91 MMHG
LVOT MV: 0.65 CM/S
MV PEAK A VEL: 1.18 M/S
MV PEAK E VEL: 1.08 M/S
PISA TR MAX VEL: 2.8 M/S
PULM VEIN S/D RATIO: 0.93
PV PEAK D VEL: 0.6 M/S
PV PEAK S VEL: 0.56 M/S
RA PRESSURE ESTIMATED: 3 MMHG
RIGHT VENTRICULAR END-DIASTOLIC DIMENSION: 4.41 CM
RIGHT VENTRICULAR LENGTH IN DIASTOLE (APICAL 4-CHAMBER VIEW): 7.24 CM
RV MID DIAMA: 2.65 CM
RV TB RVSP: 6 MMHG
RV TISSUE DOPPLER FREE WALL SYSTOLIC VELOCITY 1 (APICAL 4 CHAMBER VIEW): 13.14 CM/S
SINUS: 3.26 CM
STJ: 3.05 CM
TDI LATERAL: 0.05 M/S
TDI SEPTAL: 0.06 M/S
TDI: 0.06 M/S
TR MAX PG: 31 MMHG
TRICUSPID ANNULAR PLANE SYSTOLIC EXCURSION: 2.12 CM
TV REST PULMONARY ARTERY PRESSURE: 34 MMHG
Z-SCORE OF LEFT VENTRICULAR DIMENSION IN END DIASTOLE: 1.19
Z-SCORE OF LEFT VENTRICULAR DIMENSION IN END SYSTOLE: 1.97

## 2024-03-15 ENCOUNTER — TELEPHONE (OUTPATIENT)
Dept: CARDIOLOGY | Facility: CLINIC | Age: 79
End: 2024-03-15
Payer: MEDICARE

## 2024-03-15 DIAGNOSIS — Z95.0 PACEMAKER: Primary | ICD-10-CM

## 2024-03-15 NOTE — TELEPHONE ENCOUNTER
----- Message from Sharifa Bridgesazael sent at 3/15/2024  7:06 AM CDT -----  Type:  Sooner Appointment Request    Caller is requesting a sooner appointment.  Caller declined first available appointment listed below.  Caller will not accept being placed on the waitlist and is requesting a message be sent to doctor.    Name of Caller:  pt  When is the first available appointment? September  Symptoms:  Annual f/u w/ECHO  Would the patient rather a call back or a response via [facility]? Call  Best Call Back Number:  [phone]  Additional Information:  Please call back to advise. Thanks!

## 2024-03-15 NOTE — TELEPHONE ENCOUNTER
----- Message from Shahla Mcdonald sent at 3/15/2024 10:36 AM CDT -----  Regarding: ret call  Contact: Shirlene [phone]  Type:  Patient Returning Call    Who Called:  Shirlene    Who Left Message for Patient:  Christin    Does the patient know what this is regarding?:  no    Best Call Back Number:  [phone]    Additional Information:  missed call, her phone was on silent. Its on now, please call to advise.

## 2024-03-16 ENCOUNTER — HOSPITAL ENCOUNTER (OUTPATIENT)
Dept: CARDIOLOGY | Facility: HOSPITAL | Age: 79
Discharge: HOME | End: 2024-03-16
Attending: INTERNAL MEDICINE
Payer: MEDICARE

## 2024-03-16 ENCOUNTER — CLINICAL SUPPORT (OUTPATIENT)
Dept: CARDIOLOGY | Facility: HOSPITAL | Age: 79
End: 2024-03-16
Payer: MEDICARE

## 2024-03-16 DIAGNOSIS — Z95.0 PRESENCE OF CARDIAC PACEMAKER: ICD-10-CM

## 2024-03-16 PROCEDURE — 93296 REM INTERROG EVL PM/IDS: CPT | Mod: PO | Performed by: INTERNAL MEDICINE

## 2024-03-16 PROCEDURE — 93294 REM INTERROG EVL PM/LDLS PM: CPT | Mod: ,,, | Performed by: INTERNAL MEDICINE

## 2024-03-26 LAB
OHS CV AF BURDEN PERCENT: < 1
OHS CV DC REMOTE DEVICE TYPE: NORMAL
OHS CV ICD SHOCK: NO
OHS CV RV PACING PERCENT: 31.93 %

## 2024-03-27 ENCOUNTER — HOSPITAL ENCOUNTER (OUTPATIENT)
Dept: RADIOLOGY | Facility: HOSPITAL | Age: 79
Discharge: HOME | End: 2024-03-27
Attending: INTERNAL MEDICINE
Payer: MEDICARE

## 2024-03-27 DIAGNOSIS — R91.8 LUNG NODULES: ICD-10-CM

## 2024-03-27 PROCEDURE — 71250 CT THORAX DX C-: CPT | Mod: 26,,, | Performed by: RADIOLOGY

## 2024-03-27 PROCEDURE — 71250 CT THORAX DX C-: CPT | Mod: TC,PO

## 2024-04-03 ENCOUNTER — OFFICE VISIT (OUTPATIENT)
Dept: FAMILY MEDICINE | Facility: CLINIC | Age: 79
End: 2024-04-03
Payer: MEDICARE

## 2024-04-03 ENCOUNTER — HOSPITAL ENCOUNTER (OUTPATIENT)
Dept: CARDIOLOGY | Facility: HOSPITAL | Age: 79
Discharge: HOME | End: 2024-04-03
Attending: INTERNAL MEDICINE
Payer: MEDICARE

## 2024-04-03 VITALS
HEIGHT: 62 IN | SYSTOLIC BLOOD PRESSURE: 138 MMHG | BODY MASS INDEX: 28.52 KG/M2 | OXYGEN SATURATION: 99 % | WEIGHT: 155 LBS | HEART RATE: 65 BPM | DIASTOLIC BLOOD PRESSURE: 68 MMHG

## 2024-04-03 DIAGNOSIS — I25.119 ATHEROSCLEROSIS OF NATIVE CORONARY ARTERY OF NATIVE HEART WITH ANGINA PECTORIS: ICD-10-CM

## 2024-04-03 DIAGNOSIS — I10 ESSENTIAL HYPERTENSION: ICD-10-CM

## 2024-04-03 DIAGNOSIS — E78.2 MIXED HYPERLIPIDEMIA: ICD-10-CM

## 2024-04-03 DIAGNOSIS — N18.4 CHRONIC KIDNEY DISEASE (CKD), STAGE IV (SEVERE): ICD-10-CM

## 2024-04-03 DIAGNOSIS — E11.65 UNCONTROLLED TYPE 2 DIABETES MELLITUS WITH HYPERGLYCEMIA: ICD-10-CM

## 2024-04-03 DIAGNOSIS — J43.1 PANLOBULAR EMPHYSEMA: ICD-10-CM

## 2024-04-03 DIAGNOSIS — Z00.00 ROUTINE PHYSICAL EXAMINATION: Primary | ICD-10-CM

## 2024-04-03 DIAGNOSIS — Z95.0 PACEMAKER: ICD-10-CM

## 2024-04-03 PROCEDURE — G0439 PPPS, SUBSEQ VISIT: HCPCS | Mod: S$GLB,,, | Performed by: INTERNAL MEDICINE

## 2024-04-03 PROCEDURE — 3288F FALL RISK ASSESSMENT DOCD: CPT | Mod: CPTII,S$GLB,, | Performed by: INTERNAL MEDICINE

## 2024-04-03 PROCEDURE — 1159F MED LIST DOCD IN RCRD: CPT | Mod: CPTII,S$GLB,, | Performed by: INTERNAL MEDICINE

## 2024-04-03 PROCEDURE — 3078F DIAST BP <80 MM HG: CPT | Mod: CPTII,S$GLB,, | Performed by: INTERNAL MEDICINE

## 2024-04-03 PROCEDURE — 1160F RVW MEDS BY RX/DR IN RCRD: CPT | Mod: CPTII,S$GLB,, | Performed by: INTERNAL MEDICINE

## 2024-04-03 PROCEDURE — 99214 OFFICE O/P EST MOD 30 MIN: CPT | Mod: 25,S$GLB,, | Performed by: INTERNAL MEDICINE

## 2024-04-03 PROCEDURE — 99999 PR PBB SHADOW E&M-EST. PATIENT-LVL IV: CPT | Mod: PBBFAC,,, | Performed by: INTERNAL MEDICINE

## 2024-04-03 PROCEDURE — 3075F SYST BP GE 130 - 139MM HG: CPT | Mod: CPTII,S$GLB,, | Performed by: INTERNAL MEDICINE

## 2024-04-03 PROCEDURE — 1125F AMNT PAIN NOTED PAIN PRSNT: CPT | Mod: CPTII,S$GLB,, | Performed by: INTERNAL MEDICINE

## 2024-04-03 PROCEDURE — 1101F PT FALLS ASSESS-DOCD LE1/YR: CPT | Mod: CPTII,S$GLB,, | Performed by: INTERNAL MEDICINE

## 2024-04-03 PROCEDURE — 93280 PM DEVICE PROGR EVAL DUAL: CPT | Mod: 26,,, | Performed by: INTERNAL MEDICINE

## 2024-04-03 PROCEDURE — 93280 PM DEVICE PROGR EVAL DUAL: CPT | Mod: PO

## 2024-04-03 RX ORDER — LINAGLIPTIN 5 MG/1
5 TABLET, FILM COATED ORAL DAILY
Qty: 90 TABLET | Refills: 3 | Status: SHIPPED | OUTPATIENT
Start: 2024-04-03 | End: 2024-04-03 | Stop reason: SDUPTHER

## 2024-04-03 RX ORDER — LINAGLIPTIN 5 MG/1
5 TABLET, FILM COATED ORAL DAILY
Qty: 90 TABLET | Refills: 3 | Status: SHIPPED | OUTPATIENT
Start: 2024-04-03 | End: 2024-04-05 | Stop reason: SDUPTHER

## 2024-04-03 NOTE — PROGRESS NOTES
Subjective:       Patient ID: Shirlene Ortiz is a 78 y.o. female.  Chief Complaint: Health Maintenance and Annual Exam     HPI      HRA: patient feels overall is healthy.  Psychosocial and behavioral risks discussed.  BMI - 28  Weight loss discussed.   Diet - well balanced.   ADL: self sufficient in all  Instrumental ADL: patient is able to manage things like their medications and finances.    Memory or cognitive function - Patient has no issues with either   Ambulates normal. No recent falls.  Exercise - none   Depression screening is negative.  Hearing--+ deficits.  Vision - + deficits.   Incontinence - none    Preventative health needs discussed and patient was given a printed list of what they have received and what they will need with in the next 5-10 years.  Screening schedule reviewed with patient  Colonoscopy - 2022  Covid - complete   Pneumovax -  complete   Prevnar -  complete   Shingrix - pending  Flu vaccine -  complete   Mammogram - complete   PAP - done by gyn yearly.   DEXA -  complete     Advanced Care directive: Patient will address advanced care directives on their own at a later time.   I have reviewed and updated the patient's current list of providers.       The patient is not currently on opioids              In addition to the patient's preventative review and discussion today, the patient also has other issues to discuss today with a separate summary of plan below:         Lung nodules.  repeat 4/24 = no change.  Next due 4/25  Previous 9/2022 showed multiple with a 8 mm nodule.      Low back pain with sciatica - uncontrolled s/p 2 steroid injections.     Jonas's esophagus.  EGD 2020   Normocytic anemia - likely ACD.  FOBT x3 neg.  Stable Hb at 10.       DM - uncontrolled   HTN - uncontrolled; takes 1/2 10 mg amlodipine daily; if BP low, will take 1/4 of a 10 mg per Dr Sandoval's instructions.   HLD - controlled ldl goal < 70; high triglycerides;   Smoke abuse - advised, but does not  want to quit  CKD IV - in 20s. Seeing renal.  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  Saw cardiology who feels the same.    Saw Dr Lugo.  Scared to go to PT.  Needs imaging.  Pacemaker not compatible with MRI, so he wanted a lumbar CT but has not happened yet.    Recommended Tylenol and if nw, tramadol.  She has no relief from Tylenol and is now open to trying tramadol.   Recall:  Complains of b/l low back pain radiating to her b/l posterior thighs and calves.  Occurs with standing and walking.  She frequently has to stop walking to get relief.  Sitting makes it better.  Leaning forward (shopping cart) improves it.     CT of abdomin showed severe stenosis in LE.  Has appointment with Dr Loo 9/1.    CAD s/p CABG 8/17 after an NSTEMI;  Lexiscan 2020 good.   Thoracic aneurysm - stable; cardiology  PVD - 3 new stents in left leg 12/2016; Mild/moderate PAD by ultrasound per cardiology.    13 stents in total    Assessment:       1. Routine physical examination    2. Uncontrolled type 2 diabetes mellitus with hyperglycemia    3. Essential hypertension    4. Mixed hyperlipidemia    5. Panlobular emphysema    6. Atherosclerosis of native coronary artery of native heart with angina pectoris    7. Chronic kidney disease (CKD), stage IV (severe)        Plan:       Routine physical examination    Uncontrolled type 2 diabetes mellitus with hyperglycemia  -     Discontinue: linaGLIPtin (TRADJENTA) 5 mg Tab tablet; Take 1 tablet (5 mg total) by mouth once daily.  Dispense: 90 tablet; Refill: 3  -     linaGLIPtin (TRADJENTA) 5 mg Tab tablet; Take 1 tablet (5 mg total) by mouth once daily.  Dispense: 90 tablet; Refill: 3  -     Hemoglobin A1C; Future; Expected date: 09/30/2024    Essential hypertension  -     Comprehensive Metabolic Panel; Future; Expected date: 09/30/2024    Mixed hyperlipidemia  -     Lipid Panel; Future; Expected date: 09/30/2024    Panlobular emphysema    Atherosclerosis of native  "coronary artery of native heart with angina pectoris    Chronic kidney disease (CKD), stage IV (severe)            Wellness reviewed          Continue current management and monitor.  Other diagnoses were reviewed and found stable and will continue to monitor.  Counseled on regular exercise, maintenance of a healthy weight, balanced diet rich in fruits/vegetables and lean protein, and avoidance of unhealthy habits like smoking and excessive alcohol intake.   Also, counseled on importance of being compliant with medication, health appointments, diet and exercise.     Follow up in about 6 months (around 10/3/2024).      Medication List with Changes/Refills   New Medications    LINAGLIPTIN (TRADJENTA) 5 MG TAB TABLET    Take 1 tablet (5 mg total) by mouth once daily.   Current Medications    ACCU-CHEK ISRAEL CONTROL SOLN SOLN        ACCU-CHEK ISRAEL PLUS METER MISC    USE AS DIRECTED    ACCU-CHEK ISRAEL PLUS TEST STRP STRP    TEST 2 TO 4 TIMES EVERY DAY     AMLODIPINE (NORVASC) 10 MG TABLET    TAKE 1 TABLET EVERY EVENING    ASPIRIN (ECOTRIN) 81 MG EC TABLET    Take 325 mg by mouth once daily.    ATORVASTATIN (LIPITOR) 20 MG TABLET    TAKE 1 TABLET EVERY DAY    BD ALCOHOL SWABS PADM        CARVEDILOL (COREG) 12.5 MG TABLET    Take 1 tablet (12.5 mg total) by mouth 2 (two) times daily.    CLOPIDOGREL (PLAVIX) 75 MG TABLET    TAKE 1 TABLET EVERY DAY    EZETIMIBE (ZETIA) 10 MG TABLET    TAKE 1 TABLET EVERY DAY    GLIPIZIDE (GLUCOTROL) 10 MG TABLET    TAKE 1 TABLET TWICE DAILY    LANCETS MISC    To check BG 6 times daily, to use with insurance preferred meter    PANTOPRAZOLE (PROTONIX) 40 MG TABLET    TAKE 1 TABLET ONE TIME DAILY    PEN NEEDLE, DIABETIC (BD DEAN 2ND GEN PEN NEEDLE) 32 GAUGE X 5/32" NDLE    Use as directed with insulin pen. Diagnosis Code E11.65    VALSARTAN (DIOVAN) 320 MG TABLET    TAKE 1 TABLET EVERY DAY       BP Readings from Last 3 Encounters:   04/03/24 138/68   02/21/24 118/60   01/27/24 139/75 "     Hemoglobin A1C   Date Value Ref Range Status   03/27/2024 7.7 (H) 4.0 - 5.6 % Final     Comment:     ADA Screening Guidelines:  5.7-6.4%  Consistent with prediabetes  >or=6.5%  Consistent with diabetes    High levels of fetal hemoglobin interfere with the HbA1C  assay. Heterozygous hemoglobin variants (HbS, HgC, etc)do  not significantly interfere with this assay.   However, presence of multiple variants may affect accuracy.     03/06/2024 8.0 (H) 4.0 - 5.6 % Final     Comment:     ADA Screening Guidelines:  5.7-6.4%  Consistent with prediabetes  >or=6.5%  Consistent with diabetes    High levels of fetal hemoglobin interfere with the HbA1C  assay. Heterozygous hemoglobin variants (HbS, HgC, etc)do  not significantly interfere with this assay.   However, presence of multiple variants may affect accuracy.     10/02/2023 9.2 (H) 4.0 - 5.6 % Final     Comment:     ADA Screening Guidelines:  5.7-6.4%  Consistent with prediabetes  >or=6.5%  Consistent with diabetes    High levels of fetal hemoglobin interfere with the HbA1C  assay. Heterozygous hemoglobin variants (HbS, HgC, etc)do  not significantly interfere with this assay.   However, presence of multiple variants may affect accuracy.       Lab Results   Component Value Date    TSH 2.268 03/29/2022     Lab Results   Component Value Date    LDLCALC 71.4 03/27/2024    LDLCALC 67.0 03/30/2023    LDLCALC 65.4 03/29/2022     Lab Results   Component Value Date    TRIG 183 (H) 03/27/2024    TRIG 230 (H) 03/30/2023    TRIG 178 (H) 03/29/2022     Wt Readings from Last 3 Encounters:   04/03/24 70.3 kg (154 lb 15.7 oz)   03/06/24 76.2 kg (168 lb)   10/02/23 76.6 kg (168 lb 14 oz)     Lab Results   Component Value Date    HGB 10.2 (L) 01/27/2024    HCT 31.5 (L) 01/27/2024    WBC 9.51 01/27/2024    ALT 7 (L) 03/27/2024    AST 11 03/27/2024     03/27/2024    K 4.3 03/27/2024    CREATININE 2.4 (H) 03/27/2024           Review of Systems   Constitutional:  Negative for  diaphoresis and fever.   HENT:  Negative for drooling and nosebleeds.    Eyes:  Negative for discharge and redness.   Respiratory:  Positive for shortness of breath. Negative for apnea and choking.    Cardiovascular:  Negative for chest pain and palpitations.   Gastrointestinal:  Negative for abdominal pain and nausea.   Skin:  Negative for color change.   Neurological:  Negative for seizures and syncope.   Psychiatric/Behavioral:  Negative for behavioral problems.            Objective:      Vitals:    04/03/24 0949   BP: 138/68   Pulse: 65     Physical Exam  Vitals reviewed.   Constitutional:       Appearance: Normal appearance.   Eyes:      Conjunctiva/sclera: Conjunctivae normal.   Cardiovascular:      Rate and Rhythm: Normal rate.   Pulmonary:      Effort: Pulmonary effort is normal.      Breath sounds: Normal breath sounds.   Musculoskeletal:      Cervical back: Normal range of motion.   Skin:     General: Skin is warm and dry.   Neurological:      Mental Status: She is alert.      Cranial Nerves: Cranial nerve deficit: grossly intact.   Psychiatric:      Comments: Alert and orientated

## 2024-04-04 DIAGNOSIS — I25.10 CORONARY ARTERY DISEASE INVOLVING NATIVE CORONARY ARTERY OF NATIVE HEART WITHOUT ANGINA PECTORIS: Primary | ICD-10-CM

## 2024-04-04 DIAGNOSIS — E11.65 UNCONTROLLED TYPE 2 DIABETES MELLITUS WITH HYPERGLYCEMIA: ICD-10-CM

## 2024-04-04 DIAGNOSIS — K22.710 BARRETT'S ESOPHAGUS WITH LOW GRADE DYSPLASIA: ICD-10-CM

## 2024-04-04 DIAGNOSIS — I10 ESSENTIAL HYPERTENSION: ICD-10-CM

## 2024-04-04 RX ORDER — CLOPIDOGREL BISULFATE 75 MG/1
75 TABLET ORAL DAILY
Qty: 90 TABLET | Refills: 0 | Status: ON HOLD | OUTPATIENT
Start: 2024-04-04 | End: 2024-10-01

## 2024-04-04 NOTE — TELEPHONE ENCOUNTER
No care due was identified.  [facility] Embedded Care Due Messages. Reference number: [id number].   4/04/2024 10:55:53 AM CDT

## 2024-04-04 NOTE — TELEPHONE ENCOUNTER
----- Message from Roxanne Rudolph sent at 4/4/2024 11:13 AM CDT -----  Type:  RX Refill Request    Who Called:  Dorota from [facility]    Refill or New Rx:  refill    RX Name and Strength:    carvediloL (COREG) 12.5 MG tablet  glipiZIDE (GLUCOTROL) 10 MG tablet  linaGLIPtin (TRADJENTA) 5 mg Tab tablet  valsartan (DIOVAN) 320 MG tablet    How is the patient currently taking it? (ex. 1XDay):  as directed    Is this a 30 day or 90 day RX:  90    Preferred Pharmacy with phone number:    [facility] - [place] [address] - [facility] AT [address] to [facility]  [place] [address]  Phone: [phone] Fax: [phone]    Local or Mail Order:  Mail order    Ordering Provider:  Dr Mendiola    Would the patient rather a call back or a response via [facility]?  Call back    Best Call Back Number:  [phone]    Additional Information:  Changing from [facility] to [facility].  Please call back to advise. Thanks!

## 2024-04-04 NOTE — TELEPHONE ENCOUNTER
----- Message from Roxanne Rudolph sent at 4/4/2024 11:18 AM CDT -----  Type:  RX Refill Request    Who Called:  Dorota from [facility]    Refill or New Rx:  refill    RX Name and Strength:  clopidogreL (PLAVIX) 75 mg tablet    How is the patient currently taking it? (ex. 1XDay):  as directed    Is this a 30 day or 90 day RX:  90    Preferred Pharmacy with phone number:    [facility] - [place] - [facility] AT [address] to [facility]  [place] [address]  Phone: [phone] Fax: [phone]    Local or Mail Order:  Mail Order    Ordering Provider:  Dr Espinoza    Would the patient rather a call back or a response via [facility]?  Call back    Best Call Back Number:  [phone]    Additional Information:  Pt is changing pharmacy to [facility] instead of [facility].   Please call back to advise. Thanks!

## 2024-04-04 NOTE — TELEPHONE ENCOUNTER
----- Message from Asia Perez sent at 4/4/2024 10:49 AM CDT -----  Contact: Self  Type:  RX Refill Request    Who Called:  Self  Refill or New Rx:  refill  RX Name and Strength:   pantoprazole (PROTONIX) 40 MG tablet   How is the patient currently taking it? (ex. 1XDay):  TAKE 1 TABLET ONE TIME DAILY - Oral  Is this a 30 day or 90 day RX:  90 tablet 1   Preferred Pharmacy with phone number:    [facility] on [address] and [facility]  Local or Mail Order:  Local  Ordering Provider:  Maury Jose Call Back Number:  [phone]  Additional Information:   Please call the patient back at the phone number listed above to advise. Thank you!

## 2024-04-05 DIAGNOSIS — K22.710 BARRETT'S ESOPHAGUS WITH LOW GRADE DYSPLASIA: ICD-10-CM

## 2024-04-05 DIAGNOSIS — I10 ESSENTIAL HYPERTENSION: ICD-10-CM

## 2024-04-05 DIAGNOSIS — E11.65 UNCONTROLLED TYPE 2 DIABETES MELLITUS WITH HYPERGLYCEMIA: ICD-10-CM

## 2024-04-05 RX ORDER — LINAGLIPTIN 5 MG/1
5 TABLET, FILM COATED ORAL DAILY
Qty: 90 TABLET | Refills: 0 | OUTPATIENT
Start: 2024-04-05

## 2024-04-05 RX ORDER — LINAGLIPTIN 5 MG/1
5 TABLET, FILM COATED ORAL DAILY
Qty: 90 TABLET | Refills: 1 | Status: ON HOLD | OUTPATIENT
Start: 2024-04-05

## 2024-04-05 RX ORDER — VALSARTAN 320 MG/1
320 TABLET ORAL DAILY
Qty: 90 TABLET | Refills: 0 | OUTPATIENT
Start: 2024-04-05

## 2024-04-05 RX ORDER — GLIPIZIDE 10 MG/1
10 TABLET ORAL 2 TIMES DAILY
Qty: 180 TABLET | Refills: 0 | OUTPATIENT
Start: 2024-04-05

## 2024-04-05 RX ORDER — PANTOPRAZOLE SODIUM 40 MG/1
40 TABLET, DELAYED RELEASE ORAL DAILY
Qty: 90 TABLET | Refills: 0 | OUTPATIENT
Start: 2024-04-05

## 2024-04-05 RX ORDER — PANTOPRAZOLE SODIUM 40 MG/1
40 TABLET, DELAYED RELEASE ORAL DAILY
Qty: 90 TABLET | Refills: 3 | Status: ON HOLD | OUTPATIENT
Start: 2024-04-05

## 2024-04-05 RX ORDER — CARVEDILOL 12.5 MG/1
12.5 TABLET ORAL 2 TIMES DAILY
Qty: 180 TABLET | Refills: 0 | OUTPATIENT
Start: 2024-04-05

## 2024-04-05 RX ORDER — VALSARTAN 320 MG/1
320 TABLET ORAL DAILY
Qty: 90 TABLET | Refills: 3 | Status: ON HOLD | OUTPATIENT
Start: 2024-04-05

## 2024-04-05 RX ORDER — CARVEDILOL 12.5 MG/1
12.5 TABLET ORAL 2 TIMES DAILY
Qty: 180 TABLET | Refills: 3 | Status: ON HOLD | OUTPATIENT
Start: 2024-04-05

## 2024-04-05 RX ORDER — GLIPIZIDE 10 MG/1
10 TABLET ORAL 2 TIMES DAILY
Qty: 180 TABLET | Refills: 1 | Status: ON HOLD | OUTPATIENT
Start: 2024-04-05

## 2024-04-05 NOTE — TELEPHONE ENCOUNTER
Refill Routing Note   Medication(s) are not appropriate for processing by [facility] for the following reason(s):        Drug-disease interaction   Required labs abnormal: Glipizide (Cr, eGFR), Valsartan (Cr)  New or recently adjusted medication: Tradjenta - Initial approval on 4/3/24 sent to [id number]. Rx pended to be re-issued to patient's requested pharmacy ([facility])    [facility] action(s):  Defer        Pharmacist review requested: Yes     Appointments  past 12m or future 3m with PCP    Date Provider   Last Visit   4/3/2024 Hiram Mendiola MD   Next Visit   Visit date not found Hiram Mendiola MD   ED visits in past 90 days: 1        Note composed:1:22 PM 04/05/2024

## 2024-04-05 NOTE — TELEPHONE ENCOUNTER
No care due was identified.  [facility] Embedded Care Due Messages. Reference number: [id number].   4/05/2024 12:58:26 PM CDT

## 2024-04-05 NOTE — TELEPHONE ENCOUNTER
Refill Decision Note   Shirlene Ortiz  is requesting a refill authorization.  Brief Assessment and Rationale for Refill:  Approve     Medication Therapy Plan:  2/21/24 progress note indicates no episodes of hypoglycemia      Pharmacist review requested: Yes   Comments:     Note composed:2:07 PM 04/05/2024

## 2024-04-05 NOTE — TELEPHONE ENCOUNTER
Refill Decision Note   Shirlene Ortiz  is requesting a refill authorization.  Brief Assessment and Rationale for Refill:  Quick Discontinue     Medication Therapy Plan:       Medication Reconciliation Completed: No   Comments: Duplicate medication request--pended in a previous encounter and awaiting assessment    No Care Gaps recommended.     Duplicate Pended Encounter(s)/ Last Prescribed Details:      Ordering Encounter Report    Associated Reports   View Encounter            Note composed:1:05 PM 04/05/2024

## 2024-04-15 ENCOUNTER — NURSE TRIAGE (OUTPATIENT)
Dept: ADMINISTRATIVE | Facility: CLINIC | Age: 79
End: 2024-04-15
Payer: MEDICARE

## 2024-04-15 ENCOUNTER — OFFICE VISIT (OUTPATIENT)
Dept: NEPHROLOGY | Facility: CLINIC | Age: 79
End: 2024-04-15
Payer: MEDICARE

## 2024-04-15 VITALS
WEIGHT: 155 LBS | BODY MASS INDEX: 28.52 KG/M2 | HEART RATE: 55 BPM | DIASTOLIC BLOOD PRESSURE: 64 MMHG | OXYGEN SATURATION: 96 % | SYSTOLIC BLOOD PRESSURE: 142 MMHG | HEIGHT: 62 IN

## 2024-04-15 DIAGNOSIS — N18.4 CHRONIC KIDNEY DISEASE, STAGE IV (SEVERE): Primary | ICD-10-CM

## 2024-04-15 DIAGNOSIS — N18.4 CHRONIC KIDNEY DISEASE (CKD), STAGE IV (SEVERE): ICD-10-CM

## 2024-04-15 DIAGNOSIS — I10 ESSENTIAL HYPERTENSION: ICD-10-CM

## 2024-04-15 DIAGNOSIS — E11.00 UNCONTROLLED TYPE 2 DIABETES MELLITUS WITH HYPEROSMOLARITY WITHOUT COMA, WITHOUT LONG-TERM CURRENT USE OF INSULIN: ICD-10-CM

## 2024-04-15 DIAGNOSIS — E21.3 HYPERPARATHYROIDISM: ICD-10-CM

## 2024-04-15 DIAGNOSIS — E87.20 METABOLIC ACIDOSIS: ICD-10-CM

## 2024-04-15 PROBLEM — E11.22 TYPE 2 DIABETES MELLITUS WITH STAGE 3 CHRONIC KIDNEY DISEASE, WITHOUT LONG-TERM CURRENT USE OF INSULIN: Status: RESOLVED | Noted: 2019-04-30 | Resolved: 2024-04-15

## 2024-04-15 PROBLEM — N18.30 TYPE 2 DIABETES MELLITUS WITH STAGE 3 CHRONIC KIDNEY DISEASE, WITHOUT LONG-TERM CURRENT USE OF INSULIN: Status: RESOLVED | Noted: 2019-04-30 | Resolved: 2024-04-15

## 2024-04-15 PROBLEM — N17.9 AKI (ACUTE KIDNEY INJURY): Status: RESOLVED | Noted: 2024-02-21 | Resolved: 2024-04-15

## 2024-04-15 PROBLEM — N18.32 STAGE 3B CHRONIC KIDNEY DISEASE: Status: RESOLVED | Noted: 2023-03-30 | Resolved: 2024-04-15

## 2024-04-15 PROCEDURE — 99214 OFFICE O/P EST MOD 30 MIN: CPT | Mod: S$GLB,,, | Performed by: INTERNAL MEDICINE

## 2024-04-15 PROCEDURE — 3288F FALL RISK ASSESSMENT DOCD: CPT | Mod: CPTII,S$GLB,, | Performed by: INTERNAL MEDICINE

## 2024-04-15 PROCEDURE — 1160F RVW MEDS BY RX/DR IN RCRD: CPT | Mod: CPTII,S$GLB,, | Performed by: INTERNAL MEDICINE

## 2024-04-15 PROCEDURE — 3077F SYST BP >= 140 MM HG: CPT | Mod: CPTII,S$GLB,, | Performed by: INTERNAL MEDICINE

## 2024-04-15 PROCEDURE — 1101F PT FALLS ASSESS-DOCD LE1/YR: CPT | Mod: CPTII,S$GLB,, | Performed by: INTERNAL MEDICINE

## 2024-04-15 PROCEDURE — 99999 PR PBB SHADOW E&M-EST. PATIENT-LVL IV: CPT | Mod: PBBFAC,,, | Performed by: INTERNAL MEDICINE

## 2024-04-15 PROCEDURE — 3078F DIAST BP <80 MM HG: CPT | Mod: CPTII,S$GLB,, | Performed by: INTERNAL MEDICINE

## 2024-04-15 PROCEDURE — 1159F MED LIST DOCD IN RCRD: CPT | Mod: CPTII,S$GLB,, | Performed by: INTERNAL MEDICINE

## 2024-04-15 RX ORDER — ERGOCALCIFEROL 1.25 MG/1
50000 CAPSULE ORAL
Qty: 12 CAPSULE | Refills: 0 | Status: ON HOLD | OUTPATIENT
Start: 2024-04-15 | End: 2024-07-14

## 2024-04-15 RX ORDER — SODIUM BICARBONATE 650 MG/1
1300 TABLET ORAL DAILY
Qty: 180 TABLET | Refills: 3 | Status: ON HOLD | OUTPATIENT
Start: 2024-04-15 | End: 2025-04-15

## 2024-04-15 NOTE — PROGRESS NOTES
Subjective:    Patient ID: Shirlene Ortiz is a 78 y.o. White female who presents for follow-up for chronic renal failure.    She has been lost to follow-up but reestablished care 2024 after presenting to ED and found to have BEN versus progression of CKD. Follow-up lab work concerning for progression. Cr stable but now with nephrotic range proteinuria.    Patient complains of bilateral hand tremors, denies periods of confusion or nausea. Good PO intake. Continues to smoke. Denies symptoms of hypoglycemia and does not regularly check her blood sugar.    Prescribed tradjenta by PCP, last A1c 7.7.    Review of Systems   Constitutional:  Negative for chills and fever.   HENT:  Negative for congestion.    Respiratory:  Positive for cough. Negative for shortness of breath.    Cardiovascular:  Negative for chest pain and leg swelling.   Gastrointestinal:  Negative for abdominal pain, diarrhea, nausea and vomiting.   Genitourinary:  Negative for difficulty urinating, dysuria and hematuria.   Musculoskeletal:  Negative for myalgias.   Neurological:  Negative for headaches.   Hematological:  Does not bruise/bleed easily.       The past medical, family and social histories were reviewed for this encounter.     Past Medical History:   Diagnosis Date    Acute coronary artery obstruction without MI     14 stents    Anemia 08/27/2017    Arthritis     Colon polyp     COPD (chronic obstructive pulmonary disease) 08/25/2017    Diverticulosis of colon     DM (diabetes mellitus)     Essential hypertension     Fatty liver     Hepatomegaly     HTN (hypertension)     Hyperlipidemia     Mixed hyperlipidemia     NSTEMI (non-ST elevated myocardial infarction) 08/17/2017 11/17    Pacemaker 06/25/2012    left chest PACEMAKER [employer] SEDR01 Sensia   S/N:[id number] Deion Link 8/3/2010 - current   right atrium LEAD [employer] 5076 CapSure Fix Novus  S/N:[id number] Deion Link 8/3/2010 - current   right ventricle LEAD [employer] 5076  CapSure Fix Novus  S/N:[id number] Deion Link 8/3/2010 - current     PVD (peripheral vascular disease)     S/P CABG (coronary artery bypass graft) 09/14/2017 8/17 CABG x5 LIMA-LAD, VG-PDA, VG-D, YVG-OM-PLB  mammary artery to left anterior descending coronary artery and separate segments  of saphenous vein from the aorta to the posterior descending coronary artery  and from the aorta to the diagonal coronary artery and from the aorta to the  obtuse marginal coronary artery and to the posterolateral branch of the  circumflex in a sequential fashion using a combination of antegrade and  retrograde cardioplegia with mild systemic hypothermia and endoscopic vein  harvest.    S/P coronary artery stent placement     11/17 proximal circumflex using a 2.5 x 38, post-dilated to 3.0 with 0% residual stenosis.    Sinus node dysfunction     Stroke 07/04/2014    Tobacco abuse 08/15/2017    Uncontrolled type 2 diabetes mellitus with hyperosmolarity without coma, without long-term current use of insulin     Documented by Maury on 10/26/15.     Past Surgical History:   Procedure Laterality Date    ABDOMINAL SURGERY      APPENDECTOMY      CARDIAC PACEMAKER PLACEMENT      CHOLECYSTECTOMY      COLONOSCOPY  04/27/2015    Dr. Marinelli: 2 colon polyps removed (one not completely removed), hemorrhoids; repeat in 6 weeks; biopsy: ADENOMATOUS POLYPS    COLONOSCOPY N/A 8/5/2020    Procedure: COLONOSCOPY;  Surgeon: Alonzo Marinelli MD;  Location: [facility];  Service: Endoscopy;  Laterality: N/A;    COLONOSCOPY N/A 11/4/2020    Procedure: COLONOSCOPY;  Surgeon: Theodore Zimmerman MD;  Location: [facility] ([address] [place] [address]); multiple colon polyps removed, 1 large polyp removed via piecemeal, incomplete resection. Repeat colonoscopy in 6 months for surveillance after piecemeal polypectomy; biopsy: Tubular adenoma (low grade dysplasia)    COLONOSCOPY N/A 4/6/2022    Procedure: COLONOSCOPY;  Surgeon: Alonzo Marinelli MD;  Location: [facility];   Service: Endoscopy;  Laterality: N/A;    coranary stent      CORONARY ANGIOPLASTY      CORONARY ARTERY BYPASS GRAFT      EPIDURAL STEROID INJECTION INTO LUMBAR SPINE N/A 4/19/2021    Procedure: Injection-steroid-epidural-lumbar L5/S1;  Surgeon: Genaro Lugo MD;  Location: [facility] OR;  Service: Pain Management;  Laterality: N/A;    EPIDURAL STEROID INJECTION INTO LUMBAR SPINE N/A 4/5/2022    Procedure: Injection-steroid-epidural-lumbar L5/S1 to the left;  Surgeon: Genaro Lugo MD;  Location: [facility] OR;  Service: Pain Management;  Laterality: N/A;    ESOPHAGOGASTRODUODENOSCOPY N/A 3/9/2020    Procedure: ESOPHAGOGASTRODUODENOSCOPY (EGD);  Surgeon: Alonzo Marinelli MD;  Location: [facility] ENDO;  Service: Endoscopy;  Laterality: N/A; Mild Schatzki ring. Biopsied. Dilated. gastritis; biopsy: stomach- chemical/reactive gastropathy, esophagus- Squamocolumnar mucosa with focal intestinal metaplasia and low-grade dysplasia    ESOPHAGOGASTRODUODENOSCOPY N/A 8/5/2020    Procedure: EGD (ESOPHAGOGASTRODUODENOSCOPY);  Surgeon: Alonzo Marinelli MD;  Location: [facility] ENDO;  Service: Endoscopy;  Laterality: N/A;    ESOPHAGOGASTRODUODENOSCOPY N/A 3/22/2021    Procedure: EGD (ESOPHAGOGASTRODUODENOSCOPY);  Surgeon: Alonzo Marinelli MD;  Location: [facility];  Service: Endoscopy;  Laterality: N/A; negative for phillips's    ESOPHAGOGASTRODUODENOSCOPY N/A 6/30/2021    Procedure: EGD (ESOPHAGOGASTRODUODENOSCOPY);  Surgeon: Alonzo Marinelli MD;  Location: [facility] ENDO; Esophageal mucosal changes suspicious for short-segment Phillips's esophagus; gastritis    ILIAC ARTERY STENT      POLYPECTOMY      REPLACEMENT OF DUAL CHAMBER PACEMAKER GENERATOR N/A 11/3/2022    Procedure: REPLACEMENT, PULSE GENERATOR OF DUAL CHAMBER PACEMAKER - [employer] - WATSON NOTIFIED;  Surgeon: Baldemar Espinoza MD;  Location: [facility];  Service: Cardiology;  Laterality: N/A;    TOTAL ABDOMINAL HYSTERECTOMY       Social History     Socioeconomic History     Marital status: Single   Tobacco Use    Smoking status: Every Day     Current packs/day: 1.50     Average packs/day: 1.5 packs/day for 60.0 years (90.0 ttl pk-yrs)     Types: Cigarettes    Smokeless tobacco: Never    Tobacco comments:     Tobacco treatment specialist consulted.   Substance and Sexual Activity    Alcohol use: No    Drug use: No     Current Outpatient Medications   Medication Sig Dispense Refill    amLODIPine (NORVASC) 10 MG tablet Take 1 tablet (10 mg total) by mouth every evening. 90 tablet 5    aspirin (ECOTRIN) 81 MG EC tablet Take 325 mg by mouth once daily.      atorvastatin (LIPITOR) 20 MG tablet Take 1 tablet (20 mg total) by mouth once daily. 90 tablet 4    carvediloL (COREG) 12.5 MG tablet Take 1 tablet (12.5 mg total) by mouth 2 (two) times daily. 180 tablet 3    clopidogreL (PLAVIX) 75 mg tablet Take 1 tablet (75 mg total) by mouth once daily. 90 tablet 0    ezetimibe (ZETIA) 10 mg tablet Take 1 tablet (10 mg total) by mouth once daily. 90 tablet 4    glipiZIDE (GLUCOTROL) 10 MG tablet Take 1 tablet (10 mg total) by mouth 2 (two) times daily. 180 tablet 1    linaGLIPtin (TRADJENTA) 5 mg Tab tablet Take 1 tablet (5 mg total) by mouth once daily. 90 tablet 1    pantoprazole (PROTONIX) 40 MG tablet Take 1 tablet (40 mg total) by mouth once daily. 90 tablet 3    valsartan (DIOVAN) 320 MG tablet Take 1 tablet (320 mg total) by mouth once daily. 90 tablet 3    ACCU-CHEK ISRAEL CONTROL SOLN Yecenia  (Patient not taking: Reported on 4/15/2024)      ACCU-CHEK ISAREL PLUS METER Misc USE AS DIRECTED (Patient not taking: Reported on 4/15/2024) 1 each 0    ACCU-CHEK ISRAEL PLUS TEST STRP Strp TEST 2 TO 4 TIMES EVERY DAY  (Patient not taking: Reported on 4/15/2024) 400 strip 3    BD ALCOHOL SWABS PadM  (Patient not taking: Reported on 4/15/2024)      ergocalciferol (ERGOCALCIFEROL) 50,000 unit Cap Take 1 capsule (50,000 Units total) by mouth every 7 days. 12 capsule 0    lancets Misc To check BG 6 times  "daily, to use with insurance preferred meter (Patient not taking: Reported on 4/15/2024) 200 each 11    pen needle, diabetic (BD DEAN 2ND GEN PEN NEEDLE) 32 gauge x 5/32" Ndle Use as directed with insulin pen. Diagnosis Code E11.65 (Patient not taking: Reported on 4/15/2024) 100 each 1    sodium bicarbonate 650 MG tablet Take 2 tablets (1,300 mg total) by mouth once daily. 180 tablet 3     Current Facility-Administered Medications   Medication Dose Route Frequency Provider Last Rate Last Admin    sodium chloride 0.9% flush 10 mL  10 mL Intravenous PRN Baldemar Espinoza MD         BP (!) 142/64 (BP Location: Right arm, Patient Position: Sitting, BP Method: Medium (Manual))   Pulse (!) 55   Ht 5' 2" (1.575 m)   Wt 70.3 kg (154 lb 15.7 oz)   SpO2 96%   BMI 28.35 kg/m²     Objective:      Physical Exam  Vitals reviewed.   Constitutional:       General: She is not in acute distress.     Appearance: She is well-developed.   HENT:      Head: Normocephalic and atraumatic.   Eyes:      General: No scleral icterus.     Conjunctiva/sclera: Conjunctivae normal.   Neck:      Vascular: No JVD.   Cardiovascular:      Rate and Rhythm: Normal rate and regular rhythm.      Heart sounds: Normal heart sounds. No murmur heard.     No friction rub. No gallop.   Pulmonary:      Effort: Pulmonary effort is normal. No respiratory distress.      Breath sounds: Normal breath sounds. No wheezing or rales.   Abdominal:      General: Bowel sounds are normal. There is no distension.      Palpations: Abdomen is soft.      Tenderness: There is no abdominal tenderness.   Musculoskeletal:      Right lower leg: No edema.      Left lower leg: No edema.   Skin:     General: Skin is warm and dry.      Findings: No rash.   Neurological:      Mental Status: She is alert and oriented to person, place, and time.         Assessment:       1. Chronic kidney disease, stage IV (severe)    2. Chronic kidney disease (CKD), stage IV (severe)    3. " Metabolic acidosis    4. Hyperparathyroidism    5. Uncontrolled type 2 diabetes mellitus with hyperosmolarity without coma, without long-term current use of insulin    6. Essential hypertension        Lab Results   Component Value Date    CREATININE 2.4 (H) 03/27/2024    BUN 31 (H) 03/27/2024     03/27/2024    K 4.3 03/27/2024     03/27/2024    CO2 18 (L) 03/27/2024     Lab Results   Component Value Date    .2 (H) 03/06/2024    CALCIUM 8.9 03/27/2024    CAION 1.25 08/22/2017    PHOS 3.5 03/06/2024     Lab Results   Component Value Date    HCT 31.5 (L) 01/27/2024     Prot/Creat Ratio, Urine   Date Value Ref Range Status   03/06/2024 5.04 (H) 0.00 - 0.20 Final   09/21/2020 0.30 (H) 0.00 - 0.20 Final   11/06/2019 0.27 (H) 0.00 - 0.20 Final       Plan:   Return to clinic in three months.  Labs for next visit include rfp, upc, pth, vitamin D.  Baseline creatinine is 2.2-2.4, UPCR 5.04. Patient with progressive renal disease, will refer for CKD education and start on sodium bicarbonate.  CKD clinically due to diabetes and vascular disease. Hold off on biopsy for now given age and co-morbidities.  Renal US shows R 10.6 cm, L 11.2 cm.   , Ca 8.9. Will start on ergocalciferol and check vitamin D level at next visit.  A1c 7.7, improved, on glipizide and recently started on Tradjenta  Blood pressure is controlled on the current regimen.    Medication changes:  Ergocalciferol  Sodium bicarbonate      KFRE 2-Year: 36.4% at 3/27/2024  8:09 AM  Calculated from:  Serum Creatinine: 2.4 mg/dL at 3/27/2024  8:09 AM  Urine Albumin Creatinine Ratio: 3,935.5 ug/mg at 3/6/2024  8:57 AM  Age: 78 years  Sex: Female at 3/27/2024  8:09 AM  Has CKD-3 to CKD-5: Yes    KFRE 5-Year: 75.7% at 3/27/2024  8:09 AM  Calculated from:  Serum Creatinine: 2.4 mg/dL at 3/27/2024  8:09 AM  Urine Albumin Creatinine Ratio: 3,935.5 ug/mg at 3/6/2024  8:57 AM  Age: 78 years  Sex: Female at 3/27/2024  8:09 AM  Has CKD-3 to CKD-5: Yes

## 2024-04-15 NOTE — PROGRESS NOTES
approximately 6:16 PM  received phone call from on-call RN in regards to patient.  Self reported episode of slurred speech, very brief episode. Episode fairly quickly resolved. Denies any other new focal neurologic deficits.   Recommended to nurse to recommend patient to the emergency room given that she has a very significant cardiovascular history, including that of a TIA.

## 2024-04-15 NOTE — TELEPHONE ENCOUNTER
Pt calling, states when she got home a few moments prior to call, she had a very brief episode of slurred speech reported by herself and her daughter. Pt states she feels okay now but reports significant history of TIA/Stroke. Advised to ED now per protocol. Pt states she feels fine and is wondering if it could be because she was SOB. Advised again that she should go to ED now with 911 precautions and on call Dr. Clements for Dr. Mendiola also agrees with ED protocol. Pt verbalized understanding, confirms she has someone that can bring her to nearest ED.      Reason for Disposition   [1] Loss of speech or garbled speech AND [2] sudden onset AND [3] brief (now gone)    Additional Information   Negative: [1] SEVERE weakness (i.e., unable to walk or barely able to walk, requires support) AND [2] new-onset or worsening   Negative: [1] Weakness (i.e., paralysis, loss of muscle strength) of the face, arm / hand, or leg / foot on one side of the body AND [2] sudden onset AND [3] present now  (Exception: Bell's palsy suspected [i.e., weakness only on one side of the face, developing over hours to days, no other symptoms].)   Negative: [1] Numbness (i.e., loss of sensation) of the face, arm / hand, or leg / foot on one side of the body AND [2] sudden onset AND [3] present now   Negative: [1] Loss of speech or garbled speech AND [2] sudden onset AND [3] present now   Negative: Difficult to awaken or acting confused (e.g., disoriented, slurred speech)   Negative: Sounds like a life-threatening emergency to the triager   Negative: Headache  (and neurologic deficit)   Negative: [1] Back pain AND [2] numbness (loss of sensation) in groin or rectal area   Negative: [1] Unable to urinate (or only a few drops) > 4 hours AND [2] bladder feels very full (e.g., palpable bladder or strong urge to urinate)   Negative: [1] Loss of bladder or bowel control (urine or bowel incontinence; wetting self, leaking stool) AND [2] new-onset   Negative:  [1] Weakness (i.e., paralysis, loss of muscle strength) of the face, arm / hand, or leg / foot on one side of the body AND [2] sudden onset AND [3] brief (now gone)   Negative: [1] Numbness (i.e., loss of sensation) of the face, arm / hand, or leg / foot on one side of the body AND [2] sudden onset AND [3] brief (now gone)    Protocols used: Neurologic Deficit-A-AH

## 2024-04-19 ENCOUNTER — TELEPHONE (OUTPATIENT)
Dept: NEPHROLOGY | Facility: CLINIC | Age: 79
End: 2024-04-19
Payer: MEDICARE

## 2024-04-22 ENCOUNTER — TELEPHONE (OUTPATIENT)
Dept: NEPHROLOGY | Facility: CLINIC | Age: 79
End: 2024-04-22
Payer: MEDICARE

## 2024-04-25 ENCOUNTER — TELEPHONE (OUTPATIENT)
Dept: NEPHROLOGY | Facility: CLINIC | Age: 79
End: 2024-04-25
Payer: MEDICARE

## 2024-04-25 NOTE — TELEPHONE ENCOUNTER
----- Message from Ana Paula Rossi sent at 4/25/2024 10:11 AM CDT -----  Type: Needs Medical Advice  Who Called:  pt  Best Call Back Number: [phone]    Additional Information: Pt is calling the office needs new script insurance will not pay for the one sent considered an over the counter order.Please call back and advise.

## 2024-04-26 NOTE — TELEPHONE ENCOUNTER
Angus Coffey MD   to Me       4/26/24  2:54 PM  She should be able to get the sodium bicarb OTC

## 2024-04-29 ENCOUNTER — TELEPHONE (OUTPATIENT)
Dept: NEPHROLOGY | Facility: CLINIC | Age: 79
End: 2024-04-29
Payer: MEDICARE

## 2024-05-02 LAB
OHS CV AF BURDEN PERCENT: < 1
OHS CV DC REMOTE DEVICE TYPE: NORMAL
OHS CV RV PACING PERCENT: 26.8 %

## 2024-05-29 ENCOUNTER — CLINICAL SUPPORT (OUTPATIENT)
Dept: NEPHROLOGY | Facility: CLINIC | Age: 79
End: 2024-05-29
Payer: MEDICARE

## 2024-05-29 DIAGNOSIS — N18.4 CHRONIC KIDNEY DISEASE, STAGE IV (SEVERE): ICD-10-CM

## 2024-05-29 NOTE — PROGRESS NOTES
The patient location is: home in [place], [place]    Visit type: audio only webinar. Mailed the pt a copy of her labs/results and Intro to CKD webinar packet.    Shirlene Ortiz was referred to Introduction to CKD education by Dr. Dietz    The patient attended class in an individual setting unaccompanied.    The following material was covered. Outcomes were assessed via the outcome assessment questions and documented at the end of each lesson.    Chronic Kidney Disease Education (Lesson 1, 2, 3)    Lesson 1 Understanding Kidney Disease  Lesson Objectives  By the end of each session, participants will be able to:  Recognize that fear and grief are usual responses to kidney disease  State causes/risk factors for kidney disease  Explain how GFR reflects kidney function  Explain how urine albumin/protein reflects kidney damage  State two ways the kidneys help maintain health  Describe how kidney disease is progressive but can be slowed down  Topics & Points Covered  Emotional impact of diagnosis  You're not alone  Emotional aspects  Depression, grief, and fear are typical  Physical activity may help  Benefits of education: Why are you here?  There are many causes of CKD. Diabetes and hypertension are the leading causes. Family history, cardiovascular disease, recurrent UTIs, immunological disease and genetics also play a role in CKD  CKD is complicated  The more you understand, the better you'll do. (Stated directly)  Basic anatomy  Location in the body  The nephrons have filters (working units)  Normal kidney function  Maintain chemical and fluid balance  Produce hormones  Regulate blood pressure  Kidney damage  Major causes of kidney damage  High blood pressure, diabetes  Fewer functioning nephrons  Monitoring kidney function and damage  eGFR (function)  Urine albumin/UACR or Urine protein/creatinine ratio (damage)  eGFR goal: a stable level  Decline means progression  Urine albumin/UACR goal: a stable or lower  level  Increase in urine albumin/protein may mean progression  Kidney disease is often irreversible and progressive  You'll likely need the help of a kidney specialist  Overview of treatment modalities  There are things you can do that may slow progression (more on this in next session)  Take your medications  Control blood pressure  Manage diabetes  Eat less salt  Miscellaneous  Early kidney disease has no symptoms    Lesson 2 Managing Your Kidney Disease  Lesson Objectives  By the end of each session, participants will be able to:  Recognize that managing blood pressure is a key part of managing kidney disease  Recognize that managing diabetes is a key part of managing kidney disease  State at least one step to eating right for kidney health  Recognize the importance of being cautious about over-the-counter medicines  Recognize that smoking can worsen kidney disease  Identify which lab values are used to keep track of diabetes, high blood pressure, and other conditions  Topics & Points Covered  There are steps you can take to keep your kidneys working  Weight management  Blood pressure management  Blood pressure goal: < 140/90 mm Hg  Medications - ACEs/ARBs, diuretics  ACEs/ARBS and risk of hyperkalemia (too much potassium in the blood, but help lower urine albumin)  Sodium reduction (<2,000 mg)  Physical activity  Diabetes management  A1C target   Diabetes medications may change because of kidney disease (often takes less medication to control glucose in the later stages of CKD)  How to treat hypoglycemia appropriately (risk of high potassium with ACEi and ARB use) glucose tablets are preferred [May need to orange juice with high potassium levels if hyperkalemic]  Hyperglycemia  Cardiovascular disease (CVD)  Physical activity  CVD is major cause of mortality  LDL goal  Medications  Nutritional health  Choose and prepare foods with less salt and sodium  Eat the right amount and kind of protein  Choose foods that  are healthy for your heart  Choose foods based on phosphorus and potassium content (if restricted)  Make choices that help with diabetes management  Dietitian referral/nutrition therapy is covered benefit  Medication safety  Prescription  Over-the-counter (pain relief)  Tobacco cessation  Pt reports that she has no interest in quitting smoking. Reports that she has atherosclerosis and asked about if this might affect her kidney disease. Informed her that it very likely could be affecting her kidneys. Encouraged smoking cessation.    Lesson 3 What Happens When Kidney Disease Gets Worse  Lesson Objectives  By the end of each session, participants will be able to:  Recognize that managing blood pressure is a key part of managing kidney disease  Recognize that additional medications may be needed to treat complications  Topics & Points Covered  Kidneys have other jobs besides making urine. At this stage, they can't function as well.  Gradual/adaptation into symptoms; you may feel fine  Different for everyone - important to know your lab test results, including eGFR and urine albumin/protein values  Possible symptoms of decreasing kidney function and why they occur later  There is usually no kidney pain  You may have decreased urine output  Fatigue or weakness  Swelling  Bad taste in the mouth/food doesn't taste good (especially red meat)  Feel cold  Poor concentration  Shortness of breath  Itching skin  Cramping in hands and legs  Nausea and vomiting/Loss of appetite   Complications (*May require additional medications) - basic explanation and treatment  Anemia*  May feel tired or weak  Erythropoietin deficiency  May need iron  May need erythropoiesis stimulating agent (injection)  LABS: hemoglobin  Mineral and bone disorder*  May get itchy skin  Inadequate active Vitamin D  May take special supplement  Too much phosphorus in the blood  May need phosphorus binding med with meals  LABS: Phosphorus, calcium, VitaminD,  iPTH  Malnutrition  May have bad taste in mouth  Poor appetite  May add to swelling  Need adequate calories  LABS: albumin  Changes in functional status  Difficulty walking  Harder to care for self  Fluid overload  May be seen as weight gain, swelling, or shortness of breath  Kidneys may not remove fluid well  Rarely need fluid restriction  Use less salt, take diuretics  Metabolic acidosis  Kidneys not removing acid in the blood  May need medication with bicarbonate  LABS: bicarbonate  Depression  Diabetes can contribute  Fear, grieving with loss of body function  It is OK to talk about it  Sexual complications  Discuss concerns with your doctor  Know how your medications work. Know which medications you can continue taking (including OTC medications). Talk with your pharmacist and provider about:  The more medications you take, the more you need to know about: medication interactions, medication-disease interactions, medication-food interactions, medication-herbal supplement interactions.  Timing of medication use (ie., qAC, etc.)  Patient states that she takes her medications at the same time every day.  Cultural consideration: Be sensitive to use of traditional remedies and healers.  Symptoms and complications may increase as kidney function declines to kidney failure (stated directly)    Areas of information that primary nephrology provider should reinforce during follow-up visit includes: dietary recommendations for CKD.    The content of these lessons are adapted from the Kidney Disease Education (KDE) Services benefit as defined by the Centers for Medicare & Medicaid Services.    65 minutes spent educating patient on the above topics.     clear no

## 2024-05-29 NOTE — Clinical Note
Hi Dr. Dietz, I met with this patient individually for CKD education by phone for about 65 minutes after mailing a copy of the information and her labs to discuss. All questions answered. Thank you! Jenn

## 2024-06-15 ENCOUNTER — HOSPITAL ENCOUNTER (OUTPATIENT)
Dept: CARDIOLOGY | Facility: HOSPITAL | Age: 79
Discharge: HOME | End: 2024-06-15
Attending: INTERNAL MEDICINE
Payer: MEDICARE

## 2024-06-15 DIAGNOSIS — Z95.0 PRESENCE OF CARDIAC PACEMAKER: ICD-10-CM

## 2024-06-15 PROCEDURE — 93294 REM INTERROG EVL PM/LDLS PM: CPT | Mod: ,,, | Performed by: INTERNAL MEDICINE

## 2024-06-15 PROCEDURE — 93296 REM INTERROG EVL PM/IDS: CPT | Performed by: INTERNAL MEDICINE

## 2024-06-18 PROBLEM — E11.51 DIABETES TYPE 2 WITH ATHEROSCLEROSIS OF ARTERIES OF EXTREMITIES: Status: ACTIVE | Noted: 2019-04-30

## 2024-06-18 PROBLEM — I70.209 DIABETES TYPE 2 WITH ATHEROSCLEROSIS OF ARTERIES OF EXTREMITIES: Status: ACTIVE | Noted: 2019-04-30

## 2024-06-18 PROBLEM — G83.89: Status: ACTIVE | Noted: 2024-06-18

## 2024-06-18 PROBLEM — K21.9 GERD (GASTROESOPHAGEAL REFLUX DISEASE): Status: ACTIVE | Noted: 2024-06-18

## 2024-06-19 PROBLEM — R33.9 URINARY RETENTION: Status: ACTIVE | Noted: 2024-06-19

## 2024-06-19 PROBLEM — J44.1 COPD EXACERBATION: Status: ACTIVE | Noted: 2017-08-25

## 2024-06-20 PROBLEM — Z73.6 LIMITATION OF ACTIVITIES DUE TO DISABILITY: Status: ACTIVE | Noted: 2024-06-20

## 2024-06-20 PROBLEM — N17.9 ACUTE RENAL FAILURE SUPERIMPOSED ON STAGE 4 CHRONIC KIDNEY DISEASE: Status: ACTIVE | Noted: 2023-03-30

## 2024-06-22 PROBLEM — N17.9 AKI (ACUTE KIDNEY INJURY): Status: RESOLVED | Noted: 2024-02-21 | Resolved: 2024-06-22

## 2024-06-24 LAB
OHS CV AF BURDEN PERCENT: < 1
OHS CV DC REMOTE DEVICE TYPE: NORMAL
OHS CV RV PACING PERCENT: 40.43 %

## 2024-06-26 PROBLEM — K59.01 SLOW TRANSIT CONSTIPATION: Status: ACTIVE | Noted: 2024-06-26

## 2024-06-27 ENCOUNTER — TELEPHONE (OUTPATIENT)
Dept: NEPHROLOGY | Facility: CLINIC | Age: 79
End: 2024-06-27
Payer: MEDICARE

## 2024-06-27 PROBLEM — E87.5 HYPERKALEMIA: Status: ACTIVE | Noted: 2024-06-27

## 2024-06-27 NOTE — TELEPHONE ENCOUNTER
----- Message from Shahla Mendiola sent at 6/27/2024  9:45 AM CDT -----  Contact: [facility]  Type:  Sooner Apoointment Request    Caller is requesting a sooner appointment.  Caller declined first available appointment listed below.  Caller will not accept being placed on the waitlist and is requesting a message be sent to doctor.    Name of Caller: [facility]    When is the first available appointment? None    Symptoms:hospital f/u    Would the patient rather a call back or a response via [facility]?  Call back    Best Call Back Number:[phone]    Additional Information: please call to schedule  Thanks

## 2024-06-28 PROBLEM — R47.1 DYSARTHRIA: Status: ACTIVE | Noted: 2024-06-28

## 2024-06-28 PROBLEM — Z71.89 ACP (ADVANCE CARE PLANNING): Status: RESOLVED | Noted: 2021-06-29 | Resolved: 2024-06-28

## 2024-06-29 PROBLEM — J81.1 PULMONARY EDEMA: Status: ACTIVE | Noted: 2024-06-29

## 2024-07-11 PROBLEM — Z75.8 DISCHARGE PLANNING ISSUES: Status: ACTIVE | Noted: 2024-07-11

## 2024-07-12 PROBLEM — R74.01 TRANSAMINITIS: Status: ACTIVE | Noted: 2024-07-12

## 2024-07-12 PROBLEM — R94.39 ABNORMAL STRESS TEST: Status: ACTIVE | Noted: 2024-07-12

## 2024-07-22 ENCOUNTER — HOSPITAL ENCOUNTER (OUTPATIENT)
Dept: RADIOLOGY | Facility: HOSPITAL | Age: 79
Discharge: HOME | End: 2024-07-22
Attending: PHYSICAL MEDICINE & REHABILITATION
Payer: MEDICARE

## 2024-07-22 PROCEDURE — 71045 X-RAY EXAM CHEST 1 VIEW: CPT | Mod: 26,,, | Performed by: RADIOLOGY

## 2024-07-29 ENCOUNTER — HOSPITAL ENCOUNTER (OUTPATIENT)
Dept: RADIOLOGY | Facility: HOSPITAL | Age: 79
Discharge: HOME | End: 2024-07-29
Attending: PHYSICAL MEDICINE & REHABILITATION
Payer: MEDICARE

## 2024-07-29 PROCEDURE — 74018 RADEX ABDOMEN 1 VIEW: CPT | Mod: 26,,, | Performed by: RADIOLOGY

## 2024-07-29 PROCEDURE — 71045 X-RAY EXAM CHEST 1 VIEW: CPT | Mod: 26,,, | Performed by: RADIOLOGY

## 2024-07-31 ENCOUNTER — HOSPITAL ENCOUNTER (OUTPATIENT)
Dept: RADIOLOGY | Facility: HOSPITAL | Age: 79
Discharge: HOME | End: 2024-07-31
Attending: PHYSICAL MEDICINE & REHABILITATION
Payer: MEDICARE

## 2024-07-31 PROCEDURE — 71045 X-RAY EXAM CHEST 1 VIEW: CPT | Mod: 26,,, | Performed by: RADIOLOGY

## 2024-08-07 ENCOUNTER — HOSPITAL ENCOUNTER (OUTPATIENT)
Dept: RADIOLOGY | Facility: HOSPITAL | Age: 79
Discharge: HOME | End: 2024-08-07
Attending: INTERNAL MEDICINE
Payer: MEDICARE

## 2024-08-07 ENCOUNTER — HOSPITAL ENCOUNTER (OUTPATIENT)
Dept: RADIOLOGY | Facility: HOSPITAL | Age: 79
Discharge: HOME | End: 2024-08-07
Attending: PHYSICAL MEDICINE & REHABILITATION
Payer: MEDICARE

## 2024-08-07 PROCEDURE — 71045 X-RAY EXAM CHEST 1 VIEW: CPT | Mod: 26,,, | Performed by: RADIOLOGY

## 2024-08-10 PROBLEM — G82.20 PARAPLEGIA: Status: ACTIVE | Noted: 2024-08-10

## 2024-08-10 PROBLEM — Z79.4 TYPE 2 DIABETES MELLITUS, WITH LONG-TERM CURRENT USE OF INSULIN: Status: ACTIVE | Noted: 2019-04-30

## 2024-08-10 PROBLEM — N18.4 CKD (CHRONIC KIDNEY DISEASE), STAGE IV: Status: ACTIVE | Noted: 2024-08-10

## 2024-08-10 PROBLEM — Z97.8 CHRONIC INDWELLING FOLEY CATHETER: Status: ACTIVE | Noted: 2024-08-10

## 2024-08-10 PROBLEM — E11.9 TYPE 2 DIABETES MELLITUS, WITH LONG-TERM CURRENT USE OF INSULIN: Status: ACTIVE | Noted: 2019-04-30

## 2024-08-10 PROBLEM — M46.28 OSTEOMYELITIS OF COCCYX: Status: ACTIVE | Noted: 2024-08-10

## 2024-08-10 PROBLEM — K59.00 CONSTIPATION: Status: ACTIVE | Noted: 2024-06-26

## 2024-08-10 PROBLEM — D63.8 ANEMIA OF CHRONIC DISEASE: Status: ACTIVE | Noted: 2024-08-10

## 2024-09-23 PROBLEM — N18.4 ACUTE RENAL FAILURE SUPERIMPOSED ON STAGE 4 CHRONIC KIDNEY DISEASE: Status: RESOLVED | Noted: 2023-03-30 | Resolved: 2024-09-23

## 2024-09-23 PROBLEM — N17.9 ACUTE RENAL FAILURE SUPERIMPOSED ON STAGE 4 CHRONIC KIDNEY DISEASE: Status: RESOLVED | Noted: 2023-03-30 | Resolved: 2024-09-23

## 2024-10-18 ENCOUNTER — HOSPITAL ENCOUNTER (OUTPATIENT)
Dept: CARDIOLOGY | Facility: HOSPITAL | Age: 79
Discharge: HOME | End: 2024-10-18
Attending: INTERNAL MEDICINE
Payer: MEDICARE

## 2024-10-18 ENCOUNTER — CLINICAL SUPPORT (OUTPATIENT)
Dept: CARDIOLOGY | Facility: HOSPITAL | Age: 79
End: 2024-10-18
Payer: MEDICARE

## 2024-10-18 DIAGNOSIS — Z95.0 PRESENCE OF CARDIAC PACEMAKER: ICD-10-CM

## 2024-10-18 PROCEDURE — 93296 REM INTERROG EVL PM/IDS: CPT | Mod: PO | Performed by: INTERNAL MEDICINE

## 2024-10-18 PROCEDURE — 93294 REM INTERROG EVL PM/LDLS PM: CPT | Mod: ,,, | Performed by: INTERNAL MEDICINE

## 2024-10-23 LAB
OHS CV AF BURDEN PERCENT: < 1
OHS CV DC REMOTE DEVICE TYPE: NORMAL
OHS CV ICD SHOCK: NO
OHS CV RV PACING PERCENT: 9.04 %

## 2025-01-01 ENCOUNTER — CLINICAL SUPPORT (OUTPATIENT)
Dept: CARDIOLOGY | Facility: HOSPITAL | Age: 80
End: 2025-01-01

## 2025-01-01 ENCOUNTER — CLINICAL SUPPORT (OUTPATIENT)
Dept: CARDIOLOGY | Facility: HOSPITAL | Age: 80
End: 2025-01-01
Payer: MEDICARE

## 2025-01-01 DIAGNOSIS — Z95.0 PRESENCE OF CARDIAC PACEMAKER: ICD-10-CM

## 2025-01-19 PROBLEM — I48.0 PAROXYSMAL ATRIAL FIBRILLATION: Status: ACTIVE | Noted: 2025-01-01

## 2025-01-19 PROBLEM — N39.0 UTI (URINARY TRACT INFECTION): Status: ACTIVE | Noted: 2025-01-01

## 2025-01-19 PROBLEM — R65.20 SEVERE SEPSIS: Status: ACTIVE | Noted: 2025-01-01

## 2025-01-19 PROBLEM — D62 ACUTE BLOOD LOSS ANEMIA: Status: ACTIVE | Noted: 2025-01-01

## 2025-01-19 PROBLEM — L08.9 INFECTED DECUBITUS ULCER, STAGE IV: Status: ACTIVE | Noted: 2025-01-01

## 2025-01-19 PROBLEM — T83.511A URINARY TRACT INFECTION ASSOCIATED WITH CATHETERIZATION OF URINARY TRACT: Status: ACTIVE | Noted: 2025-01-01

## 2025-01-19 PROBLEM — G95.11 SPINAL CORD STROKE: Status: ACTIVE | Noted: 2025-01-01

## 2025-01-19 PROBLEM — L89.94 INFECTED DECUBITUS ULCER, STAGE IV: Status: ACTIVE | Noted: 2025-01-01

## 2025-01-19 PROBLEM — B96.4 BACTEREMIA DUE TO PROTEUS SPECIES: Status: ACTIVE | Noted: 2025-01-01

## 2025-01-19 PROBLEM — Z93.3 COLOSTOMY IN PLACE: Status: ACTIVE | Noted: 2025-01-01

## 2025-01-19 PROBLEM — A41.9 SEVERE SEPSIS: Status: ACTIVE | Noted: 2025-01-01

## 2025-01-19 PROBLEM — R78.81 BACTEREMIA DUE TO PROTEUS SPECIES: Status: ACTIVE | Noted: 2025-01-01

## 2025-01-22 PROBLEM — A41.9 SEVERE SEPSIS: Status: RESOLVED | Noted: 2025-01-01 | Resolved: 2025-01-01

## 2025-01-22 PROBLEM — R65.10 SIRS (SYSTEMIC INFLAMMATORY RESPONSE SYNDROME): Status: ACTIVE | Noted: 2025-01-01

## 2025-01-22 PROBLEM — N17.9 AKI (ACUTE KIDNEY INJURY): Status: ACTIVE | Noted: 2025-01-01

## 2025-01-22 PROBLEM — J44.1 COPD EXACERBATION: Status: RESOLVED | Noted: 2017-08-25 | Resolved: 2025-01-01

## 2025-01-22 PROBLEM — T83.511A URINARY TRACT INFECTION ASSOCIATED WITH CATHETERIZATION OF URINARY TRACT: Status: RESOLVED | Noted: 2025-01-01 | Resolved: 2025-01-01

## 2025-01-22 PROBLEM — R65.20 SEVERE SEPSIS: Status: RESOLVED | Noted: 2025-01-01 | Resolved: 2025-01-01

## 2025-01-22 PROBLEM — N39.0 URINARY TRACT INFECTION ASSOCIATED WITH CATHETERIZATION OF URINARY TRACT: Status: RESOLVED | Noted: 2025-01-01 | Resolved: 2025-01-01

## 2025-01-24 PROBLEM — Z51.5 COMFORT MEASURES ONLY STATUS: Status: ACTIVE | Noted: 2025-01-01

## 2025-01-24 PROBLEM — Z51.5 ENCOUNTER FOR PALLIATIVE CARE: Status: ACTIVE | Noted: 2025-01-01

## 2025-02-03 ENCOUNTER — TELEPHONE (OUTPATIENT)
Dept: FAMILY MEDICINE | Facility: CLINIC | Age: 80
End: 2025-02-03
Payer: MEDICARE

## (undated) DEVICE — SOL SOD CHLORIDE 0.9% 10ML

## (undated) DEVICE — GLOVE 7.5 PROTEXIS PI MICRO

## (undated) DEVICE — APPLICATOR CHLORAPREP CLR 10.5

## (undated) DEVICE — TRAY NERVE BLOCK